# Patient Record
Sex: FEMALE | Race: WHITE | NOT HISPANIC OR LATINO | Employment: UNEMPLOYED | URBAN - NONMETROPOLITAN AREA
[De-identification: names, ages, dates, MRNs, and addresses within clinical notes are randomized per-mention and may not be internally consistent; named-entity substitution may affect disease eponyms.]

---

## 2017-06-05 ENCOUNTER — OFFICE VISIT (OUTPATIENT)
Dept: FAMILY MEDICINE | Facility: OTHER | Age: 42
End: 2017-06-05
Attending: FAMILY MEDICINE
Payer: COMMERCIAL

## 2017-06-05 VITALS
HEIGHT: 64 IN | DIASTOLIC BLOOD PRESSURE: 62 MMHG | WEIGHT: 164 LBS | BODY MASS INDEX: 28 KG/M2 | TEMPERATURE: 98.9 F | HEART RATE: 66 BPM | SYSTOLIC BLOOD PRESSURE: 98 MMHG

## 2017-06-05 DIAGNOSIS — R10.11 RUQ ABDOMINAL PAIN: Primary | ICD-10-CM

## 2017-06-05 DIAGNOSIS — F41.1 GAD (GENERALIZED ANXIETY DISORDER): ICD-10-CM

## 2017-06-05 LAB
ALBUMIN SERPL-MCNC: 4.1 G/DL (ref 3.4–5)
ALBUMIN UR-MCNC: NEGATIVE MG/DL
ALP SERPL-CCNC: 118 U/L (ref 40–150)
ALT SERPL W P-5'-P-CCNC: 68 U/L (ref 0–50)
ANION GAP SERPL CALCULATED.3IONS-SCNC: 6 MMOL/L (ref 3–14)
APPEARANCE UR: CLEAR
AST SERPL W P-5'-P-CCNC: 32 U/L (ref 0–45)
BACTERIA #/AREA URNS HPF: ABNORMAL /HPF
BASOPHILS # BLD AUTO: 0 10E9/L (ref 0–0.2)
BASOPHILS NFR BLD AUTO: 0.3 %
BILIRUB SERPL-MCNC: 0.7 MG/DL (ref 0.2–1.3)
BILIRUB UR QL STRIP: NEGATIVE
BUN SERPL-MCNC: 11 MG/DL (ref 7–30)
CALCIUM SERPL-MCNC: 8.7 MG/DL (ref 8.5–10.1)
CHLORIDE SERPL-SCNC: 105 MMOL/L (ref 94–109)
CO2 SERPL-SCNC: 27 MMOL/L (ref 20–32)
COLOR UR AUTO: ABNORMAL
CREAT SERPL-MCNC: 0.71 MG/DL (ref 0.52–1.04)
DIFFERENTIAL METHOD BLD: NORMAL
EOSINOPHIL # BLD AUTO: 0.1 10E9/L (ref 0–0.7)
EOSINOPHIL NFR BLD AUTO: 1.5 %
ERYTHROCYTE [DISTWIDTH] IN BLOOD BY AUTOMATED COUNT: 12.2 % (ref 10–15)
GFR SERPL CREATININE-BSD FRML MDRD: 89 ML/MIN/1.7M2
GLUCOSE SERPL-MCNC: 100 MG/DL (ref 70–99)
GLUCOSE UR STRIP-MCNC: NEGATIVE MG/DL
HCT VFR BLD AUTO: 41.8 % (ref 35–47)
HGB BLD-MCNC: 14.7 G/DL (ref 11.7–15.7)
HGB UR QL STRIP: NEGATIVE
IMM GRANULOCYTES # BLD: 0 10E9/L (ref 0–0.4)
IMM GRANULOCYTES NFR BLD: 0.3 %
KETONES UR STRIP-MCNC: NEGATIVE MG/DL
LEUKOCYTE ESTERASE UR QL STRIP: NEGATIVE
LIPASE SERPL-CCNC: 226 U/L (ref 73–393)
LYMPHOCYTES # BLD AUTO: 1.5 10E9/L (ref 0.8–5.3)
LYMPHOCYTES NFR BLD AUTO: 20.2 %
MCH RBC QN AUTO: 30 PG (ref 26.5–33)
MCHC RBC AUTO-ENTMCNC: 35.2 G/DL (ref 31.5–36.5)
MCV RBC AUTO: 85 FL (ref 78–100)
MONOCYTES # BLD AUTO: 0.5 10E9/L (ref 0–1.3)
MONOCYTES NFR BLD AUTO: 7.1 %
MUCOUS THREADS #/AREA URNS LPF: PRESENT /LPF
NEUTROPHILS # BLD AUTO: 5.2 10E9/L (ref 1.6–8.3)
NEUTROPHILS NFR BLD AUTO: 70.6 %
NITRATE UR QL: NEGATIVE
NRBC # BLD AUTO: 0 10*3/UL
NRBC BLD AUTO-RTO: 0 /100
PH UR STRIP: 6 PH (ref 4.7–8)
PLATELET # BLD AUTO: 209 10E9/L (ref 150–450)
POTASSIUM SERPL-SCNC: 3.8 MMOL/L (ref 3.4–5.3)
PROT SERPL-MCNC: 7.5 G/DL (ref 6.8–8.8)
RBC # BLD AUTO: 4.9 10E12/L (ref 3.8–5.2)
RBC #/AREA URNS AUTO: 1 /HPF (ref 0–2)
SODIUM SERPL-SCNC: 138 MMOL/L (ref 133–144)
SP GR UR STRIP: 1.01 (ref 1–1.03)
SQUAMOUS #/AREA URNS AUTO: 2 /HPF (ref 0–1)
URN SPEC COLLECT METH UR: ABNORMAL
UROBILINOGEN UR STRIP-MCNC: NORMAL MG/DL (ref 0–2)
WBC # BLD AUTO: 7.4 10E9/L (ref 4–11)
WBC #/AREA URNS AUTO: 1 /HPF (ref 0–2)

## 2017-06-05 PROCEDURE — 80053 COMPREHEN METABOLIC PANEL: CPT | Mod: ZL | Performed by: FAMILY MEDICINE

## 2017-06-05 PROCEDURE — 81001 URINALYSIS AUTO W/SCOPE: CPT | Mod: ZL | Performed by: FAMILY MEDICINE

## 2017-06-05 PROCEDURE — 99214 OFFICE O/P EST MOD 30 MIN: CPT | Performed by: FAMILY MEDICINE

## 2017-06-05 PROCEDURE — 83690 ASSAY OF LIPASE: CPT | Mod: ZL | Performed by: FAMILY MEDICINE

## 2017-06-05 PROCEDURE — 85025 COMPLETE CBC W/AUTO DIFF WBC: CPT | Mod: ZL | Performed by: FAMILY MEDICINE

## 2017-06-05 PROCEDURE — 99212 OFFICE O/P EST SF 10 MIN: CPT

## 2017-06-05 PROCEDURE — 36415 COLL VENOUS BLD VENIPUNCTURE: CPT | Mod: ZL | Performed by: FAMILY MEDICINE

## 2017-06-05 ASSESSMENT — ANXIETY QUESTIONNAIRES
6. BECOMING EASILY ANNOYED OR IRRITABLE: SEVERAL DAYS
3. WORRYING TOO MUCH ABOUT DIFFERENT THINGS: SEVERAL DAYS
5. BEING SO RESTLESS THAT IT IS HARD TO SIT STILL: SEVERAL DAYS
IF YOU CHECKED OFF ANY PROBLEMS ON THIS QUESTIONNAIRE, HOW DIFFICULT HAVE THESE PROBLEMS MADE IT FOR YOU TO DO YOUR WORK, TAKE CARE OF THINGS AT HOME, OR GET ALONG WITH OTHER PEOPLE: SOMEWHAT DIFFICULT
2. NOT BEING ABLE TO STOP OR CONTROL WORRYING: SEVERAL DAYS
GAD7 TOTAL SCORE: 8
1. FEELING NERVOUS, ANXIOUS, OR ON EDGE: MORE THAN HALF THE DAYS
7. FEELING AFRAID AS IF SOMETHING AWFUL MIGHT HAPPEN: SEVERAL DAYS

## 2017-06-05 ASSESSMENT — PATIENT HEALTH QUESTIONNAIRE - PHQ9: 5. POOR APPETITE OR OVEREATING: SEVERAL DAYS

## 2017-06-05 ASSESSMENT — PAIN SCALES - GENERAL: PAINLEVEL: SEVERE PAIN (7)

## 2017-06-05 NOTE — MR AVS SNAPSHOT
"              After Visit Summary   2017    Lenora Montelongo    MRN: [id number]           Patient Information     Date Of Birth          1975        Visit Information        Provider Department      2017 4:00 PM Duncan Moore MD [facility]        Today's Diagnoses     RUQ abdominal pain    -  1       Follow-ups after your visit        Who to contact     If you have questions or need follow up information about today's clinic visit or your schedule please contact [facility] directly at [phone].  Normal or non-critical lab and imaging results will be communicated to you by MyChart, letter or phone within 4 business days after the clinic has received the results. If you do not hear from us within 7 days, please contact the clinic through [facility] or phone. If you have a critical or abnormal lab result, we will notify you by phone as soon as possible.  Submit refill requests through [employer] or call your pharmacy and they will forward the refill request to us. Please allow 3 business days for your refill to be completed.          Additional Information About Your Visit        MyChart Information     [employer] lets you send messages to your doctor, view your test results, renew your prescriptions, schedule appointments and more. To sign up, go to [email or web address] . Click on \"Log in\" on the left side of the screen, which will take you to the Welcome page. Then click on \"Sign up Now\" on the right side of the page.     You will be asked to enter the access code listed below, as well as some personal information. Please follow the directions to create your username and password.     Your access code is: [email or web address]  Expires: 9/3/2017  4:22 PM     Your access code will  in 90 days. If you need help or a new code, please call your [facility] or [phone].        Care EveryWhere ID     This is your Care EveryWhere ID. This could be used by other organizations to " "access your [facility] medical records  [id number]        Your Vitals Were     Pulse Temperature Height BMI (Body Mass Index)          66 98.9  F (37.2  C) 5' 3.5\" (1.613 m) 28.6 kg/m2         Blood Pressure from Last 3 Encounters:   06/05/17 98/62   11/29/16 100/60   11/22/16 94/62    Weight from Last 3 Encounters:   06/05/17 164 lb (74.4 kg)   11/29/16 164 lb (74.4 kg)   11/22/16 164 lb (74.4 kg)              We Performed the Following     CBC with platelets differential     Comprehensive metabolic panel     Lipase     UA with Microscopic reflex to Culture     US Abdomen Limited        Primary Care Provider Office Phone # Fax #    Duncan Moore [phone]       [facility] [address] [facility] [address] [facility] [address] [facility]  [place] [place] [address]        Thank you!     Thank you for choosing [facility]  for your care. Our goal is always to provide you with excellent care. Hearing back from our patients is one way we can continue to improve our services. Please take a few minutes to complete the written survey that you may receive in the mail after your visit with us. Thank you!             Your Updated Medication List - Protect others around you: Learn how to safely use, store and throw away your medicines at www.disposemymeds.org.          This list is accurate as of: 6/5/17  4:22 PM.  Always use your most recent med list.                   Brand Name Dispense Instructions for use    LANsoprazole 30 MG CR capsule    PREVACID    90 capsule    Take 1 capsule (30 mg) by mouth daily Take 30-60 minutes before a meal.       sertraline 100 MG tablet    ZOLOFT    90 tablet    Take 1 tablet (100 mg) by mouth daily 1/2 tab daily for 1 week then go to 1 tab daily       ZANTAC PO      Take 150 mg by mouth 2 times daily         "

## 2017-06-05 NOTE — NURSING NOTE
"Chief Complaint   Patient presents with     Abdominal Pain       Initial BP 98/62  Pulse 66  Temp 98.9  F (37.2  C)  Ht 5' 3.5\" (1.613 m)  Wt 164 lb (74.4 kg)  BMI 28.6 kg/m2 Estimated body mass index is 28.6 kg/(m^2) as calculated from the following:    Height as of this encounter: 5' 3.5\" (1.613 m).    Weight as of this encounter: 164 lb (74.4 kg).  Medication Reconciliation: complete     Andrea Pulliam      "

## 2017-06-05 NOTE — PATIENT INSTRUCTIONS
*Abdominal Pain, Unknown Cause (Female)    The exact cause of your abdominal (stomach) pain is not certain. This does not mean that this is something to worry about, or the right tests were not done. Everyone likes to know the exact cause of the problem, but sometimes with abdominal pain, there is no clear-cut cause, and this could be a good thing. The good news is that your symptoms can be treated, and you will feel better.   Your condition does not seem serious now; however, sometimes the signs of a serious problem may take more time to appear. For this reason, it is important for you to watch for any new symptoms, problems, or worsening of your condition.  Over the next few days, the abdominal pain may come and go, or be continuous. Other common symptoms can include nausea and vomiting. Sometimes it can be difficult to tell if you feel nauseous, you may just feel bad and not associate that feeling with nausea. Constipation, diarrhea, and a fever may go along with the pain.  The pain may continue even if treated correctly over the following days. Depending on how things go, sometimes the cause can become clear and may require further or different treatment. Additional evaluations, medications, or tests may be needed.  Home care  Your health care provider may prescribe medications for pain, symptoms, or an infection.  Follow the health care provider's instructions for taking these medications.  General care    Rest until your next exam. No strenuous activities.    Try to find positions that ease discomfort. A small pillow placed on the abdomen may help relieve pain.    Something warm on your abdomen (such as a heating pad) may help, but be careful not to burn yourself.  Diet    Do not force yourself to eat, especially if having cramps, vomiting, or diarrhea.    Water is important so you do not get dehydrated. Soup may also be good. Sports drinks may also help, especially if they are not too acidic. Make sure you  don't drink sugary drinks as this can make things worse. Take liquids in small amounts. Do not guzzle them.    Caffeine sometimes makes the pain and cramping worse.    Avoid dairy products if you have vomiting or diarrhea.    Don't eat large amounts at a time. Wait a few minutes between bites.    Eat a diet low in fiber (called a low-residue diet). Foods allowed include refined breads, white rice, fruit and vegetable juices without pulp, tender meats. These foods will pass more easily through the intestine.    Avoid fried or fatty foods, dairy, alcohol and spicy foods until your symptoms go away.  Follow-up care  Follow up with your health care provider as instructed, or if your pain does not begin to improve in the next 24 hours.  When to seek medical care  Seek prompt medical care if any of the following occur:    Pain gets worse or moves to the right lower abdomen    New or worsening vomiting or diarrhea    Swelling of the abdomen    Unable to pass stool for more than three days    New fever over 101  F (38.3 C), or rising fever    Blood in vomit or bowel movements (dark red or black color)    Jaundice (yellow color of eyes and skin)    Weakness, dizziness    Chest, arm, back, neck or jaw pain    Unexpected vaginal bleeding or missed period  Call 911  Call emergency services if any of the following occur:    Trouble breathing    Confusion    Fainting or loss of consciousness    Rapid heart rate    Seizure    [id number] [facility], [address] [place] [address], [place] [address]. All rights reserved. This information is not intended as a substitute for professional medical care. Always follow your healthcare professional's instructions.

## 2017-06-05 NOTE — PROGRESS NOTES
"  SUBJECTIVE:                                                    Lenora Montelongo is a 42 year old female who presents to clinic today for the following health issues:      Abdominal Pain      Duration: 2 months    Description (location/character/radiation): upper right side into back       Associated flank pain: None    Intensity:  moderate, severe    Accompanying signs and symptoms:        Fever/Chills: no        Gas/Bloating: YES       Nausea/vomitting: YES- nausea after eating       Diarrhea: YES       Dysuria or Hematuria: no     History (previous similar pain/trauma/previous testing): none    Precipitating or alleviating factors:       Pain worse with eating/BM/urination: with eating       Pain relieved by BM: no     Therapies tried and outcome: None    LMP:  not applicable    Increase nausea with eating     Pain comes and goes- can last for days    No f/c/constipation    Some diarrhea    Still has GB     No GERD-  On PPI    2014 had US and HIDA-- feels like this different pain.        C/o increase anxiety - lots of issues with family  Taking zoloft- wondering about counseling  Depression ok       Problem list and histories reviewed & adjusted, as indicated.  Additional history: as documented    Labs reviewed in EPIC    Reviewed and updated as needed this visit by clinical staff  Tobacco  Allergies  Meds  Med Hx  Surg Hx  Fam Hx  Soc Hx      Reviewed and updated as needed this visit by Provider         ROS:  C: NEGATIVE for fever, chills, change in weight  R: NEGATIVE for significant cough or SOB  CV: NEGATIVE for chest pain, palpitations or peripheral edema    OBJECTIVE:                                                    BP 98/62  Pulse 66  Temp 98.9  F (37.2  C)  Ht 5' 3.5\" (1.613 m)  Wt 164 lb (74.4 kg)  BMI 28.6 kg/m2  Body mass index is 28.6 kg/(m^2).   GENERAL: healthy, alert, well nourished, well hydrated, no distress  NECK: no tenderness, no adenopathy, no asymmetry, no masses, no stiffness; " thyroid- normal to palpation  RESP: lungs clear to auscultation - no rales, no rhonchi, no wheezes  CV: regular rates and rhythm, normal S1 S2, no S3 or S4 and no murmur, no click or rub -  ABDOMEN: soft, RMG/RUQ/epigastric  tenderness, no  hepatosplenomegaly, no masses, normal bowel sounds  Psych - overall doing well in the changes she is making  to help self. Minimal teary eyed which is good for her.         Results for orders placed or performed in visit on 06/05/17 (from the past 24 hour(s))   CBC with platelets differential   Result Value Ref Range    WBC 7.4 4.0 - 11.0 10e9/L    RBC Count 4.90 3.8 - 5.2 10e12/L    Hemoglobin 14.7 11.7 - 15.7 g/dL    Hematocrit 41.8 35.0 - 47.0 %    MCV 85 78 - 100 fl    MCH 30.0 26.5 - 33.0 pg    MCHC 35.2 31.5 - 36.5 g/dL    RDW 12.2 10.0 - 15.0 %    Platelet Count 209 150 - 450 10e9/L    Diff Method Automated Method     % Neutrophils 70.6 %    % Lymphocytes 20.2 %    % Monocytes 7.1 %    % Eosinophils 1.5 %    % Basophils 0.3 %    % Immature Granulocytes 0.3 %    Nucleated RBCs 0 0 /100    Absolute Neutrophil 5.2 1.6 - 8.3 10e9/L    Absolute Lymphocytes 1.5 0.8 - 5.3 10e9/L    Absolute Monocytes 0.5 0.0 - 1.3 10e9/L    Absolute Eosinophils 0.1 0.0 - 0.7 10e9/L    Absolute Basophils 0.0 0.0 - 0.2 10e9/L    Abs Immature Granulocytes 0.0 0 - 0.4 10e9/L    Absolute Nucleated RBC 0.0    UA with Microscopic reflex to Culture   Result Value Ref Range    Color Urine Light Yellow     Appearance Urine Clear     Glucose Urine Negative NEG mg/dL    Bilirubin Urine Negative NEG    Ketones Urine Negative NEG mg/dL    Specific Gravity Urine 1.007 1.003 - 1.035    Blood Urine Negative NEG    pH Urine 6.0 4.7 - 8.0 pH    Protein Albumin Urine Negative NEG mg/dL    Urobilinogen mg/dL Normal 0.0 - 2.0 mg/dL    Nitrite Urine Negative NEG    Leukocyte Esterase Urine Negative NEG    Source Midstream Urine     WBC Urine 1 0 - 2 /HPF    RBC Urine 1 0 - 2 /HPF    Bacteria Urine Few (A) NEG /HPF     Squamous Epithelial /HPF Urine 2 (H) 0 - 1 /HPF    Mucous Urine Present (A) NEG /LPF       ASSESSMENT/PLAN:                                                    (R10.11) RUQ abdominal pain  (primary encounter diagnosis)  Comment: etiology unclear.  possible GB vs colitis vs gastritis but on PPI  Plan: CBC with platelets differential, Comprehensive         metabolic panel, Lipase, US Abdomen Limited, UA        with Microscopic reflex to Culture        Work up done above.  IF GB US negative -recommend HIDA scan. [place] diet for now. Symptomatic treatment was discussed along when patient should call and/or come back into the clinic or go to ER/Urgent care. All questions answered.       (F41.1) [place] (generalized anxiety disorder)  Comment: discussed at length. Lots of good things she is doing  Need to keep from getting taken advantage of by her kids.   Plan: No change in meds. Yes would recommend counseling. Needs to be done late in day due to work.  Phones numbers to call for appt for counseling given    Continue current medications and behavioral changes.       35-40 minutes was spent with patient and over 50%  of this time was spent on counseling patient regarding  illness, medication and / or treatment  options, coordinating further cares and follow ups that are needed along with resource material that will be helpful in the treatment of these issues.         See Patient Instructions    Duncan Moore MD  [facility]

## 2017-06-06 DIAGNOSIS — Z12.31 VISIT FOR SCREENING MAMMOGRAM: Primary | ICD-10-CM

## 2017-06-06 ASSESSMENT — ANXIETY QUESTIONNAIRES: GAD7 TOTAL SCORE: 8

## 2017-06-06 ASSESSMENT — PATIENT HEALTH QUESTIONNAIRE - PHQ9: SUM OF ALL RESPONSES TO PHQ QUESTIONS 1-9: 0

## 2017-06-08 ENCOUNTER — HOSPITAL ENCOUNTER (OUTPATIENT)
Dept: ULTRASOUND IMAGING | Facility: HOSPITAL | Age: 42
Discharge: HOME | End: 2017-06-08
Attending: FAMILY MEDICINE | Admitting: FAMILY MEDICINE
Payer: COMMERCIAL

## 2017-06-08 PROCEDURE — 76705 ECHO EXAM OF ABDOMEN: CPT | Mod: TC

## 2017-07-14 DIAGNOSIS — K21.9 GASTROESOPHAGEAL REFLUX DISEASE WITHOUT ESOPHAGITIS: ICD-10-CM

## 2017-07-18 RX ORDER — LANSOPRAZOLE 30 MG/1
CAPSULE, DELAYED RELEASE ORAL
Qty: 90 CAPSULE | Refills: 2 | Status: SHIPPED | OUTPATIENT
Start: 2017-07-18 | End: 2017-08-25

## 2017-08-25 ENCOUNTER — TELEPHONE (OUTPATIENT)
Dept: FAMILY MEDICINE | Facility: OTHER | Age: 42
End: 2017-08-25

## 2017-08-25 DIAGNOSIS — K21.9 GASTROESOPHAGEAL REFLUX DISEASE WITHOUT ESOPHAGITIS: ICD-10-CM

## 2017-08-25 RX ORDER — LANSOPRAZOLE 30 MG/1
CAPSULE, DELAYED RELEASE ORAL
Qty: 90 CAPSULE | Refills: 2 | Status: SHIPPED | OUTPATIENT
Start: 2017-08-25 | End: 2017-11-13

## 2017-08-25 NOTE — TELEPHONE ENCOUNTER
Contacted insurance company to initiate prior authorization. Will take up to 45 calender days to receive a denial or an approval. Mame Ritter LPN

## 2017-09-05 NOTE — TELEPHONE ENCOUNTER
DENIAL - on 9/3/2017, received DENIAL from [facility] for Lansoprazole.  1 capsule per day for a max of 120 days within 365 days.  This applies to ALL PPI's under this patient's plan.  Forms scanned to Epic.  Leydi Posey, HIS Specialist.

## 2017-11-08 ENCOUNTER — TELEPHONE (OUTPATIENT)
Dept: FAMILY MEDICINE | Facility: OTHER | Age: 42
End: 2017-11-08

## 2017-11-08 NOTE — TELEPHONE ENCOUNTER
11:55 AM    Reason for Call: Phone Call    Description: Pt would like nurse to call back. Personal reasons.    Was an appointment offered for this call? No  If yes : Appointment type              Date    Preferred method for responding to this message: Telephone Call  What is your phone number ? [phone]    If we cannot reach you directly, may we leave a detailed response at the number you provided? Yes    Can this message wait until your PCP/provider returns, if available today? No, would like call today    Alba Domingo

## 2017-11-08 NOTE — TELEPHONE ENCOUNTER
2:11 PM    Reason for Call: OVERBOOK    Patient is having the following symptoms: depression for 2 weeks.    The patient is requesting an appointment for ASAP with .    Was an appointment offered for this call? Yes  If yes : Appointment type              Date    Preferred method for responding to this message: Telephone Call  What is your phone number ?    If we cannot reach you directly, may we leave a detailed response at the number you provided? Yes    Can this message wait until your PCP/provider returns, if unavailable today? Not applicable,     Cyndie Lazar

## 2017-11-13 ENCOUNTER — OFFICE VISIT (OUTPATIENT)
Dept: FAMILY MEDICINE | Facility: OTHER | Age: 42
End: 2017-11-13
Attending: FAMILY MEDICINE
Payer: COMMERCIAL

## 2017-11-13 VITALS
OXYGEN SATURATION: 98 % | DIASTOLIC BLOOD PRESSURE: 60 MMHG | HEIGHT: 64 IN | TEMPERATURE: 97.5 F | SYSTOLIC BLOOD PRESSURE: 92 MMHG | WEIGHT: 164 LBS | HEART RATE: 78 BPM | BODY MASS INDEX: 28 KG/M2

## 2017-11-13 DIAGNOSIS — G44.201 ACUTE INTRACTABLE TENSION-TYPE HEADACHE: ICD-10-CM

## 2017-11-13 DIAGNOSIS — M77.11 LATERAL EPICONDYLITIS OF RIGHT ELBOW: Primary | ICD-10-CM

## 2017-11-13 DIAGNOSIS — F43.23 ADJUSTMENT DISORDER WITH MIXED ANXIETY AND DEPRESSED MOOD: ICD-10-CM

## 2017-11-13 DIAGNOSIS — Z63.79 STRESS DUE TO ILLNESS OF FAMILY MEMBER: ICD-10-CM

## 2017-11-13 PROCEDURE — 99212 OFFICE O/P EST SF 10 MIN: CPT

## 2017-11-13 PROCEDURE — 99214 OFFICE O/P EST MOD 30 MIN: CPT | Performed by: FAMILY MEDICINE

## 2017-11-13 RX ORDER — CITALOPRAM HYDROBROMIDE 40 MG/1
TABLET ORAL
Qty: 30 TABLET | Refills: 1 | Status: SHIPPED | OUTPATIENT
Start: 2017-11-13 | End: 2017-11-29

## 2017-11-13 ASSESSMENT — ANXIETY QUESTIONNAIRES
3. WORRYING TOO MUCH ABOUT DIFFERENT THINGS: NEARLY EVERY DAY
1. FEELING NERVOUS, ANXIOUS, OR ON EDGE: NEARLY EVERY DAY
7. FEELING AFRAID AS IF SOMETHING AWFUL MIGHT HAPPEN: NEARLY EVERY DAY
5. BEING SO RESTLESS THAT IT IS HARD TO SIT STILL: NEARLY EVERY DAY
4. TROUBLE RELAXING: NEARLY EVERY DAY
2. NOT BEING ABLE TO STOP OR CONTROL WORRYING: NEARLY EVERY DAY
6. BECOMING EASILY ANNOYED OR IRRITABLE: NEARLY EVERY DAY
GAD7 TOTAL SCORE: 21

## 2017-11-13 ASSESSMENT — PAIN SCALES - GENERAL: PAINLEVEL: MODERATE PAIN (5)

## 2017-11-13 ASSESSMENT — PATIENT HEALTH QUESTIONNAIRE - PHQ9: SUM OF ALL RESPONSES TO PHQ QUESTIONS 1-9: 24

## 2017-11-13 NOTE — NURSING NOTE
"Chief Complaint   Patient presents with     Depression       Initial BP 92/60 (BP Location: Right arm, Patient Position: Chair, Cuff Size: Adult Regular)  Pulse 78  Temp 97.5  F (36.4  C) (Tympanic)  Ht 5' 3.5\" (1.613 m)  Wt 164 lb (74.4 kg)  SpO2 98%  BMI 28.6 kg/m2 Estimated body mass index is 28.6 kg/(m^2) as calculated from the following:    Height as of this encounter: 5' 3.5\" (1.613 m).    Weight as of this encounter: 164 lb (74.4 kg).  Medication Reconciliation: complete     Ligia Boateng     "

## 2017-11-13 NOTE — MR AVS SNAPSHOT
After Visit Summary   11/13/2017    Lenora Montelongo    MRN: [id number]           Patient Information     Date Of Birth          1975        Visit Information        Provider Department      11/13/2017 7:45 AM Duncan Moore MD [facility]        Today's Diagnoses     Lateral epicondylitis of right elbow    -  1    Adjustment disorder with mixed anxiety and depressed mood        Acute intractable tension-type headache        Stress due to illness of family member           Follow-ups after your visit        Additional Services     PHYSICAL THERAPY REFERRAL       *This order will print in the [facility] Central Scheduling Office*    [facility] provides Physical Therapy evaluation and treatment and many specialty services across the [facility] system.  If requesting a specialty program, please choose from the list below.    Call [phone] to schedule [facility] at all locations, with the exception of [facility], please call [phone].     Treatment: Evaluation & Treatment  Special Instructions/Modalities: evaluate and treat  May use ionto  Special Programs: Good HEP    Please be aware that coverage of these services is subject to the terms and limitations of your health insurance plan.  Call member services at your health plan with any benefit or coverage questions.      **Note to Provider** To refer patients to therapy outside of the location list, change the order class to External Referral in the order composer.            PSYCHOLOGY REFERRAL       Your provider has referred you to:  [facility]   Please be aware that coverage of these services is subject to the terms and limitations of your health insurance plan.  Call member services at your health plan with any benefit or coverage questions.      Please bring the following to your appointment:    >>   Any x-rays, CTs or MRIs which have been performed.   "Contact the facility where they were done to arrange for  prior to your scheduled appointment.   >>   List of current medications   >>   This referral request   >>   Any documents/labs given to you for this referral                  Your next 10 appointments already scheduled     Dec 11, 2017  3:30 PM CST   (Arrive by 3:15 PM)   SHORT with Duncan Moore MD   [facility]    [place] [address]   [phone]              Who to contact     If you have questions or need follow up information about today's clinic visit or your schedule please contact [facility] directly at [phone].  Normal or non-critical lab and imaging results will be communicated to you by MyChart, letter or phone within 4 business days after the clinic has received the results. If you do not hear from us within 7 days, please contact the clinic through MyChart or phone. If you have a critical or abnormal lab result, we will notify you by phone as soon as possible.  Submit refill requests through [employer] or call your pharmacy and they will forward the refill request to us. Please allow 3 business days for your refill to be completed.          Additional Information About Your Visit        MyChart Information     [employer] lets you send messages to your doctor, view your test results, renew your prescriptions, schedule appointments and more. To sign up, go to [email or web address] . Click on \"Log in\" on the left side of the screen, which will take you to the Welcome page. Then click on \"Sign up Now\" on the right side of the page.     You will be asked to enter the access code listed below, as well as some personal information. Please follow the directions to create your username and password.     Your access code is: [email or web address]  Expires: 2018  8:28 AM     Your access code will  in 90 days. If you need help or a new code, please call your [place] " "clinic or [phone].        Care EveryWhere ID     This is your Care EveryWhere ID. This could be used by other organizations to access your [place] medical records  [id number]        Your Vitals Were     Pulse Temperature Height Pulse Oximetry BMI (Body Mass Index)       78 97.5  F (36.4  C) (Tympanic) 5' 3.5\" (1.613 m) 98% 28.6 kg/m2        Blood Pressure from Last 3 Encounters:   11/13/17 92/60   06/05/17 98/62   11/29/16 100/60    Weight from Last 3 Encounters:   11/13/17 164 lb (74.4 kg)   06/05/17 164 lb (74.4 kg)   11/29/16 164 lb (74.4 kg)              We Performed the Following     PHYSICAL THERAPY REFERRAL     PSYCHOLOGY REFERRAL          Today's Medication Changes          These changes are accurate as of: 11/13/17  8:36 AM.  If you have any questions, ask your nurse or doctor.               Start taking these medicines.        Dose/Directions    ACE TENNIS ELBOW STRAP Misc   Used for:  Lateral epicondylitis of right elbow   Started by:  Duncan Moore MD        Dose:  1 each   1 each daily   Quantity:  1 each   Refills:  0       citalopram 40 MG tablet   Commonly known as:  celeXA   Used for:  Adjustment disorder with mixed anxiety and depressed mood   Started by:  Duncan Moore MD        Take 1/2 tablet (20 mg) for 4-6 days, then increase to 1 tablet orally daily   Quantity:  30 tablet   Refills:  1            Where to get your medicines      These medications were sent to [facility] [address] [place] [address] [place] - [address] AT [facility] [address], [place] [address]     Phone:  [phone]     ACE TENNIS ELBOW STRAP Misc    citalopram 40 MG tablet                Primary Care Provider Office Phone # Fax #    Duncan Moore [phone]       [facility] [address] [facility] [address]  [place] [address]        Equal Access to Services     KYE COLE AH: Hadii aad ku hadasho Soomaali, waaxda luqadaha, qaybta kaalmada ademelani, mely de leon " jose c santizo ah. So [facility] [phone].    ATENCIÓN: Si habla devang, tiene a roper disposición servicios gratuitos de asistencia lingüística. Tyesha al [phone].    We comply with applicable federal civil rights laws and [place] laws. We do not discriminate on the basis of race, color, national origin, age, disability, sex, sexual orientation, or gender identity.            Thank you!     Thank you for choosing [facility]  for your [facility]. Our goal is always to provide you with excellent care. Hearing back from our patients is one way we can continue to improve our services. Please take a few minutes to complete the written survey that you may receive in the mail after your visit with us. Thank you!             Your Updated Medication List - Protect others around you: Learn how to safely use, store and throw away your medicines at www.disposemymeds.org.          This list is accurate as of: 11/13/17  8:36 AM.  Always use your most recent med list.                   Brand Name Dispense Instructions for use Diagnosis    ACE TENNIS ELBOW STRAP Misc     1 each    1 each daily    Lateral epicondylitis of right elbow       citalopram 40 MG tablet    celeXA    30 tablet    Take 1/2 tablet (20 mg) for 4-6 days, then increase to 1 tablet orally daily    Adjustment disorder with mixed anxiety and depressed mood       PRILOSEC PO

## 2017-11-13 NOTE — PROGRESS NOTES
SUBJECTIVE:   Lenora Montelongo is a 42 year old female who presents to clinic today for the following health issues:      Depression and Anxiety Follow-Up    Status since last visit: Worsened     Other associated symptoms:agitation, not sleeping well, loss of appetite     Complicating factors:     Significant life event: No     Current substance abuse: None    Lots of stress with kids     PHQ-9 Score and MyChart F/U Questions 11/22/2016 6/5/2017 11/13/2017   Total Score 13 0 24   Q9: Suicide Ideation Not at all Not at all Not at all     AZ-7 SCORE 11/22/2016 6/5/2017 11/13/2017   Total Score 13 8 21       PHQ-9  English  PHQ-9   Any Language  GAD7  Suicide Assessment Five-step Evaluation and Treatment (SAFE-T)      Amount of exercise or physical activity: None    Problems taking medications regularly: doesn't like to    Medication side effects: none    Diet: regular (no restrictions)        Arm pain      Duration: 3 months    Description (location/character/radiation): fell down the stairs, hurt right arm. Whole arm hurts    Intensity:  moderate    Accompanying signs and symptoms: none    History (similar episodes/previous evaluation): None    Precipitating or alleviating factors: None    Therapies tried and outcome: None    Mainly over lateral right elbow    Hurts to pick anything up      C/o sharp right side head pain over right frontal head region. Last for few minutes and goes away.  Sharp and quick- like ice cream headache - going on for 3 weeks.  2-3 times a week  No pattern    Problem list and histories reviewed & adjusted, as indicated.  Additional history:     Labs reviewed in EPIC    Reviewed and updated as needed this visit by clinical staffTobacco  Allergies  Meds  Med Hx  Surg Hx  Fam Hx  Soc Hx      Reviewed and updated as needed this visit by Provider         ROS:  C: NEGATIVE for fever, chills, change in weight  R: NEGATIVE for significant cough or SOB  CV: NEGATIVE for chest pain,  "palpitations or peripheral edema    OBJECTIVE:                                                    BP 92/60 (BP Location: Right arm, Patient Position: Chair, Cuff Size: Adult Regular)  Pulse 78  Temp 97.5  F (36.4  C) (Tympanic)  Ht 5' 3.5\" (1.613 m)  Wt 164 lb (74.4 kg)  SpO2 98%  BMI 28.6 kg/m2  Body mass index is 28.6 kg/(m^2).   GENERAL: healthy, alert, well nourished, well hydrated, no distress  Heat - minimal  tender over right scalp   RESP: lungs clear to auscultation - no rales, no rhonchi, no wheezes  CV: regular rates and rhythm, normal S1 S2, no S3 or S4 and no murmur, no click or rub -  ABDOMEN: soft, no tenderness, no  hepatosplenomegaly, no masses, normal bowel sounds  MS: extremities- right elbow - tender lateral epicondyl region -- no gross deformities noted, no edema  PSYCH: Alert and oriented times 3; speech- coherent , normal rate and volume; able to articulate logical thoughts, able to abstract reason, no tangential thoughts, no hallucinations or delusions, affect- depressed and anxiety          ASSESSMENT/PLAN:                                                      (M77.11) Lateral epicondylitis of right elbow  (primary encounter diagnosis)  Comment: discussed   Plan: Elastic Bandages & Supports (ACE TENNIS ELBOW         STRAP) MISC, PHYSICAL THERAPY REFERRAL        Discussed in length conservative measures of OTC medications for pain, Ice/Heat, elevation and the concept of R.I.C.E.. Continue behavioral changes and proper body mechanics to allow for healing. Follow up as directed.   Symptomatic treatment was discussed along when patient should call and/or come back into the clinic or go to ER/Urgent care. All questions answered.     (F43.23) Adjustment disorder with mixed anxiety and depressed mood  Comment: long discussion. Will try Celexa   Plan: citalopram (CELEXA) 40 MG tablet        restart counseling.     (G44.201) Acute intractable tension-type headache  Comment: discussed   Plan: Will " watch - work on  Stress reduction.   Symptomatic treatment was discussed along when patient should call and/or come back into the clinic or go to ER/Urgent care. All questions answered.     F/u in 3-4 weeks.    See Patient Instructions    Duncan Moore MD  [facility]

## 2017-11-14 ASSESSMENT — ANXIETY QUESTIONNAIRES: GAD7 TOTAL SCORE: 21

## 2017-11-20 ENCOUNTER — TELEPHONE (OUTPATIENT)
Dept: PHYSICAL THERAPY | Facility: HOSPITAL | Age: 42
End: 2017-11-20

## 2017-11-20 ENCOUNTER — HOSPITAL ENCOUNTER (OUTPATIENT)
Dept: PHYSICAL THERAPY | Facility: HOSPITAL | Age: 42
Setting detail: THERAPIES SERIES
End: 2017-11-20
Attending: FAMILY MEDICINE
Payer: COMMERCIAL

## 2017-11-20 DIAGNOSIS — G89.29 ELBOW PAIN, CHRONIC, RIGHT: Primary | ICD-10-CM

## 2017-11-20 DIAGNOSIS — M25.521 ELBOW PAIN, CHRONIC, RIGHT: Primary | ICD-10-CM

## 2017-11-20 PROCEDURE — 97161 PT EVAL LOW COMPLEX 20 MIN: CPT | Mod: GP | Performed by: PHYSICAL THERAPIST

## 2017-11-20 NOTE — TELEPHONE ENCOUNTER
Hi Dr Moore,   I saw Lenora in clinic today for her R elbow and was wondering if you'd consider ordering a xray to rule out any bony injury.  Due to her mechanism of injury (falling down the stairs) and that she continues to have persistent swelling and pain with multiple motions at her elbow and wrist, I would feel most comfortable ruling out any bony pathology prior to carrying out some of the modalities and interventions I would typically use for tendinopathy and inflammation, as some of these interventions are contraindicated in the presence of a fracture or bony injury.  Please let me know your thoughts.    Thank you,   Sharon Ly DPT, OCS

## 2017-11-20 NOTE — PROGRESS NOTES
11/20/17 1100   General Information   Type of Visit Initial OP Ortho PT Evaluation   Start of Care Date 11/20/17   Referring Physician Dr Moore   Patient/Family Goals Statement less pain, normal function   Orders Evaluate and Treat   Date of Order 11/13/17   Insurance Type [employer]   Insurance Comments/Visits Authorized up to 40 visits   Medical Diagnosis R elbow pain   Surgical/Medical history reviewed Yes   Precautions/Limitations no known precautions/limitations   Special Instructions Contacted physician about xray due to mechanism of injury   Body Part(s)   Body Part(s) Elbow/Wrist   Presentation and Etiology   Pertinent history of current problem (include personal factors and/or comorbidities that impact the POC) Patient reports she fell carrying a chair on the stairs in September. She hit her elbow and experienced immediate pain and swelling and bruising. She has noted continued swellin and pain over the region the past few months. At her physician follow-up for depression, she was given an order for PT and a forearm strap to address her ongoing pain. She did not have imaging performed prior to coming to PT. 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 Pain;B. Decreased WB tolerance;C. Swelling;J. Burning;L. Tingling   Functional Limitations perform required work activities;perform activities of daily living   Symptom Location R elbow - throughout but most prominent R posterior medial aspect   How/Where did it occur With a fall   Onset date of current episode/exacerbation 11/13/17  (physician order date)   Chronicity New   Pain rating (0-10 point scale) Best (/10);Worst (/10)   Best (/10) 5   Worst (/10) 9   Pain quality A. Sharp;B. Dull;C. Aching;D. Burning   Frequency of pain/symptoms A. Constant   Pain/symptoms are: Worse during the day   Pain/symptoms exacerbated by C. Lifting;D.  Carrying;J. ADL;K. Home tasks;L. Work tasks   Pain/symptoms eased by J. Braces/supports   Progression of symptoms since onset: Unchanged   Current / Previous Interventions   Diagnostic Tests: (None)   Current Level of Function   Patient role/employment history A. Employed   Employment Comments  provider   Living environment [facility]   Fall Risk Screen   Fall screen completed by PT   Have you fallen 2 or more times in the past year? No   Have you fallen and had an injury in the past year? No   Is patient a fall risk? No   Elbow/Wrist Objective Findings   Side (if bilateral, select both right and left) Right   Observation no acute distress   Integumentary  Mild-mod swelling appreciated around extensor bundle   Posture WNL   Cervical Screen ROM WNL   Cervical Screen ULTT #1 neg   Varus Stress Test neg   Valgus Stress Test neg   Palpation TTP throughout extensor bundle, ECRB, medial elbow - grossly tender throughout upper arm and into forearm. Special tests unable to get reliable answer as many things were painful   Accessory Motion/Joint Mobility Pain with mobility testing of radial head and with radio-ulnar joint   Right Elbow Flexion/Extension AROM Full FLEX, limited EXT by 4 degrees   Right Wrist Flexion AROM min duran + pain   Right Wrist Extension AROM min duran + pain   Right Wrist Radial Deviation AROM min duran + pain   Right Wrist Ulnar Deviation AROM min duran + pain   Right Wrist Supination AROM WNL + pull   Right Wrist Pronation AROM WNL + pull   Right Biceps Brachii Strength 4/5 + pain   Right Brachialis Strength 4+/5 + pain   Right Brachioradialis Strength 4+/5 + pain   Right Triceps Brachii Strength 5-/5 pain   Right Flexor Carpi Radialis Strength 4/5 pain   Right Flexor Carpi Ulnaris Strength 5/5 pain   Right Extensor Carpi Radialis Brevis Strength 5-/5 pain   Right  Strength (lbs) 20 lbs R, 42 lbs L (R hand dominant)   Planned Therapy Interventions   Planned Therapy Interventions joint  mobilization;manual therapy;ROM;strengthening;stretching   Planned Modality Interventions   Planned Modality Interventions Cryotherapy;Electrical stimulation;Iontophoresis;TENS;Ultrasound   Planned Modality Interventions Comments if bony pathology is ruled out   Clinical Impression   Criteria for Skilled Therapeutic Interventions Met yes, treatment indicated   PT Diagnosis R elbow pain, possible tendinopathy but need to rule out bony pathology   Influenced by the following impairments pain and swelling   Functional limitations due to impairments difficulty performing home and work tasks   Clinical Presentation Stable/Uncomplicated   Clinical Presentation Rationale due to lack of additional comorbidities   Clinical Decision Making (Complexity) Low complexity   Therapy Frequency 2 times/Week   Predicted Duration of Therapy Intervention (days/wks) up to 6 weeks   Risk & Benefits of therapy have been explained Yes   Patient, Family & other staff in agreement with plan of care Yes   Clinical Impression Comments Presentation is consistent with R elbow pain associated with trauma with a fall. She would benefit from xray imaging to rule out bony pathology to ensure she will benefit from skilled PT intervention   Education Assessment   Preferred Learning Style Demonstration   Barriers to Learning No barriers   ORTHO GOALS   PT Ortho Eval Goals 1;2;3   Ortho Goal 1   Goal Identifier STG 1   Goal Description Patient will report decreased worst PL in R elbow to 6/10 or less in order to perform home tasks   Target Date 12/11/17   Ortho Goal 2   Goal Identifier LTG 1   Goal Description Patient will demonstrate increased  strength by 10 lbs in order to improve function at home of her R elbow/arm   Target Date 01/01/18   Ortho Goal 3   Goal Identifier LTG 2   Goal Description Patient will report overall 80% or better improvement in her R elbow symptoms in order to return to all home and worst duties   Target Date 01/01/18    Total Evaluation Time   Total Evaluation Time 30

## 2017-11-24 ENCOUNTER — APPOINTMENT (OUTPATIENT)
Dept: GENERAL RADIOLOGY | Facility: OTHER | Age: 42
End: 2017-11-24
Attending: FAMILY MEDICINE
Payer: COMMERCIAL

## 2017-11-24 DIAGNOSIS — G89.29 ELBOW PAIN, CHRONIC, RIGHT: ICD-10-CM

## 2017-11-24 DIAGNOSIS — M25.521 ELBOW PAIN, CHRONIC, RIGHT: ICD-10-CM

## 2017-11-24 PROCEDURE — 73070 X-RAY EXAM OF ELBOW: CPT | Mod: TC,LT

## 2017-11-26 ENCOUNTER — HEALTH MAINTENANCE LETTER (OUTPATIENT)
Age: 42
End: 2017-11-26

## 2017-11-29 ENCOUNTER — HOSPITAL ENCOUNTER (EMERGENCY)
Facility: HOSPITAL | Age: 42
Discharge: HOME | End: 2017-11-29
Attending: NURSE PRACTITIONER | Admitting: NURSE PRACTITIONER
Payer: COMMERCIAL

## 2017-11-29 VITALS
RESPIRATION RATE: 16 BRPM | DIASTOLIC BLOOD PRESSURE: 74 MMHG | SYSTOLIC BLOOD PRESSURE: 112 MMHG | OXYGEN SATURATION: 100 % | HEART RATE: 94 BPM | TEMPERATURE: 98.1 F

## 2017-11-29 DIAGNOSIS — R30.0 DYSURIA: ICD-10-CM

## 2017-11-29 LAB
ALBUMIN UR-MCNC: 30 MG/DL
APPEARANCE UR: ABNORMAL
BACTERIA #/AREA URNS HPF: ABNORMAL /HPF
BILIRUB UR QL STRIP: NEGATIVE
COLOR UR AUTO: YELLOW
GLUCOSE UR STRIP-MCNC: NEGATIVE MG/DL
HGB UR QL STRIP: ABNORMAL
KETONES UR STRIP-MCNC: NEGATIVE MG/DL
LEUKOCYTE ESTERASE UR QL STRIP: ABNORMAL
MUCOUS THREADS #/AREA URNS LPF: PRESENT /LPF
NITRATE UR QL: NEGATIVE
PH UR STRIP: 7.5 PH (ref 4.7–8)
RBC #/AREA URNS AUTO: 27 /HPF (ref 0–2)
SOURCE: ABNORMAL
SP GR UR STRIP: 1.02 (ref 1–1.03)
SQUAMOUS #/AREA URNS AUTO: 15 /HPF (ref 0–1)
UROBILINOGEN UR STRIP-MCNC: NORMAL MG/DL (ref 0–2)
WBC #/AREA URNS AUTO: 174 /HPF (ref 0–2)

## 2017-11-29 PROCEDURE — 81001 URINALYSIS AUTO W/SCOPE: CPT | Performed by: NURSE PRACTITIONER

## 2017-11-29 PROCEDURE — 99213 OFFICE O/P EST LOW 20 MIN: CPT | Performed by: NURSE PRACTITIONER

## 2017-11-29 PROCEDURE — 99213 OFFICE O/P EST LOW 20 MIN: CPT

## 2017-11-29 PROCEDURE — 87086 URINE CULTURE/COLONY COUNT: CPT | Performed by: NURSE PRACTITIONER

## 2017-11-29 RX ORDER — NITROFURANTOIN 25; 75 MG/1; MG/1
100 CAPSULE ORAL 2 TIMES DAILY
Qty: 14 CAPSULE | Refills: 0 | Status: SHIPPED | OUTPATIENT
Start: 2017-11-29 | End: 2017-12-06

## 2017-11-29 RX ORDER — PHENAZOPYRIDINE HYDROCHLORIDE 200 MG/1
200 TABLET, FILM COATED ORAL 3 TIMES DAILY
Qty: 9 TABLET | Refills: 0 | Status: SHIPPED | OUTPATIENT
Start: 2017-11-29 | End: 2017-12-02

## 2017-11-29 ASSESSMENT — ENCOUNTER SYMPTOMS
FATIGUE: 0
COUGH: 0
ACTIVITY CHANGE: 0
DIARRHEA: 0
CHILLS: 0
ABDOMINAL PAIN: 0
HEMATURIA: 0
NAUSEA: 0
VOMITING: 0
FLANK PAIN: 0
APPETITE CHANGE: 0
PSYCHIATRIC NEGATIVE: 1
FREQUENCY: 1
FEVER: 0
DYSURIA: 1
TROUBLE SWALLOWING: 0

## 2017-11-29 NOTE — ED PROVIDER NOTES
History     Chief Complaint   Patient presents with     UTI     The history is provided by the patient. No  was used.     Lenora Montelongo is a 42 year old female who presents with burning, frequency, and urgency with urination that started 2 days ago. No interventions for symptoms. Denies fever, chills, or night sweats. Eating and drinking well. Bowel is working well. No antibiotic use in the past 30 days.       Problem List:    Patient Active Problem List    Diagnosis Date Noted     Acute intractable tension-type headache 11/13/2017     Priority: Medium     AZ (generalized anxiety disorder) 06/05/2017     Priority: Medium     Prolonged grief reaction 11/01/2016     Priority: Medium     Major depressive disorder, recurrent episode, moderate (H) 11/01/2016     Priority: Medium     ACP (advance care planning) 08/24/2016     Priority: Medium     Advance Care Planning 8/24/2016: ACP Review of Chart / Resources Provided:  Reviewed chart for advance care plan.  Lenora Montelongo has no plan or code status on file. Discussed available resources and provided with information.   Added by Andrea Pulliam             Gastroesophageal reflux disease without esophagitis 04/08/2016     Priority: Medium     Helicobacter positive gastritis      Priority: Medium     Midline low back pain without sciatica 01/13/2016     Priority: Medium     Stress due to illness of family member 12/10/2014     Priority: Medium     OCD (obsessive compulsive disorder)      Priority: Medium        Past Medical History:    Past Medical History:   Diagnosis Date     Adjustment disorder with anxiety      Constipation 07/17/2012     Dysmenorrhea 06/20/2002     Dyspareunia 06/13/2007     Dysplasia of cervix (uteri)  07/25/2000     Endometriosis of uterus 12/29/2003     Esophageal reflux 02/07/2000     Helicobacter positive gastritis      IBS (Irritable colon syndrome) 07/06/2011     OCD (obsessive compulsive disorder)        Past Surgical  History:    Past Surgical History:   Procedure Laterality Date     bladder reconstruction and mesh removal  2012     BLADDER REPAIR  2010     colposcopy with biopsy-mutliple  10/2014     ROEL/LSO       TUBAL LIGATION         Family History:    Family History   Problem Relation Age of Onset     Other - See Comments Paternal Grandmother      Antitrysin Deficiency     CANCER Mother      Cervical     Cancer - colorectal Maternal Grandfather        Social History:  Marital Status:   [4]  Social History   Substance Use Topics     Smoking status: Never Smoker     Smokeless tobacco: Never Used     Alcohol use 0.0 oz/week      Comment: RARELY        Medications:      nitroFURantoin, macrocrystal-monohydrate, (MACROBID) 100 MG capsule   phenazopyridine (PYRIDIUM) 200 MG tablet   Omeprazole (PRILOSEC PO)   Elastic Bandages & Supports (ACE TENNIS ELBOW STRAP) MISC         Review of Systems   Constitutional: Negative for activity change, appetite change, chills, fatigue and fever.   HENT: Negative for trouble swallowing.    Respiratory: Negative for cough.    Gastrointestinal: Negative for abdominal pain, diarrhea, nausea and vomiting.   Genitourinary: Positive for dysuria, frequency and urgency. Negative for decreased urine volume, flank pain, hematuria, pelvic pain, vaginal bleeding, vaginal discharge and vaginal pain.        Bladder fullness and pressure.    Skin: Negative for rash.   Psychiatric/Behavioral: Negative.        Physical Exam   BP: 112/74  Pulse: 94  Temp: 98.1  F (36.7  C)  Resp: 16  SpO2: 100 %      Physical Exam   Constitutional: She is oriented to person, place, and time. She appears well-developed and well-nourished. No distress.   HENT:   Head: Normocephalic.   Mouth/Throat: Oropharynx is clear and moist.   Neck: Normal range of motion. Neck supple.   Cardiovascular: Normal rate, regular rhythm and normal heart sounds.    No murmur heard.  Pulmonary/Chest: Effort normal. No respiratory distress.    Abdominal: Soft. She exhibits no distension. There is no tenderness. There is no rebound and no guarding.   Genitourinary:   Genitourinary Comments: Negative bilateral CVA tenderness.    Musculoskeletal: Normal range of motion.   Lymphadenopathy:     She has no cervical adenopathy.   Neurological: She is alert and oriented to person, place, and time.   Skin: Skin is warm and dry. No rash noted. She is not diaphoretic. No erythema.   Psychiatric: She has a normal mood and affect. Her behavior is normal.   Nursing note and vitals reviewed.      ED Course     ED Course     Procedures    Results for orders placed or performed during the hospital encounter of 11/29/17   UA reflex to Microscopic and Culture   Result Value Ref Range    Color Urine Yellow     Appearance Urine Slightly Cloudy     Glucose Urine Negative NEG^Negative mg/dL    Bilirubin Urine Negative NEG^Negative    Ketones Urine Negative NEG^Negative mg/dL    Specific Gravity Urine 1.017 1.003 - 1.035    Blood Urine Small (A) NEG^Negative    pH Urine 7.5 4.7 - 8.0 pH    Protein Albumin Urine 30 (A) NEG^Negative mg/dL    Urobilinogen mg/dL Normal 0.0 - 2.0 mg/dL    Nitrite Urine Negative NEG^Negative    Leukocyte Esterase Urine Large (A) NEG^Negative    Source Midstream Urine     RBC Urine 27 (H) 0 - 2 /HPF    WBC Urine 174 (H) 0 - 2 /HPF    Bacteria Urine None (A) NEG^Negative /HPF    Squamous Epithelial /HPF Urine 15 (H) 0 - 1 /HPF    Mucous Urine Present (A) NEG^Negative /LPF   Urine Culture Aerobic Bacterial   Result Value Ref Range    Specimen Description Midstream Urine     Culture Micro Culture in progress        Assessments & Plan (with Medical Decision Making)     Discussed plan of care. She verbalized understanding. All questions answered.     I have reviewed the nursing notes.    I have reviewed the findings, diagnosis, plan and need for follow up with the patient.  Discharged in stable condition.     Discharge Medication List as of 11/29/2017   6:24 PM      START taking these medications    Details   nitroFURantoin, macrocrystal-monohydrate, (MACROBID) 100 MG capsule Take 1 capsule (100 mg) by mouth 2 times daily for 7 days, Disp-14 capsule, R-0, E-Prescribe      phenazopyridine (PYRIDIUM) 200 MG tablet Take 1 tablet (200 mg) by mouth 3 times daily for 3 days, Disp-9 tablet, R-0, E-Prescribe             Final diagnoses:   Dysuria     Take antibiotics as ordered.   Eat a yogurt daily while taking antibiotics.   Take pyridium as needed as directed for burning with urination.   Increase water intake.   Urinate after sexual intercourse.   Follow up with PCP with any increase in symptoms or concerns.   Return to urgent care or emergency department with any increase in symptoms or concerns.     AZAR Benavides  11/29/2017  5:47 PM  [facility]       Ciara Choi NP  11/30/17 100

## 2017-11-29 NOTE — ED AVS SNAPSHOT
[facility]    [address] [address]    Phone:  [phone]                                       Lenora Montelongo   MRN: [id number]    Department:  [facility]   Date of Visit:  11/29/2017           After Visit Summary Signature Page     I have received my discharge instructions, and my questions have been answered. I have discussed any challenges I see with this plan with the nurse or doctor.    ..........................................................................................................................................  Patient/Patient Representative Signature      ..........................................................................................................................................  Patient Representative Print Name and Relationship to Patient    ..................................................               ................................................  Date                                            Time    ..........................................................................................................................................  Reviewed by Signature/Title    ...................................................              ..............................................  Date                                                            Time

## 2017-11-29 NOTE — ED AVS SNAPSHOT
[facility]    [address]    [place] [address]    Phone:  [phone]                                       Lenora Montelongo   MRN: [id number]    Department:  [facility]   Date of Visit:  11/29/2017           Patient Information     Date Of Birth          1975        Your diagnoses for this visit were:     Dysuria        You were seen by Ciara Choi NP.      Follow-up Information     Follow up with Duncan Moore MD.    Specialty:  Family Practice    Why:  As needed, If symptoms worsen    Contact information:     [facility]  [address]  [place] [address] [phone]          Follow up with [facility].    Specialty:  EMERGENCY MEDICINE    Why:  As needed, If symptoms worsen    Contact information:    [address]  [place] [address] [phone]    Additional information:    From [place]: Take [address]. Turn left at [address]. Turn left at the first stoplight on [address]. At the first stop sign, take a right onto [address]. Take a left into the parking lot and continue through until you reach the North enterance of the building.       From [place]: Take [address]. Take the [address] ramp towards [place]. Turn left onto [address]. Take a slight right onto [address]. Turn left at the first stoplight on [address]. At the first stop sign, take a right onto [address]. Take a left into the parking lot and continue through until you reach the North enterance of the building.       From [place]: Take [address]. Take a right at [address]. At the first stop sign, take a right onto [address]. Take a left into the parking lot and continue through until you reach the North enterance of the building.         Discharge Instructions       Take antibiotics as ordered.   Eat a yogurt daily while taking antibiotics.   Take  pyridium as needed as directed for burning with urination.   Increase water intake.   Urinate after sexual intercourse.   Follow up with PCP with any increase in symptoms or concerns.   Return to urgent care or emergency department with any increase in symptoms or concerns.     Discharge References/Attachments     DYSURIA (ENGLISH)      Future Appointments        Provider Department Dept Phone Center    12/11/2017 3:30 PM Duncan Moore MD [facility] [phone] [facility]         Review of your medicines      START taking        Dose / Directions Last dose taken    nitroFURantoin (macrocrystal-monohydrate) 100 MG capsule   Commonly known as:  MACROBID   Dose:  100 mg   Quantity:  14 capsule        Take 1 capsule (100 mg) by mouth 2 times daily for 7 days   Refills:  0        phenazopyridine 200 MG tablet   Commonly known as:  PYRIDIUM   Dose:  200 mg   Quantity:  9 tablet        Take 1 tablet (200 mg) by mouth 3 times daily for 3 days   Refills:  0          Our records show that you are taking the medicines listed below. If these are incorrect, please call your family doctor or clinic.        Dose / Directions Last dose taken    ACE TENNIS ELBOW STRAP Misc   Dose:  1 each   Quantity:  1 each        1 each daily   Refills:  0        PRILOSEC PO        Refills:  0                Prescriptions were sent or printed at these locations (2 Prescriptions)                   [facility] [address] [facility], [place] - [address] AT [facility] [address]   [address], [place] [address]    Telephone:  [phone]   Fax:  [phone]   Hours:                  E-Prescribed (2 of 2)         nitroFURantoin, macrocrystal-monohydrate, (MACROBID) 100 MG capsule               phenazopyridine (PYRIDIUM) 200 MG tablet                Procedures and tests performed during your visit     UA reflex to Microscopic and Culture      Orders Needing Specimen Collection     None      Pending Results     No orders  "found from 2017 to 2017.            Pending Culture Results     No orders found from 2017 to 2017.            Thank you for choosing [facility]       Thank you for choosing [facility] for your care. Our goal is always to provide you with excellent care. Hearing back from our patients is one way we can continue to improve our services. Please take a few minutes to complete the written survey that you may receive in the mail after you visit with us. Thank you!        [employer] Information     [facility] lets you send messages to your doctor, view your test results, renew your prescriptions, schedule appointments and more. To sign up, go to [email or web address] . Click on \"Log in\" on the left side of the screen, which will take you to the Welcome page. Then click on \"Sign up Now\" on the right side of the page.     You will be asked to enter the access code listed below, as well as some personal information. Please follow the directions to create your username and password.     Your access code is: [email or web address]  Expires: 2018  8:28 AM     Your access code will  in 90 days. If you need help or a new code, please call your [facility] clinic or [phone].        Care EveryWhere ID     This is your Care EveryWhere ID. This could be used by other organizations to access your [facility] medical records  [id number]        Equal Access to Services     KYE COLE : Hadjulai Mena, christina sanches, qamely eugene . So [facility] [phone].    ATENCIÓN: Si habla español, tiene a roper disposición servicios gratuitos de asistencia lingüística. Tyesha al [phone].    We comply with applicable federal civil rights laws and [place] laws. We do not discriminate on the basis of race, color, national origin, age, disability, sex, sexual orientation, or gender identity.            After Visit Summary       This is your record. Keep this with " you and show to your community pharmacist(s) and doctor(s) at your next visit.

## 2017-11-30 NOTE — DISCHARGE INSTRUCTIONS
Take antibiotics as ordered.   Eat a yogurt daily while taking antibiotics.   Take pyridium as needed as directed for burning with urination.   Increase water intake.   Urinate after sexual intercourse.   Follow up with PCP with any increase in symptoms or concerns.   Return to urgent care or emergency department with any increase in symptoms or concerns.

## 2017-12-01 ENCOUNTER — TELEPHONE (OUTPATIENT)
Dept: FAMILY MEDICINE | Facility: OTHER | Age: 42
End: 2017-12-01

## 2017-12-01 LAB
BACTERIA SPEC CULT: ABNORMAL
SPECIMEN SOURCE: ABNORMAL

## 2017-12-01 NOTE — TELEPHONE ENCOUNTER
8:27 AM    Reason for Call: Phone Call    Description: Pt called and states that she would like a call back regarding her xray results     Was an appointment offered for this call? No  If yes : Appointment type              Date    Preferred method for responding to this message: Telephone Call  What is your phone number ?    If we cannot reach you directly, may we leave a detailed response at the number you provided? Yes    Can this message wait until your PCP/provider returns, if available today? Not applicable, PCP is in     Cyndie Hanover

## 2018-01-12 ENCOUNTER — OFFICE VISIT (OUTPATIENT)
Dept: FAMILY MEDICINE | Facility: OTHER | Age: 43
End: 2018-01-12
Attending: FAMILY MEDICINE
Payer: COMMERCIAL

## 2018-01-12 VITALS
HEIGHT: 64 IN | BODY MASS INDEX: 29.02 KG/M2 | TEMPERATURE: 96.8 F | DIASTOLIC BLOOD PRESSURE: 70 MMHG | HEART RATE: 76 BPM | SYSTOLIC BLOOD PRESSURE: 108 MMHG | OXYGEN SATURATION: 95 % | WEIGHT: 170 LBS

## 2018-01-12 DIAGNOSIS — M77.11 LATERAL EPICONDYLITIS OF RIGHT ELBOW: Primary | ICD-10-CM

## 2018-01-12 PROCEDURE — G0463 HOSPITAL OUTPT CLINIC VISIT: HCPCS

## 2018-01-12 PROCEDURE — 99213 OFFICE O/P EST LOW 20 MIN: CPT | Performed by: FAMILY MEDICINE

## 2018-01-12 RX ORDER — PREDNISONE 20 MG/1
TABLET ORAL
Qty: 12 TABLET | Refills: 0 | Status: SHIPPED | OUTPATIENT
Start: 2018-01-12 | End: 2018-07-06

## 2018-01-12 ASSESSMENT — ANXIETY QUESTIONNAIRES
1. FEELING NERVOUS, ANXIOUS, OR ON EDGE: NEARLY EVERY DAY
6. BECOMING EASILY ANNOYED OR IRRITABLE: MORE THAN HALF THE DAYS
GAD7 TOTAL SCORE: 18
7. FEELING AFRAID AS IF SOMETHING AWFUL MIGHT HAPPEN: MORE THAN HALF THE DAYS
2. NOT BEING ABLE TO STOP OR CONTROL WORRYING: NEARLY EVERY DAY
4. TROUBLE RELAXING: NEARLY EVERY DAY
5. BEING SO RESTLESS THAT IT IS HARD TO SIT STILL: MORE THAN HALF THE DAYS
3. WORRYING TOO MUCH ABOUT DIFFERENT THINGS: NEARLY EVERY DAY

## 2018-01-12 ASSESSMENT — PAIN SCALES - GENERAL: PAINLEVEL: SEVERE PAIN (7)

## 2018-01-12 ASSESSMENT — PATIENT HEALTH QUESTIONNAIRE - PHQ9: SUM OF ALL RESPONSES TO PHQ QUESTIONS 1-9: 21

## 2018-01-12 NOTE — NURSING NOTE
"Chief Complaint   Patient presents with     Musculoskeletal Problem       Initial /70  Pulse 76  Temp 96.8  F (36  C)  Ht 5' 3.5\" (1.613 m)  Wt 170 lb (77.1 kg)  SpO2 95%  BMI 29.64 kg/m2 Estimated body mass index is 29.64 kg/(m^2) as calculated from the following:    Height as of this encounter: 5' 3.5\" (1.613 m).    Weight as of this encounter: 170 lb (77.1 kg).  Medication Reconciliation: complete     Andrea Pulliam      "

## 2018-01-12 NOTE — MR AVS SNAPSHOT
After Visit Summary   1/12/2018    Lenora Montelongo    MRN: [id number]           Patient Information     Date Of Birth          1975        Visit Information        Provider Department      1/12/2018 9:45 AM Duncan Moore MD [facility] [place]        Today's Diagnoses     Lateral epicondylitis of right elbow    -  1       Follow-ups after your visit        Additional Services     PHYSICAL THERAPY REFERRAL       *This order will print in the [facility] Central Scheduling Office*    [facility] provides Physical Therapy evaluation and treatment and many specialty services across the [facility] system.  If requesting a specialty program, please choose from the list below.    Call [phone] to schedule [facility] at all locations, with the exception of [facility], please call [phone].     Treatment: Evaluation & Treatment  Special Instructions/Modalities: evaluate and treat - please use US and iontophoresis   Special Programs: good HEP    Please be aware that coverage of these services is subject to the terms and limitations of your health insurance plan.  Call member services at your health plan with any benefit or coverage questions.      **Note to Provider** To refer patients to therapy outside of the location list, change the order class to External Referral in the order composer.                  Who to contact     If you have questions or need follow up information about today's clinic visit or your schedule please contact [facility] [place] directly at [phone].  Normal or non-critical lab and imaging results will be communicated to you by MyChart, letter or phone within 4 business days after the clinic has received the results. If you do not hear from us within 7 days, please contact the clinic through MyChart or phone. If you have a critical or abnormal lab result, we will notify you by phone as  "soon as possible.  Submit refill requests through [facility] or call your pharmacy and they will forward the refill request to us. Please allow 3 business days for your refill to be completed.          Additional Information About Your Visit        [facility] [employer]IMA Information     [facility]insula lets you send messages to your doctor, view your test results, renew your prescriptions, schedule appointments and more. To sign up, go to [email or web address] . Click on \"Log in\" on the left side of the screen, which will take you to the Welcome page. Then click on \"Sign up Now\" on the right side of the page.     You will be asked to enter the access code listed below, as well as some personal information. Please follow the directions to create your username and password.     Your access code is: [email or web address]  Expires: 2018  8:28 AM     Your access code will  in 90 days. If you need help or a new code, please call your [facility] clinic or [phone].        Care EveryWhere ID     This is your Care EveryWhere ID. This could be used by other organizations to access your [facility] medical records  [id number]        Your Vitals Were     Pulse Temperature Height Pulse Oximetry BMI (Body Mass Index)       76 96.8  F (36  C) 5' 3.5\" (1.613 m) 95% 29.64 kg/m2        Blood Pressure from Last 3 Encounters:   18 108/70   17 112/74   17 92/60    Weight from Last 3 Encounters:   18 170 lb (77.1 kg)   17 164 lb (74.4 kg)   17 164 lb (74.4 kg)              We Performed the Following     PHYSICAL THERAPY REFERRAL          Today's Medication Changes          These changes are accurate as of: 18 10:42 AM.  If you have any questions, ask your nurse or doctor.               Start taking these medicines.        Dose/Directions    predniSONE 20 MG tablet   Commonly known as:  DELTASONE   Used for:  Lateral epicondylitis of right elbow   Started by:  Duncan Moore MD        1 tab orally twice daily for 4 " days then 1 tab daily for 4 days.   Quantity:  12 tablet   Refills:  0            Where to get your medicines      These medications were sent to [id number] - [place], [place] - [address] AT [facility] [address], [place] [address]     Phone:  [phone]     predniSONE 20 MG tablet                Primary Care Provider Office Phone # Fax #    Duncan Moore [phone]       [facility] [address]  [facility] [address]        Equal Access to Services     [facility]: Hadii aad ku hadasho Soomaali, waaxda luqadaha, qaybta kaalmada adeegyada, waxtati sheetsin hayanneln haley santizo . So [facility] [phone].    ATENCIÓN: Si habla español, tiene a roper disposición servicios gratuitos de asistencia lingüística. [facility] [phone].    We comply with applicable federal civil rights laws and [place] laws. We do not discriminate on the basis of race, color, national origin, age, disability, sex, sexual orientation, or gender identity.            Thank you!     Thank you for choosing [facility]  for your care. Our goal is always to provide you with excellent care. Hearing back from our patients is one way we can continue to improve our services. Please take a few minutes to complete the written survey that you may receive in the mail after your visit with us. Thank you!             Your Updated Medication List - Protect others around you: Learn how to safely use, store and throw away your medicines at www.disposemymeds.org.          This list is accurate as of: 1/12/18 10:42 AM.  Always use your most recent med list.                   Brand Name Dispense Instructions for use Diagnosis    ACE TENNIS ELBOW STRAP Misc     1 each    1 each daily    Lateral epicondylitis of right elbow       predniSONE 20 MG tablet    DELTASONE    12 tablet    1 tab orally twice daily for 4 days then 1 tab daily for 4 days.    Lateral epicondylitis of right  elbow       PRILOSEC PO

## 2018-01-12 NOTE — PROGRESS NOTES
"  SUBJECTIVE:   Lenora Montelongo is a 42 year old female who presents to clinic today for the following health issues:      Musculoskeletal problem/pain      Duration: 6 months    Description  Location: right elbow, right forearm and right shoulder    Intensity:  moderate    Accompanying signs and symptoms: radiation of pain to shoulder and wrist, numbness, tingling, weakness of arm, warmth and swelling    History  Previous similar problem: YES  Previous evaluation:  x-ray and physical therapy    Precipitating or alleviating factors:  Trauma or overuse: YES- fell downstairs  Aggravating factors include: lifting, exercise and overuse    Therapies tried and outcome: support wrap, acetaminophen and Ibuprofen    Fell at onsite and smashed elbow into the wall      Pain is centered at lateral epicondyl region     I seen in November             Problem list and histories reviewed & adjusted, as indicated.  Additional history: as documented    Labs reviewed in EPIC    Reviewed and updated as needed this visit by clinical staff     Reviewed and updated as needed this visit by Provider         ROS:  C: NEGATIVE for fever, chills, change in weight  E/M: NEGATIVE for ear, mouth and throat problems  R: NEGATIVE for significant cough or SOB  CV: NEGATIVE for chest pain, palpitations or peripheral edema    OBJECTIVE:                                                    /70  Pulse 76  Temp 96.8  F (36  C)  Ht 5' 3.5\" (1.613 m)  Wt 170 lb (77.1 kg)  SpO2 95%  BMI 29.64 kg/m2  Body mass index is 29.64 kg/(m^2).   GENERAL: healthy, alert, well nourished, well hydrated, no distress  MS: extremities- right elbow-- very tender over lateral epicondyl region no gross deformities noted, no edema         ASSESSMENT/PLAN:                                                    (M77.11) Lateral epicondylitis of right elbow  (primary encounter diagnosis)  Comment: Needs to go back to PT   Plan: predniSONE (DELTASONE) 20 MG tablet, PHYSICAL "         THERAPY REFERRAL        Risk and benefits of steroids  was discussed and verbal consent to proceed was given.   Discussed in length conservative measures of OTC medications for pain, Ice/Heat, elevation and the concept of R.I.C.E.. Continue behavioral changes and proper body mechanics to allow for healing. Follow up as directed.   Symptomatic treatment was discussed along when patient should call and/or come back into the clinic or go to ER/Urgent care. All questions answered.   Get back to try splint again             Duncan Moore MD  [facility]

## 2018-01-13 ASSESSMENT — ANXIETY QUESTIONNAIRES: GAD7 TOTAL SCORE: 18

## 2018-01-21 ENCOUNTER — HEALTH MAINTENANCE LETTER (OUTPATIENT)
Age: 43
End: 2018-01-21

## 2018-02-27 NOTE — PROGRESS NOTES
Discharge Summary  DC patient did not return following xray. Current status is unknown.  Sharon Ly DPSABA, OCS       11/20/17 1100   Signing Clinician's Name / Credentials   Signing clinician's name / credentials Sharon Aliyah DPT, OCS   Session Number   Session Number 0 eval only   Adult Goals   PT Ortho Eval Goals 1;2;3   Ortho Goal 1   Goal Identifier STG 1   Goal Description Patient will report decreased worst PL in R elbow to 6/10 or less in order to perform home tasks   Target Date 12/11/17   Date Met (DC patient did not return following xray)   Ortho Goal 2   Goal Identifier LTG 1   Goal Description Patient will demonstrate increased  strength by 10 lbs in order to improve function at home of her R elbow/arm   Target Date 01/01/18   Date Met (DC patient did not return following xray)   Ortho Goal 3   Goal Identifier LTG 2   Goal Description Patient will report overall 80% or better improvement in her R elbow symptoms in order to return to all home and worst duties   Target Date 01/01/18   Date Met (DC patient did not return following xray)   Assessments Completed   Assessments Completed PT evaluation completed and patient was referred to physician for order for xray to rule out bony pathology   Education   Learner Patient   Readiness Acceptance   Method Explanation   Response Verbalizes Understanding   Communication with other professionals   Communication with other professionals Contacted physician   Plan   Home program Ice and rest   Plan for next session If no bony pathology is appreciated, may initiate ionto and inflammation calming interventions and modalities   Comments   Comments DC patient did not return following xray   Total Session Time   Timed Code Treatment Minutes 0   Total Treatment Time (sum of timed and untimed services) 30 evaluation

## 2018-04-17 ENCOUNTER — TELEPHONE (OUTPATIENT)
Dept: FAMILY MEDICINE | Facility: OTHER | Age: 43
End: 2018-04-17

## 2018-04-17 NOTE — TELEPHONE ENCOUNTER
8:51 AM    Reason for Call: OVERBOOK    Patient is having the following symptoms: talk about anxiety medication for 1 weeks.    The patient is requesting an appointment for 04/17/18 with .    Was an appointment offered for this call? No  If yes : Appointment type              Date    Preferred method for responding to this message: Telephone Call  What is your phone number ?[phone]    If we cannot reach you directly, may we leave a detailed response at the number you provided? Yes    Can this message wait until your PCP/provider returns, if unavailable today? Not applicable, PCP is in     [facility]

## 2018-07-06 ENCOUNTER — OFFICE VISIT (OUTPATIENT)
Dept: FAMILY MEDICINE | Facility: OTHER | Age: 43
End: 2018-07-06
Attending: FAMILY MEDICINE
Payer: COMMERCIAL

## 2018-07-06 VITALS
BODY MASS INDEX: 29.59 KG/M2 | TEMPERATURE: 98.9 F | SYSTOLIC BLOOD PRESSURE: 104 MMHG | HEART RATE: 99 BPM | OXYGEN SATURATION: 98 % | HEIGHT: 63 IN | DIASTOLIC BLOOD PRESSURE: 70 MMHG | WEIGHT: 167 LBS

## 2018-07-06 DIAGNOSIS — M25.50 PAIN IN JOINT, MULTIPLE SITES: Primary | ICD-10-CM

## 2018-07-06 DIAGNOSIS — M35.3 POLYMYALGIA (H): ICD-10-CM

## 2018-07-06 LAB
ALBUMIN SERPL-MCNC: 3.9 G/DL (ref 3.4–5)
ALP SERPL-CCNC: 112 U/L (ref 40–150)
ALT SERPL W P-5'-P-CCNC: 77 U/L (ref 0–50)
ANION GAP SERPL CALCULATED.3IONS-SCNC: 8 MMOL/L (ref 3–14)
AST SERPL W P-5'-P-CCNC: 54 U/L (ref 0–45)
BASOPHILS # BLD AUTO: 0 10E9/L (ref 0–0.2)
BASOPHILS NFR BLD AUTO: 0.3 %
BILIRUB SERPL-MCNC: 0.8 MG/DL (ref 0.2–1.3)
BUN SERPL-MCNC: 7 MG/DL (ref 7–30)
CALCIUM SERPL-MCNC: 8.7 MG/DL (ref 8.5–10.1)
CHLORIDE SERPL-SCNC: 104 MMOL/L (ref 94–109)
CK SERPL-CCNC: 65 U/L (ref 30–225)
CO2 SERPL-SCNC: 25 MMOL/L (ref 20–32)
CREAT SERPL-MCNC: 0.87 MG/DL (ref 0.52–1.04)
CRP SERPL-MCNC: 6.4 MG/L (ref 0–8)
DIFFERENTIAL METHOD BLD: NORMAL
EOSINOPHIL # BLD AUTO: 0.1 10E9/L (ref 0–0.7)
EOSINOPHIL NFR BLD AUTO: 1 %
ERYTHROCYTE [DISTWIDTH] IN BLOOD BY AUTOMATED COUNT: 12.5 % (ref 10–15)
ERYTHROCYTE [SEDIMENTATION RATE] IN BLOOD BY WESTERGREN METHOD: 8 MM/H (ref 0–20)
GFR SERPL CREATININE-BSD FRML MDRD: 70 ML/MIN/1.7M2
GLUCOSE SERPL-MCNC: 97 MG/DL (ref 70–99)
HCT VFR BLD AUTO: 41.8 % (ref 35–47)
HGB BLD-MCNC: 14.7 G/DL (ref 11.7–15.7)
IMM GRANULOCYTES # BLD: 0 10E9/L (ref 0–0.4)
IMM GRANULOCYTES NFR BLD: 0.2 %
LYMPHOCYTES # BLD AUTO: 0.9 10E9/L (ref 0.8–5.3)
LYMPHOCYTES NFR BLD AUTO: 14.7 %
MCH RBC QN AUTO: 30.4 PG (ref 26.5–33)
MCHC RBC AUTO-ENTMCNC: 35.2 G/DL (ref 31.5–36.5)
MCV RBC AUTO: 86 FL (ref 78–100)
MISCELLANEOUS TEST: NORMAL
MONOCYTES # BLD AUTO: 0.6 10E9/L (ref 0–1.3)
MONOCYTES NFR BLD AUTO: 9 %
NEUTROPHILS # BLD AUTO: 4.6 10E9/L (ref 1.6–8.3)
NEUTROPHILS NFR BLD AUTO: 74.8 %
NRBC # BLD AUTO: 0 10*3/UL
NRBC BLD AUTO-RTO: 0 /100
PLATELET # BLD AUTO: 192 10E9/L (ref 150–450)
POTASSIUM SERPL-SCNC: 3.8 MMOL/L (ref 3.4–5.3)
PROT SERPL-MCNC: 7.3 G/DL (ref 6.8–8.8)
RBC # BLD AUTO: 4.84 10E12/L (ref 3.8–5.2)
SODIUM SERPL-SCNC: 137 MMOL/L (ref 133–144)
WBC # BLD AUTO: 6.1 10E9/L (ref 4–11)

## 2018-07-06 PROCEDURE — 82550 ASSAY OF CK (CPK): CPT | Mod: ZL | Performed by: FAMILY MEDICINE

## 2018-07-06 PROCEDURE — 86039 ANTINUCLEAR ANTIBODIES (ANA): CPT | Mod: ZL | Performed by: FAMILY MEDICINE

## 2018-07-06 PROCEDURE — 36415 COLL VENOUS BLD VENIPUNCTURE: CPT | Mod: ZL | Performed by: FAMILY MEDICINE

## 2018-07-06 PROCEDURE — 86200 CCP ANTIBODY: CPT | Mod: ZL | Performed by: FAMILY MEDICINE

## 2018-07-06 PROCEDURE — 99000 SPECIMEN HANDLING OFFICE-LAB: CPT | Performed by: FAMILY MEDICINE

## 2018-07-06 PROCEDURE — 99214 OFFICE O/P EST MOD 30 MIN: CPT | Performed by: FAMILY MEDICINE

## 2018-07-06 PROCEDURE — 86431 RHEUMATOID FACTOR QUANT: CPT | Mod: ZL | Performed by: FAMILY MEDICINE

## 2018-07-06 PROCEDURE — 85652 RBC SED RATE AUTOMATED: CPT | Mod: ZL | Performed by: FAMILY MEDICINE

## 2018-07-06 PROCEDURE — 87207 SMEAR SPECIAL STAIN: CPT | Mod: ZL | Performed by: FAMILY MEDICINE

## 2018-07-06 PROCEDURE — 86666 EHRLICHIA ANTIBODY: CPT | Mod: ZL | Performed by: FAMILY MEDICINE

## 2018-07-06 PROCEDURE — 80053 COMPREHEN METABOLIC PANEL: CPT | Mod: ZL | Performed by: FAMILY MEDICINE

## 2018-07-06 PROCEDURE — 86753 PROTOZOA ANTIBODY NOS: CPT | Mod: ZL,59 | Performed by: FAMILY MEDICINE

## 2018-07-06 PROCEDURE — 86618 LYME DISEASE ANTIBODY: CPT | Mod: ZL | Performed by: FAMILY MEDICINE

## 2018-07-06 PROCEDURE — G0463 HOSPITAL OUTPT CLINIC VISIT: HCPCS

## 2018-07-06 PROCEDURE — 84999 UNLISTED CHEMISTRY PROCEDURE: CPT | Mod: ZL | Performed by: FAMILY MEDICINE

## 2018-07-06 PROCEDURE — 87015 SPECIMEN INFECT AGNT CONCNTJ: CPT | Mod: ZL | Performed by: FAMILY MEDICINE

## 2018-07-06 PROCEDURE — 86753 PROTOZOA ANTIBODY NOS: CPT | Mod: ZL | Performed by: FAMILY MEDICINE

## 2018-07-06 PROCEDURE — 86140 C-REACTIVE PROTEIN: CPT | Mod: ZL | Performed by: FAMILY MEDICINE

## 2018-07-06 PROCEDURE — 85025 COMPLETE CBC W/AUTO DIFF WBC: CPT | Mod: ZL | Performed by: FAMILY MEDICINE

## 2018-07-06 RX ORDER — DICLOFENAC SODIUM 75 MG/1
75 TABLET, DELAYED RELEASE ORAL 2 TIMES DAILY
Qty: 30 TABLET | Refills: 1 | Status: SHIPPED | OUTPATIENT
Start: 2018-07-06 | End: 2019-03-22

## 2018-07-06 RX ORDER — TRAMADOL HYDROCHLORIDE 50 MG/1
TABLET ORAL
Qty: 25 TABLET | Refills: 0 | Status: SHIPPED | OUTPATIENT
Start: 2018-07-06 | End: 2018-08-14

## 2018-07-06 ASSESSMENT — PAIN SCALES - GENERAL: PAINLEVEL: WORST PAIN (10)

## 2018-07-06 NOTE — MR AVS SNAPSHOT
"              After Visit Summary   2018    Lenora Montelongo    MRN: [id number]           Patient Information     Date Of Birth          1975        Visit Information        Provider Department      2018 9:00 AM [facility] MD [facility]        Today's Diagnoses     Pain in joint, multiple sites    -  1    Polymyalgia (H)           Follow-ups after your visit        Who to contact     If you have questions or need follow up information about today's clinic visit or your schedule please contact [facility] directly at [phone].  Normal or non-critical lab and imaging results will be communicated to you by [employer], letter or phone within 4 business days after the clinic has received the results. If you do not hear from us within 7 days, please contact the clinic through [employer] or phone. If you have a critical or abnormal lab result, we will notify you by phone as soon as possible.  Submit refill requests through [employer] or call your pharmacy and they will forward the refill request to us. Please allow 3 business days for your refill to be completed.          Additional Information About Your Visit        MyChart Information     [employer] lets you send messages to your doctor, view your test results, renew your prescriptions, schedule appointments and more. To sign up, go to [email or web address] . Click on \"Log in\" on the left side of the screen, which will take you to the Welcome page. Then click on \"Sign up Now\" on the right side of the page.     You will be asked to enter the access code listed below, as well as some personal information. Please follow the directions to create your username and password.     Your access code is: [email or web address]  Expires: 10/4/2018 10:56 AM     Your access code will  in 90 days. If you need help or a new code, please call your [facility] or [phone].        Care EveryWhere ID     This is your Care EveryWhere ID. This could " "be used by other organizations to access your [place] medical records  [id number]        Your Vitals Were     Pulse Temperature Height Pulse Oximetry BMI (Body Mass Index)       99 98.9  F (37.2  C) 5' 3\" (1.6 m) 98% 29.58 kg/m2        Blood Pressure from Last 3 Encounters:   07/06/18 104/70   01/12/18 108/70   11/29/17 112/74    Weight from Last 3 Encounters:   07/06/18 167 lb (75.8 kg)   01/12/18 170 lb (77.1 kg)   11/13/17 164 lb (74.4 kg)              We Performed the Following     Anaplasma  phagocytoph antibody IgM     ARUP Miscellaneous Test     Babesia antibody IgG IgM     CBC with platelets differential     CK total     Comprehensive metabolic panel     CRP inflammation     Cyclic Citrullinated Peptide Antibody IgG     Erythrocyte sedimentation rate auto     MISAEL TITER: Laboratory Miscellaneous Order     Lyme Disease Alethea with reflex to WB Serum     Parasite stain     Rheumatoid factor          Today's Medication Changes          These changes are accurate as of 7/6/18 10:56 AM.  If you have any questions, ask your nurse or doctor.               Start taking these medicines.        Dose/Directions    diclofenac 75 MG EC tablet   Commonly known as:  VOLTAREN   Used for:  Pain in joint, multiple sites, Polymyalgia (H)   Started by:  Duncan Moore MD        Dose:  75 mg   Take 1 tablet (75 mg) by mouth 2 times daily   Quantity:  30 tablet   Refills:  1       traMADol 50 MG tablet   Commonly known as:  ULTRAM   Used for:  Pain in joint, multiple sites, Polymyalgia (H)   Started by:  Duncan Moore MD        1-2 TABS ORALLY EVERY 4-6 HRS AS NEEDED FOR SEVERE PAIN   Quantity:  25 tablet   Refills:  0         Stop taking these medicines if you haven't already. Please contact your care team if you have questions.     ACE TENNIS ELBOW STRAP Misc   Stopped by:  Duncan Moore MD           predniSONE 20 MG tablet   Commonly known as:  DELTASONE   Stopped by:  Duncan Moore MD                Where to get " your medicines      [id number] - [place], [place] - [address] AT [facility] [address], [place] [address]     Phone:  [phone]     diclofenac 75 MG EC tablet         Some of these will need a paper prescription and others can be bought over the counter.  Ask your nurse if you have questions.     Bring a paper prescription for each of these medications     traMADol 50 MG tablet               Information about OPIOIDS     PRESCRIPTION OPIOIDS: WHAT YOU NEED TO KNOW   We gave you an opioid (narcotic) pain medicine. It is important to manage your pain, but opioids are not always the best choice. You should first try all the other options your care team gave you. Take this medicine for as short a time (and as few doses) as possible.     These medicines have risks:    DO NOT drive when on new or higher doses of pain medicine. These medicines can affect your alertness and reaction times, and you could be arrested for driving under the influence (DUI). If you need to use opioids long-term, talk to your care team about driving.    DO NOT operate heave machinery    DO NOT do any other dangerous activities while taking these medicines.     DO NOT drink any alcohol while taking these medicines.      If the opioid prescribed includes acetaminophen, DO NOT take with any other medicines that contain acetaminophen. Read all labels carefully. Look for the word  acetaminophen  or  Tylenol.  Ask your pharmacist if you have questions or are unsure.    You can get addicted to pain medicines, especially if you have a history of addiction (chemical, alcohol or substance dependence). Talk to your care team about ways to reduce this risk.    Store your pills in a secure place, locked if possible. We will not replace any lost or stolen medicine. If you don t finish your medicine, please throw away (dispose) as directed by your pharmacist. The [place] [employer]  [employer]ency has more information about safe disposal: [email or web address]/living-green/managing-unwanted-medications.     All opioids tend to cause constipation. Drink plenty of water and eat foods that have a lot of fiber, such as fruits, vegetables, prune juice, apple juice and high-fiber cereal. Take a laxative (Miralax, milk of magnesia, Colace, Senna) if you don t move your bowels at least every other day.          Primary Care Provider Office Phone # Fax #    Duncan Moore [phone] [address] [facility] [address]        Equal Access to Services     [facility] : Hadii lien Mena, waolivia sanches, tiffanie kaalmamike adams, mely santizo . So [facility] [phone].    ATENCIÓN: Si habla español, tiene a roper disposición servicios gratuitos de asistencia lingüística. [facility] [phone].    We comply with applicable federal civil rights laws and [place] laws. We do not discriminate on the basis of race, color, national origin, age, disability, sex, sexual orientation, or gender identity.            Thank you!     Thank you for choosing [facility]  for your care. Our goal is always to provide you with excellent care. Hearing back from our patients is one way we can continue to improve our services. Please take a few minutes to complete the written survey that you may receive in the mail after your visit with us. Thank you!             Your Updated Medication List - Protect others around you: Learn how to safely use, store and throw away your medicines at www.disposemymeds.org.          This list is accurate as of 7/6/18 10:56 AM.  Always use your most recent med list.                   Brand Name Dispense Instructions for use Diagnosis    diclofenac 75 MG EC tablet    VOLTAREN    30 tablet    Take 1 tablet (75 mg) by mouth 2 times daily    Pain in joint, multiple sites, Polymyalgia (H)       PRILOSEC PO           traMADol 50  MG tablet    ULTRAM    25 tablet    1-2 TABS ORALLY EVERY 4-6 HRS AS NEEDED FOR SEVERE PAIN    Pain in joint, multiple sites, Polymyalgia (H)

## 2018-07-06 NOTE — PROGRESS NOTES
"  SUBJECTIVE:   Lenora Montelongo is a 43 year old female who presents to clinic today for the following health issues:      Musculoskeletal problem/pain      Duration: 1 month    Description  Location: joint, muscles and head    Intensity:  moderate, severe    Accompanying signs and symptoms: none    History  Previous similar problem: no   Previous evaluation:  none    Precipitating or alleviating factors:  Trauma or overuse: no   Aggravating factors include: none    Therapies tried and outcome: Ibuprofen does not help symptoms    C/o muscle and joint pain for about month  Thought was job related - quit job and not help  Started on hips then went to all other joints and muscles  C/o HA just last night  No F/C /Night sweats  No rash  ?? If tick bite-- has not found a tick-- low risk for tick bites  No joint swelling but everything painful  Motrin or tylenol not helping much  No one else with sx.  HA- tight squeezing feeling.   No neck pain   No focal neuro sx.   No new meds or change in meds.  No travel     Problem list and histories reviewed & adjusted, as indicated.  Additional history: as documented    Labs reviewed in EPIC    Reviewed and updated as needed this visit by clinical staff       Reviewed and updated as needed this visit by Provider         ROS:  CONSTITUTIONAL: NEGATIVE for fever, chills, change in weight  RESP: NEGATIVE for significant cough or SOB  CV: NEGATIVE for chest pain, palpitations or peripheral edema    OBJECTIVE:                                                    /70  Pulse 99  Temp 98.9  F (37.2  C)  Ht 5' 3\" (1.6 m)  Wt 167 lb (75.8 kg)  SpO2 98%  BMI 29.58 kg/m2  Body mass index is 29.58 kg/(m^2).   GENERAL: healthy, alert, well nourished, well hydrated, mild  distress  HENT: ear canals- normal; TMs- normal; Nose- normal; Mouth- no ulcers, no lesions  NECK: no tenderness, no adenopathy, no asymmetry, no masses, no stiffness; thyroid- normal to palpation  RESP: lungs clear to " auscultation - no rales, no rhonchi, no wheezes  CV: regular rates and rhythm, normal S1 S2, no S3 or S4 and no murmur, no click or rub -  ABDOMEN: soft, no tenderness, no  hepatosplenomegaly, no masses, normal bowel sounds  MS: extremities- no gross deformities noted, no edema, full ROM of joints - no swelling or warmth. Tender muscles.          Results for orders placed or performed in visit on 07/06/18 (from the past 24 hour(s))   CBC with platelets differential   Result Value Ref Range    WBC 6.1 4.0 - 11.0 10e9/L    RBC Count 4.84 3.8 - 5.2 10e12/L    Hemoglobin 14.7 11.7 - 15.7 g/dL    Hematocrit 41.8 35.0 - 47.0 %    MCV 86 78 - 100 fl    MCH 30.4 26.5 - 33.0 pg    MCHC 35.2 31.5 - 36.5 g/dL    RDW 12.5 10.0 - 15.0 %    Platelet Count 192 150 - 450 10e9/L    Diff Method Automated Method     % Neutrophils 74.8 %    % Lymphocytes 14.7 %    % Monocytes 9.0 %    % Eosinophils 1.0 %    % Basophils 0.3 %    % Immature Granulocytes 0.2 %    Nucleated RBCs 0 0 /100    Absolute Neutrophil 4.6 1.6 - 8.3 10e9/L    Absolute Lymphocytes 0.9 0.8 - 5.3 10e9/L    Absolute Monocytes 0.6 0.0 - 1.3 10e9/L    Absolute Eosinophils 0.1 0.0 - 0.7 10e9/L    Absolute Basophils 0.0 0.0 - 0.2 10e9/L    Abs Immature Granulocytes 0.0 0 - 0.4 10e9/L    Absolute Nucleated RBC 0.0    Comprehensive metabolic panel   Result Value Ref Range    Sodium 137 133 - 144 mmol/L    Potassium 3.8 3.4 - 5.3 mmol/L    Chloride 104 94 - 109 mmol/L    Carbon Dioxide 25 20 - 32 mmol/L    Anion Gap 8 3 - 14 mmol/L    Glucose 97 70 - 99 mg/dL    Urea Nitrogen 7 7 - 30 mg/dL    Creatinine 0.87 0.52 - 1.04 mg/dL    GFR Estimate 70 >60 mL/min/1.7m2    GFR Estimate If Black 85 >60 mL/min/1.7m2    Calcium 8.7 8.5 - 10.1 mg/dL    Bilirubin Total 0.8 0.2 - 1.3 mg/dL    Albumin 3.9 3.4 - 5.0 g/dL    Protein Total 7.3 6.8 - 8.8 g/dL    Alkaline Phosphatase 112 40 - 150 U/L    ALT 77 (H) 0 - 50 U/L    AST 54 (H) 0 - 45 U/L   CRP inflammation   Result Value Ref Range     CRP Inflammation 6.4 0.0 - 8.0 mg/L   Erythrocyte sedimentation rate auto   Result Value Ref Range    Sed Rate 8 0 - 20 mm/h   CK total   Result Value Ref Range    CK Total 65 30 - 225 U/L       ASSESSMENT/PLAN:                                                        ICD-10-CM    1. Pain in joint, multiple sites M25.50 CBC with platelets differential     Comprehensive metabolic panel     CRP inflammation     Erythrocyte sedimentation rate auto     Lyme Disease Alethea with reflex to WB Serum     Rheumatoid factor     Babesia antibody IgG IgM     MISAEL TITER: Laboratory Miscellaneous Order     Parasite stain     Anaplasma  phagocytoph antibody IgM     Cyclic Citrullinated Peptide Antibody IgG     CK total     ARUP Miscellaneous Test     diclofenac (VOLTAREN) 75 MG EC tablet     traMADol (ULTRAM) 50 MG tablet   2. Polymyalgia (H) M35.3 CBC with platelets differential     Comprehensive metabolic panel     CRP inflammation     Erythrocyte sedimentation rate auto     Lyme Disease Alethea with reflex to WB Serum     Rheumatoid factor     Babesia antibody IgG IgM     MISAEL TITER: Laboratory Miscellaneous Order     Parasite stain     Anaplasma  phagocytoph antibody IgM     Cyclic Citrullinated Peptide Antibody IgG     CK total     ARUP Miscellaneous Test     diclofenac (VOLTAREN) 75 MG EC tablet     traMADol (ULTRAM) 50 MG tablet     ETIOLOGY UNCLEAR-  Will wait for rest of labs.  Reassurance and symptomatic treatment discussed.  Discussed in length conservative measures of OTC medications for pain, Ice/Heat, elevation and the concept of R.I.C.E.. Continue behavioral changes and proper body mechanics to allow for healing. Follow up as directed.   Symptomatic treatment was discussed along when patient should call and/or come back into the clinic or go to ER/Urgent care. All questions answered.   Looks to have felt better with the negative tests so far. Risk and benefits of tramadol* was discussed and verbal consent to proceed was  given.     See Patient Instructions    Duncan Moore MD  [facility]

## 2018-07-06 NOTE — NURSING NOTE
"Chief Complaint   Patient presents with     Arthritis       Initial /70  Pulse 99  Temp 98.9  F (37.2  C)  Ht 5' 3\" (1.6 m)  Wt 167 lb (75.8 kg)  SpO2 98%  BMI 29.58 kg/m2 Estimated body mass index is 29.58 kg/(m^2) as calculated from the following:    Height as of this encounter: 5' 3\" (1.6 m).    Weight as of this encounter: 167 lb (75.8 kg).  Medication Reconciliation: complete    Andrea Pulliam LPN  "

## 2018-07-08 ENCOUNTER — HOSPITAL ENCOUNTER (EMERGENCY)
Facility: HOSPITAL | Age: 43
Discharge: HOME | End: 2018-07-08
Attending: NURSE PRACTITIONER | Admitting: NURSE PRACTITIONER
Payer: COMMERCIAL

## 2018-07-08 VITALS
TEMPERATURE: 99.8 F | OXYGEN SATURATION: 97 % | SYSTOLIC BLOOD PRESSURE: 116 MMHG | HEIGHT: 62 IN | DIASTOLIC BLOOD PRESSURE: 71 MMHG | RESPIRATION RATE: 16 BRPM
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
## 2018-07-08 DIAGNOSIS — S61.258A CAT BITE OF INDEX FINGER, INITIAL ENCOUNTER: ICD-10-CM

## 2018-07-08 LAB
PARASITE SPEC INSPECT: NORMAL
SPECIMEN SOURCE: NORMAL

## 2018-07-08 PROCEDURE — G0463 HOSPITAL OUTPT CLINIC VISIT: HCPCS

## 2018-07-08 PROCEDURE — 99213 OFFICE O/P EST LOW 20 MIN: CPT | Performed by: NURSE PRACTITIONER

## 2018-07-08 RX ORDER — DOXYCYCLINE 100 MG/1
100 CAPSULE ORAL 2 TIMES DAILY
Qty: 20 CAPSULE | Refills: 0 | Status: SHIPPED | OUTPATIENT
Start: 2018-07-08 | End: 2019-02-11

## 2018-07-08 RX ORDER — METRONIDAZOLE 500 MG/1
500 TABLET ORAL 2 TIMES DAILY
Qty: 20 TABLET | Refills: 0 | Status: SHIPPED | OUTPATIENT
Start: 2018-07-08 | End: 2019-02-11

## 2018-07-08 NOTE — ED TRIAGE NOTES
Pt presents today with c/o pt was bit by her own cat on Friday. Increased pain and swelling now to her second finger. Cat is up to date on shots.

## 2018-07-08 NOTE — ED PROVIDER NOTES
History     Chief Complaint   Patient presents with     Cat Bite     2 days ago, 2nd finger left hand. Reddness and swelling started yesterday and increased today. Pt's own cat. States UTD on shots.     HPI  Lenora Montelongo is a 43 year old right hand dominate female who presents today with a CC of right index finger pain and swelling x 2 days.  She was bit by her own cat 2 days ago.  The cat is an indoor cat, is up to date with rabies vaccinations.  It will be quarantined.  She noted swelling and pain that evening.  She has had low grade fevers at home.  She has been taking tramadol for pain with improvement.  She has also been washing with peroxide    Problem List:    Patient Active Problem List    Diagnosis Date Noted     Lateral epicondylitis of right elbow 01/12/2018     Priority: Medium     Acute intractable tension-type headache 11/13/2017     Priority: Medium     AZ (generalized anxiety disorder) 06/05/2017     Priority: Medium     Prolonged grief reaction 11/01/2016     Priority: Medium     Major depressive disorder, recurrent episode, moderate (H) 11/01/2016     Priority: Medium     ACP (advance care planning) 08/24/2016     Priority: Medium     Advance Care Planning 8/24/2016: ACP Review of Chart / Resources Provided:  Reviewed chart for advance care plan.  Lenora Montelongo has no plan or code status on file. Discussed available resources and provided with information.   Added by Andrea Pulliam             Gastroesophageal reflux disease without esophagitis 04/08/2016     Priority: Medium     Helicobacter positive gastritis      Priority: Medium     Midline low back pain without sciatica 01/13/2016     Priority: Medium     Stress due to illness of family member 12/10/2014     Priority: Medium     OCD (obsessive compulsive disorder)      Priority: Medium        Past Medical History:    Past Medical History:   Diagnosis Date     Adjustment disorder with anxiety      Constipation 07/17/2012     Dysmenorrhea  "06/20/2002     Dyspareunia 06/13/2007     Dysplasia of cervix (uteri)  07/25/2000     Endometriosis of uterus 12/29/2003     Esophageal reflux 02/07/2000     Helicobacter positive gastritis      IBS (Irritable colon syndrome) 07/06/2011     OCD (obsessive compulsive disorder)        Past Surgical History:    Past Surgical History:   Procedure Laterality Date     bladder reconstruction and mesh removal  2012     BLADDER REPAIR  2010     colposcopy with biopsy-mutliple  10/2014     ROEL/LSO       TUBAL LIGATION         Family History:    Family History   Problem Relation Age of Onset     Other - See Comments Paternal Grandmother      Antitrysin Deficiency     Cancer Mother      Cervical     Cancer - colorectal Maternal Grandfather        Social History:  Marital Status:   [4]  Social History   Substance Use Topics     Smoking status: Never Smoker     Smokeless tobacco: Never Used     Alcohol use 0.0 oz/week      Comment: RARELY        Medications:      diclofenac (VOLTAREN) 75 MG EC tablet   doxycycline (VIBRAMYCIN) 100 MG capsule   metroNIDAZOLE (FLAGYL) 500 MG tablet   Omeprazole (PRILOSEC PO)   traMADol (ULTRAM) 50 MG tablet         Review of Systems   Constitutional: Positive for fatigue and fever (low grade). Negative for appetite change.   Musculoskeletal: Positive for joint swelling.   Skin: Positive for wound.       Physical Exam   BP: 116/71  Heart Rate: 89  Temp: 99.8  F (37.7  C)  Resp: 16  Height: 157.5 cm (5' 2\")  SpO2: 97 %      Physical Exam   Constitutional: She is oriented to person, place, and time. She appears well-developed. She is cooperative. She does not appear ill.   HENT:   Head: Normocephalic and atraumatic.   Cardiovascular: Normal rate.    Pulmonary/Chest: Effort normal.   Musculoskeletal: Normal range of motion.        Right hand: She exhibits tenderness (Proximal and middle phalanx), laceration (index finger - diffuse puncture bites over MCP joint ~ 5 bites, no drainage or " "fluctuation) and swelling (index finger - mild to moderate). She exhibits normal range of motion (index finger - full ROM with mild to moderate pain), no bony tenderness, normal capillary refill and no deformity. Normal sensation noted. Normal strength noted.   Neurological: She is alert and oriented to person, place, and time.   Skin: Skin is warm and dry.   Psychiatric: She has a normal mood and affect. Her behavior is normal.   Nursing note and vitals reviewed.      ED Course     ED Course     Procedures    Medications - No data to display    Assessments & Plan (with Medical Decision Making)     I have reviewed the nursing notes.    I have reviewed the findings, diagnosis, plan and need for follow up with the patient.  ASSESSMENT / PLAN:  (S61.258A,  W55.01XA) Cat bite of index finger, initial encounter  Comment: right index finger  Plan: Doxycycline as prescribed   Flagyl as prescribed   Wash wounds 1-2 times daily with soap and water and cover with antiseptic ointment   Watch for signs and symptoms of infection including redness, swelling, increasing pain, drainage, odor, warmth, fever/chills   Return to PCP, ED or UC if this develops   Take all medications as prescribed   Pets fully vaccinated against rabies are at very low risk of rabies infection but    because human rabies is almost always fatal, any biting pet should be confined for 10 days.     If the animal is healthy at the end of the 10 days, there is no danger of rabies.  If the animal becomes ill or dies in the 10 day period,    contact animal control so the animal can be tested for rabies.     Return to ED/UC if symptoms increase or concerns develop such as those discussed and listed on the \"When to go the Emergency Room\" portion of your discharge instructions.     Patient verbally educated and given appropriate education sheets for their diagnoses and all questions answered to the best of my ability.    Discharge Medication List as of 7/8/2018  " 1:50 PM      START taking these medications    Details   doxycycline (VIBRAMYCIN) 100 MG capsule Take 1 capsule (100 mg) by mouth 2 times daily for 10 days, Disp-20 capsule, R-0, E-Prescribe      metroNIDAZOLE (FLAGYL) 500 MG tablet Take 1 tablet (500 mg) by mouth 2 times daily for 10 days, Disp-20 tablet, R-0, E-PrescribeEat yogurt or cottage cheese daily to prevent diarrhea that can be caused by taking this medication.             Final diagnoses:   Cat bite of index finger, initial encounter - right index finger       7/8/2018   [facility]     Nancy Betts NP  07/09/18 1952

## 2018-07-08 NOTE — ED AVS SNAPSHOT
[facility]    [address] [address]    Phone:  [phone]                                       Lenora Montelongo   MRN: [id number]    Department:  [facility]   Date of Visit:  7/8/2018           Patient Information     Date Of Birth          1975        Your diagnoses for this visit were:     Cat bite of index finger, initial encounter right index finger       You were seen by Nancy Betts NP.      Follow-up Information     Follow up with [facility].    Specialty:  EMERGENCY MEDICINE    Why:  As needed, If symptoms worsen, or concerns develop    Contact information:    [address] [address] [phone]    Additional information:    From [facility]: Take [address]. Turn left at [address]. Turn left at the first stoplight on [address]. At the first stop sign, take a right onto [facility]. Take a left into the parking lot and continue through until you reach the North enterance of the building.       From [place]: Take [address]. Take the [address] ramp towards [place]. Turn left onto [address]. Take a slight right onto [address]. Turn left at the first stoplight on [address]. At the first stop sign, take a right onto [address]. Take a left into the parking lot and continue through until you reach the North enterance of the building.       From [place]: Take [address]. Take a right at [address]. At the first stop sign, take a right onto [address]. Take a left into the parking lot and continue through until you reach the North enterance of the building.         Follow up with Duncan Moore MD. Call on 7/9/2018.    Specialty:  Family Practice    Why:  to schedule a follow up appointment for Monday or Tuesday for wound re-check    Contact information:    [address] [facility] [address]  [phone]           Discharge Instructions         Cat Bite    A cat bite can cause a wound deep enough to break the skin. In such cases, the wound is cleaned and then sometimes closed. If the wound is closed it is usually not closed completely. This is so that fluid can drain if the wound becomes infected. Often the wound is left open to heal. In addition to wound care, a tetanus shot may be given, if needed.  Home care    Wash your hands well with soap and warm water before and after caring for the wound. This helps lower the risk of infection.    Care for the wound as directed. If a dressing was applied to the wound, be sure to change it as directed.    If the wound bleeds, place a clean, soft cloth on the wound. Then firmly apply pressure until the bleeding stops. This may take up to 5 minutes. Do not release the pressure and look at the wound during this time.    Always get medical attention for cat bites on the hand. They are highly likely to become infected.    Most wounds heal within 10 days. But an infection can occur even with proper treatment. So be sure to check the wound daily for signs of infection (see below).    Antibiotics may be prescribed. These help prevent or treat infection. If you re given antibiotics, take them as directed. Also be sure to complete the medicines.  Rabies prevention  Rabies is a virus that can be carried in certain animals. These can include domestic animals such as cats and dogs. Pets fully vaccinated against rabies (2 shots) are at very low risk of infection. But because human rabies is almost always fatal, any biting pet should be confined for 10 days as an extra precaution. In general, if there is a risk for rabies, the following steps may need to be taken:    If someone s pet cat has bitten you, it should be kept in a secure area for the next 10 days to watch for signs of illness. (If the pet owner won t allow this, contact your local animal control center.) If the cat becomes ill or  dies during that time, contact your local animal control center at once so the animal may be tested for rabies. If the cat stays healthy for the next 10 days, there is no danger of rabies in the animal or you.    If a stray cat bit you, contact your local animal control center. They can give information on capture, quarantine, and animal rabies testing.    If you can t find the animal that bit you in the next 2 days, and if rabies exists in your area, you may need to receive the rabies vaccine series. Call your healthcare provider right away. Or return to the emergency department promptly.    All animal bites should be reported to the local animal control center. If you were not given a form to fill out, you can report this yourself.  Follow-up care  Follow up with your healthcare provider, or as directed.  When to seek medical advice  Call your healthcare provider right away if any of these occur:    Signs of infection:  ? Spreading redness or warmth from the wound  ? Increased pain or swelling  ? Fever of 100.4 F (38 C) or higher, or as directed by your healthcare provider  ? Colored fluid or pus draining from the wound  ? Enlarged lymph nodes above the area that was bitten, such as lymph nodes in the armpit if you were bitten on the hand or arm. This may be a sign of cat-scratch disease (cat-scratch fever).    Signs of rabies infection:  ? Headache  ? Confusion  ? Strange behavior  ? Increased salivating or drooling  ? Seizure    Decreased ability to move any body part near the bite area    Bleeding that can't be stopped after 5 minutes of firm pressure  Date Last Reviewed: 3/23/2015    9609-3636 [employer]. [address] [place] [address], [place] [address]. All rights reserved. This information is not intended as a substitute for professional medical care. Always follow your healthcare professional's instructions.             Review of your medicines      START taking        Dose / Directions Last dose  taken    doxycycline 100 MG capsule   Commonly known as:  VIBRAMYCIN   Dose:  100 mg   Quantity:  20 capsule        Take 1 capsule (100 mg) by mouth 2 times daily for 10 days   Refills:  0        metroNIDAZOLE 500 MG tablet   Commonly known as:  FLAGYL   Dose:  500 mg   Quantity:  20 tablet        Take 1 tablet (500 mg) by mouth 2 times daily for 10 days   Refills:  0          Our records show that you are taking the medicines listed below. If these are incorrect, please call your family doctor or clinic.        Dose / Directions Last dose taken    diclofenac 75 MG EC tablet   Commonly known as:  VOLTAREN   Dose:  75 mg   Quantity:  30 tablet        Take 1 tablet (75 mg) by mouth 2 times daily   Refills:  1        PRILOSEC PO        Refills:  0        traMADol 50 MG tablet   Commonly known as:  ULTRAM   Quantity:  25 tablet        1-2 TABS ORALLY EVERY 4-6 HRS AS NEEDED FOR SEVERE PAIN   Refills:  0                Prescriptions were sent or printed at these locations (2 Prescriptions)                   [facility] [address] [place] [address], [place] - [address] AT [facility] [address]   [address], [facility] [address]    Telephone:  [phone]   Fax:  [phone]   Hours:                  E-Prescribed (2 of 2)         doxycycline (VIBRAMYCIN) 100 MG capsule               metroNIDAZOLE (FLAGYL) 500 MG tablet                Orders Needing Specimen Collection     None      Pending Results     Date and Time Order Name Status Description    7/6/2018 0940 ARUP MISCELLANEOUS TEST In process     7/6/2018 0929 CYCLIC CITRULLINATED PEPTIDE ANTIBODY IGG In process     7/6/2018 0929 ANAPLASMA  PHAGOCYTOPH ANTIBODY IGM In process     7/6/2018 0929 BABESIA ANTIBODY IGG IGM In process     7/6/2018 0929 RHEUMATOID FACTOR In process     7/6/2018 0929 LYME DISEASE ANGEL WITH REFLEX TO WB SERUM In process             Pending Culture Results     No orders found from 7/6/2018 to 7/9/2018.            Thank you for choosing [facility]  "      Thank you for choosing [facility] for your care. Our goal is always to provide you with excellent care. Hearing back from our patients is one way we can continue to improve our services. Please take a few minutes to complete the written survey that you may receive in the mail after you visit with us. Thank you!        [employer] Information     [employer] lets you send messages to your doctor, view your test results, renew your prescriptions, schedule appointments and more. To sign up, go to [email or web address] . Click on \"Log in\" on the left side of the screen, which will take you to the Welcome page. Then click on \"Sign up Now\" on the right side of the page.     You will be asked to enter the access code listed below, as well as some personal information. Please follow the directions to create your username and password.     Your access code is: [email or web address]  Expires: 10/4/2018 10:56 AM     Your access code will  in 90 days. If you need help or a new code, please call your [facility] clinic or [phone].        Care EveryWhere ID     This is your Care EveryWhere ID. This could be used by other organizations to access your [facility] medical records  [id number]        Equal Access to Services     KYE COLE : Hannah Mena, waolivia sanches, qaybta kaalmada angie, mely coates. So [facility] [phone].    ATENCIÓN: Si habla español, tiene a roper disposición servicios gratuitos de asistencia lingüística. Llame al [phone].    We comply with applicable federal civil rights laws and [place] laws. We do not discriminate on the basis of race, color, national origin, age, disability, sex, sexual orientation, or gender identity.            After Visit Summary       This is your record. Keep this with you and show to your community pharmacist(s) and doctor(s) at your next visit.                  "

## 2018-07-08 NOTE — ED AVS SNAPSHOT
[facility]    [address] [address]    Phone:  [phone]                                       Lenora Montelongo   MRN: [id number]    Department[facility]  [facility]   Date of Visit:  7/8/2018           After Visit Summary Signature Page     I have received my discharge instructions, and my questions have been answered. I have discussed any challenges I see with this plan with the nurse or doctor.    ..........................................................................................................................................  Patient/Patient Representative Signature      ..........................................................................................................................................  Patient Representative Print Name and Relationship to Patient    ..................................................               ................................................  Date                                            Time    ..........................................................................................................................................  Reviewed by Signature/Title    ...................................................              ..............................................  Date                                                            Time

## 2018-07-08 NOTE — DISCHARGE INSTRUCTIONS
Cat Bite    A cat bite can cause a wound deep enough to break the skin. In such cases, the wound is cleaned and then sometimes closed. If the wound is closed it is usually not closed completely. This is so that fluid can drain if the wound becomes infected. Often the wound is left open to heal. In addition to wound care, a tetanus shot may be given, if needed.  Home care    Wash your hands well with soap and warm water before and after caring for the wound. This helps lower the risk of infection.    Care for the wound as directed. If a dressing was applied to the wound, be sure to change it as directed.    If the wound bleeds, place a clean, soft cloth on the wound. Then firmly apply pressure until the bleeding stops. This may take up to 5 minutes. Do not release the pressure and look at the wound during this time.    Always get medical attention for cat bites on the hand. They are highly likely to become infected.    Most wounds heal within 10 days. But an infection can occur even with proper treatment. So be sure to check the wound daily for signs of infection (see below).    Antibiotics may be prescribed. These help prevent or treat infection. If you re given antibiotics, take them as directed. Also be sure to complete the medicines.  Rabies prevention  Rabies is a virus that can be carried in certain animals. These can include domestic animals such as cats and dogs. Pets fully vaccinated against rabies (2 shots) are at very low risk of infection. But because human rabies is almost always fatal, any biting pet should be confined for 10 days as an extra precaution. In general, if there is a risk for rabies, the following steps may need to be taken:    If someone s pet cat has bitten you, it should be kept in a secure area for the next 10 days to watch for signs of illness. (If the pet owner won t allow this, contact your local animal control center.) If the cat becomes ill or dies during that time, contact your  local animal control center at once so the animal may be tested for rabies. If the cat stays healthy for the next 10 days, there is no danger of rabies in the animal or you.    If a stray cat bit you, contact your local animal control center. They can give information on capture, quarantine, and animal rabies testing.    If you can t find the animal that bit you in the next 2 days, and if rabies exists in your area, you may need to receive the rabies vaccine series. Call your healthcare provider right away. Or return to the emergency department promptly.    All animal bites should be reported to the local animal control center. If you were not given a form to fill out, you can report this yourself.  Follow-up care  Follow up with your healthcare provider, or as directed.  When to seek medical advice  Call your healthcare provider right away if any of these occur:    Signs of infection:  ? Spreading redness or warmth from the wound  ? Increased pain or swelling  ? Fever of 100.4 F (38 C) or higher, or as directed by your healthcare provider  ? Colored fluid or pus draining from the wound  ? Enlarged lymph nodes above the area that was bitten, such as lymph nodes in the armpit if you were bitten on the hand or arm. This may be a sign of cat-scratch disease (cat-scratch fever).    Signs of rabies infection:  ? Headache  ? Confusion  ? Strange behavior  ? Increased salivating or drooling  ? Seizure    Decreased ability to move any body part near the bite area    Bleeding that can't be stopped after 5 minutes of firm pressure  Date Last Reviewed: 3/23/2015    3168-1025 [employer]. [address] [place] [address], [place] [id number]. All rights reserved. This information is not intended as a substitute for professional medical care. Always follow your healthcare professional's instructions.

## 2018-07-09 LAB
B BURGDOR IGG+IGM SER QL: 0.28 (ref 0–0.89)
CCP AB SER IA-ACNC: 1 U/ML
RESULT: ABNORMAL
RHEUMATOID FACT SER NEPH-ACNC: <20 IU/ML (ref 0–20)
SEND OUTS MISC TEST CODE: ABNORMAL
SEND OUTS MISC TEST SPECIMEN: ABNORMAL
TEST NAME: ABNORMAL

## 2018-07-09 ASSESSMENT — ENCOUNTER SYMPTOMS
FATIGUE: 1
WOUND: 1
FEVER: 1
APPETITE CHANGE: 0
JOINT SWELLING: 1

## 2018-07-11 LAB — A PHAGOCYTOPH IGM TITR SER IF: NORMAL {TITER}

## 2018-07-12 LAB
B MICROTI IGG TITR SER: NORMAL {TITER}
B MICROTI IGM TITR SER: NORMAL {TITER}

## 2018-08-01 ENCOUNTER — TELEPHONE (OUTPATIENT)
Dept: FAMILY MEDICINE | Facility: OTHER | Age: 43
End: 2018-08-01

## 2018-08-01 NOTE — TELEPHONE ENCOUNTER
To: Dr. Moore nurse  Patient would like to speak with you regarding her last appointment.  Please call her @ [phone].  Thank you

## 2018-08-14 ENCOUNTER — OFFICE VISIT (OUTPATIENT)
Dept: FAMILY MEDICINE | Facility: OTHER | Age: 43
End: 2018-08-14
Attending: FAMILY MEDICINE
Payer: COMMERCIAL

## 2018-08-14 VITALS
SYSTOLIC BLOOD PRESSURE: 92 MMHG | TEMPERATURE: 97.5 F | BODY MASS INDEX: 30.18 KG/M2 | OXYGEN SATURATION: 98 % | HEART RATE: 77 BPM | WEIGHT: 165 LBS | DIASTOLIC BLOOD PRESSURE: 56 MMHG

## 2018-08-14 DIAGNOSIS — F33.1 MAJOR DEPRESSIVE DISORDER, RECURRENT EPISODE, MODERATE (H): ICD-10-CM

## 2018-08-14 DIAGNOSIS — Z12.31 ENCOUNTER FOR SCREENING MAMMOGRAM FOR BREAST CANCER: ICD-10-CM

## 2018-08-14 DIAGNOSIS — Z00.00 ROUTINE GENERAL MEDICAL EXAMINATION AT A HEALTH CARE FACILITY: Primary | ICD-10-CM

## 2018-08-14 DIAGNOSIS — F41.1 GAD (GENERALIZED ANXIETY DISORDER): ICD-10-CM

## 2018-08-14 PROCEDURE — 99396 PREV VISIT EST AGE 40-64: CPT | Performed by: FAMILY MEDICINE

## 2018-08-14 ASSESSMENT — ANXIETY QUESTIONNAIRES
2. NOT BEING ABLE TO STOP OR CONTROL WORRYING: SEVERAL DAYS
3. WORRYING TOO MUCH ABOUT DIFFERENT THINGS: SEVERAL DAYS
GAD7 TOTAL SCORE: 6
5. BEING SO RESTLESS THAT IT IS HARD TO SIT STILL: SEVERAL DAYS
4. TROUBLE RELAXING: SEVERAL DAYS
7. FEELING AFRAID AS IF SOMETHING AWFUL MIGHT HAPPEN: NOT AT ALL
6. BECOMING EASILY ANNOYED OR IRRITABLE: SEVERAL DAYS
1. FEELING NERVOUS, ANXIOUS, OR ON EDGE: SEVERAL DAYS

## 2018-08-14 ASSESSMENT — PAIN SCALES - GENERAL: PAINLEVEL: MODERATE PAIN (4)

## 2018-08-14 NOTE — NURSING NOTE
"Chief Complaint   Patient presents with     Physical       Initial BP 92/56  Pulse 77  Temp 97.5  F (36.4  C) (Tympanic)  Wt 165 lb (74.8 kg)  SpO2 98%  BMI 30.18 kg/m2 Estimated body mass index is 30.18 kg/(m^2) as calculated from the following:    Height as of 7/8/18: 5' 2\" (1.575 m).    Weight as of this encounter: 165 lb (74.8 kg).  Medication Reconciliation: complete    Socorro Borjas MA    "

## 2018-08-14 NOTE — PROGRESS NOTES
SUBJECTIVE:   CC: Lenora Montelongo is an 43 year old woman who presents for preventive health visit.     Healthy Habits:    Do you get at least three servings of calcium containing foods daily (dairy, green leafy vegetables, etc.)? yes    Amount of exercise or daily activities, outside of work: stays active every day     Problems taking medications regularly No    Medication side effects: No    Have you had an eye exam in the past two years? yes    Do you see a dentist twice per year? yes    Do you have sleep apnea, excessive snoring or daytime drowsiness?no      Depression and Anxiety Follow-Up    Status since last visit: Improved doing well     Other associated symptoms:somatic c/o - some sleep issues     Complicating factors:     Significant life event: Yes-  Adult Children issues      Current substance abuse: None    PHQ-9 11/13/2017 1/12/2018 8/14/2018   Total Score 24 21 4   Q9: Suicide Ideation Not at all Not at all Not at all     AZ-7 SCORE 11/13/2017 1/12/2018 8/14/2018   Total Score 21 18 6       PHQ-9  English  PHQ-9   Any Language  AZ-7  Suicide Assessment Five-step Evaluation and Treatment (SAFE-T)    PHQ-9 SCORE 11/13/2017 1/12/2018 8/14/2018   Total Score - - -   Total Score 24 21 4     AZ-7 SCORE 11/13/2017 1/12/2018 8/14/2018   Total Score 21 18 6           Abuse: Current or Past(Physical, Sexual or Emotional)- No  Do you feel safe in your environment - Yes    Social History   Substance Use Topics     Smoking status: Never Smoker     Smokeless tobacco: Never Used     Alcohol use 0.0 oz/week      Comment: RARELY     If you drink alcohol do you typically have >3 drinks per day or >7 drinks per week? No                     Reviewed orders with patient.  Reviewed health maintenance and updated orders accordingly - Yes  Labs reviewed in EPIC    Patient under age 50, mutual decision reflected in health maintenance.      Pertinent mammograms are reviewed under the imaging tab.  History of abnormal Pap  smear: Status post benign hysterectomy. Health Maintenance and Surgical History updated.     Reviewed and updated as needed this visit by clinical staff  Tobacco  Allergies  Meds  Med Hx  Surg Hx  Fam Hx  Soc Hx        Reviewed and updated as needed this visit by Provider        Past Medical History:   Diagnosis Date     Adjustment disorder with anxiety      Constipation 07/17/2012     Dysmenorrhea 06/20/2002     Dyspareunia 06/13/2007     Dysplasia of cervix (uteri)  07/25/2000     Endometriosis of uterus 12/29/2003     Esophageal reflux 02/07/2000     Helicobacter positive gastritis      IBS (Irritable colon syndrome) 07/06/2011     OCD (obsessive compulsive disorder)       Past Surgical History:   Procedure Laterality Date     bladder reconstruction and mesh removal  2012     BLADDER REPAIR  2010     colposcopy with biopsy-mutliple  10/2014     ROEL/LSO       TUBAL LIGATION         ROS:  CONSTITUTIONAL: NEGATIVE for fever, chills, change in weight  INTEGUMENTARU/SKIN: NEGATIVE for worrisome rashes, moles or lesions  EYES: NEGATIVE for vision changes or irritation  ENT: NEGATIVE for ear, mouth and throat problems  RESP: NEGATIVE for significant cough or SOB  BREAST: NEGATIVE for masses, tenderness or discharge  CV: NEGATIVE for chest pain, palpitations or peripheral edema  GI: NEGATIVE for nausea, abdominal pain, heartburn, or change in bowel habits  : NEGATIVE for unusual urinary or vaginal symptoms. Periods are regular.  MUSCULOSKELETAL: NEGATIVE for significant arthralgias or myalgia  NEURO: NEGATIVE for weakness, dizziness or paresthesias  PSYCHIATRIC: NEGATIVE for changes in mood or affect    OBJECTIVE:   BP 92/56  Pulse 77  Temp 97.5  F (36.4  C) (Tympanic)  Wt 165 lb (74.8 kg)  SpO2 98%  BMI 30.18 kg/m2  EXAM:  GENERAL: healthy, alert and no distress  EYES: Eyes grossly normal to inspection, PERRL and conjunctivae and sclerae normal  HENT: ear canals and TM's normal, nose and mouth without  "ulcers or lesions  NECK: no adenopathy, no asymmetry, masses, or scars and thyroid normal to palpation  RESP: lungs clear to auscultation - no rales, rhonchi or wheezes  BREAST: normal without masses, tenderness or nipple discharge and no palpable axillary masses or adenopathy  CV: regular rate and rhythm, normal S1 S2, no S3 or S4, no murmur, click or rub, no peripheral edema and peripheral pulses strong  ABDOMEN: soft, nontender, no hepatosplenomegaly, no masses and bowel sounds normal   (female): normal female external genitalia, normal urethral meatus, vaginal mucosa pink, moist, well rugated, and  without masses or discharge  MS: no gross musculoskeletal defects noted, no edema  SKIN: no suspicious lesions or rashes  NEURO: Normal strength and tone, mentation intact and speech normal  PSYCH: mentation appears normal, affect normal/bright        ASSESSMENT/PLAN:   1. Routine general medical examination at a health care facility  Doing well. Form filled out for insurance of      2. AZ (generalized anxiety disorder)  Stable - actually good control recently . Symptomatic treatment was discussed along when patient should call and/or come back into the clinic or go to ER/Urgent care. All questions answered.     3. Major depressive disorder, recurrent episode, moderate (H)  Stable - actually good control . Symptomatic treatment was discussed along when patient should call and/or come back into the clinic or go to ER/Urgent care. All questions answered.     4. Encounter for screening mammogram for breast cancer  Will order.   - MA Screen Bilateral w/Oskar; Future    COUNSELING:   Reviewed preventive health counseling, as reflected in patient instructions       Regular exercise       Healthy diet/nutrition    BP Readings from Last 1 Encounters:   08/14/18 92/56     Estimated body mass index is 30.18 kg/(m^2) as calculated from the following:    Height as of 7/8/18: 5' 2\" (1.575 m).    Weight as of this " encounter: 165 lb (74.8 kg).      Weight management plan: Discussed healthy diet and exercise guidelines and patient will follow up in 12 months in clinic to re-evaluate.     reports that she has never smoked. She has never used smokeless tobacco.      Counseling Resources:  ATP IV Guidelines  Pooled Cohorts Equation Calculator  Breast Cancer Risk Calculator  FRAX Risk Assessment  ICSI Preventive Guidelines  Dietary Guidelines for Americans, 2010  USDA's MyPlate  ASA Prophylaxis  Lung CA Screening    Duncan Moore MD  [facility]

## 2018-08-14 NOTE — LETTER
My Depression Action Plan  Name: Lenora Montelongo   Date of Birth 1975  Date: 8/14/2018    My doctor: Duncan Moore   My clinic: [facility] [place]  [place] [address]  [phone]          GREEN    ZONE   Good Control    What it looks like:     Things are going generally well. You have normal up s and down s. You may even feel depressed from time to time, but bad moods usually last less than a day.   What you need to do:  1. Continue to care for yourself (see self care plan)  2. Check your depression survival kit and update it as needed  3. Follow your physician s recommendations including any medication.  4. Do not stop taking medication unless you consult with your physician first.           YELLOW         ZONE Getting Worse    What it looks like:     Depression is starting to interfere with your life.     It may be hard to get out of bed; you may be starting to isolate yourself from others.    Symptoms of depression are starting to last most all day and this has happened for several days.     You may have suicidal thoughts but they are not constant.   What you need to do:     1. Call your care team, your response to treatment will improve if you keep your care team informed of your progress. Yellow periods are signs an adjustment may need to be made.     2. Continue your self-care, even if you have to fake it!    3. Talk to someone in your support network    4. Open up your depression survival kit           RED    ZONE Medical Alert - Get Help    What it looks like:     Depression is seriously interfering with your life.     You may experience these or other symptoms: You can t get out of bed most days, can t work or engage in other necessary activities, you have trouble taking care of basic hygiene, or basic responsibilities, thoughts of suicide or death that will not go away, self-injurious behavior.     What you need to do:  1. Call your care team and request a  same-day appointment. If they are not available (weekends or after hours) call your local crisis line, emergency room or 911.            Depression Self Care Plan / Survival Kit    Self-Care for Depression  Here s the deal. Your body and mind are really not as separate as most people think.  What you do and think affects how you feel and how you feel influences what you do and think. This means if you do things that people who feel good do, it will help you feel better.  Sometimes this is all it takes.  There is also a place for medication and therapy depending on how severe your depression is, so be sure to consult with your medical provider and/ or Behavioral Health Consultant if your symptoms are worsening or not improving.     In order to better manage my stress, I will:    Exercise  Get some form of exercise, every day. This will help reduce pain and release endorphins, the  feel good  chemicals in your brain. This is almost as good as taking antidepressants!  This is not the same as joining a gym and then never going! (they count on that by the way ) It can be as simple as just going for a walk or doing some gardening, anything that will get you moving.      Hygiene   Maintain good hygiene (Get out of bed in the morning, Make your bed, Brush your teeth, Take a shower, and Get dressed like you were going to work, even if you are unemployed).  If your clothes don't fit try to get ones that do.    Diet  I will strive to eat foods that are good for me, drink plenty of water, and avoid excessive sugar, caffeine, alcohol, and other mood-altering substances.  Some foods that are helpful in depression are: complex carbohydrates, B vitamins, flaxseed, fish or fish oil, fresh fruits and vegetables.    Psychotherapy  I agree to participate in Individual Therapy (if recommended).    Medication  If prescribed medications, I agree to take them.  Missing doses can result in serious side effects.  I understand that drinking  alcohol, or other illicit drug use, may cause potential side effects.  I will not stop my medication abruptly without first discussing it with my provider.    Staying Connected With Others  I will stay in touch with my friends, family members, and my primary care provider/team.    Use your imagination  Be creative.  We all have a creative side; it doesn t matter if it s oil painting, sand castles, or mud pies! This will also kick up the endorphins.    Witness Beauty  (AKA stop and smell the roses) Take a look outside, even in mid-winter. Notice colors, textures. Watch the squirrels and birds.     Service to others  Be of service to others.  There is always someone else in need.  By helping others we can  get out of ourselves  and remember the really important things.  This also provides opportunities for practicing all the other parts of the program.    Humor  Laugh and be silly!  Adjust your TV habits for less news and crime-drama and more comedy.    Control your stress  Try breathing deep, massage therapy, biofeedback, and meditation. Find time to relax each day.     My support system    Clinic Contact:  Phone number:    Contact 1:  Phone number:    Contact 2:  Phone number:    [employer]/:  Phone number:    Therapist:  Phone number:    Local crisis center:    Phone number:    Other community support:  Phone number:

## 2018-08-14 NOTE — MR AVS SNAPSHOT
After Visit Summary   8/14/2018    Lenora Montelongo    MRN: [id number]           Patient Information     Date Of Birth          1975        Visit Information        Provider Department      8/14/2018 11:15 AM Duncan Moore MD [facility]        Today's Diagnoses     Routine general medical examination at a health care facility    -  1    AZ (generalized anxiety disorder)        Major depressive disorder, recurrent episode, moderate (H)        Encounter for screening mammogram for breast cancer          Care Instructions      Preventive Health Recommendations  Female Ages 40 to 49    Yearly exam:     See your health care provider every year in order to  1. Review health changes.   2. Discuss preventive care.    3. Review your medicines if your doctor prescribed any.      Get a Pap test every three years (unless you have an abnormal result and your provider advises testing more often).      If you get Pap tests with HPV test, you only need to test every 5 years, unless you have an abnormal result. You do not need a Pap test if your uterus was removed (hysterectomy) and you have not had cancer.      You should be tested each year for STDs (sexually transmitted diseases), if you're at risk.     Ask your doctor if you should have a mammogram.      Have a colonoscopy (test for colon cancer) if someone in your family has had colon cancer or polyps before age 50.       Have a cholesterol test every 5 years.       Have a diabetes test (fasting glucose) after age 45. If you are at risk for diabetes, you should have this test every 3 years.    Shots: Get a flu shot each year. Get a tetanus shot every 10 years.     Nutrition:     Eat at least 5 servings of fruits and vegetables each day.    Eat whole-grain bread, whole-wheat pasta and brown rice instead of white grains and rice.    Get adequate Calcium and Vitamin D.      Lifestyle    Exercise at least 150 minutes a week (an average of 30  minutes a day, 5 days a week). This will help you control your weight and prevent disease.    Limit alcohol to one drink per day.    No smoking.     Wear sunscreen to prevent skin cancer.    See your dentist every six months for an exam and cleaning.          Follow-ups after your visit        Future tests that were ordered for you today     Open Future Orders        Priority Expected Expires Ordered    MA Screen Bilateral w/Oskar Routine  8/14/2019 8/14/2018            Who to contact     If you have questions or need follow up information about today's clinic visit or your schedule please contact [facility] directly at [phone].  Normal or non-critical lab and imaging results will be communicated to you by MyChart, letter or phone within 4 business days after the clinic has received the results. If you do not hear from us within 7 days, please contact the clinic through MyChart or phone. If you have a critical or abnormal lab result, we will notify you by phone as soon as possible.  Submit refill requests through [employer] or call your pharmacy and they will forward the refill request to us. Please allow 3 business days for your refill to be completed.          Additional Information About Your Visit        Care EveryWhere ID     This is your Care EveryWhere ID. This could be used by other organizations to access your [facility] medical records  [id number]        Your Vitals Were     Pulse Temperature Pulse Oximetry BMI (Body Mass Index)          77 97.5  F (36.4  C) (Tympanic) 98% 30.18 kg/m2         Blood Pressure from Last 3 Encounters:   08/14/18 92/56   07/08/18 116/71   07/06/18 104/70    Weight from Last 3 Encounters:   08/14/18 165 lb (74.8 kg)   07/06/18 167 lb (75.8 kg)   01/12/18 170 lb (77.1 kg)               Primary Care Provider Office Phone # Fax #    Duncan Moore [phone]       [facility] [address]        Equal Access to Services     KYE COLE  AH: Hannah torres Trina, wascotda luqadaha, qaybta karick marymelani, waxtati inez marilynvidal guzmanterecordell coates. So [facility] [phone].    ATENCIÓN: Si habla español, tiene a roper disposición servicios gratuitos de asistencia lingüística. Llame al [phone].    We comply with applicable federal civil rights laws and [place] laws. We do not discriminate on the basis of race, color, national origin, age, disability, sex, sexual orientation, or gender identity.            Thank you!     [facility] you [facility]  [facility] your [facility]. Our goal is always to provide you with excellent care. Hearing back from our patients is one way we can continue to improve our services. Please take a few minutes to complete the written survey that you may receive in the mail after your visit with us. Thank you!             Your Updated Medication List - Protect others around you: Learn how to safely use, store and throw away your medicines at www.disposemymeds.org.          This list is accurate as of 8/14/18 12:38 PM.  Always use your most recent med list.                   Brand Name Dispense Instructions for use Diagnosis    diclofenac 75 MG EC tablet    VOLTAREN    30 tablet    Take 1 tablet (75 mg) by mouth 2 times daily    Pain in joint, multiple sites, Polymyalgia (H)       PRILOSEC PO

## 2018-08-15 ASSESSMENT — PATIENT HEALTH QUESTIONNAIRE - PHQ9: SUM OF ALL RESPONSES TO PHQ QUESTIONS 1-9: 4

## 2018-08-15 ASSESSMENT — ANXIETY QUESTIONNAIRES: GAD7 TOTAL SCORE: 6

## 2018-11-09 ENCOUNTER — TELEPHONE (OUTPATIENT)
Dept: FAMILY MEDICINE | Facility: OTHER | Age: 43
End: 2018-11-09

## 2018-11-09 NOTE — TELEPHONE ENCOUNTER
Spoke to patient - looking to get all of son's medical records.   Patient advised to call release of information and was given the phone number to reach.     Jasmyn Tapia LPN

## 2018-11-09 NOTE — TELEPHONE ENCOUNTER
Pt would like Dr Moore nurse to call her back please at [phone] regarding records.  She did not want to be transferred to release of information.

## 2018-11-12 ENCOUNTER — OFFICE VISIT (OUTPATIENT)
Dept: FAMILY MEDICINE | Facility: OTHER | Age: 43
End: 2018-11-12
Attending: FAMILY MEDICINE
Payer: COMMERCIAL

## 2018-11-12 VITALS
OXYGEN SATURATION: 97 % | HEIGHT: 62 IN | SYSTOLIC BLOOD PRESSURE: 118 MMHG | WEIGHT: 159 LBS | RESPIRATION RATE: 16 BRPM | BODY MASS INDEX: 29.26 KG/M2 | HEART RATE: 72 BPM | DIASTOLIC BLOOD PRESSURE: 64 MMHG | TEMPERATURE: 97.5 F

## 2018-11-12 DIAGNOSIS — R10.11 RUQ ABDOMINAL PAIN: ICD-10-CM

## 2018-11-12 DIAGNOSIS — L65.9 HAIR LOSS: Primary | ICD-10-CM

## 2018-11-12 DIAGNOSIS — B00.9 HERPES SIMPLEX VIRUS INFECTION: ICD-10-CM

## 2018-11-12 DIAGNOSIS — F41.1 GAD (GENERALIZED ANXIETY DISORDER): ICD-10-CM

## 2018-11-12 LAB — TSH SERPL DL<=0.005 MIU/L-ACNC: 1.74 MU/L (ref 0.4–4)

## 2018-11-12 PROCEDURE — 99214 OFFICE O/P EST MOD 30 MIN: CPT | Performed by: FAMILY MEDICINE

## 2018-11-12 PROCEDURE — 84443 ASSAY THYROID STIM HORMONE: CPT | Performed by: FAMILY MEDICINE

## 2018-11-12 PROCEDURE — 36415 COLL VENOUS BLD VENIPUNCTURE: CPT | Performed by: FAMILY MEDICINE

## 2018-11-12 RX ORDER — ACYCLOVIR 400 MG/1
400 TABLET ORAL 3 TIMES DAILY
Qty: 90 TABLET | Refills: 1 | Status: SHIPPED | OUTPATIENT
Start: 2018-11-12 | End: 2020-11-11

## 2018-11-12 RX ORDER — OMEPRAZOLE 40 MG/1
40 CAPSULE, DELAYED RELEASE ORAL DAILY
Qty: 90 CAPSULE | Refills: 1 | Status: SHIPPED | OUTPATIENT
Start: 2018-11-12 | End: 2019-08-20

## 2018-11-12 ASSESSMENT — ANXIETY QUESTIONNAIRES
IF YOU CHECKED OFF ANY PROBLEMS ON THIS QUESTIONNAIRE, HOW DIFFICULT HAVE THESE PROBLEMS MADE IT FOR YOU TO DO YOUR WORK, TAKE CARE OF THINGS AT HOME, OR GET ALONG WITH OTHER PEOPLE: SOMEWHAT DIFFICULT
3. WORRYING TOO MUCH ABOUT DIFFERENT THINGS: MORE THAN HALF THE DAYS
6. BECOMING EASILY ANNOYED OR IRRITABLE: MORE THAN HALF THE DAYS
1. FEELING NERVOUS, ANXIOUS, OR ON EDGE: MORE THAN HALF THE DAYS
5. BEING SO RESTLESS THAT IT IS HARD TO SIT STILL: SEVERAL DAYS
2. NOT BEING ABLE TO STOP OR CONTROL WORRYING: MORE THAN HALF THE DAYS
GAD7 TOTAL SCORE: 13
7. FEELING AFRAID AS IF SOMETHING AWFUL MIGHT HAPPEN: MORE THAN HALF THE DAYS
4. TROUBLE RELAXING: MORE THAN HALF THE DAYS

## 2018-11-12 ASSESSMENT — PAIN SCALES - GENERAL: PAINLEVEL: NO PAIN (0)

## 2018-11-12 ASSESSMENT — PATIENT HEALTH QUESTIONNAIRE - PHQ9: SUM OF ALL RESPONSES TO PHQ QUESTIONS 1-9: 17

## 2018-11-12 NOTE — MR AVS SNAPSHOT
After Visit Summary   11/12/2018    Lenora Montelongo    MRN: [id number]           Patient Information     Date Of Birth          1975        Visit Information        Provider Department      11/12/2018 11:15 AM Duncan Moore MD [facility]        Today's Diagnoses     Hair loss    -  1    Herpes simplex virus infection        AZ (generalized anxiety disorder)           Follow-ups after your visit        Your next 10 appointments already scheduled     Nov 19, 2018  3:30 PM CST   (Arrive by 3:15 PM)   Office Visit with TRAVIS Khan CNP   [facility] ([facility] )    [address]  [facility] [address]   [phone]           Bring a current list of meds and any records pertaining to this visit. For Physicals, please bring immunization records and any forms needing to be filled out. Please arrive 10 minutes early to complete paperwork.              Who to contact     If you have questions or need follow up information about today's clinic visit or your schedule please contact [facility] directly at [phone].  Normal or non-critical lab and imaging results will be communicated to you by Healthrage[employer], letter or phone within 4 business days after the clinic has received the results. If you do not hear from us within 7 days, please contact the clinic through [employer] or phone. If you have a critical or abnormal lab result, we will notify you by phone as soon as possible.  Submit refill requests through [employer] or call your pharmacy and they will forward the refill request to us. Please allow 3 business days for your refill to be completed.          Additional Information About Your Visit        MyChart Information     [employer] lets you send messages to your doctor, view your test results, renew your prescriptions, schedule appointments and more. To sign up, go to [email or web address] . Click on  "\"Log in\" on the left side of the screen, which will take you to the Welcome page. Then click on \"Sign up Now\" on the right side of the page.     You will be asked to enter the access code listed below, as well as some personal information. Please follow the directions to create your username and password.     Your access code is: [email or web address]  Expires: 2/10/2019  1:04 PM     Your access code will  in 90 days. If you need help or a new code, please call your [facility] or [phone].        Care EveryWhere ID     This is your Care EveryWhere ID. This could be used by other organizations to access your [facility] medical records  [id number]        Your Vitals Were     Pulse Temperature Respirations Height Pulse Oximetry BMI (Body Mass Index)    72 97.5  F (36.4  C) (Tympanic) 16 5' 2\" (1.575 m) 97% 29.08 kg/m2       Blood Pressure from Last 3 Encounters:   18 118/64   18 92/56   18 116/71    Weight from Last 3 Encounters:   18 159 lb (72.1 kg)   18 165 lb (74.8 kg)   18 167 lb (75.8 kg)              We Performed the Following     TSH          Today's Medication Changes          These changes are accurate as of 18  1:04 PM.  If you have any questions, ask your nurse or doctor.               Start taking these medicines.        Dose/Directions    acyclovir 400 MG tablet   Commonly known as:  ZOVIRAX   Used for:  Herpes simplex virus infection   Started by:  Duncan Moore MD        Dose:  400 mg   Take 1 tablet (400 mg) by mouth 3 times daily For 10 days as needed for HSV breakout.   Quantity:  90 tablet   Refills:  1            Where to get your medicines      These medications were sent to [facility] - [address]  [address] [place] [address] [place] [address]     Phone:  [phone]     acyclovir 400 MG tablet                Primary Care Provider Office Phone # Fax #    Duncan Moore [phone] [address] [facility] [address]        Equal Access to Services     [facility] DOUGLAS : Hadii aad ku hadkadealfredito Sozhaneali, wascotda luqadaha, qaybta kaalmamely nixon. So [facility] [phone].    ATENCIÓN: Si habla español, tiene a roper disposición servicios gratuitos de asistencia lingüística. Saritaame al [phone].    We comply with applicable federal civil rights laws and [place] laws. We do not discriminate on the basis of race, color, national origin, age, disability, sex, sexual orientation, or gender identity.            Thank you!     Thank you for choosing [facility] [place] [facility]  for your care. Our goal is always to provide you with excellent care. Hearing back from our patients is one way we can continue to improve our services. Please take a few minutes to complete the written survey that you may receive in the mail after your visit with us. Thank you!             Your Updated Medication List - Protect others around you: Learn how to safely use, store and throw away your medicines at www.disposemymeds.org.          This list is accurate as of 11/12/18  1:04 PM.  Always use your most recent med list.                   Brand Name Dispense Instructions for use Diagnosis    acyclovir 400 MG tablet    ZOVIRAX    90 tablet    Take 1 tablet (400 mg) by mouth 3 times daily For 10 days as needed for HSV breakout.    Herpes simplex virus infection       diclofenac 75 MG EC tablet    VOLTAREN    30 tablet    Take 1 tablet (75 mg) by mouth 2 times daily    Pain in joint, multiple sites, Polymyalgia (H)       PRILOSEC PO

## 2018-11-12 NOTE — NURSING NOTE
"Chief Complaint   Patient presents with     Derm Problem     Hair Loss       Initial /64  Pulse 72  Temp 97.5  F (36.4  C) (Tympanic)  Resp 16  Ht 5' 2\" (1.575 m)  Wt 165 lb (74.8 kg)  SpO2 97%  BMI 30.18 kg/m2 Estimated body mass index is 30.18 kg/(m^2) as calculated from the following:    Height as of this encounter: 5' 2\" (1.575 m).    Weight as of this encounter: 165 lb (74.8 kg).  Medication Reconciliation: complete    Ligia Boateng LPN  "

## 2018-11-12 NOTE — PROGRESS NOTES
"  SUBJECTIVE:   Lenora Montelongo is a 43 year old female who presents to clinic today for the following health issues:      Hair loss      Duration: couple months    Description (location/character/radiation): losing hair on head, eyebrows, and eye lashes    Intensity:  mild    Accompanying signs and symptoms: none    History (similar episodes/previous evaluation): None    Precipitating or alleviating factors: None    Therapies tried and outcome: vitamins make her sick       Acne      Duration: on going    Description (location/character/radiation): facial acne    Intensity:  mild    Accompanying signs and symptoms: none    History (similar episodes/previous evaluation): None    Precipitating or alleviating factors: None    Therapies tried and outcome: acyclovir - helps. Unable to get refills because prescribing dermatologist is now retired.        Problem list and histories reviewed & adjusted, as indicated.  Additional history:     Labs reviewed in EPIC    Reviewed and updated as needed this visit by clinical staff       Reviewed and updated as needed this visit by Provider         ROS:  CONSTITUTIONAL: NEGATIVE for fever, chills, change in weight  RESP: NEGATIVE for significant cough or SOB  CV: NEGATIVE for chest pain, palpitations or peripheral edema    OBJECTIVE:                                                    /64  Pulse 72  Temp 97.5  F (36.4  C) (Tympanic)  Resp 16  Ht 5' 2\" (1.575 m)  Wt 159 lb (72.1 kg)  SpO2 97%  BMI 29.08 kg/m2  Body mass index is 29.08 kg/(m^2).   GENERAL: healthy, alert, well nourished, well hydrated, no distress  HENT: eyelashes and eyebrows - showed damage - fractured hairs and stubble , scalp -   SKIN: bakari-nasal old HSV infection   Psych - anxious and teary eyed - lots of stress Again        ASSESSMENT/PLAN:                                                      (L65.9) Hair loss  (primary encounter diagnosis)  Comment: of scalp - no pathology  Seen. probable " physiological  Plan: TSH        Will watch - decrease hair damage.   Regarding eyelashes/eyebrows -- ??trichimaina - she denies.  Looks like due to trauma- long discussion on this. Hands off her eyelashes and eyebrows. Should grow back - damaged and broken     (B00.9) Herpes simplex virus infection  Comment: will treat and rf meds   Plan: acyclovir (ZOVIRAX) 400 MG tablet            (F41.1) AZ (generalized anxiety disorder)  Comment: long discussion on meds vs counseling. Have down this road before- pt does not follow thru with staying on meds.   Plan: Pt is going to hold on meds and look to see where she can see a counselor that insurance will pay for it. Discussed at length. Symptomatic treatment was discussed along when patient should call and/or come back into the clinic or go to ER/Urgent care. All questions answered.   F/u prn.           Duncan Moore MD  [facility]

## 2018-11-13 ASSESSMENT — ANXIETY QUESTIONNAIRES: GAD7 TOTAL SCORE: 13

## 2019-02-11 ENCOUNTER — HOSPITAL ENCOUNTER (EMERGENCY)
Facility: HOSPITAL | Age: 44
Discharge: HOME | End: 2019-02-11
Attending: NURSE PRACTITIONER | Admitting: NURSE PRACTITIONER
Payer: COMMERCIAL

## 2019-02-11 VITALS
BODY MASS INDEX: 29.26 KG/M2 | WEIGHT: 160 LBS | TEMPERATURE: 99.4 F | DIASTOLIC BLOOD PRESSURE: 76 MMHG | OXYGEN SATURATION: 98 % | HEART RATE: 99 BPM | SYSTOLIC BLOOD PRESSURE: 105 MMHG | RESPIRATION RATE: 18 BRPM

## 2019-02-11 DIAGNOSIS — J01.00 ACUTE MAXILLARY SINUSITIS, RECURRENCE NOT SPECIFIED: ICD-10-CM

## 2019-02-11 DIAGNOSIS — R30.0 DYSURIA: ICD-10-CM

## 2019-02-11 LAB
ALBUMIN UR-MCNC: NEGATIVE MG/DL
APPEARANCE UR: ABNORMAL
BACTERIA #/AREA URNS HPF: ABNORMAL /HPF
BILIRUB UR QL STRIP: NEGATIVE
COLOR UR AUTO: YELLOW
GLUCOSE UR STRIP-MCNC: NEGATIVE MG/DL
HGB UR QL STRIP: NEGATIVE
HYALINE CASTS #/AREA URNS LPF: 1 /LPF
KETONES UR STRIP-MCNC: NEGATIVE MG/DL
LEUKOCYTE ESTERASE UR QL STRIP: ABNORMAL
MUCOUS THREADS #/AREA URNS LPF: PRESENT /LPF
NITRATE UR QL: NEGATIVE
PH UR STRIP: 6.5 PH (ref 4.7–8)
RBC #/AREA URNS AUTO: 1 /HPF (ref 0–2)
SOURCE: ABNORMAL
SP GR UR STRIP: 1.01 (ref 1–1.03)
SQUAMOUS #/AREA URNS AUTO: 12 /HPF (ref 0–1)
UROBILINOGEN UR STRIP-MCNC: NORMAL MG/DL (ref 0–2)
WBC #/AREA URNS AUTO: 1 /HPF (ref 0–5)

## 2019-02-11 PROCEDURE — G0463 HOSPITAL OUTPT CLINIC VISIT: HCPCS

## 2019-02-11 PROCEDURE — 99214 OFFICE O/P EST MOD 30 MIN: CPT | Mod: Z6 | Performed by: NURSE PRACTITIONER

## 2019-02-11 PROCEDURE — 81001 URINALYSIS AUTO W/SCOPE: CPT | Performed by: FAMILY MEDICINE

## 2019-02-11 RX ORDER — DOXYCYCLINE 100 MG/1
100 CAPSULE ORAL 2 TIMES DAILY
Qty: 14 CAPSULE | Refills: 0 | Status: SHIPPED | OUTPATIENT
Start: 2019-02-11 | End: 2019-03-22

## 2019-02-11 ASSESSMENT — ENCOUNTER SYMPTOMS
FATIGUE: 1
HEADACHES: 1
COUGH: 1
SHORTNESS OF BREATH: 0
FREQUENCY: 1
DIARRHEA: 0
DYSURIA: 1
CONSTIPATION: 0
SINUS PAIN: 1
SINUS PRESSURE: 1
VOMITING: 0
SORE THROAT: 0
FEVER: 0
ABDOMINAL PAIN: 0
BACK PAIN: 1
APPETITE CHANGE: 1

## 2019-02-11 NOTE — ED AVS SNAPSHOT
[facility]  [address] [address]  Phone:  [phone]                                    Lenora Gage   MRN: [id number]    Department:  [facility]   Date of Visit:  2/11/2019           After Visit Summary Signature Page    I have received my discharge instructions, and my questions have been answered. I have discussed any challenges I see with this plan with the nurse or doctor.    ..........................................................................................................................................  Patient/Patient Representative Signature      ..........................................................................................................................................  Patient Representative Print Name and Relationship to Patient    ..................................................               ................................................  Date                                   Time    ..........................................................................................................................................  Reviewed by Signature/Title    ...................................................              ..............................................  Date                                               Time          22EPIC Rev 08/18

## 2019-02-11 NOTE — ED TRIAGE NOTES
"Arrived for \"thinking I am having a upper respiratory infection and a urinary tract infection.  I have been having respiratory symptoms for 7 days and my right side of my face hurts.  It hurts to urinate for 5 days.\"   "

## 2019-02-21 ENCOUNTER — APPOINTMENT (OUTPATIENT)
Dept: GENERAL RADIOLOGY | Facility: HOSPITAL | Age: 44
End: 2019-02-21
Attending: NURSE PRACTITIONER
Payer: COMMERCIAL

## 2019-02-21 ENCOUNTER — HOSPITAL ENCOUNTER (EMERGENCY)
Facility: HOSPITAL | Age: 44
Discharge: HOME | End: 2019-02-21
Attending: NURSE PRACTITIONER | Admitting: NURSE PRACTITIONER
Payer: COMMERCIAL

## 2019-02-21 VITALS
DIASTOLIC BLOOD PRESSURE: 66 MMHG | TEMPERATURE: 98 F | SYSTOLIC BLOOD PRESSURE: 110 MMHG | OXYGEN SATURATION: 96 % | RESPIRATION RATE: 14 BRPM

## 2019-02-21 DIAGNOSIS — M25.562 LEFT KNEE PAIN, UNSPECIFIED CHRONICITY: ICD-10-CM

## 2019-02-21 PROCEDURE — 73562 X-RAY EXAM OF KNEE 3: CPT | Mod: TC,LT

## 2019-02-21 PROCEDURE — G0463 HOSPITAL OUTPT CLINIC VISIT: HCPCS

## 2019-02-21 PROCEDURE — 99213 OFFICE O/P EST LOW 20 MIN: CPT | Mod: Z6 | Performed by: NURSE PRACTITIONER

## 2019-02-21 ASSESSMENT — ENCOUNTER SYMPTOMS
CONSTITUTIONAL NEGATIVE: 1
ALLERGIC/IMMUNOLOGIC NEGATIVE: 1
NEUROLOGICAL NEGATIVE: 1
HEMATOLOGIC/LYMPHATIC NEGATIVE: 1
GASTROINTESTINAL NEGATIVE: 1
RESPIRATORY NEGATIVE: 1
PSYCHIATRIC NEGATIVE: 1

## 2019-02-21 NOTE — ED AVS SNAPSHOT
[facility]  [address] [address]  Phone:  [phone]                                    Lenora Gage   MRN: [id number]    Department:  [facility]   Date of Visit:  2/21/2019           After Visit Summary Signature Page    I have received my discharge instructions, and my questions have been answered. I have discussed any challenges I see with this plan with the nurse or doctor.    ..........................................................................................................................................  Patient/Patient Representative Signature      ..........................................................................................................................................  Patient Representative Print Name and Relationship to Patient    ..................................................               ................................................  Date                                   Time    ..........................................................................................................................................  Reviewed by Signature/Title    ...................................................              ..............................................  Date                                               Time          22EPIC Rev 08/18

## 2019-02-22 NOTE — ED NOTES
Patient presents with Lt knee pain X 1 month.  Patient states she fell on it a month ago.  Patient hasn't seen pcp.  Ibu taken at home.

## 2019-02-22 NOTE — ED PROVIDER NOTES
History     Chief Complaint   Patient presents with     Knee Pain     lt knee pain x 1 month, notes injury     The history is provided by the patient.     Lenora Gage is a 43 year old female who presents to the  for left knee pain. She feel about a month ago and landed on knees. The left knee continues to giver her some problems. She has tried tylenol and ibuprofen with minimal relief. Pain worsens at the end of the day when she is on her feet all day.     Allergies:  Allergies   Allergen Reactions     Blood Transfusion Related (Informational Only) Other (See Comments)     Patient has a history of a clinically significant antibody against RBC antigens.  A delay in compatible RBCs may occur.     Penicillins Anaphylaxis and Hives       Problem List:    Patient Active Problem List    Diagnosis Date Noted     Lateral epicondylitis of right elbow 01/12/2018     Priority: Medium     Acute intractable tension-type headache 11/13/2017     Priority: Medium     AZ (generalized anxiety disorder) 06/05/2017     Priority: Medium     Prolonged grief reaction 11/01/2016     Priority: Medium     Major depressive disorder, recurrent episode, moderate (H) 11/01/2016     Priority: Medium     ACP (advance care planning) 08/24/2016     Priority: Medium     Advance Care Planning 8/24/2016: ACP Review of Chart / Resources Provided:  Reviewed chart for advance care plan.  Lenora Montelongo has no plan or code status on file. Discussed available resources and provided with information.   Added by Andrea Pulliam             Gastroesophageal reflux disease without esophagitis 04/08/2016     Priority: Medium     Helicobacter positive gastritis      Priority: Medium     Midline low back pain without sciatica 01/13/2016     Priority: Medium     Stress due to illness of family member 12/10/2014     Priority: Medium     OCD (obsessive compulsive disorder)      Priority: Medium        Past Medical History:    Past Medical History:   Diagnosis  Date     Adjustment disorder with anxiety      Constipation 07/17/2012     Dysmenorrhea 06/20/2002     Dyspareunia 06/13/2007     Dysplasia of cervix (uteri)  07/25/2000     Endometriosis of uterus 12/29/2003     Esophageal reflux 02/07/2000     Helicobacter positive gastritis      IBS (Irritable colon syndrome) 07/06/2011     OCD (obsessive compulsive disorder)        Past Surgical History:    Past Surgical History:   Procedure Laterality Date     bladder reconstruction and mesh removal  2012     BLADDER REPAIR  2010     colposcopy with biopsy-mutliple  10/2014     HYSTERECTOMY TOTAL ABDOMINAL, BILATERAL SALPINGO-OOPHORECTOMY, COMBINED Left     Right ovary remains.      TUBAL LIGATION         Family History:    Family History   Problem Relation Age of Onset     Other - See Comments Paternal Grandmother         Antitrysin Deficiency     Cancer Mother         Cervical     Cancer - colorectal Maternal Grandfather        Social History:  Marital Status:   [2]  Social History     Tobacco Use     Smoking status: Never Smoker     Smokeless tobacco: Never Used   Substance Use Topics     Alcohol use: Yes     Alcohol/week: 0.0 oz     Comment: RARELY     Drug use: No        Medications:      acyclovir (ZOVIRAX) 400 MG tablet   diclofenac (VOLTAREN) 75 MG EC tablet   Omeprazole (PRILOSEC PO)   omeprazole (PRILOSEC) 40 MG capsule         Review of Systems   Constitutional: Negative.    HENT: Negative.    Respiratory: Negative.    Gastrointestinal: Negative.    Genitourinary: Negative.    Musculoskeletal:        Left knee pain   Skin:        Initially it was bruised,but that has cleared.    Allergic/Immunologic: Negative.    Neurological: Negative.    Hematological: Negative.    Psychiatric/Behavioral: Negative.        Physical Exam   BP: 110/66  Heart Rate: 86  Temp: 98  F (36.7  C)  Resp: 14  SpO2: 96 %      Physical Exam   Constitutional: She is oriented to person, place, and time.   Cardiovascular: Normal rate.    Pulmonary/Chest: Effort normal.   Musculoskeletal:        Left knee: She exhibits normal range of motion.        Legs:  Neurological: She is alert and oriented to person, place, and time.   Skin: Skin is warm and dry. Capillary refill takes less than 2 seconds.   Psychiatric: She has a normal mood and affect.   Nursing note and vitals reviewed.      ED Course        Procedures              Critical Care time:  none             Results for orders placed or performed during the hospital encounter of 02/21/19 (from the past 24 hour(s))   XR Knee Left 3 Views    Narrative    PROCEDURE:  XR KNEE LT 3 VW    HISTORY: pain    COMPARISON:  None.    TECHNIQUE:  3 views of the left knee were obtained.    FINDINGS:  No fracture or dislocation is identified. The joint spaces  are preserved.        Impression    IMPRESSION: Normal left knee      JULIANNE COSME MD       Medications - No data to display    Assessments & Plan (with Medical Decision Making)     I have reviewed the nursing notes.    I have reviewed the findings, diagnosis, plan and need for follow up with the patient.  Elevate injured area above heart as often as possible and when resting. Take OTC motrin 800 mg every 8 hours as needed for pain/swelling.   Apply ice at least three times a day x 20 minutes.   Ace wrap applied in the UC and suggested to try a knee sleeve   Patient verbally educated and given appropriate education sheets for the diagnoses and has no questions.  Take medications as directed.   Follow up with your Primary Care provider if symptoms increase or if further concerns develop, return to the ER         Medication List      ASK your doctor about these medications    doxycycline hyclate 100 MG capsule  Commonly known as:  VIBRAMYCIN  100 mg, Oral, 2 TIMES DAILY  Ask about: Should I take this medication?            Final diagnoses:   Left knee pain, unspecified chronicity       2/21/2019   [facility]     Leyda Domingo  TRAVIS CNP  02/21/19 2021

## 2019-03-21 ENCOUNTER — TELEPHONE (OUTPATIENT)
Dept: FAMILY MEDICINE | Facility: OTHER | Age: 44
End: 2019-03-21

## 2019-03-21 NOTE — TELEPHONE ENCOUNTER
11:42 AM    Reason for Call: OVERBOOK    Patient is having the following symptoms: UTI AND LOW BACK PAIN for X 3 WEEKS.    The patient is requesting an appointment for FRI 02/22/19 OR MON 02/25/19 with DR VOGEL.    Was an appointment offered for this call? Yes  If yes : Appointment type              Date 03/26/19 [facility]    Preferred method for responding to this message: TELEPHONE   What is your phone number ?[phone] please let her know either way if can or can't work in    If we cannot reach you directly, may we leave a detailed response at the number you provided? Yes    Can this message wait until your PCP/provider returns, if unavailable today? Yes      Tashia Kidd

## 2019-03-22 ENCOUNTER — OFFICE VISIT (OUTPATIENT)
Dept: FAMILY MEDICINE | Facility: OTHER | Age: 44
End: 2019-03-22
Attending: FAMILY MEDICINE
Payer: COMMERCIAL

## 2019-03-22 VITALS
OXYGEN SATURATION: 98 % | HEIGHT: 62 IN | DIASTOLIC BLOOD PRESSURE: 66 MMHG | TEMPERATURE: 98.1 F | HEART RATE: 72 BPM | WEIGHT: 160 LBS | SYSTOLIC BLOOD PRESSURE: 112 MMHG | BODY MASS INDEX: 29.44 KG/M2

## 2019-03-22 DIAGNOSIS — R30.0 DYSURIA: Primary | ICD-10-CM

## 2019-03-22 DIAGNOSIS — R30.0 DYSURIA: ICD-10-CM

## 2019-03-22 DIAGNOSIS — M70.61 GREATER TROCHANTERIC BURSITIS OF RIGHT HIP: ICD-10-CM

## 2019-03-22 DIAGNOSIS — M53.3 SACROILIAC JOINT DYSFUNCTION: ICD-10-CM

## 2019-03-22 LAB
ALBUMIN UR-MCNC: NEGATIVE MG/DL
APPEARANCE UR: ABNORMAL
BACTERIA #/AREA URNS HPF: ABNORMAL /HPF
BILIRUB UR QL STRIP: NEGATIVE
COLOR UR AUTO: ABNORMAL
GLUCOSE UR STRIP-MCNC: NEGATIVE MG/DL
HGB UR QL STRIP: NEGATIVE
HYALINE CASTS #/AREA URNS LPF: 1 /LPF
KETONES UR STRIP-MCNC: NEGATIVE MG/DL
LEUKOCYTE ESTERASE UR QL STRIP: NEGATIVE
MUCOUS THREADS #/AREA URNS LPF: PRESENT /LPF
NITRATE UR QL: NEGATIVE
PH UR STRIP: 6.5 PH (ref 4.7–8)
RBC #/AREA URNS AUTO: 1 /HPF (ref 0–2)
SOURCE: ABNORMAL
SP GR UR STRIP: 1.02 (ref 1–1.03)
SQUAMOUS #/AREA URNS AUTO: 31 /HPF (ref 0–1)
UROBILINOGEN UR STRIP-MCNC: NORMAL MG/DL (ref 0–2)
WBC #/AREA URNS AUTO: 1 /HPF (ref 0–5)

## 2019-03-22 PROCEDURE — 81001 URINALYSIS AUTO W/SCOPE: CPT | Performed by: FAMILY MEDICINE

## 2019-03-22 PROCEDURE — 99213 OFFICE O/P EST LOW 20 MIN: CPT | Performed by: FAMILY MEDICINE

## 2019-03-22 ASSESSMENT — MIFFLIN-ST. JEOR: SCORE: 1324.01

## 2019-03-22 ASSESSMENT — PAIN SCALES - GENERAL: PAINLEVEL: SEVERE PAIN (6)

## 2019-03-22 NOTE — NURSING NOTE
"Chief Complaint   Patient presents with     Musculoskeletal Problem       Initial /66 (BP Location: Right arm, Patient Position: Sitting, Cuff Size: Adult Regular)   Pulse 72   Temp 98.1  F (36.7  C)   Ht 1.575 m (5' 2\")   Wt 72.6 kg (160 lb)   SpO2 98%   BMI 29.26 kg/m   Estimated body mass index is 29.26 kg/m  as calculated from the following:    Height as of this encounter: 1.575 m (5' 2\").    Weight as of this encounter: 72.6 kg (160 lb).  Medication Reconciliation: complete    Socorro Bray LPN  "

## 2019-03-22 NOTE — PROGRESS NOTES
"  SUBJECTIVE:   Lenora Gage is a 44 year old female who presents to clinic today for the following health issues:        Musculoskeletal problem/pain      Duration: 3 months    Description  Location: low back    Intensity:  moderate    Accompanying signs and symptoms: radiation of pain to hips    History  Previous similar problem: no   Previous evaluation:  none    Precipitating or alleviating factors:  Trauma or overuse: YES  Aggravating factors include: none    Therapies tried and outcome: nothing    Points to over right greater than left SI joint area    Some mild radiation to right hip - lateral greater trochanter area    Heat helps - took Alleve on regular bases.     No pain down leg    No injury           Pt c/o some dysuria in past and seen in ER - wanted UA checked. No sx except back issues      Problem list and histories reviewed & adjusted, as indicated.  Additional history: as documented    Labs reviewed in EPIC    Reviewed and updated as needed this visit by clinical staff       Reviewed and updated as needed this visit by Provider         ROS:  CONSTITUTIONAL: NEGATIVE for fever, chills, change in weight  CV: NEGATIVE for chest pain, palpitations or peripheral edema    OBJECTIVE:                                                    /66 (BP Location: Right arm, Patient Position: Sitting, Cuff Size: Adult Regular)   Pulse 72   Temp 98.1  F (36.7  C)   Ht 1.575 m (5' 2\")   Wt 72.6 kg (160 lb)   SpO2 98%   BMI 29.26 kg/m    Body mass index is 29.26 kg/m .   GENERAL: healthy, alert, well nourished, well hydrated, no distress  ABDOMEN: soft, no tenderness, no  hepatosplenomegaly, no masses, normal bowel sounds  MS: extremities- no gross deformities noted, no edema tender over Right greater trochanter.   BACK: no CVA tenderness, mild  paralumbar tenderness/ right  SI joint tenderness and JIMBO test positive on right side     Results for orders placed or performed in visit on 03/22/19   UA with " Microscopic reflex to Culture - [place]   Result Value Ref Range    Color Urine Light Yellow     Appearance Urine Slightly Cloudy     Glucose Urine Negative NEG^Negative mg/dL    Bilirubin Urine Negative NEG^Negative    Ketones Urine Negative NEG^Negative mg/dL    Specific Gravity Urine 1.020 1.003 - 1.035    Blood Urine Negative NEG^Negative    pH Urine 6.5 4.7 - 8.0 pH    Protein Albumin Urine Negative NEG^Negative mg/dL    Urobilinogen mg/dL Normal 0.0 - 2.0 mg/dL    Nitrite Urine Negative NEG^Negative    Leukocyte Esterase Urine Negative NEG^Negative    Source Midstream Urine     WBC Urine 1 0 - 5 /HPF    RBC Urine 1 0 - 2 /HPF    Bacteria Urine Few (A) NEG^Negative /HPF    Squamous Epithelial /HPF Urine 31 (H) 0 - 1 /HPF    Mucous Urine Present (A) NEG^Negative /LPF    Hyaline Casts 1 (A) OTO2^O - 2 /LPF          ASSESSMENT/PLAN:                                                    (R30.0) Dysuria  (primary encounter diagnosis)  Comment: resolved - UA ok   Plan: UA with Microscopic reflex to Culture - [place]            (M53.3) Sacroiliac joint dysfunction  Comment: discussed. Discussed in length conservative measures of OTC medications for pain, Ice/Heat, elevation and the concept of R.I.C.E.. Continue behavioral changes and proper body mechanics to allow for healing. Follow up as directed.   Plan: PHYSICAL THERAPY REFERRAL, nabumetone (RELAFEN)        750 MG tablet        Add PT     (M70.61) Greater trochanteric bursitis of right hip  Comment: discussed. Discussed in length conservative measures of OTC medications for pain, Ice/Heat, elevation and the concept of R.I.C.E.. Continue behavioral changes and proper body mechanics to allow for healing. Follow up as directed.   Plan: PHYSICAL THERAPY REFERRAL, nabumetone (RELAFEN)        750 MG tablet        Symptomatic treatment was discussed along when patient should call and/or come back into the clinic or go to ER/Urgent care. All questions answered.      .        Duncan Moore MD  [facility]

## 2019-03-22 NOTE — PATIENT INSTRUCTIONS
Patient Education     Understanding Trochanteric Bursitis    A bursa is a thin, slippery, sac-like film. It contains a small amount of fluid. This structure is found between bones and soft tissues in and around joints. A bursa cushions and protects a joint. It keeps parts of a joint from rubbing against each other. If a bursa becomes inflamed and irritated, it is known as bursitis.  The trochanteric bursa is found on the hip joint. It lies on top of the bump at the top of the thighbone called the greater trochanter. Inflammation of this bursa is called trochanteric bursitis.     How to say it  [id number]   Causes of trochanteric bursitis  Causes may include:    Overuse of the hip during running or other sports, dance, or work    Falling on or irritation to the side of the hip  This condition may occur along with other problems, such as osteoarthritis of the hip or knee, or low back problems. In rare cases, it may occur after hip surgery.  Symptoms of trochanteric bursitis    Pain or aching on the side of the hip. The pain may travel down the leg.    Swelling, tenderness, or warmth on the side of the hip at the bony bump at the top of the thigh  Treatment for trochanteric bursitis  These may include:    Resting the hip. This allows the bursa to heal.    Prescription or over-the-counter pain medicines. These help reduce inflammation, swelling, and pain.    Cold packs and heat packs. These help reduce pain and swelling.    Stretching and strengthening exercises. These improve flexibility and strength around the hip.    Physical therapy. This includes exercises or other treatments.    Injections of medicine into the bursa. This may help reduce inflammation and relieve symptoms.  Possible complications  If you don t give your hip time to heal, the problem may not go away, may return, or may get worse. Rest and treat your hip as directed.     When to call your healthcare provider  Call your healthcare provider  right away if you have any of these:    Fever of 100.4 F (38 C) or higher, or as directed    Redness, swelling, or warmth that gets worse    Symptoms that don t get better with prescribed medicines, or get worse    New symptoms   Date Last Reviewed: 3/29/2016    [id number] [employer]. [address] [place] [address], [place] [id number]. All rights reserved. This information is not intended as a substitute for professional medical care. Always follow your healthcare professional's instructions.

## 2019-04-15 ENCOUNTER — HOSPITAL ENCOUNTER (EMERGENCY)
Facility: HOSPITAL | Age: 44
Discharge: HOME | End: 2019-04-15
Attending: FAMILY MEDICINE | Admitting: FAMILY MEDICINE
Payer: COMMERCIAL

## 2019-04-15 VITALS
RESPIRATION RATE: 12 BRPM | WEIGHT: 160 LBS | TEMPERATURE: 98.1 F | OXYGEN SATURATION: 98 % | DIASTOLIC BLOOD PRESSURE: 69 MMHG | SYSTOLIC BLOOD PRESSURE: 101 MMHG | BODY MASS INDEX: 29.26 KG/M2

## 2019-04-15 DIAGNOSIS — G89.29 CHRONIC MUSCULOSKELETAL PAIN: ICD-10-CM

## 2019-04-15 DIAGNOSIS — F41.1 GAD (GENERALIZED ANXIETY DISORDER): ICD-10-CM

## 2019-04-15 DIAGNOSIS — F41.1 ANXIETY REACTION: Primary | ICD-10-CM

## 2019-04-15 DIAGNOSIS — M79.18 CHRONIC MUSCULOSKELETAL PAIN: ICD-10-CM

## 2019-04-15 PROCEDURE — 93005 ELECTROCARDIOGRAM TRACING: CPT

## 2019-04-15 PROCEDURE — 99284 EMERGENCY DEPT VISIT MOD MDM: CPT | Mod: Z6 | Performed by: FAMILY MEDICINE

## 2019-04-15 PROCEDURE — 99283 EMERGENCY DEPT VISIT LOW MDM: CPT

## 2019-04-15 RX ORDER — DULOXETIN HYDROCHLORIDE 60 MG/1
60 CAPSULE, DELAYED RELEASE ORAL AT BEDTIME
Qty: 30 CAPSULE | Refills: 0 | Status: SHIPPED | OUTPATIENT
Start: 2019-04-15 | End: 2019-05-15

## 2019-04-15 ASSESSMENT — ENCOUNTER SYMPTOMS
COLOR CHANGE: 0
EYE REDNESS: 0
FEVER: 0
HEADACHES: 0
DIFFICULTY URINATING: 0
CONFUSION: 0
SHORTNESS OF BREATH: 0
NECK STIFFNESS: 0
ABDOMINAL PAIN: 0
ARTHRALGIAS: 0

## 2019-04-15 NOTE — ED NOTES
"Pt states she was going to PT for right hip and back pain along waistline \"it is weird because it moves\". Pt states she was sent to PT but has not had any xrays yet. \"I don't know why the pain moves\".  "

## 2019-04-15 NOTE — ED PROVIDER NOTES
History     Chief Complaint   Patient presents with     Chest Pain     Anxiety     HPI  Lenora Gage is a 44 year old woman with history of AZ who presents reporting increased stress and anxiety from a variety of sources. She reports that her son has been struggling with mental health issues and was recently diagnosed with borderline personality disorder. The patient's close girlfriend has been diagnosed with cancer. The patient's mother-in-law  this past fall. 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 She feels intermittent episodes of left-sided chest pain, most recently beginning last evening. It comes and goes without associated dizziness, diaphoresis, N/V, shortness of breath.    She also struggles with chronic musculoskeletal pain, particularly in her upper back. She reports that she was actually beginning physical therapy this morning when she also reported her chest pain and was directed to the emergency department for further evaluation. The patient reports no recent illness/injury.    Allergies:  Allergies   Allergen Reactions     Blood Transfusion Related (Informational Only) Other (See Comments)     Patient has a history of a clinically significant antibody against RBC antigens.  A delay in compatible RBCs may occur.     Penicillins Anaphylaxis and Hives       Problem List:    Patient Active Problem List    Diagnosis Date Noted     Lateral epicondylitis of right elbow 2018     Priority: Medium     Acute intractable tension-type headache 2017     Priority: Medium     AZ (generalized anxiety disorder) 2017     Priority: Medium     Prolonged grief reaction 2016     Priority: Medium     Major depressive disorder, recurrent episode, moderate (H) 2016     Priority: Medium     ACP (advance care planning) 2016     Priority: Medium     Advance Care Planning 2016: ACP Review of Chart / Resources Provided:  Reviewed chart  for advance care plan.  Lenora Montelongo has no plan or code status on file. Discussed available resources and provided with information.   Added by Andrea Pulliam             Gastroesophageal reflux disease without esophagitis 04/08/2016     Priority: Medium     Helicobacter positive gastritis      Priority: Medium     Midline low back pain without sciatica 01/13/2016     Priority: Medium     Stress due to illness of family member 12/10/2014     Priority: Medium     OCD (obsessive compulsive disorder)      Priority: Medium        Past Medical History:    Past Medical History:   Diagnosis Date     Adjustment disorder with anxiety      Constipation 07/17/2012     Dysmenorrhea 06/20/2002     Dyspareunia 06/13/2007     Dysplasia of cervix (uteri)  07/25/2000     Endometriosis of uterus 12/29/2003     Esophageal reflux 02/07/2000     Helicobacter positive gastritis      IBS (Irritable colon syndrome) 07/06/2011     OCD (obsessive compulsive disorder)        Past Surgical History:    Past Surgical History:   Procedure Laterality Date     bladder reconstruction and mesh removal  2012     BLADDER REPAIR  2010     colposcopy with biopsy-mutliple  10/2014     HYSTERECTOMY TOTAL ABDOMINAL, BILATERAL SALPINGO-OOPHORECTOMY, COMBINED Left     Right ovary remains.      TUBAL LIGATION         Family History:    Family History   Problem Relation Age of Onset     Other - See Comments Paternal Grandmother         Antitrysin Deficiency     Cancer Mother         Cervical     Cancer - colorectal Maternal Grandfather        Social History:  Marital Status:   [2]  Social History     Tobacco Use     Smoking status: Never Smoker     Smokeless tobacco: Never Used   Substance Use Topics     Alcohol use: Yes     Alcohol/week: 0.0 oz     Comment: RARELY     Drug use: No        Medications:      DULoxetine (CYMBALTA) 60 MG capsule   nabumetone (RELAFEN) 750 MG tablet   omeprazole (PRILOSEC) 40 MG capsule   acyclovir (ZOVIRAX) 400 MG tablet          Review of Systems   Constitutional: Negative for fever.   HENT: Negative for congestion.    Eyes: Negative for redness.   Respiratory: Negative for shortness of breath.    Cardiovascular: Negative for chest pain.   Gastrointestinal: Negative for abdominal pain.   Genitourinary: Negative for difficulty urinating.   Musculoskeletal: Negative for arthralgias and neck stiffness.   Skin: Negative for color change.   Neurological: Negative for headaches.   Psychiatric/Behavioral: Negative for confusion.       Physical Exam   BP: 111/75  Heart Rate: 101  Temp: 98.1  F (36.7  C)  Resp: 18  Weight: 72.6 kg (160 lb)  SpO2: 100 %      Physical Exam  General Appearance: well-developed, well-nourished, alert & oriented, no apparent distress.    HEENT: atraumatic. Pupils equal, round & reactive to light, extraocular movements intact. Bilateral ear canals clear. Nose without rhinorrhea or epistaxis. Clear oropharynx. Moist mucous membranes.    Neck: normal inspection, non-tender, full & painless ROM, supple, no lymphadenopathy or nuchal rigidity. No jugular venous distension.    Cardiovascular: regular rate, rhythm, normal S1 & S2, no murmurs, rubs or gallops.    Respiratory: clear lung sounds with good air entry, no wheezes rales or rhonchi, no acute respiratory distress.    Gastrointestinal: normal inspection, normal bowel sounds, non-tender, no masses or organomegaly.    Back: no pain with palpation of midline cervical, thoracic or lumbar vertebrae. No tenderness of paraspinous musculature.    Extremities: 2+/4+ pulses bilaterally, normal & painless ROM, non-tender, joints normal, normal inspection. No obvious paired points of tenderness.    Neurologic: CN II - XII intact, no motor/sensory deficit.    Psychiatric: Anxious mood and affect. Tearful at times when describing stressful circumstances. Logical through with fluent speech. No SI/HI or intent to harm self/others.    Skin: normal color, no skin rash.    ED Course         Procedures                 No results found for this or any previous visit (from the past 24 hour(s)).    Medications - No data to display    Assessments & Plan (with Medical Decision Making)   The patient is a 44 year old woman with AZ who presents with an anxiety reaction and chronic musculoskeletal pain.    1) Anxiety reaction and chronic musculoskeletal pain - patient will be treated with rest, follow-up with her therapist and duloxetine which is expected to assist the patient with both her AZ and chronic musculoskeletal pain. She may also use heat/cold application and alternating APAP/ibuprofen for pain management with plan for PCP follow-up in 5 - 7 days regarding this ER visit. The patient verbalizes agreement with this plan as well as to immediately return to the ER with any new/worsening S/Sx.      I have reviewed the nursing notes.    I have reviewed the findings, diagnosis, plan and need for follow up with the patient.       Medication List      Started    DULoxetine 60 MG capsule  Commonly known as:  CYMBALTA  60 mg, Oral, AT BEDTIME            Final diagnoses:   Anxiety reaction   AZ (generalized anxiety disorder)   Chronic musculoskeletal pain       4/15/2019   [facility]     Isreal Quintanilla MD  04/15/19 0242

## 2019-04-15 NOTE — PROGRESS NOTES
Emergency Department Assessment  Reason for Referral: Anxiety  PCP: Duncan Moore-Is going to establish with Doreen Riveraradha CNP  Specialists or MH providers: Has seen a counselor in [place] one time.   Pharmacy: [facility]  Medication routine/concerns none  Cognitive Behavioral Status: awake, alert and oriented    Affect and Mood Status: depressed and teary    Problems sleeping: yes    Mental Health History: anxiety, grief reaction, depression  Recent Stressors: many stressors    Case Manger/: no    Support System: spouse  Transportation: drives    Current Living Situation:    Home Setting: Two-story   Living Situation:Spouse   Function Status/Assistive Device: no assistive devices    Employment/Financial/Insurance Concerns:takes care of her brother and father in law     No Insurance issues identified     Patient's insurance coverage: [unfilled]     [facility]  Health Care Directive: no  Previous Services at Home or in the Community:no  Falls at home?: no    ADL's:ind  Equipment at home?: no  O2: no  Smoker: no  ETOH: no  THC/Drugs: no    Any Violence in the home?: no  Do you feel safe at home?: yes    Smoke alarm/Carbon monoxide detectors in home: na :     Plan: home    Met with patient at length.  Pt is very teary and chronically depressed since the death of her adopted daughter several years ago.  Following that she  and her one of 2 son's had lots of chemical dependency issues.  Right now she takes care of her father and brother in law.  Elderly man and the brother in law who is a paraplegic.  Pt had been to counseling briefly (one session) and did not return.  She is also would like to establish new PCP with a female.  I set an appt up with Doreen Galo for this Wednesday at 11:30.  I also provided her with a list of local psychologists.  Encouraged patient to follow through with counseling.  I also provided her with my contact information if she has any  further concerns.    Elizabeth Rodriguez, [facility]   Care Transitions

## 2019-04-15 NOTE — ED AVS SNAPSHOT
[facility]  [address] [address]  Phone:  [phone]                                    Lenora Gage   MRN: [id number]    Department:  [facility]   Date of Visit:  4/15/2019           After Visit Summary Signature Page    I have received my discharge instructions, and my questions have been answered. I have discussed any challenges I see with this plan with the nurse or doctor.    ..........................................................................................................................................  Patient/Patient Representative Signature      ..........................................................................................................................................  Patient Representative Print Name and Relationship to Patient    ..................................................               ................................................  Date                                   Time    ..........................................................................................................................................  Reviewed by Signature/Title    ...................................................              ..............................................  Date                                               Time          22EPIC Rev 08/18

## 2019-04-15 NOTE — ED NOTES
Pt states last night at approx 2030 while grocery shopping she started having left chest pain that radiates into left shoulder. C/O increased pain with deep breath causing pain that radiates to her mid back between shoulder blades. Pt states she has been under stress lately with son's mental health and friend recently diagnosed with cancer. Pt works as a PCA taking care of a paraplegic. Denies any recent illness. Pt states for 2 weeks she has felt tired. 7/10 left chest pain.

## 2019-04-16 NOTE — PROGRESS NOTES
SUBJECTIVE:   Lenora Gage is a 44 year old female who presents to clinic today for the following   health issues:      New Patient/Transfer of Care:  At this time, past medical history, current medications, allergies and drug sensitivities, immunizations, habits and life style, family history, and social history are reviewed and updated.    Anxiety and Depression:  Patient has a history of anxiety and depression and notes that it has recently worsened over the past several months. She states that she has a lot going on and is having trouble coping. Her son was having a lot of mental health issues and was using IV drugs. He was suicidal, but was recently diagnosed with borderline personality and has been clean for several weeks. Her best friend was also recently diagnosed with cancer and her mother in law recently . 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 Her daughter also  of a drug overdose 10 years ago and she does not feel she has gotten over this. She did donate her daughter's organs and notes that the boy that got her daughter's heart, graduates this year. They want her to be at the graduation, but she is unsure how she feels about this. She does not feel suicidal. She has tried counseling in the past and is unsure if it helped. She only went to one session. She has also taken sertraline in the past and did not feel this helped. She was recently in the ER with an anxiety attack and was started on Cymbalta. She has taken this for 3 days, but is unsure if it is helping. She denies chest pain or shortness of breath, but notes that her neck and shoulders hurt. She does note that she carries a lot of her stress in her upper back. No fevers. Slight nausea since starting the Cymbalta, but no vomiting. She also has OCD and notes that she likes everything very clean and organized.     Additional history: as documented    Reviewed  and updated as needed this visit by clinical  staff         Reviewed and updated as needed this visit by Provider         Patient Active Problem List   Diagnosis     OCD (obsessive compulsive disorder)     Stress due to illness of family member     Midline low back pain without sciatica     Helicobacter positive gastritis     Gastroesophageal reflux disease without esophagitis     ACP (advance care planning)     Prolonged grief reaction     Major depressive disorder, recurrent episode, moderate (H)     AZ (generalized anxiety disorder)     Acute intractable tension-type headache     Lateral epicondylitis of right elbow     Past Surgical History:   Procedure Laterality Date     bladder reconstruction and mesh removal  2012     BLADDER REPAIR  2010     colposcopy with biopsy-mutliple  10/2014     HYSTERECTOMY TOTAL ABDOMINAL, BILATERAL SALPINGO-OOPHORECTOMY, COMBINED Left     Right ovary remains.      TUBAL LIGATION         Social History     Tobacco Use     Smoking status: Never Smoker     Smokeless tobacco: Never Used   Substance Use Topics     Alcohol use: Yes     Alcohol/week: 0.0 oz     Comment: RARELY     Family History   Problem Relation Age of Onset     Other - See Comments Paternal Grandmother         Antitrysin Deficiency     Cancer Mother         Cervical     Cancer - colorectal Maternal Grandfather          Current Outpatient Medications   Medication Sig Dispense Refill     acyclovir (ZOVIRAX) 400 MG tablet Take 1 tablet (400 mg) by mouth 3 times daily For 10 days as needed for HSV breakout. 90 tablet 1     DULoxetine (CYMBALTA) 60 MG capsule Take 1 capsule (60 mg) by mouth At Bedtime 30 capsule 0     omeprazole (PRILOSEC) 40 MG capsule Take 1 capsule (40 mg) by mouth daily Take 30-60 minutes before a meal. 90 capsule 1     Allergies   Allergen Reactions     Blood Transfusion Related (Informational Only) Other (See Comments)     Patient has a history of a clinically significant antibody against RBC antigens.  A delay in compatible RBCs may occur.      "Penicillins Anaphylaxis and Hives       ROS:  As noted in the HPI.     OBJECTIVE:     /68 (BP Location: Left arm, Patient Position: Sitting, Cuff Size: Adult Regular)   Pulse 90   Temp 98.8  F (37.1  C) (Tympanic)   Ht 1.575 m (5' 2\")   Wt 75.8 kg (167 lb)   SpO2 96%   BMI 30.54 kg/m    Body mass index is 30.54 kg/m .  GENERAL: healthy, alert and no distress  EYES: Eyes grossly normal to inspection, PERRL and conjunctivae and sclerae normal  HENT: ear canals and TM's normal, nose and mouth without ulcers or lesions  NECK: no adenopathy, no asymmetry, masses, or scars and thyroid normal to palpation  RESP: lungs clear to auscultation - no rales, rhonchi or wheezes  CV: regular rate and rhythm, normal S1 S2, no S3 or S4, no murmur, click or rub, no peripheral edema and peripheral pulses strong  PSYCH: mentation appears normal, tearful when talking about her daughter    Diagnostic Test Results:  TSH and vit D pending.     ASSESSMENT/PLAN:   (F41.1) AZ (generalized anxiety disorder)  (primary encounter diagnosis)  (F33.1) Major depressive disorder, recurrent episode, moderate (H)  (F42.9) Obsessive-compulsive disorder, unspecified type  (Z63.79) Stress due to illness of family member  (F43.29) Prolonged grief reaction  Comment: Anxiety and depression poorly controlled.   Plan: Will have patient continue her Cymbalta and begin counseling again. Will then see her back in 3 weeks for reassessment. If still having problems, will increase her Cymbalta. Will also check TSH and vit D. Will notify patient of the results when available and intervene accordingly. Certainly, if she feels worse in the meantime, she will f/u sooner. If she feels suicidal, she will stop her Cymbalta and go to the ER.        Doreen Galo NP  [facility] [place]      "

## 2019-04-17 ENCOUNTER — OFFICE VISIT (OUTPATIENT)
Dept: FAMILY MEDICINE | Facility: OTHER | Age: 44
End: 2019-04-17
Attending: NURSE PRACTITIONER
Payer: COMMERCIAL

## 2019-04-17 VITALS
OXYGEN SATURATION: 96 % | TEMPERATURE: 98.8 F | WEIGHT: 167 LBS | BODY MASS INDEX: 30.73 KG/M2 | DIASTOLIC BLOOD PRESSURE: 68 MMHG | HEART RATE: 90 BPM | SYSTOLIC BLOOD PRESSURE: 106 MMHG | HEIGHT: 62 IN

## 2019-04-17 DIAGNOSIS — F41.1 GAD (GENERALIZED ANXIETY DISORDER): Primary | ICD-10-CM

## 2019-04-17 DIAGNOSIS — F42.9 OBSESSIVE-COMPULSIVE DISORDER, UNSPECIFIED TYPE: ICD-10-CM

## 2019-04-17 DIAGNOSIS — F33.1 MAJOR DEPRESSIVE DISORDER, RECURRENT EPISODE, MODERATE (H): ICD-10-CM

## 2019-04-17 DIAGNOSIS — Z63.79 STRESS DUE TO ILLNESS OF FAMILY MEMBER: ICD-10-CM

## 2019-04-17 DIAGNOSIS — F43.29 PROLONGED GRIEF REACTION: ICD-10-CM

## 2019-04-17 LAB — TSH SERPL DL<=0.005 MIU/L-ACNC: 2.09 MU/L (ref 0.4–4)

## 2019-04-17 PROCEDURE — 82306 VITAMIN D 25 HYDROXY: CPT | Mod: 90 | Performed by: NURSE PRACTITIONER

## 2019-04-17 PROCEDURE — 36415 COLL VENOUS BLD VENIPUNCTURE: CPT | Performed by: NURSE PRACTITIONER

## 2019-04-17 PROCEDURE — 84443 ASSAY THYROID STIM HORMONE: CPT | Performed by: NURSE PRACTITIONER

## 2019-04-17 PROCEDURE — 99000 SPECIMEN HANDLING OFFICE-LAB: CPT | Performed by: NURSE PRACTITIONER

## 2019-04-17 PROCEDURE — 99214 OFFICE O/P EST MOD 30 MIN: CPT | Performed by: NURSE PRACTITIONER

## 2019-04-17 ASSESSMENT — PATIENT HEALTH QUESTIONNAIRE - PHQ9
SUM OF ALL RESPONSES TO PHQ QUESTIONS 1-9: 19
5. POOR APPETITE OR OVEREATING: NEARLY EVERY DAY

## 2019-04-17 ASSESSMENT — PAIN SCALES - GENERAL: PAINLEVEL: SEVERE PAIN (7)

## 2019-04-17 ASSESSMENT — ANXIETY QUESTIONNAIRES
5. BEING SO RESTLESS THAT IT IS HARD TO SIT STILL: MORE THAN HALF THE DAYS
6. BECOMING EASILY ANNOYED OR IRRITABLE: MORE THAN HALF THE DAYS
1. FEELING NERVOUS, ANXIOUS, OR ON EDGE: NEARLY EVERY DAY
3. WORRYING TOO MUCH ABOUT DIFFERENT THINGS: NEARLY EVERY DAY
GAD7 TOTAL SCORE: 18
7. FEELING AFRAID AS IF SOMETHING AWFUL MIGHT HAPPEN: MORE THAN HALF THE DAYS
2. NOT BEING ABLE TO STOP OR CONTROL WORRYING: NEARLY EVERY DAY

## 2019-04-17 ASSESSMENT — MIFFLIN-ST. JEOR: SCORE: 1355.76

## 2019-04-17 NOTE — LETTER
My Depression Action Plan  Name: Lenora Gage   Date of Birth 1975  Date: 4/17/2019    My doctor: Duncan Moore   My clinic: [facility] [place]  [place] [address]  [phone]          GREEN    ZONE   Good Control    What it looks like:     Things are going generally well. You have normal up s and down s. You may even feel depressed from time to time, but bad moods usually last less than a day.   What you need to do:  1. Continue to care for yourself (see self care plan)  2. Check your depression survival kit and update it as needed  3. Follow your physician s recommendations including any medication.  4. Do not stop taking medication unless you consult with your physician first.           YELLOW         ZONE Getting Worse    What it looks like:     Depression is starting to interfere with your life.     It may be hard to get out of bed; you may be starting to isolate yourself from others.    Symptoms of depression are starting to last most all day and this has happened for several days.     You may have suicidal thoughts but they are not constant.   What you need to do:     1. Call your care team, your response to treatment will improve if you keep your care team informed of your progress. Yellow periods are signs an adjustment may need to be made.     2. Continue your self-care, even if you have to fake it!    3. Talk to someone in your support network    4. Open up your depression survival kit           RED    ZONE Medical Alert - Get Help    What it looks like:     Depression is seriously interfering with your life.     You may experience these or other symptoms: You can t get out of bed most days, can t work or engage in other necessary activities, you have trouble taking care of basic hygiene, or basic responsibilities, thoughts of suicide or death that will not go away, self-injurious behavior.     What you need to do:  1. Call your care team and request  a same-day appointment. If they are not available (weekends or after hours) call your local crisis line, emergency room or 911.            Depression Self Care Plan / Survival Kit    Self-Care for Depression  Here s the deal. Your body and mind are really not as separate as most people think.  What you do and think affects how you feel and how you feel influences what you do and think. This means if you do things that people who feel good do, it will help you feel better.  Sometimes this is all it takes.  There is also a place for medication and therapy depending on how severe your depression is, so be sure to consult with your medical provider and/ or Behavioral Health Consultant if your symptoms are worsening or not improving.     In order to better manage my stress, I will:    Exercise  Get some form of exercise, every day. This will help reduce pain and release endorphins, the  feel good  chemicals in your brain. This is almost as good as taking antidepressants!  This is not the same as joining a gym and then never going! (they count on that by the way ) It can be as simple as just going for a walk or doing some gardening, anything that will get you moving.      Hygiene   Maintain good hygiene (Get out of bed in the morning, Make your bed, Brush your teeth, Take a shower, and Get dressed like you were going to work, even if you are unemployed).  If your clothes don't fit try to get ones that do.    Diet  I will strive to eat foods that are good for me, drink plenty of water, and avoid excessive sugar, caffeine, alcohol, and other mood-altering substances.  Some foods that are helpful in depression are: complex carbohydrates, B vitamins, flaxseed, fish or fish oil, fresh fruits and vegetables.    Psychotherapy  I agree to participate in Individual Therapy (if recommended).    Medication  If prescribed medications, I agree to take them.  Missing doses can result in serious side effects.  I understand that drinking  alcohol, or other illicit drug use, may cause potential side effects.  I will not stop my medication abruptly without first discussing it with my provider.    Staying Connected With Others  I will stay in touch with my friends, family members, and my primary care provider/team.    Use your imagination  Be creative.  We all have a creative side; it doesn t matter if it s oil painting, sand castles, or mud pies! This will also kick up the endorphins.    Witness Beauty  (AKA stop and smell the roses) Take a look outside, even in mid-winter. Notice colors, textures. Watch the squirrels and birds.     Service to others  Be of service to others.  There is always someone else in need.  By helping others we can  get out of ourselves  and remember the really important things.  This also provides opportunities for practicing all the other parts of the program.    Humor  Laugh and be silly!  Adjust your TV habits for less news and crime-drama and more comedy.    Control your stress  Try breathing deep, massage therapy, biofeedback, and meditation. Find time to relax each day.     My support system    Clinic Contact:  Phone number:    Contact 1:  Phone number:    Contact 2:  Phone number:    Congregational/:  Phone number:    Therapist:  Phone number:    Local crisis center:    Phone number:    Other community support:  Phone number:

## 2019-04-17 NOTE — NURSING NOTE
"Chief Complaint   Patient presents with     Establish Care       Initial /68 (BP Location: Left arm, Patient Position: Sitting, Cuff Size: Adult Regular)   Pulse 90   Temp 98.8  F (37.1  C) (Tympanic)   Ht 1.575 m (5' 2\")   Wt 75.8 kg (167 lb)   SpO2 96%   BMI 30.54 kg/m   Estimated body mass index is 30.54 kg/m  as calculated from the following:    Height as of this encounter: 1.575 m (5' 2\").    Weight as of this encounter: 75.8 kg (167 lb).  Medication Reconciliation: complete    Socorro Bray LPN  "

## 2019-04-18 ASSESSMENT — ANXIETY QUESTIONNAIRES: GAD7 TOTAL SCORE: 18

## 2019-04-19 LAB — DEPRECATED CALCIDIOL+CALCIFEROL SERPL-MC: 39 UG/L (ref 20–75)

## 2019-05-09 NOTE — PROGRESS NOTES
SUBJECTIVE:   Lenora Gage is a 44 year old female who presents to clinic today for the following   health issues:      Depression and Anxiety Follow-Up  Do note, patient was started on duloxetine about 4 weeks ago.       Status since last visit: Improved but still having difficulties, her best friend currently has cancer and she is going through chemo treatments, Lenora notes that this has been very hard on her, her son also stopped taking his medications and his mental health was not in control, she does feel the duloxetine has helped    Other associated symptoms:None    Complicating factors:     Significant life event: No     Current substance abuse: None    PHQ 11/12/2018 4/17/2019 5/15/2019   PHQ-9 Total Score 17 19 10   Q9: Thoughts of better off dead/self-harm past 2 weeks Several days Not at all Not at all   F/U: Thoughts of suicide or self-harm No - -   F/U: Safety concerns No - -     AZ-7 SCORE 11/12/2018 4/17/2019 5/15/2019   Total Score 13 18 7       PHQ-9  English  PHQ-9   Any Language  AZ-7  Suicide Assessment Five-step Evaluation and Treatment (SAFE-T)    Amount of exercise or physical activity: 2-3 days/week for an average of 30-45 minutes    Problems taking medications regularly: No    Medication side effects: none    Diet: regular (no restrictions)    No thoughts of suicide.     She also notes that she has a small mass on her right mid shin that is getting bigger. She notes that it has been present times 2 months. No fevers. No erythema or pain over the area. No known injury.     Additional history: as documented    Reviewed  and updated as needed this visit by clinical staff  Tobacco  Allergies  Meds  Med Hx  Surg Hx  Fam Hx  Soc Hx        Reviewed and updated as needed this visit by Provider         Patient Active Problem List   Diagnosis     OCD (obsessive compulsive disorder)     Stress due to illness of family member     Midline low back pain without sciatica     Helicobacter  positive gastritis     Gastroesophageal reflux disease without esophagitis     ACP (advance care planning)     Prolonged grief reaction     Major depressive disorder, recurrent episode, moderate (H)     AZ (generalized anxiety disorder)     Acute intractable tension-type headache     Lateral epicondylitis of right elbow     Past Surgical History:   Procedure Laterality Date     bladder reconstruction and mesh removal  2012     BLADDER REPAIR  2010     colposcopy with biopsy-mutliple  10/2014     HYSTERECTOMY TOTAL ABDOMINAL, BILATERAL SALPINGO-OOPHORECTOMY, COMBINED Left     Right ovary remains.      TUBAL LIGATION         Social History     Tobacco Use     Smoking status: Never Smoker     Smokeless tobacco: Never Used   Substance Use Topics     Alcohol use: Yes     Alcohol/week: 0.0 oz     Comment: RARELY     Family History   Problem Relation Age of Onset     Other - See Comments Paternal Grandmother         Antitrysin Deficiency     Cancer Mother         Cervical     Cancer - colorectal Maternal Grandfather          Current Outpatient Medications   Medication Sig Dispense Refill     acyclovir (ZOVIRAX) 400 MG tablet Take 1 tablet (400 mg) by mouth 3 times daily For 10 days as needed for HSV breakout. 90 tablet 1     DULoxetine (CYMBALTA) 30 MG capsule Take 1 capsule (30 mg) by mouth daily 30 capsule 1     DULoxetine (CYMBALTA) 60 MG capsule Take 1 capsule (60 mg) by mouth At Bedtime 30 capsule 1     omeprazole (PRILOSEC) 40 MG capsule Take 1 capsule (40 mg) by mouth daily Take 30-60 minutes before a meal. 90 capsule 1     Allergies   Allergen Reactions     Blood Transfusion Related (Informational Only) Other (See Comments)     Patient has a history of a clinically significant antibody against RBC antigens.  A delay in compatible RBCs may occur.     Penicillins Anaphylaxis and Hives       ROS:  As noted in the HPI.     OBJECTIVE:     /64 (BP Location: Left arm, Patient Position: Chair, Cuff Size: Adult  "Regular)   Pulse 92   Ht 1.575 m (5' 2\")   Wt 76 kg (167 lb 9.6 oz)   BMI 30.65 kg/m    Body mass index is 30.65 kg/m .  GENERAL: healthy, alert and no distress  SKIN: patient has nickel sized mass on right mid shin, mobile and no pain with palpation  PSYCH: mentation appears normal, affect normal/bright    Diagnostic Test Results:  none     ASSESSMENT/PLAN:     (F41.1) AZ (generalized anxiety disorder)  (primary encounter diagnosis)  (Z63.79) Stress due to illness of family member  (F43.29) Prolonged grief reaction  Comment: Gad7 score went from 18 to 7 and PHQ9 score went from 19 to 10  Plan: DULoxetine (CYMBALTA) 30 MG capsule, DULoxetine        (CYMBALTA) 60 MG capsule        Mental health improved, but not where she would like it. Will increase duloxetine to 90 mg daily from 60 mg daily. F/u 4 weeks for reassessment. Encouraged discussion with trusted relative or friend to monitor for negative mood changes and change in behaviour during medication initiation and titration. Recommended immediate help if increased thoughts of suicide and call if significant side effects occur. Encouraged patient that this type of medication is not effective immediately, and to be consistant with taking the medication.    (R22.41) Mass of soft tissue of right lower extremity  Comment: most likely lipoma or cyst  Plan: US Extremity Non Vascular Bilateral,   Will notify patient of the results when available and intervene accordingly.       Doreen Galo, BEULAH  [facility] [place]      "

## 2019-05-15 ENCOUNTER — OFFICE VISIT (OUTPATIENT)
Dept: FAMILY MEDICINE | Facility: OTHER | Age: 44
End: 2019-05-15
Attending: NURSE PRACTITIONER
Payer: COMMERCIAL

## 2019-05-15 VITALS
DIASTOLIC BLOOD PRESSURE: 64 MMHG | WEIGHT: 167.6 LBS | BODY MASS INDEX: 30.84 KG/M2 | HEART RATE: 92 BPM | HEIGHT: 62 IN | SYSTOLIC BLOOD PRESSURE: 102 MMHG

## 2019-05-15 DIAGNOSIS — F43.29 PROLONGED GRIEF REACTION: ICD-10-CM

## 2019-05-15 DIAGNOSIS — M79.89 MASS OF SOFT TISSUE OF RIGHT LOWER EXTREMITY: ICD-10-CM

## 2019-05-15 DIAGNOSIS — Z63.79 STRESS DUE TO ILLNESS OF FAMILY MEMBER: ICD-10-CM

## 2019-05-15 DIAGNOSIS — F41.1 GAD (GENERALIZED ANXIETY DISORDER): Primary | ICD-10-CM

## 2019-05-15 PROCEDURE — 99213 OFFICE O/P EST LOW 20 MIN: CPT | Performed by: NURSE PRACTITIONER

## 2019-05-15 RX ORDER — DULOXETIN HYDROCHLORIDE 30 MG/1
30 CAPSULE, DELAYED RELEASE ORAL DAILY
Qty: 30 CAPSULE | Refills: 1 | Status: SHIPPED | OUTPATIENT
Start: 2019-05-15 | End: 2019-06-12

## 2019-05-15 RX ORDER — DULOXETIN HYDROCHLORIDE 60 MG/1
60 CAPSULE, DELAYED RELEASE ORAL AT BEDTIME
Qty: 30 CAPSULE | Refills: 1 | Status: SHIPPED | OUTPATIENT
Start: 2019-05-15 | End: 2019-06-12

## 2019-05-15 ASSESSMENT — PATIENT HEALTH QUESTIONNAIRE - PHQ9
SUM OF ALL RESPONSES TO PHQ QUESTIONS 1-9: 10
5. POOR APPETITE OR OVEREATING: SEVERAL DAYS

## 2019-05-15 ASSESSMENT — ANXIETY QUESTIONNAIRES
3. WORRYING TOO MUCH ABOUT DIFFERENT THINGS: SEVERAL DAYS
IF YOU CHECKED OFF ANY PROBLEMS ON THIS QUESTIONNAIRE, HOW DIFFICULT HAVE THESE PROBLEMS MADE IT FOR YOU TO DO YOUR WORK, TAKE CARE OF THINGS AT HOME, OR GET ALONG WITH OTHER PEOPLE: SOMEWHAT DIFFICULT
7. FEELING AFRAID AS IF SOMETHING AWFUL MIGHT HAPPEN: SEVERAL DAYS
5. BEING SO RESTLESS THAT IT IS HARD TO SIT STILL: SEVERAL DAYS
6. BECOMING EASILY ANNOYED OR IRRITABLE: SEVERAL DAYS
2. NOT BEING ABLE TO STOP OR CONTROL WORRYING: SEVERAL DAYS
1. FEELING NERVOUS, ANXIOUS, OR ON EDGE: SEVERAL DAYS
GAD7 TOTAL SCORE: 7

## 2019-05-15 ASSESSMENT — PAIN SCALES - GENERAL: PAINLEVEL: NO PAIN (0)

## 2019-05-15 ASSESSMENT — MIFFLIN-ST. JEOR: SCORE: 1358.48

## 2019-05-15 NOTE — LETTER
My Depression Action Plan  Name: Lenora Gage   Date of Birth 1975  Date: 5/15/2019    My doctor: Doreen Galo   My clinic: [facility] [place]  [place] [address]  [phone]          GREEN    ZONE   Good Control    What it looks like:     Things are going generally well. You have normal up s and down s. You may even feel depressed from time to time, but bad moods usually last less than a day.   What you need to do:  1. Continue to care for yourself (see self care plan)  2. Check your depression survival kit and update it as needed  3. Follow your physician s recommendations including any medication.  4. Do not stop taking medication unless you consult with your physician first.           YELLOW         ZONE Getting Worse    What it looks like:     Depression is starting to interfere with your life.     It may be hard to get out of bed; you may be starting to isolate yourself from others.    Symptoms of depression are starting to last most all day and this has happened for several days.     You may have suicidal thoughts but they are not constant.   What you need to do:     1. Call your care team, your response to treatment will improve if you keep your care team informed of your progress. Yellow periods are signs an adjustment may need to be made.     2. Continue your self-care, even if you have to fake it!    3. Talk to someone in your support network    4. Open up your depression survival kit           RED    ZONE Medical Alert - Get Help    What it looks like:     Depression is seriously interfering with your life.     You may experience these or other symptoms: You can t get out of bed most days, can t work or engage in other necessary activities, you have trouble taking care of basic hygiene, or basic responsibilities, thoughts of suicide or death that will not go away, self-injurious behavior.     What you need to do:  1. Call your care team and request  a same-day appointment. If they are not available (weekends or after hours) call your local crisis line, emergency room or 911.            Depression Self Care Plan / Survival Kit    Self-Care for Depression  Here s the deal. Your body and mind are really not as separate as most people think.  What you do and think affects how you feel and how you feel influences what you do and think. This means if you do things that people who feel good do, it will help you feel better.  Sometimes this is all it takes.  There is also a place for medication and therapy depending on how severe your depression is, so be sure to consult with your medical provider and/ or Behavioral Health Consultant if your symptoms are worsening or not improving.     In order to better manage my stress, I will:    Exercise  Get some form of exercise, every day. This will help reduce pain and release endorphins, the  feel good  chemicals in your brain. This is almost as good as taking antidepressants!  This is not the same as joining a gym and then never going! (they count on that by the way ) It can be as simple as just going for a walk or doing some gardening, anything that will get you moving.      Hygiene   Maintain good hygiene (Get out of bed in the morning, Make your bed, Brush your teeth, Take a shower, and Get dressed like you were going to work, even if you are unemployed).  If your clothes don't fit try to get ones that do.    Diet  I will strive to eat foods that are good for me, drink plenty of water, and avoid excessive sugar, caffeine, alcohol, and other mood-altering substances.  Some foods that are helpful in depression are: complex carbohydrates, B vitamins, flaxseed, fish or fish oil, fresh fruits and vegetables.    Psychotherapy  I agree to participate in Individual Therapy (if recommended).    Medication  If prescribed medications, I agree to take them.  Missing doses can result in serious side effects.  I understand that drinking  alcohol, or other illicit drug use, may cause potential side effects.  I will not stop my medication abruptly without first discussing it with my provider.    Staying Connected With Others  I will stay in touch with my friends, family members, and my primary care provider/team.    Use your imagination  Be creative.  We all have a creative side; it doesn t matter if it s oil painting, sand castles, or mud pies! This will also kick up the endorphins.    Witness Beauty  (AKA stop and smell the roses) Take a look outside, even in mid-winter. Notice colors, textures. Watch the squirrels and birds.     Service to others  Be of service to others.  There is always someone else in need.  By helping others we can  get out of ourselves  and remember the really important things.  This also provides opportunities for practicing all the other parts of the program.    Humor  Laugh and be silly!  Adjust your TV habits for less news and crime-drama and more comedy.    Control your stress  Try breathing deep, massage therapy, biofeedback, and meditation. Find time to relax each day.     My support system    Clinic Contact:  Phone number:    Contact 1:  Phone number:    Contact 2:  Phone number:    [employer]/:  Phone number:    Therapist:  Phone number:    Local crisis center:    Phone number:    Other community support:  Phone number:

## 2019-05-15 NOTE — NURSING NOTE
"Chief Complaint   Patient presents with     Depression     Anxiety       Initial /64 (BP Location: Left arm, Patient Position: Chair, Cuff Size: Adult Regular)   Pulse 92   Ht 1.575 m (5' 2\")   Wt 76 kg (167 lb 9.6 oz)   BMI 30.65 kg/m   Estimated body mass index is 30.65 kg/m  as calculated from the following:    Height as of this encounter: 1.575 m (5' 2\").    Weight as of this encounter: 76 kg (167 lb 9.6 oz).  Medication Reconciliation: complete    Bindu Castle LPN  "

## 2019-05-16 ASSESSMENT — ANXIETY QUESTIONNAIRES: GAD7 TOTAL SCORE: 7

## 2019-05-17 ENCOUNTER — HOSPITAL ENCOUNTER (OUTPATIENT)
Dept: ULTRASOUND IMAGING | Facility: HOSPITAL | Age: 44
Discharge: HOME | End: 2019-05-17
Attending: NURSE PRACTITIONER | Admitting: NURSE PRACTITIONER
Payer: COMMERCIAL

## 2019-05-17 DIAGNOSIS — M79.89 MASS OF SOFT TISSUE OF RIGHT LOWER EXTREMITY: ICD-10-CM

## 2019-05-17 PROCEDURE — 76882 US LMTD JT/FCL EVL NVASC XTR: CPT | Mod: TC,RT

## 2019-06-07 NOTE — PROGRESS NOTES
Subjective     Lenora Gage is a 44 year old female who presents to clinic today for the following health issues:    HPI   Depression and Anxiety Follow-Up  Do note, she was seen 4 weeks ago and her duloxetine was increased slightly. She had been taking 60 mg and it was increased to 90 mg daily.      How are you doing with your depression since your last visit? Improved     How are you doing with your anxiety since your last visit?  Improved     Are you having other symptoms that might be associated with depression or anxiety? No    Have you had a significant life event? Yes, recently attended the graduation of the boy who received her daughter's heart when she ; her son also does meth and recently was admitted into a 28 day treatment program    Do you have any concerns with your use of alcohol or other drugs? No     Patient notes that overall, she feels slightly better. Still occasionally has panic attacks and notes that she has trouble sleeping as her mind often races. Also has trouble focusing. 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 At the end of the day, she notes that she has many projects started, but nothing is finished. She drinks about 6-8 bottles of mountain dew daily and notes that this helps with her inability to focus. She states that her inability to focus frustrates her the most. Denies thoughts of suicide. No chest pain or shortness of breath. No fevers. No palpitations.     Social History     Tobacco Use     Smoking status: Never Smoker     Smokeless tobacco: Never Used   Substance Use Topics     Alcohol use: Yes     Alcohol/week: 0.0 oz     Comment: RARELY     Drug use: No     PHQ 2019 5/15/2019 2019   PHQ-9 Total Score 19 10 12   Q9: Thoughts of better off dead/self-harm past 2 weeks Not at all Not at all Not at all   F/U: Thoughts of suicide or self-harm - - -   F/U: Safety concerns - - -     AZ-7 SCORE 2019 5/15/2019 2019    Total Score 18 7 14       Amount of exercise or physical activity: None    Problems taking medications regularly: No    Medication side effects: none    Diet: regular (no restrictions)    Secondly, she notes that [place], she recently hurty her left great toe. Nail is coming off, but is not completely off. Does not hurt. No erythema. No edema.     Patient Active Problem List   Diagnosis     OCD (obsessive compulsive disorder)     Stress due to illness of family member     Midline low back pain without sciatica     Helicobacter positive gastritis     Gastroesophageal reflux disease without esophagitis     ACP (advance care planning)     Prolonged grief reaction     Major depressive disorder, recurrent episode, moderate (H)     AZ (generalized anxiety disorder)     Acute intractable tension-type headache     Lateral epicondylitis of right elbow     Past Surgical History:   Procedure Laterality Date     bladder reconstruction and mesh removal  2012     BLADDER REPAIR  2010     colposcopy with biopsy-mutliple  10/2014     HYSTERECTOMY TOTAL ABDOMINAL, BILATERAL SALPINGO-OOPHORECTOMY, COMBINED Left     Right ovary remains.      TUBAL LIGATION         Social History     Tobacco Use     Smoking status: Never Smoker     Smokeless tobacco: Never Used   Substance Use Topics     Alcohol use: Yes     Alcohol/week: 0.0 oz     Comment: RARELY     Family History   Problem Relation Age of Onset     Other - See Comments Paternal Grandmother         Antitrysin Deficiency     Cancer Mother         Cervical     Cancer - colorectal Maternal Grandfather          Current Outpatient Medications   Medication Sig Dispense Refill     acyclovir (ZOVIRAX) 400 MG tablet Take 1 tablet (400 mg) by mouth 3 times daily For 10 days as needed for HSV breakout. 90 tablet 1     DULoxetine (CYMBALTA) 30 MG capsule Take 1 capsule (30 mg) by mouth daily 90 capsule 1     DULoxetine (CYMBALTA) 60 MG capsule Take 1  capsule (60 mg) by mouth At Bedtime 90 capsule 1     hydrOXYzine (ATARAX) 25 MG tablet Take 1 tablet (25 mg) by mouth 3 times daily as needed for itching 30 tablet 1     omeprazole (PRILOSEC) 40 MG capsule Take 1 capsule (40 mg) by mouth daily Take 30-60 minutes before a meal. 90 capsule 1     Allergies   Allergen Reactions     Blood Transfusion Related (Informational Only) Other (See Comments)     Patient has a history of a clinically significant antibody against RBC antigens.  A delay in compatible RBCs may occur.     Penicillins Anaphylaxis and Hives       Reviewed and updated as needed this visit by Provider         Review of Systems   As noted in the HPI.       Objective    BP 98/70 (BP Location: Left arm, Patient Position: Chair, Cuff Size: Adult Regular)   Pulse 90   LMP  (Exact Date)   SpO2 98%   There is no height or weight on file to calculate BMI.  Physical Exam   GENERAL: healthy, alert and no distress  PSYCH: mentation appears normal, affect normal/bright  Left Great Toe: Skin intact. No edema or erythema or ecchymosis. She has no pain over the area, but her nail is partially torn off.     Diagnostic Test Results:  none         Assessment & Plan   (F41.1) AZ (generalized anxiety disorder)  (primary encounter diagnosis)  (Z63.79) Stress due to illness of family member  (F43.29) Prolonged grief reaction  (F33.1) Major depressive disorder, recurrent episode, moderate (H)  (R41.840) Inattention  Comment: anxiety and depression better controlled, but having a lot of trouble staying on task and focusing.    Plan: Will continue the duloxetine, but add 25 mg hydroxyzine to take as needed.  She was encouraged to take this also before bed to help with her racing mind. She was made aware of the side effects. Will also refer to [facility] for ADHD testing. She will then f/u with me after she is seen there. If she is positive, she may benefit from some Adderall.     (S90.212A) Contusion of left  "great toe with damage to nail, initial encounter  Comment: no pain, no signs of infection, no ingrown nail  Plan: Declines to see podiatry for complete removal. She will wrap the toenail until it grows out. If she changes her mind, will place referral to podiatry.          BMI:   Estimated body mass index is 30.65 kg/m  as calculated from the following:    Height as of 5/15/19: 1.575 m (5' 2\").    Weight as of 5/15/19: 76 kg (167 lb 9.6 oz).   Weight management plan: Discussed healthy diet and exercise guidelines      No follow-ups on file.    Doreen Galo NP  [facility] [place]      "

## 2019-06-12 ENCOUNTER — OFFICE VISIT (OUTPATIENT)
Dept: FAMILY MEDICINE | Facility: OTHER | Age: 44
End: 2019-06-12
Attending: NURSE PRACTITIONER
Payer: COMMERCIAL

## 2019-06-12 VITALS — DIASTOLIC BLOOD PRESSURE: 70 MMHG | HEART RATE: 90 BPM | SYSTOLIC BLOOD PRESSURE: 98 MMHG | OXYGEN SATURATION: 98 %

## 2019-06-12 DIAGNOSIS — S90.212A CONTUSION OF LEFT GREAT TOE WITH DAMAGE TO NAIL, INITIAL ENCOUNTER: ICD-10-CM

## 2019-06-12 DIAGNOSIS — R41.840 INATTENTION: ICD-10-CM

## 2019-06-12 DIAGNOSIS — F41.1 GAD (GENERALIZED ANXIETY DISORDER): Primary | ICD-10-CM

## 2019-06-12 DIAGNOSIS — Z63.79 STRESS DUE TO ILLNESS OF FAMILY MEMBER: ICD-10-CM

## 2019-06-12 DIAGNOSIS — F33.1 MAJOR DEPRESSIVE DISORDER, RECURRENT EPISODE, MODERATE (H): ICD-10-CM

## 2019-06-12 DIAGNOSIS — F43.29 PROLONGED GRIEF REACTION: ICD-10-CM

## 2019-06-12 PROCEDURE — 99213 OFFICE O/P EST LOW 20 MIN: CPT | Performed by: NURSE PRACTITIONER

## 2019-06-12 RX ORDER — HYDROXYZINE HYDROCHLORIDE 25 MG/1
25 TABLET, FILM COATED ORAL 3 TIMES DAILY PRN
Qty: 30 TABLET | Refills: 1 | Status: SHIPPED | OUTPATIENT
Start: 2019-06-12 | End: 2019-08-20

## 2019-06-12 RX ORDER — DULOXETIN HYDROCHLORIDE 60 MG/1
60 CAPSULE, DELAYED RELEASE ORAL AT BEDTIME
Qty: 90 CAPSULE | Refills: 1 | Status: SHIPPED | OUTPATIENT
Start: 2019-06-12 | End: 2019-08-20

## 2019-06-12 RX ORDER — DULOXETIN HYDROCHLORIDE 30 MG/1
30 CAPSULE, DELAYED RELEASE ORAL DAILY
Qty: 90 CAPSULE | Refills: 1 | Status: SHIPPED | OUTPATIENT
Start: 2019-06-12 | End: 2019-08-20 | Stop reason: DRUGHIGH

## 2019-06-12 ASSESSMENT — PATIENT HEALTH QUESTIONNAIRE - PHQ9
5. POOR APPETITE OR OVEREATING: NEARLY EVERY DAY
SUM OF ALL RESPONSES TO PHQ QUESTIONS 1-9: 12

## 2019-06-12 ASSESSMENT — ANXIETY QUESTIONNAIRES
6. BECOMING EASILY ANNOYED OR IRRITABLE: MORE THAN HALF THE DAYS
5. BEING SO RESTLESS THAT IT IS HARD TO SIT STILL: MORE THAN HALF THE DAYS
GAD7 TOTAL SCORE: 14
3. WORRYING TOO MUCH ABOUT DIFFERENT THINGS: MORE THAN HALF THE DAYS
1. FEELING NERVOUS, ANXIOUS, OR ON EDGE: NEARLY EVERY DAY
2. NOT BEING ABLE TO STOP OR CONTROL WORRYING: MORE THAN HALF THE DAYS
7. FEELING AFRAID AS IF SOMETHING AWFUL MIGHT HAPPEN: NOT AT ALL
IF YOU CHECKED OFF ANY PROBLEMS ON THIS QUESTIONNAIRE, HOW DIFFICULT HAVE THESE PROBLEMS MADE IT FOR YOU TO DO YOUR WORK, TAKE CARE OF THINGS AT HOME, OR GET ALONG WITH OTHER PEOPLE: SOMEWHAT DIFFICULT

## 2019-06-12 ASSESSMENT — PAIN SCALES - GENERAL: PAINLEVEL: NO PAIN (0)

## 2019-06-12 NOTE — NURSING NOTE
"Chief Complaint   Patient presents with     Anxiety       Initial BP 98/70 (BP Location: Left arm, Patient Position: Chair, Cuff Size: Adult Regular)   Pulse 90   LMP  (Exact Date)   SpO2 98%  Estimated body mass index is 30.65 kg/m  as calculated from the following:    Height as of 5/15/19: 1.575 m (5' 2\").    Weight as of 5/15/19: 76 kg (167 lb 9.6 oz).  Medication Reconciliation: complete    Bindu aCstle LPN  "

## 2019-06-13 ASSESSMENT — ANXIETY QUESTIONNAIRES: GAD7 TOTAL SCORE: 14

## 2019-07-11 ENCOUNTER — TRANSFERRED RECORDS (OUTPATIENT)
Dept: HEALTH INFORMATION MANAGEMENT | Facility: CLINIC | Age: 44
End: 2019-07-11

## 2019-08-02 ENCOUNTER — OFFICE VISIT (OUTPATIENT)
Dept: FAMILY MEDICINE | Facility: OTHER | Age: 44
End: 2019-08-02
Attending: NURSE PRACTITIONER
Payer: COMMERCIAL

## 2019-08-02 VITALS
BODY MASS INDEX: 30.21 KG/M2 | SYSTOLIC BLOOD PRESSURE: 108 MMHG | HEART RATE: 103 BPM | HEIGHT: 61 IN | DIASTOLIC BLOOD PRESSURE: 64 MMHG | WEIGHT: 160 LBS | TEMPERATURE: 98.6 F | OXYGEN SATURATION: 98 %

## 2019-08-02 DIAGNOSIS — Z12.72 SCREENING FOR VAGINAL CANCER: ICD-10-CM

## 2019-08-02 DIAGNOSIS — Z12.31 ENCOUNTER FOR SCREENING MAMMOGRAM FOR BREAST CANCER: ICD-10-CM

## 2019-08-02 DIAGNOSIS — D22.9 ATYPICAL NEVI: ICD-10-CM

## 2019-08-02 DIAGNOSIS — Z00.00 ROUTINE GENERAL MEDICAL EXAMINATION AT A HEALTH CARE FACILITY: Primary | ICD-10-CM

## 2019-08-02 DIAGNOSIS — F43.29 PROLONGED GRIEF REACTION: ICD-10-CM

## 2019-08-02 DIAGNOSIS — R41.840 INATTENTION: ICD-10-CM

## 2019-08-02 DIAGNOSIS — F33.1 MAJOR DEPRESSIVE DISORDER, RECURRENT EPISODE, MODERATE (H): ICD-10-CM

## 2019-08-02 DIAGNOSIS — Z63.79 STRESS DUE TO ILLNESS OF FAMILY MEMBER: ICD-10-CM

## 2019-08-02 DIAGNOSIS — F41.1 GAD (GENERALIZED ANXIETY DISORDER): ICD-10-CM

## 2019-08-02 DIAGNOSIS — Z13.1 SCREENING FOR DIABETES MELLITUS: ICD-10-CM

## 2019-08-02 DIAGNOSIS — Z13.220 LIPID SCREENING: ICD-10-CM

## 2019-08-02 PROCEDURE — 87624 HPV HI-RISK TYP POOLED RSLT: CPT | Mod: 90 | Performed by: NURSE PRACTITIONER

## 2019-08-02 PROCEDURE — 99000 SPECIMEN HANDLING OFFICE-LAB: CPT | Performed by: NURSE PRACTITIONER

## 2019-08-02 PROCEDURE — 99396 PREV VISIT EST AGE 40-64: CPT | Performed by: NURSE PRACTITIONER

## 2019-08-02 PROCEDURE — G0123 SCREEN CERV/VAG THIN LAYER: HCPCS | Performed by: NURSE PRACTITIONER

## 2019-08-02 RX ORDER — GABAPENTIN 300 MG/1
100 CAPSULE ORAL DAILY
COMMUNITY
End: 2019-08-20

## 2019-08-02 ASSESSMENT — PATIENT HEALTH QUESTIONNAIRE - PHQ9
5. POOR APPETITE OR OVEREATING: MORE THAN HALF THE DAYS
SUM OF ALL RESPONSES TO PHQ QUESTIONS 1-9: 11

## 2019-08-02 ASSESSMENT — ANXIETY QUESTIONNAIRES
3. WORRYING TOO MUCH ABOUT DIFFERENT THINGS: NEARLY EVERY DAY
GAD7 TOTAL SCORE: 19
5. BEING SO RESTLESS THAT IT IS HARD TO SIT STILL: MORE THAN HALF THE DAYS
IF YOU CHECKED OFF ANY PROBLEMS ON THIS QUESTIONNAIRE, HOW DIFFICULT HAVE THESE PROBLEMS MADE IT FOR YOU TO DO YOUR WORK, TAKE CARE OF THINGS AT HOME, OR GET ALONG WITH OTHER PEOPLE: VERY DIFFICULT
6. BECOMING EASILY ANNOYED OR IRRITABLE: NEARLY EVERY DAY
7. FEELING AFRAID AS IF SOMETHING AWFUL MIGHT HAPPEN: NEARLY EVERY DAY
1. FEELING NERVOUS, ANXIOUS, OR ON EDGE: NEARLY EVERY DAY
2. NOT BEING ABLE TO STOP OR CONTROL WORRYING: NEARLY EVERY DAY

## 2019-08-02 ASSESSMENT — MIFFLIN-ST. JEOR: SCORE: 1314.49

## 2019-08-02 ASSESSMENT — PAIN SCALES - GENERAL: PAINLEVEL: NO PAIN (0)

## 2019-08-02 NOTE — NURSING NOTE
"Chief Complaint   Patient presents with     Physical       Initial /64 (BP Location: Left arm, Patient Position: Chair, Cuff Size: Adult Large)   Pulse 103   Temp 98.6  F (37  C) (Tympanic)   Ht 1.56 m (5' 1.4\")   Wt 72.6 kg (160 lb)   SpO2 98%   BMI 29.84 kg/m   Estimated body mass index is 29.84 kg/m  as calculated from the following:    Height as of this encounter: 1.56 m (5' 1.4\").    Weight as of this encounter: 72.6 kg (160 lb).  Medication Reconciliation: complete  "

## 2019-08-02 NOTE — LETTER
8/12/2019     Lenora Gage  [address]  [place] [address]      Dear Lenora:    Thank you for allowing me to participate in your care. Your recent test results were reviewed and listed below.      Your results are provided below for your review    Your results are normal.       Results for orders placed or performed in visit on 08/02/19   A pap thin layer screen with  HPV - recommended age 30 - 65 years (select HPV order below)   Result Value Ref Range    PAP NIL     Copath Report         Patient Name: LENORA GAGE  MR#: [id number]  Specimen #: [id number]  Collected: 8/2/2019  Received: 8/5/2019  Reported: 8/7/2019 10:25  Ordering Phy(s): PARDEEP MEHTA    For improved result formatting, select 'View Enhanced Report Format' under   Linked Documents section.    SPECIMEN/STAIN PROCESS:  Pap thin layer prep screening ([facility])       Pap-Cyto x 1, HPV ordered x 1    SOURCE: Vaginal  ----------------------------------------------------------------   Pap thin layer prep screening ([facility])  SPECIMEN ADEQUACY:  Satisfactory for evaluation.    CYTOLOGIC INTERPRETATION:    Negative for intraepithelial lesion or malignancy    Electronically signed out by:  TIM Montes ( ASCP)    CLINICAL HISTORY:    Complete Hysterectomy: due to cervical dysplasia,    Papanicolaou Test Limitations:  Cervical cytology is a screening test with   limited sensitivity; regular  screening is critical for cancer prevention; Pap tests are primarily   effective for the diagnosis/prevention of   squamous cell carcinoma, not adenocarcinomas or other cancers.    COLLECTION SITE:  Client:  [facility]  Location: [facility] (B)    The technical component of this testing was completed at [facility], with the professional  component performed at [facility], [address] [place] [address], [place] [address] ([phone])       HPV High Risk Types DNA Cervical   Result Value  Ref Range    HPV Source [facility]     HPV 16 DNA Negative NEG^Negative    HPV 18 DNA Negative NEG^Negative    Other HR HPV Negative NEG^Negative    Final Diagnosis This patient's sample is negative for HPV DNA.     Specimen Description Cervical Cells                 As a result, please continue with the treatment plan discussed in the office. Return as discussed or sooner if symptoms worsens or fail to improve. If you have any further questions or concerns, please do not hesitate to contact us.      Sincerely,    Bindu Castle LPN   [facility]  [address]  [place] [address]  Phone: [phone]

## 2019-08-03 ASSESSMENT — ANXIETY QUESTIONNAIRES: GAD7 TOTAL SCORE: 19

## 2019-08-07 LAB
COPATH REPORT: NORMAL
PAP: NORMAL

## 2019-08-08 LAB
FINAL DIAGNOSIS: NORMAL
HPV HR 12 DNA CVX QL NAA+PROBE: NEGATIVE
HPV16 DNA SPEC QL NAA+PROBE: NEGATIVE
HPV18 DNA SPEC QL NAA+PROBE: NEGATIVE
SPECIMEN DESCRIPTION: NORMAL
SPECIMEN SOURCE CVX/VAG CYTO: NORMAL

## 2019-08-12 ENCOUNTER — ANCILLARY PROCEDURE (OUTPATIENT)
Dept: MAMMOGRAPHY | Facility: OTHER | Age: 44
End: 2019-08-12
Attending: NURSE PRACTITIONER
Payer: COMMERCIAL

## 2019-08-12 ENCOUNTER — OFFICE VISIT (OUTPATIENT)
Dept: SURGERY | Facility: OTHER | Age: 44
End: 2019-08-12
Attending: NURSE PRACTITIONER
Payer: COMMERCIAL

## 2019-08-12 VITALS
OXYGEN SATURATION: 97 % | DIASTOLIC BLOOD PRESSURE: 66 MMHG | SYSTOLIC BLOOD PRESSURE: 100 MMHG | WEIGHT: 162 LBS | TEMPERATURE: 98.1 F | BODY MASS INDEX: 29.81 KG/M2 | HEIGHT: 62 IN | HEART RATE: 88 BPM

## 2019-08-12 DIAGNOSIS — D22.9 ATYPICAL NEVI: ICD-10-CM

## 2019-08-12 DIAGNOSIS — Z12.31 ENCOUNTER FOR SCREENING MAMMOGRAM FOR BREAST CANCER: ICD-10-CM

## 2019-08-12 PROCEDURE — 99214 OFFICE O/P EST MOD 30 MIN: CPT | Mod: 25 | Performed by: SURGERY

## 2019-08-12 PROCEDURE — 77067 SCR MAMMO BI INCL CAD: CPT | Mod: TC

## 2019-08-12 PROCEDURE — 88305 TISSUE EXAM BY PATHOLOGIST: CPT | Mod: TC | Performed by: SURGERY

## 2019-08-12 PROCEDURE — 11104 PUNCH BX SKIN SINGLE LESION: CPT | Performed by: SURGERY

## 2019-08-12 ASSESSMENT — PAIN SCALES - GENERAL: PAINLEVEL: NO PAIN (0)

## 2019-08-12 ASSESSMENT — MIFFLIN-ST. JEOR: SCORE: 1333.08

## 2019-08-12 NOTE — NURSING NOTE
"Chief Complaint   Patient presents with     Consult     atypical nevi       Initial /66 (BP Location: Right arm, Patient Position: Chair, Cuff Size: Adult Large)   Pulse 88   Temp 98.1  F (36.7  C) (Tympanic)   Ht 1.575 m (5' 2\")   Wt 73.5 kg (162 lb)   SpO2 97%   BMI 29.63 kg/m   Estimated body mass index is 29.63 kg/m  as calculated from the following:    Height as of this encounter: 1.575 m (5' 2\").    Weight as of this encounter: 73.5 kg (162 lb).  Medication Reconciliation: complete       Emily Caal LPN  '    "

## 2019-08-12 NOTE — LETTER
8/14/2019     Lenora Mack  [address] [address]      Dear Lenora:    Thank you for allowing me to participate in your care. Your recent test results were reviewed and listed below.      Your results are provided below for your review  Your  Results are normal.   Results for orders placed or performed in visit on 08/12/19   Surgical pathology exam   Result Value Ref Range    Copath Report       Patient Name: LENORA MACK  MR#: [id number]  Specimen #: [id number]  Collected: 8/12/2019  Received: 8/13/2019  Reported: 8/14/2019 15:04  Ordering Phy(s): ANGEL JERNIGAN  Additional Phy(s): PARDEEP MEHTA    For improved result formatting, select 'View Enhanced Report Format' under   Linked Documents section.    SPECIMEN(S):  Skin, left wrist    FINAL DIAGNOSIS:  Skin, left wrist, biopsy  - Blue nevus, excised    I have personally reviewed all specimens and/or slides, including the   listed special stains, and used them  with my medical judgement to determine or confirm the final diagnosis.    Electronically signed out by:    Louis Maria M.D.    CLINICAL HISTORY:  Left wrist skin lesion; atypical nevi.    GROSS:  There is a disc of tan skin which is 5 mm in diameter and 3 mm thick with   a central flat blue area.  After the  margin is inked it is bisected. (2 TE in 1 block). (Dictated by: Louis Maria MD 8/13/2019 03:48 PM)    MICROSCOPIC:  There is skin with spindle cell s within the dermis.  Some of them have   coarse granular brown pigment.  The  nuclei are uniform and small.  The lesion is excised.  The overlying   epidermis has mild hyperkeratosis.    CPT Codes  A: 67191-AN1    COLLECTION SITE:  Client: [facility]  Location: [facility] (B)    The technical component of this testing was completed at the [facility], with the  professional component performed at the [facility], [address] [place] [address], [place]  [address]  ([phone])                    As a result, please continue with the treatment plan discussed in the office. Return as discussed or sooner if symptoms worsens or fail to improve. If you have any further questions or concerns, please do not hesitate to contact us.      Sincerely,    Bindu Castle LPN   [facility]  [address]  [place] [address]  Phone: [phone]

## 2019-08-12 NOTE — PATIENT INSTRUCTIONS
"Thank you for allowing Dr. Parkinson and the surgical team to participate in your care today. Please call with any scheduling questions to our Unit Health Coordinator at [phone] or any other questions to the nurse at  [phone].     POST PROCEDURE INSTRUCTIONS    Apply ice to the surgical area to reduce swelling if desired. (no longer than 20 minutes at a time Remove your dressing in 24-48 hours.  Wash incision gently with soap and water twice daily or let soapy shower water run over the wound. Do not scrub the wound.  Keep incision clean and dry.   Do not submerge wound in water such as a tub bath or swimming.   Do not do dishes or \"dirty work\" if wound is on hands.     Do not put make-up, deodorant, powders, hairspray, lotions, etc on the incision.  You may apply antibiotic ointment twice daily.  Cover with a clean dressing daily or when wet/soiled  If you have steri-strips, these will fall off on their own in 7 days. If they are still adhered after 7 days, you may remove them by pulling gently.   You can use acetaminophen(Tylenol) and Ibuprofen for pain.     If you have any bleeding, cover the wound with clean gauze and hold pressure for 10-15 Minutes. If the bleeding does not stop or is heavy and profuse, call the clinic or go to the Urgent Care/Emergency Department.    SIGNS OF INFECTION:    Watch for redness, swelling, red streaks, pus, drainage, warmth, fever, increased pain, foul smell.   Contact our office or your primary health care provider if you notice any of the warning signs.     FOLLOW - UP    Follow-up in clinic with  in 7-10 days for suture removal.   Pathology results will available in about 7-10 days and be communicated via phone or letter or may be discussed at a follow-up appointment.  Call the office with any questions.     "

## 2019-08-12 NOTE — PROGRESS NOTES
Surgery Consult Clinic Note      RE: Lenora Gage  : 1975    Chief Complaint:  Atypical nevus    History of Present Illness:  Mrs. Gage is a very pleasant 44 year old female who I am seeing at the request of Doreen Galo NP for evaluation of skin lesion and for consideration for excision.  Has had for at least 5 years.  Not growing in size, but has changed.  She bumped it the other week and it bled and since that time has a red ring around it.  States it used to be more raised, but now is smaller in size.  No family history of skin cancer.  Does spend a lot of time in the sun without wearing sunscreen.  States she's never really had issues with sunburns in the past because of her dark complexion.  No previous skin removals.  Doesn't smoke or use alcohol on a regular basis.     Medical history:  Past Medical History:   Diagnosis Date     Adjustment disorder with anxiety      Constipation 2012     Dysmenorrhea 2002     Dyspareunia 2007     Dysplasia of cervix (uteri)  2000     Endometriosis of uterus 2003     Esophageal reflux 2000     Helicobacter positive gastritis      IBS (Irritable colon syndrome) 2011     OCD (obsessive compulsive disorder)        Surgical history:  Past Surgical History:   Procedure Laterality Date     bladder reconstruction and mesh removal       BLADDER REPAIR       colposcopy with biopsy-mutliple  10/2014     HYSTERECTOMY TOTAL ABDOMINAL, BILATERAL SALPINGO-OOPHORECTOMY, COMBINED Left     Right ovary remains.      TUBAL LIGATION         Family history:  Family History   Problem Relation Age of Onset     Other - See Comments Paternal Grandmother         Antitrysin Deficiency     Cancer Mother         Cervical     Cancer - colorectal Maternal Grandfather        Medications:  Prior to Admission medications    Medication Sig Start Date End Date Taking? Authorizing Provider   acyclovir (ZOVIRAX) 400 MG tablet Take 1 tablet (400 mg)  by mouth 3 times daily For 10 days as needed for HSV breakout. 11/12/18  Yes Duncan Moore MD   DULoxetine (CYMBALTA) 30 MG capsule Take 1 capsule (30 mg) by mouth daily 6/12/19  Yes Doreen Galo NP   DULoxetine (CYMBALTA) 60 MG capsule Take 1 capsule (60 mg) by mouth At Bedtime 6/12/19  Yes Doreen Galo NP   gabapentin (NEURONTIN) 300 MG capsule Take 100 mg by mouth daily    Yes Reported, Patient   hydrOXYzine (ATARAX) 25 MG tablet Take 1 tablet (25 mg) by mouth 3 times daily as needed for itching  Patient taking differently: Take 25 mg by mouth At Bedtime  6/12/19  Yes Doreen Galo NP   omeprazole (PRILOSEC) 40 MG capsule Take 1 capsule (40 mg) by mouth daily Take 30-60 minutes before a meal. 11/12/18  Yes Duncan Moore MD       Allergies:  The patientis allergic to blood transfusion related (informational only) and penicillins.  .  Social history:  Social History     Tobacco Use     Smoking status: Never Smoker     Smokeless tobacco: Never Used   Substance Use Topics     Alcohol use: Yes     Alcohol/week: 0.0 oz     Comment: RARELY     Marital status: .    Review of Systems:    Constitutional: Negative for fever, chills and weight loss.   HENT: Negative for ear pain, nosebleeds, congestion, sore throat, tinnitus and ear discharge.    Eyes: Negative for blurred vision, double vision, photophobia and pain.   Respiratory: Negative for cough, hemoptysis, shortness of breath, wheezing and stridor.    Cardiovascular: Negative for chest pain, palpitations and orthopnea.   Gastrointestinal: Negative for heartburn, nausea, vomiting, abdominal pain and blood in stool.   Genitourinary: Negative for urgency, frequency and hematuria.   Musculoskeletal: Negative for myalgias, back pain and joint pain.   Neurological: Negative for tingling, speech change and headaches.   Endo/Heme/Allergies: Does not bruise/bleed easily.   Psychiatric/Behavioral: Negative for depression, suicidal ideas and  "hallucinations. The patient is not nervous/anxious.    Physical Examination:  /66 (BP Location: Right arm, Patient Position: Chair, Cuff Size: Adult Large)   Pulse 88   Temp 98.1  F (36.7  C) (Tympanic)   Ht 1.575 m (5' 2\")   Wt 73.5 kg (162 lb)   SpO2 97%   BMI 29.63 kg/m    General: AAOx4, NAD, WN/WD, ambulating without assistance  HEENT:NCAT, EOMI, PERRL Sclerae anicteric; Trachea mideline, no JVD  Extremities: Cursory exam unremarkable.  Skin: Warm, dry, < 2 sec cap refill.  On the dorsum of the left wrist near the carpal metacarpal joint of the 2nd digit is a 1mm x 3 mm symmetric hyperpigmented slightly raised papule with a very slight surrounding erythema  Neuro: CN 2-12 grossly intact, no focal deficit, GCS 15  Psych: happy, calm, asks appropriate questions      Assessment/Plan:  #1 atypical nevus left hand     I don't  think its unreasonable to remove this mass and confirm diagnosis.  The risks, including but not limited to, bleeding, infection, recurrence, need for further resection, wound healing, scar formation and chronic pain have all been discussed with the patient. She voiced understanding of all these risks and there were no barriers to patient education.    After a procedural pause was conducted, the area was prepped and draped in the usual sterile fashion.  Local anesthetic was infiltrated around the left hand skin lesion.  After the anesthetic had taken affect,a 5 mm skin punch was used to completely excise the skin lesion.  Hemostasis was ensured using direct pressure and the wound was closed with interrupted 4-0 prolene sutures.  Patient tolerated the procedure well and a sterile dressing was applied.           Dr. Nino DO, [facility]  [address], [address]  [place]    [address]    Referring Provider:  [place] [address]     Primary Care Provider:  Doreen Galo  "

## 2019-08-14 ENCOUNTER — TELEPHONE (OUTPATIENT)
Dept: FAMILY MEDICINE | Facility: OTHER | Age: 44
End: 2019-08-14

## 2019-08-14 LAB — COPATH REPORT: NORMAL

## 2019-08-14 NOTE — TELEPHONE ENCOUNTER
Left message for patient informing her that an appointment  has been made for Friday Aug. 16th at 1:00 with Dolores at [facility] .

## 2019-08-20 ENCOUNTER — ALLIED HEALTH/NURSE VISIT (OUTPATIENT)
Dept: SURGERY | Facility: OTHER | Age: 44
End: 2019-08-20
Payer: COMMERCIAL

## 2019-08-20 DIAGNOSIS — F33.1 MAJOR DEPRESSIVE DISORDER, RECURRENT EPISODE, MODERATE (H): ICD-10-CM

## 2019-08-20 DIAGNOSIS — Z63.79 STRESS DUE TO ILLNESS OF FAMILY MEMBER: ICD-10-CM

## 2019-08-20 DIAGNOSIS — R10.11 RUQ ABDOMINAL PAIN: ICD-10-CM

## 2019-08-20 DIAGNOSIS — Z48.02 VISIT FOR SUTURE REMOVAL: Primary | ICD-10-CM

## 2019-08-20 DIAGNOSIS — F43.29 PROLONGED GRIEF REACTION: ICD-10-CM

## 2019-08-20 DIAGNOSIS — F41.1 GAD (GENERALIZED ANXIETY DISORDER): ICD-10-CM

## 2019-08-20 RX ORDER — DULOXETIN HYDROCHLORIDE 60 MG/1
120 CAPSULE, DELAYED RELEASE ORAL AT BEDTIME
Qty: 180 CAPSULE | Refills: 3 | Status: SHIPPED | OUTPATIENT
Start: 2019-08-20 | End: 2019-11-18

## 2019-08-20 RX ORDER — DULOXETIN HYDROCHLORIDE 60 MG/1
120 CAPSULE, DELAYED RELEASE ORAL AT BEDTIME
Qty: 14 CAPSULE | Refills: 0 | Status: SHIPPED | OUTPATIENT
Start: 2019-08-20 | End: 2019-11-05

## 2019-08-20 RX ORDER — HYDROXYZINE HYDROCHLORIDE 25 MG/1
25 TABLET, FILM COATED ORAL AT BEDTIME
Qty: 90 TABLET | Refills: 3 | Status: SHIPPED | OUTPATIENT
Start: 2019-08-20 | End: 2019-11-18

## 2019-08-20 RX ORDER — OMEPRAZOLE 40 MG/1
40 CAPSULE, DELAYED RELEASE ORAL DAILY
Qty: 90 CAPSULE | Refills: 3 | Status: SHIPPED | OUTPATIENT
Start: 2019-08-20 | End: 2019-11-18

## 2019-08-20 RX ORDER — HYDROXYZINE HYDROCHLORIDE 25 MG/1
25 TABLET, FILM COATED ORAL AT BEDTIME
Qty: 7 TABLET | Refills: 0 | Status: SHIPPED | OUTPATIENT
Start: 2019-08-20 | End: 2019-11-05

## 2019-08-20 RX ORDER — OMEPRAZOLE 40 MG/1
40 CAPSULE, DELAYED RELEASE ORAL DAILY
Qty: 7 CAPSULE | Refills: 0 | Status: SHIPPED | OUTPATIENT
Start: 2019-08-20 | End: 2019-11-05

## 2019-08-20 NOTE — NURSING NOTE
Removed sutures placed steri strips and patient advise to let steri strips fall off on own and to continue not to emerge in water until fully heal. No s/s of infection noted.   Merly Domingo LPN

## 2019-10-31 DIAGNOSIS — R30.0 DYSURIA: Primary | ICD-10-CM

## 2019-11-01 NOTE — PROGRESS NOTES
"Tonya Gage is a 44 year old female who presents to clinic today for the following health issues:    HPI   URINARY TRACT SYMPTOMS      Duration: x 2 months     Description  frequency and odor, no dysuria, no hematuria     Intensity:  mild    Accompanying signs and symptoms:  Fever/chills: no   Flank pain: no  Nausea and vomiting: no   Vaginal symptoms: no discharge or odor  Abdominal/Pelvic Pain: no     History  History of frequent UTI's: YES in the past   History of kidney stones: no   Sexually Active: YES, no concern about STDs  Possibility of pregnancy: No    Precipitating or alleviating factors: None    Therapies tried and outcome: increase fluid intake     Bilateral Hand Swelling     Duration: 2 months     Description  Location: bilat hand swelling-right hand worse than the left, states it will turn a \"bluish color\" when cold and feels bruised     Intensity:  mild, moderate    Accompanying signs and symptoms: swelling and discoloration of pointer fingers    History  Previous similar problem: no   Previous evaluation:  none    Precipitating or alleviating factors:  Trauma or overuse: no   Aggravating factors include: overuse, 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 No shortness of breath.     She feels her mother might have rheumatoid arthritis.    No known injury.        Patient Active Problem List   Diagnosis     OCD (obsessive compulsive disorder)     Stress due to illness of family member     Midline low back pain without sciatica     Helicobacter positive gastritis     Gastroesophageal reflux disease without esophagitis     ACP (advance care planning)     Prolonged grief reaction     Major depressive disorder, recurrent episode, moderate (H)     AZ (generalized anxiety disorder)     Acute intractable tension-type headache     Lateral epicondylitis of right elbow     Past " "Surgical History:   Procedure Laterality Date     bladder reconstruction and mesh removal  2012     BLADDER REPAIR  2010     colposcopy with biopsy-mutliple  10/2014     HYSTERECTOMY TOTAL ABDOMINAL, BILATERAL SALPINGO-OOPHORECTOMY, COMBINED Left     Right ovary remains.      TUBAL LIGATION         Social History     Tobacco Use     Smoking status: Never Smoker     Smokeless tobacco: Never Used   Substance Use Topics     Alcohol use: Yes     Alcohol/week: 0.0 standard drinks     Comment: RARELY     Family History   Problem Relation Age of Onset     Other - See Comments Paternal Grandmother         Antitrysin Deficiency     Cancer Mother         Cervical     Cancer - colorectal Maternal Grandfather          Current Outpatient Medications   Medication Sig Dispense Refill     acyclovir (ZOVIRAX) 400 MG tablet Take 1 tablet (400 mg) by mouth 3 times daily For 10 days as needed for HSV breakout. 90 tablet 1     DULoxetine (CYMBALTA) 60 MG capsule Take 2 capsules (120 mg) by mouth At Bedtime 180 capsule 3     hydrOXYzine (ATARAX) 25 MG tablet Take 1 tablet (25 mg) by mouth At Bedtime 90 tablet 3     omeprazole (PRILOSEC) 40 MG DR capsule Take 1 capsule (40 mg) by mouth daily Take 30-60 minutes before a meal. 90 capsule 3     sulfamethoxazole-trimethoprim (BACTRIM DS/SEPTRA DS) 800-160 MG tablet Take 1 tablet by mouth 2 times daily for 3 days 6 tablet 0     Allergies   Allergen Reactions     Blood Transfusion Related (Informational Only) Other (See Comments)     Patient has a history of a clinically significant antibody against RBC antigens.  A delay in compatible RBCs may occur.     Penicillins Anaphylaxis and Hives       Reviewed and updated as needed this visit by Provider         Review of Systems   As noted in the HPI.       Objective    /70 (BP Location: Left arm, Patient Position: Chair, Cuff Size: Adult Regular)   Pulse 105   Ht 1.575 m (5' 2\")   Wt 74.4 kg (164 lb)   SpO2 99%   BMI 30.00 kg/m    Body " mass index is 30 kg/m .  Physical Exam   GENERAL: healthy, alert and no distress  RESP: lungs clear to auscultation - no rales, rhonchi or wheezes  CV: regular rate and rhythm, normal S1 S2, no S3 or S4, no murmur, click or rub, no peripheral edema and peripheral pulses strong  ABDOMEN: soft, nontender, no hepatosplenomegaly, no masses and bowel sounds normal  NO CVA tenderness  PSYCH: mentation appears normal, affect normal/bright  HANDS: Skin intact. No edema, erythema, or ecchymosis. Normal coloring and sensation. Radial pulses 2+ bilaterally. Strength 5/5. No pain with palpation.     Diagnostic Test Results:  Labs reviewed in Epic  Results for orders placed or performed in visit on 11/05/19 (from the past 24 hour(s))   UA reflex to Microscopic and Culture - [place]   Result Value Ref Range    Color Urine Yellow     Appearance Urine Slightly Cloudy     Glucose Urine Negative NEG^Negative mg/dL    Bilirubin Urine Negative NEG^Negative    Ketones Urine Negative NEG^Negative mg/dL    Specific Gravity Urine 1.015 1.003 - 1.035    Blood Urine Negative NEG^Negative    pH Urine 6.5 4.7 - 8.0 pH    Protein Albumin Urine Negative NEG^Negative mg/dL    Urobilinogen mg/dL Normal 0.0 - 2.0 mg/dL    Nitrite Urine Positive (A) NEG^Negative    Leukocyte Esterase Urine Large (A) NEG^Negative    Source Midstream Urine     RBC Urine 10 (H) 0 - 2 /HPF    WBC Urine >182 (H) 0 - 5 /HPF    WBC Clumps Present (A) NEG^Negative /HPF    Bacteria Urine Many (A) NEG^Negative /HPF    Squamous Epithelial /HPF Urine 4 (H) 0 - 1 /HPF    Mucous Urine Present (A) NEG^Negative /LPF       PROCEDURE:  XR HAND LT G/E 3 VW     HISTORY: pain and swelling at base of pointer finger on both hands,  arthritis?; Bilateral hand swelling     COMPARISON:  None.     TECHNIQUE:  3 views of the left hand were obtained.     FINDINGS:  No fracture or dislocation is identified. The joint spaces  are preserved.                                                           "              IMPRESSION: No acute fracture    PROCEDURE:  XR HAND RT G/E 3 VW     HISTORY: pain and swelling at base of pointer finger on both hands,  arthritis?, no erythema; Bilateral hand swelling     COMPARISON:  None.     TECHNIQUE:  3 views of the right hand were obtained.     FINDINGS:  No fracture or dislocation is identified. The joint spaces  are preserved.                                                                        IMPRESSION: Unremarkable right hand x-rays    Assessment & Plan   (M79.89) Bilateral hand swelling  Comment: no current swelling, normal coloring, imaging appears unremarkable  Plan: Will continue to monitor. Most likely from overuse. Rest, ice, and NSAIDs recommended. Will also draw RA labs. Will notify patient of the results when available and intervene accordingly.     (N39.0) Urinary tract infection without hematuria, site unspecified  Plan: sulfamethoxazole-trimethoprim (BACTRIM DS/SEPTRA DS) 800-160 MG tablet times 3 days        She was also encouraged to push fluids. F/u if symptoms do not improve.    (Z23) Need for prophylactic vaccination and inoculation against influenza  Plan: INFLUENZA VACCINE IM > 6 MONTHS VALENT IIV4        BMI:   Estimated body mass index is 29.63 kg/m  as calculated from the following:    Height as of 8/12/19: 1.575 m (5' 2\").    Weight as of 8/12/19: 73.5 kg (162 lb).         Doreen Galo NP  [facility] [place]      "

## 2019-11-05 ENCOUNTER — ANCILLARY PROCEDURE (OUTPATIENT)
Dept: GENERAL RADIOLOGY | Facility: OTHER | Age: 44
End: 2019-11-05
Attending: NURSE PRACTITIONER
Payer: COMMERCIAL

## 2019-11-05 ENCOUNTER — OFFICE VISIT (OUTPATIENT)
Dept: FAMILY MEDICINE | Facility: OTHER | Age: 44
End: 2019-11-05
Attending: NURSE PRACTITIONER
Payer: COMMERCIAL

## 2019-11-05 VITALS
DIASTOLIC BLOOD PRESSURE: 70 MMHG | SYSTOLIC BLOOD PRESSURE: 100 MMHG | HEIGHT: 62 IN | OXYGEN SATURATION: 99 % | BODY MASS INDEX: 30.18 KG/M2 | WEIGHT: 164 LBS | HEART RATE: 105 BPM

## 2019-11-05 DIAGNOSIS — Z23 NEED FOR PROPHYLACTIC VACCINATION AND INOCULATION AGAINST INFLUENZA: ICD-10-CM

## 2019-11-05 DIAGNOSIS — M79.89 BILATERAL HAND SWELLING: ICD-10-CM

## 2019-11-05 DIAGNOSIS — N39.0 URINARY TRACT INFECTION WITHOUT HEMATURIA, SITE UNSPECIFIED: ICD-10-CM

## 2019-11-05 DIAGNOSIS — R82.90 FOUL SMELLING URINE: Primary | ICD-10-CM

## 2019-11-05 DIAGNOSIS — R82.90 ABNORMAL URINE FINDINGS: ICD-10-CM

## 2019-11-05 LAB
ALBUMIN UR-MCNC: NEGATIVE MG/DL
APPEARANCE UR: ABNORMAL
BACTERIA #/AREA URNS HPF: ABNORMAL /HPF
BILIRUB UR QL STRIP: NEGATIVE
COLOR UR AUTO: YELLOW
GLUCOSE UR STRIP-MCNC: NEGATIVE MG/DL
HGB UR QL STRIP: NEGATIVE
KETONES UR STRIP-MCNC: NEGATIVE MG/DL
LEUKOCYTE ESTERASE UR QL STRIP: ABNORMAL
MUCOUS THREADS #/AREA URNS LPF: PRESENT /LPF
NITRATE UR QL: POSITIVE
PH UR STRIP: 6.5 PH (ref 4.7–8)
RBC #/AREA URNS AUTO: 10 /HPF (ref 0–2)
SOURCE: ABNORMAL
SP GR UR STRIP: 1.01 (ref 1–1.03)
SQUAMOUS #/AREA URNS AUTO: 4 /HPF (ref 0–1)
UROBILINOGEN UR STRIP-MCNC: NORMAL MG/DL (ref 0–2)
WBC #/AREA URNS AUTO: >182 /HPF (ref 0–5)
WBC CLUMPS #/AREA URNS HPF: PRESENT /HPF

## 2019-11-05 PROCEDURE — 73130 X-RAY EXAM OF HAND: CPT | Mod: TC

## 2019-11-05 PROCEDURE — 87086 URINE CULTURE/COLONY COUNT: CPT | Performed by: NURSE PRACTITIONER

## 2019-11-05 PROCEDURE — 36415 COLL VENOUS BLD VENIPUNCTURE: CPT | Performed by: NURSE PRACTITIONER

## 2019-11-05 PROCEDURE — 86200 CCP ANTIBODY: CPT | Performed by: NURSE PRACTITIONER

## 2019-11-05 PROCEDURE — 87088 URINE BACTERIA CULTURE: CPT | Performed by: NURSE PRACTITIONER

## 2019-11-05 PROCEDURE — 90686 IIV4 VACC NO PRSV 0.5 ML IM: CPT | Performed by: NURSE PRACTITIONER

## 2019-11-05 PROCEDURE — 90471 IMMUNIZATION ADMIN: CPT | Performed by: NURSE PRACTITIONER

## 2019-11-05 PROCEDURE — 86431 RHEUMATOID FACTOR QUANT: CPT | Performed by: NURSE PRACTITIONER

## 2019-11-05 PROCEDURE — 81001 URINALYSIS AUTO W/SCOPE: CPT | Performed by: NURSE PRACTITIONER

## 2019-11-05 PROCEDURE — 99213 OFFICE O/P EST LOW 20 MIN: CPT | Mod: 25 | Performed by: NURSE PRACTITIONER

## 2019-11-05 PROCEDURE — 87186 SC STD MICRODIL/AGAR DIL: CPT | Performed by: NURSE PRACTITIONER

## 2019-11-05 RX ORDER — SULFAMETHOXAZOLE/TRIMETHOPRIM 800-160 MG
1 TABLET ORAL 2 TIMES DAILY
Qty: 6 TABLET | Refills: 0 | Status: SHIPPED | OUTPATIENT
Start: 2019-11-05 | End: 2019-11-18

## 2019-11-05 ASSESSMENT — PATIENT HEALTH QUESTIONNAIRE - PHQ9
SUM OF ALL RESPONSES TO PHQ QUESTIONS 1-9: 19
5. POOR APPETITE OR OVEREATING: NEARLY EVERY DAY

## 2019-11-05 ASSESSMENT — ANXIETY QUESTIONNAIRES
2. NOT BEING ABLE TO STOP OR CONTROL WORRYING: NEARLY EVERY DAY
GAD7 TOTAL SCORE: 21
7. FEELING AFRAID AS IF SOMETHING AWFUL MIGHT HAPPEN: NEARLY EVERY DAY
5. BEING SO RESTLESS THAT IT IS HARD TO SIT STILL: NEARLY EVERY DAY
3. WORRYING TOO MUCH ABOUT DIFFERENT THINGS: NEARLY EVERY DAY
1. FEELING NERVOUS, ANXIOUS, OR ON EDGE: NEARLY EVERY DAY
IF YOU CHECKED OFF ANY PROBLEMS ON THIS QUESTIONNAIRE, HOW DIFFICULT HAVE THESE PROBLEMS MADE IT FOR YOU TO DO YOUR WORK, TAKE CARE OF THINGS AT HOME, OR GET ALONG WITH OTHER PEOPLE: VERY DIFFICULT
6. BECOMING EASILY ANNOYED OR IRRITABLE: NEARLY EVERY DAY

## 2019-11-05 ASSESSMENT — MIFFLIN-ST. JEOR: SCORE: 1342.15

## 2019-11-05 ASSESSMENT — PAIN SCALES - GENERAL: PAINLEVEL: MILD PAIN (2)

## 2019-11-05 NOTE — NURSING NOTE
"Chief Complaint   Patient presents with     Swelling     hand swelling left is worse states hands will turn a \"bluish color\"       Initial /70 (BP Location: Left arm, Patient Position: Chair, Cuff Size: Adult Regular)   Pulse 105   Ht 1.575 m (5' 2\")   Wt 74.4 kg (164 lb)   SpO2 99%   BMI 30.00 kg/m   Estimated body mass index is 30 kg/m  as calculated from the following:    Height as of this encounter: 1.575 m (5' 2\").    Weight as of this encounter: 74.4 kg (164 lb).  Medication Reconciliation: complete  Bindu Castle LPN  "

## 2019-11-06 ASSESSMENT — ANXIETY QUESTIONNAIRES: GAD7 TOTAL SCORE: 21

## 2019-11-07 LAB
BACTERIA SPEC CULT: ABNORMAL
RHEUMATOID FACT SER NEPH-ACNC: 26 IU/ML (ref 0–20)
SPECIMEN SOURCE: ABNORMAL

## 2019-11-08 LAB — CCP AB SER IA-ACNC: 2 U/ML

## 2019-11-12 DIAGNOSIS — M79.89 BILATERAL HAND SWELLING: Primary | ICD-10-CM

## 2019-11-12 DIAGNOSIS — M25.541 ARTHRALGIA OF BOTH HANDS: ICD-10-CM

## 2019-11-12 DIAGNOSIS — M25.542 ARTHRALGIA OF BOTH HANDS: ICD-10-CM

## 2019-11-18 ENCOUNTER — TELEPHONE (OUTPATIENT)
Dept: FAMILY MEDICINE | Facility: OTHER | Age: 44
End: 2019-11-18

## 2019-11-18 ENCOUNTER — OFFICE VISIT (OUTPATIENT)
Dept: FAMILY MEDICINE | Facility: OTHER | Age: 44
End: 2019-11-18
Attending: NURSE PRACTITIONER
Payer: COMMERCIAL

## 2019-11-18 VITALS
OXYGEN SATURATION: 97 % | TEMPERATURE: 99.2 F | HEART RATE: 107 BPM | SYSTOLIC BLOOD PRESSURE: 94 MMHG | BODY MASS INDEX: 31.28 KG/M2 | HEIGHT: 62 IN | WEIGHT: 170 LBS | DIASTOLIC BLOOD PRESSURE: 68 MMHG

## 2019-11-18 DIAGNOSIS — Z63.79 STRESS DUE TO ILLNESS OF FAMILY MEMBER: ICD-10-CM

## 2019-11-18 DIAGNOSIS — F33.1 MAJOR DEPRESSIVE DISORDER, RECURRENT EPISODE, MODERATE (H): ICD-10-CM

## 2019-11-18 DIAGNOSIS — F41.1 GAD (GENERALIZED ANXIETY DISORDER): ICD-10-CM

## 2019-11-18 DIAGNOSIS — H53.8 BLURRED VISION: ICD-10-CM

## 2019-11-18 DIAGNOSIS — M25.50 ARTHRALGIA, UNSPECIFIED JOINT: ICD-10-CM

## 2019-11-18 DIAGNOSIS — R53.83 FATIGUE, UNSPECIFIED TYPE: Primary | ICD-10-CM

## 2019-11-18 DIAGNOSIS — F43.29 PROLONGED GRIEF REACTION: ICD-10-CM

## 2019-11-18 DIAGNOSIS — R74.01 ELEVATED ALT MEASUREMENT: ICD-10-CM

## 2019-11-18 DIAGNOSIS — R74.01 ELEVATED ALT MEASUREMENT: Primary | ICD-10-CM

## 2019-11-18 DIAGNOSIS — R06.83 SNORES: ICD-10-CM

## 2019-11-18 LAB
ALBUMIN SERPL-MCNC: 4.1 G/DL (ref 3.4–5)
ALP SERPL-CCNC: 134 U/L (ref 40–150)
ALT SERPL W P-5'-P-CCNC: 82 U/L (ref 0–50)
ANION GAP SERPL CALCULATED.3IONS-SCNC: 3 MMOL/L (ref 3–14)
AST SERPL W P-5'-P-CCNC: 35 U/L (ref 0–45)
BASOPHILS # BLD AUTO: 0 10E9/L (ref 0–0.2)
BASOPHILS NFR BLD AUTO: 0.3 %
BILIRUB SERPL-MCNC: 0.6 MG/DL (ref 0.2–1.3)
BUN SERPL-MCNC: 9 MG/DL (ref 7–30)
CALCIUM SERPL-MCNC: 8.6 MG/DL (ref 8.5–10.1)
CHLORIDE SERPL-SCNC: 105 MMOL/L (ref 94–109)
CO2 SERPL-SCNC: 30 MMOL/L (ref 20–32)
CREAT SERPL-MCNC: 0.74 MG/DL (ref 0.52–1.04)
CRP SERPL-MCNC: <2.9 MG/L (ref 0–8)
DIFFERENTIAL METHOD BLD: NORMAL
EOSINOPHIL # BLD AUTO: 0.1 10E9/L (ref 0–0.7)
EOSINOPHIL NFR BLD AUTO: 1.4 %
ERYTHROCYTE [DISTWIDTH] IN BLOOD BY AUTOMATED COUNT: 11.9 % (ref 10–15)
ERYTHROCYTE [SEDIMENTATION RATE] IN BLOOD BY WESTERGREN METHOD: 7 MM/H (ref 0–20)
GFR SERPL CREATININE-BSD FRML MDRD: >90 ML/MIN/{1.73_M2}
GLUCOSE SERPL-MCNC: 102 MG/DL (ref 70–99)
HCT VFR BLD AUTO: 41.1 % (ref 35–47)
HGB BLD-MCNC: 14.3 G/DL (ref 11.7–15.7)
IMM GRANULOCYTES # BLD: 0 10E9/L (ref 0–0.4)
IMM GRANULOCYTES NFR BLD: 0.3 %
LYMPHOCYTES # BLD AUTO: 1.8 10E9/L (ref 0.8–5.3)
LYMPHOCYTES NFR BLD AUTO: 27.5 %
MCH RBC QN AUTO: 29.4 PG (ref 26.5–33)
MCHC RBC AUTO-ENTMCNC: 34.8 G/DL (ref 31.5–36.5)
MCV RBC AUTO: 84 FL (ref 78–100)
MONOCYTES # BLD AUTO: 0.5 10E9/L (ref 0–1.3)
MONOCYTES NFR BLD AUTO: 7.9 %
NEUTROPHILS # BLD AUTO: 4.1 10E9/L (ref 1.6–8.3)
NEUTROPHILS NFR BLD AUTO: 62.6 %
NRBC # BLD AUTO: 0 10*3/UL
NRBC BLD AUTO-RTO: 0 /100
PLATELET # BLD AUTO: 252 10E9/L (ref 150–450)
POTASSIUM SERPL-SCNC: 3.6 MMOL/L (ref 3.4–5.3)
PROT SERPL-MCNC: 7.6 G/DL (ref 6.8–8.8)
RBC # BLD AUTO: 4.87 10E12/L (ref 3.8–5.2)
SODIUM SERPL-SCNC: 138 MMOL/L (ref 133–144)
TSH SERPL DL<=0.005 MIU/L-ACNC: 3.64 MU/L (ref 0.4–4)
WBC # BLD AUTO: 6.5 10E9/L (ref 4–11)

## 2019-11-18 PROCEDURE — 86038 ANTINUCLEAR ANTIBODIES: CPT | Performed by: NURSE PRACTITIONER

## 2019-11-18 PROCEDURE — 82977 ASSAY OF GGT: CPT | Performed by: NURSE PRACTITIONER

## 2019-11-18 PROCEDURE — 80050 GENERAL HEALTH PANEL: CPT | Performed by: NURSE PRACTITIONER

## 2019-11-18 PROCEDURE — 86140 C-REACTIVE PROTEIN: CPT | Performed by: NURSE PRACTITIONER

## 2019-11-18 PROCEDURE — 82306 VITAMIN D 25 HYDROXY: CPT | Performed by: NURSE PRACTITIONER

## 2019-11-18 PROCEDURE — 36415 COLL VENOUS BLD VENIPUNCTURE: CPT | Performed by: NURSE PRACTITIONER

## 2019-11-18 PROCEDURE — 99214 OFFICE O/P EST MOD 30 MIN: CPT | Performed by: NURSE PRACTITIONER

## 2019-11-18 PROCEDURE — 85652 RBC SED RATE AUTOMATED: CPT | Performed by: NURSE PRACTITIONER

## 2019-11-18 RX ORDER — OMEPRAZOLE 40 MG/1
40 CAPSULE, DELAYED RELEASE ORAL DAILY
Qty: 90 CAPSULE | Refills: 3 | Status: SHIPPED | OUTPATIENT
Start: 2019-11-18 | End: 2020-11-11

## 2019-11-18 RX ORDER — NAPROXEN 500 MG/1
500 TABLET ORAL 2 TIMES DAILY WITH MEALS
Qty: 60 TABLET | Refills: 1 | Status: SHIPPED | OUTPATIENT
Start: 2019-11-18 | End: 2020-01-21

## 2019-11-18 RX ORDER — DULOXETIN HYDROCHLORIDE 60 MG/1
120 CAPSULE, DELAYED RELEASE ORAL AT BEDTIME
Qty: 180 CAPSULE | Refills: 3 | Status: SHIPPED | OUTPATIENT
Start: 2019-11-18 | End: 2020-03-23

## 2019-11-18 RX ORDER — HYDROXYZINE HYDROCHLORIDE 25 MG/1
50 TABLET, FILM COATED ORAL AT BEDTIME
Qty: 180 TABLET | Refills: 3 | Status: SHIPPED | OUTPATIENT
Start: 2019-11-18 | End: 2020-03-23

## 2019-11-18 ASSESSMENT — PATIENT HEALTH QUESTIONNAIRE - PHQ9
SUM OF ALL RESPONSES TO PHQ QUESTIONS 1-9: 18
5. POOR APPETITE OR OVEREATING: MORE THAN HALF THE DAYS

## 2019-11-18 ASSESSMENT — ANXIETY QUESTIONNAIRES
7. FEELING AFRAID AS IF SOMETHING AWFUL MIGHT HAPPEN: MORE THAN HALF THE DAYS
2. NOT BEING ABLE TO STOP OR CONTROL WORRYING: NEARLY EVERY DAY
IF YOU CHECKED OFF ANY PROBLEMS ON THIS QUESTIONNAIRE, HOW DIFFICULT HAVE THESE PROBLEMS MADE IT FOR YOU TO DO YOUR WORK, TAKE CARE OF THINGS AT HOME, OR GET ALONG WITH OTHER PEOPLE: VERY DIFFICULT
1. FEELING NERVOUS, ANXIOUS, OR ON EDGE: NEARLY EVERY DAY
6. BECOMING EASILY ANNOYED OR IRRITABLE: MORE THAN HALF THE DAYS
3. WORRYING TOO MUCH ABOUT DIFFERENT THINGS: MORE THAN HALF THE DAYS
GAD7 TOTAL SCORE: 16
5. BEING SO RESTLESS THAT IT IS HARD TO SIT STILL: MORE THAN HALF THE DAYS

## 2019-11-18 ASSESSMENT — PAIN SCALES - GENERAL: PAINLEVEL: MODERATE PAIN (5)

## 2019-11-18 ASSESSMENT — MIFFLIN-ST. JEOR: SCORE: 1369.36

## 2019-11-18 NOTE — PROGRESS NOTES
Subjective     Lenora Gage is a 44 year old female who presents to clinic today for the following health issues:    HPI   Joint Aches and Fatigue      Duration: x 2 years, but she feels her symptoms are worsening over the past 2 months     Description (location/character/radiation): Joint pain-worse in right hip, feet, and hands; No joint erythema, edema, or ecchymosis; Blurry vision on and off-she notes that she recently saw the eye doctor and she did need a stronger prescription, but she has not picked this up yet; fatigue-often tired throughout the day, she notes that she was recently driving and almost fell asleep at the wheel, she does snore at night and frequently wakes up, she takes hydroxyzine before bed, but is unsure if it helps;     Intensity:  mild    Accompanying signs and symptoms: blurry vision, forgetfulness     History (similar episodes/previous evaluation): none     Precipitating or alleviating factors: None    Therapies tried and outcome:  Increased Atarax 50 mg at bedtime, cymbalta     She recently was seen for hand pain. Lab testing was done. Her rheumatoid factor was elevated at 26. CCP was normal. TSH normal in 4/17/19. At this time, she was referred to rheumatology. She still has not heard from them.     She denies fevers, chest pain, shortness of breath, abdominal pain, nausea, or vomiting. No rashes. Eating and drinking well.     She is unsure if RA runs in her family as she does not know her father's side.    She is under a lot of stress and suffers from anxiety and depression. Following with a counselor and notes that it helps. Also taking Cymbalta with some relief. She has never had a diagnostic assessment and this is scheduled in December to r/o bipolar and ADHD. She does admit that her stress is getting better. Son in treatment for drugs and doing well. Daughter also doing better. 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 She and her  plan to move in with them to  better help them. No thoughts of suicide.        Patient Active Problem List   Diagnosis     OCD (obsessive compulsive disorder)     Stress due to illness of family member     Midline low back pain without sciatica     Helicobacter positive gastritis     Gastroesophageal reflux disease without esophagitis     ACP (advance care planning)     Prolonged grief reaction     Major depressive disorder, recurrent episode, moderate (H)     AZ (generalized anxiety disorder)     Acute intractable tension-type headache     Lateral epicondylitis of right elbow     Past Surgical History:   Procedure Laterality Date     bladder reconstruction and mesh removal  2012     BLADDER REPAIR  2010     colposcopy with biopsy-mutliple  10/2014     HYSTERECTOMY TOTAL ABDOMINAL, BILATERAL SALPINGO-OOPHORECTOMY, COMBINED Left     Right ovary remains.      TUBAL LIGATION         Social History     Tobacco Use     Smoking status: Never Smoker     Smokeless tobacco: Never Used   Substance Use Topics     Alcohol use: Yes     Alcohol/week: 0.0 standard drinks     Comment: RARELY     Family History   Problem Relation Age of Onset     Other - See Comments Paternal Grandmother         Antitrysin Deficiency     Cancer Mother         Cervical     Cancer - colorectal Maternal Grandfather          Current Outpatient Medications   Medication Sig Dispense Refill     acyclovir (ZOVIRAX) 400 MG tablet Take 1 tablet (400 mg) by mouth 3 times daily For 10 days as needed for HSV breakout. 90 tablet 1     DULoxetine (CYMBALTA) 60 MG capsule Take 2 capsules (120 mg) by mouth At Bedtime 180 capsule 3     hydrOXYzine (ATARAX) 25 MG tablet Take 2 tablets (50 mg) by mouth At Bedtime 180 tablet 3     naproxen (NAPROSYN) 500 MG tablet Take 1 tablet (500 mg) by mouth 2 times daily (with meals) 60 tablet 1     omeprazole (PRILOSEC) 40 MG DR capsule Take 1 capsule (40 mg) by mouth daily Take 30-60 minutes before a meal. 90 capsule 3     Allergies   Allergen Reactions      "Blood Transfusion Related (Informational Only) Other (See Comments)     Patient has a history of a clinically significant antibody against RBC antigens.  A delay in compatible RBCs may occur.     Penicillins Anaphylaxis and Hives       Reviewed and updated as needed this visit by Provider         Review of Systems   As noted in the HPI.       Objective    BP 94/68 (BP Location: Left arm, Patient Position: Chair, Cuff Size: Adult Regular)   Pulse 107   Temp 99.2  F (37.3  C) (Tympanic)   Ht 1.575 m (5' 2\")   Wt 77.1 kg (170 lb)   SpO2 97%   BMI 31.09 kg/m    Body mass index is 31.09 kg/m .  Physical Exam   GENERAL: healthy, alert and no distress  EYES: Eyes grossly normal to inspection, PERRL and conjunctivae and sclerae normal  HENT: ear canals and TM's normal, nose and mouth without ulcers or lesions  NECK: no adenopathy, no asymmetry, masses, or scars and thyroid normal to palpation  RESP: lungs clear to auscultation - no rales, rhonchi or wheezes  CV: regular rate and rhythm, normal S1 S2, no S3 or S4, no murmur, click or rub, no peripheral edema and peripheral pulses strong  ABDOMEN: soft, nontender, no hepatosplenomegaly, no masses and bowel sounds normal  MS: no gross musculoskeletal defects noted, no edema, no joint erythema or bogginess   NEURO: Normal strength and tone, mentation intact and speech normal  PSYCH: mentation appears normal, affect normal/bright    Diagnostic Test Results:  Labs reviewed in Epic  Results for orders placed or performed in visit on 11/18/19 (from the past 24 hour(s))   CBC with platelets and differential   Result Value Ref Range    WBC 6.5 4.0 - 11.0 10e9/L    RBC Count 4.87 3.8 - 5.2 10e12/L    Hemoglobin 14.3 11.7 - 15.7 g/dL    Hematocrit 41.1 35.0 - 47.0 %    MCV 84 78 - 100 fl    MCH 29.4 26.5 - 33.0 pg    MCHC 34.8 31.5 - 36.5 g/dL    RDW 11.9 10.0 - 15.0 %    Platelet Count 252 150 - 450 10e9/L    Diff Method Automated Method     % Neutrophils 62.6 %    % Lymphocytes " 27.5 %    % Monocytes 7.9 %    % Eosinophils 1.4 %    % Basophils 0.3 %    % Immature Granulocytes 0.3 %    Nucleated RBCs 0 0 /100    Absolute Neutrophil 4.1 1.6 - 8.3 10e9/L    Absolute Lymphocytes 1.8 0.8 - 5.3 10e9/L    Absolute Monocytes 0.5 0.0 - 1.3 10e9/L    Absolute Eosinophils 0.1 0.0 - 0.7 10e9/L    Absolute Basophils 0.0 0.0 - 0.2 10e9/L    Abs Immature Granulocytes 0.0 0 - 0.4 10e9/L    Absolute Nucleated RBC 0.0    TSH with free T4 reflex   Result Value Ref Range    TSH 3.64 0.40 - 4.00 mU/L   Comprehensive metabolic panel   Result Value Ref Range    Sodium 138 133 - 144 mmol/L    Potassium 3.6 3.4 - 5.3 mmol/L    Chloride 105 94 - 109 mmol/L    Carbon Dioxide 30 20 - 32 mmol/L    Anion Gap 3 3 - 14 mmol/L    Glucose 102 (H) 70 - 99 mg/dL    Urea Nitrogen 9 7 - 30 mg/dL    Creatinine 0.74 0.52 - 1.04 mg/dL    GFR Estimate >90 >60 mL/min/[1.73_m2]    GFR Estimate If Black >90 >60 mL/min/[1.73_m2]    Calcium 8.6 8.5 - 10.1 mg/dL    Bilirubin Total 0.6 0.2 - 1.3 mg/dL    Albumin 4.1 3.4 - 5.0 g/dL    Protein Total 7.6 6.8 - 8.8 g/dL    Alkaline Phosphatase 134 40 - 150 U/L    ALT 82 (H) 0 - 50 U/L    AST 35 0 - 45 U/L   ESR: Erythrocyte sedimentation rate   Result Value Ref Range    Sed Rate 7 0 - 20 mm/h   CRP, inflammation   Result Value Ref Range    CRP Inflammation <2.9 0.0 - 8.0 mg/L           Assessment & Plan   (R53.83) Fatigue, unspecified type  (primary encounter diagnosis)  (R06.83) Snores  Plan: Lab work unremarkable thus far for fatigue. Will send for sleep study to r/o sleep apnea.     (M25.50) Arthralgia, unspecified joint  Comment: positive RF recently  Plan: ESR: Erythrocyte sedimentation rate, CRP,         inflammation, Anti Nuclear Alethea IgG by IFA with         Reflex, naproxen (NAPROSYN) 500 MG tablet        Will refer to rheumatology. Will order Naproxen for the pain. She will not taking ibuprofen when taking the Naproxen. F/u with me in 4 weeks for reassessment.     (F41.1) AZ  "(generalized anxiety disorder)  (F33.1) Major depressive disorder, recurrent episode, moderate (H)  (Z63.79) Stress due to illness of family member  (F43.29) Prolonged grief reaction  Comment: stable  Plan: DULoxetine (CYMBALTA) 60 MG capsule        Continue to see therapist and take the Cymbalta. F/u with me after diagnotic testing-approx 4 weeks.     (H53.8) Blurred vision  Comment: recently saw eye MD and needs new prescription, but she has not picked it up  Plan:  new eye glasses script. F/u with eye MD if it does not help.     ((R74.0) Elevated ALT measurement  Comment: unsure cause  Plan: Will order US of liver and add on GGT. Will notify patient of the results when available and intervene accordingly. Will also encouraged to avoid alcohol and tylenol. Recheck 4 weeks.          BMI:   Estimated body mass index is 30 kg/m  as calculated from the following:    Height as of 11/5/19: 1.575 m (5' 2\").    Weight as of 11/5/19: 74.4 kg (164 lb).   Weight management plan: Discussed healthy diet and exercise guidelines      Doreen Galo NP  [facility] - [place]    "

## 2019-11-18 NOTE — NURSING NOTE
"Chief Complaint   Patient presents with     Eye Problem     blurry vision on and off      Memory Loss     has been placing this in odd places . example placing keys inside the fridge        Initial BP 94/68 (BP Location: Left arm, Patient Position: Chair, Cuff Size: Adult Regular)   Pulse 107   Temp 99.2  F (37.3  C) (Tympanic)   Ht 1.575 m (5' 2\")   Wt 77.1 kg (170 lb)   SpO2 97%   BMI 31.09 kg/m   Estimated body mass index is 31.09 kg/m  as calculated from the following:    Height as of this encounter: 1.575 m (5' 2\").    Weight as of this encounter: 77.1 kg (170 lb).  Medication Reconciliation: complete  Bindu Castle LPN  "

## 2019-11-19 LAB — GGT SERPL-CCNC: 200 U/L (ref 0–40)

## 2019-11-19 ASSESSMENT — ANXIETY QUESTIONNAIRES: GAD7 TOTAL SCORE: 16

## 2019-11-20 ENCOUNTER — TELEPHONE (OUTPATIENT)
Dept: FAMILY MEDICINE | Facility: OTHER | Age: 44
End: 2019-11-20

## 2019-11-20 DIAGNOSIS — R30.0 DYSURIA: Primary | ICD-10-CM

## 2019-11-20 DIAGNOSIS — R30.0 DYSURIA: ICD-10-CM

## 2019-11-20 DIAGNOSIS — R82.90 ABNORMAL URINE FINDINGS: Primary | ICD-10-CM

## 2019-11-20 DIAGNOSIS — N39.0 URINARY TRACT INFECTION WITHOUT HEMATURIA, SITE UNSPECIFIED: Primary | ICD-10-CM

## 2019-11-20 LAB
ALBUMIN UR-MCNC: 30 MG/DL
ANA SER QL IF: NEGATIVE
APPEARANCE UR: ABNORMAL
BACTERIA #/AREA URNS HPF: ABNORMAL /HPF
BILIRUB UR QL STRIP: NEGATIVE
COLOR UR AUTO: YELLOW
DEPRECATED CALCIDIOL+CALCIFEROL SERPL-MC: 33 UG/L (ref 20–75)
GLUCOSE UR STRIP-MCNC: NEGATIVE MG/DL
HGB UR QL STRIP: ABNORMAL
KETONES UR STRIP-MCNC: NEGATIVE MG/DL
LEUKOCYTE ESTERASE UR QL STRIP: ABNORMAL
MUCOUS THREADS #/AREA URNS LPF: PRESENT /LPF
NITRATE UR QL: POSITIVE
PH UR STRIP: 5.5 PH (ref 4.7–8)
RBC #/AREA URNS AUTO: 8 /HPF (ref 0–2)
SOURCE: ABNORMAL
SP GR UR STRIP: 1.02 (ref 1–1.03)
SQUAMOUS #/AREA URNS AUTO: 7 /HPF (ref 0–1)
UROBILINOGEN UR STRIP-MCNC: NORMAL MG/DL (ref 0–2)
WBC #/AREA URNS AUTO: >182 /HPF (ref 0–5)
WBC CLUMPS #/AREA URNS HPF: PRESENT /HPF

## 2019-11-20 PROCEDURE — 81001 URINALYSIS AUTO W/SCOPE: CPT | Performed by: NURSE PRACTITIONER

## 2019-11-20 PROCEDURE — 87086 URINE CULTURE/COLONY COUNT: CPT | Performed by: NURSE PRACTITIONER

## 2019-11-20 PROCEDURE — 87186 SC STD MICRODIL/AGAR DIL: CPT | Performed by: NURSE PRACTITIONER

## 2019-11-20 PROCEDURE — 87088 URINE BACTERIA CULTURE: CPT | Performed by: NURSE PRACTITIONER

## 2019-11-20 RX ORDER — NITROFURANTOIN 25; 75 MG/1; MG/1
100 CAPSULE ORAL 2 TIMES DAILY
Qty: 10 CAPSULE | Refills: 0 | Status: SHIPPED | OUTPATIENT
Start: 2019-11-20 | End: 2020-01-29

## 2019-11-20 NOTE — TELEPHONE ENCOUNTER
Called pt and let her know that her GGT was elevated. ALT most likely up due to alcohol intake. She does note that she does not drink often. Will also proceed with abdominal US to check liver. She denies abdominal pain. Does have joint pain and recent RF was elevated. She has been referred to rheumatology.     She also mentions that she has some dysuria. Wondering if she can come in for outpt urine. Ordered. Will notify patient of the results when available and intervene accordingly.

## 2019-11-22 ENCOUNTER — HOSPITAL ENCOUNTER (OUTPATIENT)
Dept: ULTRASOUND IMAGING | Facility: HOSPITAL | Age: 44
Discharge: HOME | End: 2019-11-22
Attending: NURSE PRACTITIONER | Admitting: NURSE PRACTITIONER
Payer: COMMERCIAL

## 2019-11-22 DIAGNOSIS — R74.01 ELEVATED ALT MEASUREMENT: ICD-10-CM

## 2019-11-22 LAB
BACTERIA SPEC CULT: ABNORMAL
SPECIMEN SOURCE: ABNORMAL

## 2019-11-22 PROCEDURE — 76705 ECHO EXAM OF ABDOMEN: CPT | Mod: TC

## 2019-12-06 ENCOUNTER — TELEPHONE (OUTPATIENT)
Dept: FAMILY MEDICINE | Facility: OTHER | Age: 44
End: 2019-12-06

## 2019-12-06 DIAGNOSIS — R35.0 URINARY FREQUENCY: ICD-10-CM

## 2019-12-06 DIAGNOSIS — R35.0 URINARY FREQUENCY: Primary | ICD-10-CM

## 2019-12-06 LAB
ALBUMIN UR-MCNC: NEGATIVE MG/DL
APPEARANCE UR: CLEAR
BACTERIA #/AREA URNS HPF: ABNORMAL /HPF
BILIRUB UR QL STRIP: NEGATIVE
COLOR UR AUTO: ABNORMAL
GLUCOSE UR STRIP-MCNC: NEGATIVE MG/DL
HGB UR QL STRIP: NEGATIVE
KETONES UR STRIP-MCNC: NEGATIVE MG/DL
LEUKOCYTE ESTERASE UR QL STRIP: ABNORMAL
MUCOUS THREADS #/AREA URNS LPF: PRESENT /LPF
NITRATE UR QL: NEGATIVE
PH UR STRIP: 7 PH (ref 4.7–8)
RBC #/AREA URNS AUTO: 0 /HPF (ref 0–2)
SOURCE: ABNORMAL
SP GR UR STRIP: 1.01 (ref 1–1.03)
SQUAMOUS #/AREA URNS AUTO: 7 /HPF (ref 0–1)
UROBILINOGEN UR STRIP-MCNC: NORMAL MG/DL (ref 0–2)
WBC #/AREA URNS AUTO: 5 /HPF (ref 0–5)

## 2019-12-06 PROCEDURE — 87088 URINE BACTERIA CULTURE: CPT | Performed by: NURSE PRACTITIONER

## 2019-12-06 PROCEDURE — 87186 SC STD MICRODIL/AGAR DIL: CPT | Performed by: NURSE PRACTITIONER

## 2019-12-06 PROCEDURE — 87086 URINE CULTURE/COLONY COUNT: CPT | Performed by: NURSE PRACTITIONER

## 2019-12-06 PROCEDURE — 81001 URINALYSIS AUTO W/SCOPE: CPT | Performed by: NURSE PRACTITIONER

## 2019-12-06 NOTE — TELEPHONE ENCOUNTER
Spoke with patient who stated that that her urine is still foul smelling , dark and cloudy. She is concerned that her UTI may not be resolved and is requesting to come in for an outpatient UA.     Ok to enter outpatient UA order ? Was given Macrobid 100 mg bid x 5 days  on 11-20-19

## 2019-12-08 LAB
BACTERIA SPEC CULT: ABNORMAL
SPECIMEN SOURCE: ABNORMAL

## 2019-12-09 DIAGNOSIS — N30.00 ACUTE CYSTITIS WITHOUT HEMATURIA: Primary | ICD-10-CM

## 2019-12-09 RX ORDER — SULFAMETHOXAZOLE/TRIMETHOPRIM 800-160 MG
1 TABLET ORAL 2 TIMES DAILY
Qty: 10 TABLET | Refills: 0 | Status: SHIPPED | OUTPATIENT
Start: 2019-12-09 | End: 2020-01-29

## 2020-01-19 DIAGNOSIS — M25.50 ARTHRALGIA, UNSPECIFIED JOINT: ICD-10-CM

## 2020-01-21 RX ORDER — NAPROXEN 500 MG/1
TABLET ORAL
Qty: 60 TABLET | Refills: 0 | Status: SHIPPED | OUTPATIENT
Start: 2020-01-21 | End: 2020-02-14

## 2020-01-24 NOTE — PROGRESS NOTES
"Subjective     Lenora Gage is a 44 year old female who presents to clinic today for the following health issues:    HPI   Follow up from ADHD testing       Duration: had ADHD testing completed with Issa at [facility],     Description (location/character/radiation): received results, The diagnosises included: major recurrent depressive disorder, AZ, PTSD, and dependent personality disorder.     Intensity:  N/a     Accompanying signs and symptoms:     History (similar episodes/previous evaluation): n/a     Precipitating or alleviating factors: None    Therapies tried and outcome: currently on Cymbalta 60 mg      Do note, she is under a lot of stress and suffers from anxiety and depression. Following with a counselor and notes that it helps. Also taking Cymbalta with some relief. She most recently noted that she had trouble concentrating and focusing on one task. She often had trouble completing projects as she found herself \"bouncing around.\" She was referred for ADHD testing and follows up today.     Did review testing and she was not diagnosed with ADHD. Therapist thought a lot of her symptoms were related to her anxiety. She notes that she is under a lot of stress. One son is in treatment for drugs. Other son has not paid his mortgage and is being evicted. Grandson recently diagnosed with autism. Children not appreciative of everything she does for them. No thoughts of suicide.     Patient Active Problem List   Diagnosis     OCD (obsessive compulsive disorder)     Stress due to illness of family member     Midline low back pain without sciatica     Helicobacter positive gastritis     Gastroesophageal reflux disease without esophagitis     ACP (advance care planning)     Prolonged grief reaction     Major depressive disorder, recurrent episode, moderate (H)     AZ (generalized anxiety disorder)     Acute intractable tension-type headache     Lateral epicondylitis of right elbow     Past Surgical " "History:   Procedure Laterality Date     bladder reconstruction and mesh removal  2012     BLADDER REPAIR  2010     colposcopy with biopsy-mutliple  10/2014     HYSTERECTOMY TOTAL ABDOMINAL, BILATERAL SALPINGO-OOPHORECTOMY, COMBINED Left     Right ovary remains.      TUBAL LIGATION         Social History     Tobacco Use     Smoking status: Never Smoker     Smokeless tobacco: Never Used   Substance Use Topics     Alcohol use: Yes     Alcohol/week: 0.0 standard drinks     Comment: RARELY     Family History   Problem Relation Age of Onset     Other - See Comments Paternal Grandmother         Antitrysin Deficiency     Cancer Mother         Cervical     Cancer - colorectal Maternal Grandfather          Current Outpatient Medications   Medication Sig Dispense Refill     acyclovir (ZOVIRAX) 400 MG tablet Take 1 tablet (400 mg) by mouth 3 times daily For 10 days as needed for HSV breakout. 90 tablet 1     DULoxetine (CYMBALTA) 30 MG capsule Take 1 capsule (30 mg) by mouth daily 90 capsule 1     DULoxetine (CYMBALTA) 60 MG capsule Take 2 capsules (120 mg) by mouth At Bedtime 180 capsule 3     hydrOXYzine (ATARAX) 25 MG tablet Take 2 tablets (50 mg) by mouth At Bedtime 180 tablet 3     naproxen (NAPROSYN) 500 MG tablet TAKE 1 TABLET BY MOUTH TWICE A DAY WITH MEALS 60 tablet 0     omeprazole (PRILOSEC) 40 MG DR capsule Take 1 capsule (40 mg) by mouth daily Take 30-60 minutes before a meal. 90 capsule 3     Allergies   Allergen Reactions     Blood Transfusion Related (Informational Only) Other (See Comments)     Patient has a history of a clinically significant antibody against RBC antigens.  A delay in compatible RBCs may occur.     Penicillins Anaphylaxis and Hives       Reviewed and updated as needed this visit by Provider  Tobacco         Review of Systems   As noted in the HPI.       Objective    /70 (BP Location: Left arm, Patient Position: Chair, Cuff Size: Adult Large)   Pulse 80   Ht 1.575 m (5' 2\")   Wt 77.1 " kg (170 lb)   SpO2 96%   BMI 31.09 kg/m    Body mass index is 31.09 kg/m .  Physical Exam   GENERAL: healthy, alert and no distress  PSYCH: mentation appears normal and tearful    Diagnostic Test Results:  none         Assessment & Plan   (F41.1) AZ (generalized anxiety disorder)  (primary encounter diagnosis)  (F33.1) Major depressive disorder, recurrent episode, moderate (H)  (F43.29) Prolonged grief reaction  (Z63.79) Stress due to illness of family member  Comment: testing was negative for ADHD  Plan: Currently taking 60 mg of Cymbalta daily. Will increase to 90 mg daily. F/u 3 weeks for reassessment, sooner with new or worsening symptoms. Encouraged discussion with trusted relative or friend to monitor for negative mood changes and change in behaviour during medication initiation and titration. Recommended immediate help if increased thoughts of suicide and call if significant side effects occur. Encouraged patient that this type of medication is not effective immediately, and to be consistant with taking the medication.           Doreen Galo NP  [facility] - [facility]

## 2020-01-29 ENCOUNTER — TELEPHONE (OUTPATIENT)
Dept: FAMILY MEDICINE | Facility: OTHER | Age: 45
End: 2020-01-29

## 2020-01-29 ENCOUNTER — OFFICE VISIT (OUTPATIENT)
Dept: FAMILY MEDICINE | Facility: OTHER | Age: 45
End: 2020-01-29
Attending: NURSE PRACTITIONER
Payer: COMMERCIAL

## 2020-01-29 VITALS
WEIGHT: 170 LBS | DIASTOLIC BLOOD PRESSURE: 70 MMHG | HEART RATE: 80 BPM | OXYGEN SATURATION: 96 % | HEIGHT: 62 IN | BODY MASS INDEX: 31.28 KG/M2 | SYSTOLIC BLOOD PRESSURE: 100 MMHG

## 2020-01-29 DIAGNOSIS — F43.29 PROLONGED GRIEF REACTION: ICD-10-CM

## 2020-01-29 DIAGNOSIS — F33.1 MAJOR DEPRESSIVE DISORDER, RECURRENT EPISODE, MODERATE (H): ICD-10-CM

## 2020-01-29 DIAGNOSIS — Z63.79 STRESS DUE TO ILLNESS OF FAMILY MEMBER: ICD-10-CM

## 2020-01-29 DIAGNOSIS — F41.1 GAD (GENERALIZED ANXIETY DISORDER): Primary | ICD-10-CM

## 2020-01-29 PROCEDURE — 99213 OFFICE O/P EST LOW 20 MIN: CPT | Performed by: NURSE PRACTITIONER

## 2020-01-29 RX ORDER — DULOXETIN HYDROCHLORIDE 30 MG/1
30 CAPSULE, DELAYED RELEASE ORAL DAILY
Qty: 90 CAPSULE | Refills: 1 | Status: SHIPPED | OUTPATIENT
Start: 2020-01-29 | End: 2020-02-21 | Stop reason: DRUGHIGH

## 2020-01-29 ASSESSMENT — PATIENT HEALTH QUESTIONNAIRE - PHQ9
SUM OF ALL RESPONSES TO PHQ QUESTIONS 1-9: 19
5. POOR APPETITE OR OVEREATING: NEARLY EVERY DAY

## 2020-01-29 ASSESSMENT — ANXIETY QUESTIONNAIRES
1. FEELING NERVOUS, ANXIOUS, OR ON EDGE: NEARLY EVERY DAY
2. NOT BEING ABLE TO STOP OR CONTROL WORRYING: NEARLY EVERY DAY
7. FEELING AFRAID AS IF SOMETHING AWFUL MIGHT HAPPEN: NEARLY EVERY DAY
IF YOU CHECKED OFF ANY PROBLEMS ON THIS QUESTIONNAIRE, HOW DIFFICULT HAVE THESE PROBLEMS MADE IT FOR YOU TO DO YOUR WORK, TAKE CARE OF THINGS AT HOME, OR GET ALONG WITH OTHER PEOPLE: VERY DIFFICULT
3. WORRYING TOO MUCH ABOUT DIFFERENT THINGS: NEARLY EVERY DAY
6. BECOMING EASILY ANNOYED OR IRRITABLE: NEARLY EVERY DAY
5. BEING SO RESTLESS THAT IT IS HARD TO SIT STILL: MORE THAN HALF THE DAYS
GAD7 TOTAL SCORE: 20

## 2020-01-29 ASSESSMENT — MIFFLIN-ST. JEOR: SCORE: 1369.36

## 2020-01-29 ASSESSMENT — PAIN SCALES - GENERAL: PAINLEVEL: NO PAIN (0)

## 2020-01-29 NOTE — NURSING NOTE
"Chief Complaint   Patient presents with     Follow Up     ADHD        Initial /70 (BP Location: Left arm, Patient Position: Chair, Cuff Size: Adult Large)   Pulse 100   Ht 1.575 m (5' 2\")   Wt 77.1 kg (170 lb)   SpO2 96%   BMI 31.09 kg/m   Estimated body mass index is 31.09 kg/m  as calculated from the following:    Height as of this encounter: 1.575 m (5' 2\").    Weight as of this encounter: 77.1 kg (170 lb).  Medication Reconciliation: complete  Bindu Castle LPN  "

## 2020-01-29 NOTE — LETTER
[facility]  01/29/20    Patient: Lenora Gage  YOB: 1975  Medical Record Number: [id number]  CSN: [id number]                                                                              Non-opioid Controlled Substance Agreement    I understand that my care provider has prescribed a controlled substance to help manage my condition(s). I am taking this medicine to help me function or work. I know this is strong medicine, and that it can cause serious side effects. Controlled substances can be sedating, addicting and may cause a dependency on the drug. They can affect my ability to drive or think, and cause depression. They need to be taken exactly as prescribed. Combining controlled substances with certain medicines or chemicals (such as cocaine, sedatives and tranquilizers, sleeping pills, meth) can be dangerous or even fatal. Also, if I stop controlled substances suddenly, I may have severe withdrawal symptoms.  If not helpful, I may be asked to stop them.    The risks, benefits, and side effects of these medicine(s) were explained to me. I agree that:    1. I will take part in other treatments as advised by my care team. This may be psychiatry or counseling, physical therapy, behavioral therapy, group treatment or a referral to a pain clinic. I will reduce or stop my medicine when my care team tells me to do so.  2. I will take my medicines as prescribed. I will not change the dose or schedule unless my care team tells me to. There will be no refills if I  run out early.   I may be contactedwithout warning and asked to complete a urine drug test or pill count at any time.   3. I will keep all my appointments, and understand this is part of the monitoring of controlled substances. My care team may require an office visit for EVERY controlled substance refill. If I miss appointments or don t follow instructions, my care team may stop my medicine.  4. I will not ask other providers to  prescribe controlled substances, and I will not accept controlled substances from other people. If I need another prescribed controlled substance for a new reason, I will tell my care team within 1 business day.  5. I will use one pharmacy to fill all of my controlled substance prescriptions, and it is up to me to make sure that I do not run out of my medicines on weekends or holidays. If my care team is willing to refill my controlled substance prescription without a visit, I must request refills only during office hours, refills may take up to 3 days to process, and it may take up to 5 to 7 days for my medicine to be mailed and ready at my pharmacy. Prescriptions will not be mailed anywhere except my pharmacy.    6. I am responsible for my prescriptions. If the medicine/prescription is lost or stolen, it will not be replaced. I also agree not to share controlled substance medicines with anyone.              [facility]  01/29/20  Patient:  Lenora Gage  YOB: 1975  Medical Record Number: [id number]  [facility]: [id number]    7. I agree to not use ANY illegal or recreational drugs. This includes marijuana, cocaine, bath salts or other drugs. I agree not to use alcohol unless my care team says I may. I agree to give urine samples whenever asked. If I don t give a urine sample, the care team may stop my medicine.    8. If I enroll in the [place] Medical Marijuana program, I will tell my care team. I will also sign an agreement to share my medical records with my care team.    9. I will bring in my list of medicines (or my medicine bottles) each time I come to the clinic.   10. I will tell my care team right away if I become pregnant or have a new medical problem treated outside of my regular clinic.  11. I understand that this medicine can affect my thinking and judgment. It may be unsafe for me to drive, use machinery and do dangerous tasks. I will not do any of these things until I know how the  medicine affects me. If my dose changes, I will wait to see how it affects me. I will contact my care team if I have concerns about medicine side effects.    I understand that if I do not follow any of the conditions above, my prescriptions or treatment may be stopped.      I agree that my provider, clinic care team, and pharmacy may work with any city, state or federal law enforcement agency that investigates the misuse, sale, or other diversion of my controlled medicine. I will allow my provider to discuss my care with or share a copy of this agreement with any other treating provider, pharmacy or emergency room where I receive care. I agree to give up (waive) any right of privacy or confidentiality with respect to these consents.   I have read this agreement and have asked questions about anything I did not understand.    ____________________________________________________    ____________  ________  Patient signature                                                         Date      Time    ____________________________________________________     ____________  ________  Witness                                                          Date      Time    ____________________________________________________  Provider signature

## 2020-01-29 NOTE — TELEPHONE ENCOUNTER
Mckenzie Valencia from [facility] called with DX for patient. The diagnosis include: major recurrent depressive disorder, AZ, PTSD, and dependent personality disorder.    For questions you can call him back at [phone].    Thank you,    Colleen Rodríguez RN

## 2020-01-30 ASSESSMENT — ANXIETY QUESTIONNAIRES: GAD7 TOTAL SCORE: 20

## 2020-02-05 ENCOUNTER — TELEPHONE (OUTPATIENT)
Dept: FAMILY MEDICINE | Facility: OTHER | Age: 45
End: 2020-02-05

## 2020-02-05 NOTE — TELEPHONE ENCOUNTER
Received voicemail from Ashley Valencia at [facility] who did clarify that patient does indeed meet the criteria for ADHD predominately Inattentive disorder .

## 2020-02-06 DIAGNOSIS — F90.2 ADHD (ATTENTION DEFICIT HYPERACTIVITY DISORDER), COMBINED TYPE: Primary | ICD-10-CM

## 2020-02-06 NOTE — TELEPHONE ENCOUNTER
Spoke with patient informed her of medication and instructions . A follow up appointment has been made for Monday March 2nd.

## 2020-02-07 RX ORDER — DEXTROAMPHETAMINE SACCHARATE, AMPHETAMINE ASPARTATE, DEXTROAMPHETAMINE SULFATE AND AMPHETAMINE SULFATE 1.25; 1.25; 1.25; 1.25 MG/1; MG/1; MG/1; MG/1
5 TABLET ORAL 2 TIMES DAILY
Qty: 60 TABLET | Refills: 0 | Status: SHIPPED | OUTPATIENT
Start: 2020-02-07 | End: 2020-02-10

## 2020-02-10 DIAGNOSIS — F90.2 ADHD (ATTENTION DEFICIT HYPERACTIVITY DISORDER), COMBINED TYPE: ICD-10-CM

## 2020-02-10 RX ORDER — DEXTROAMPHETAMINE SACCHARATE, AMPHETAMINE ASPARTATE, DEXTROAMPHETAMINE SULFATE AND AMPHETAMINE SULFATE 1.25; 1.25; 1.25; 1.25 MG/1; MG/1; MG/1; MG/1
5 TABLET ORAL 2 TIMES DAILY
Qty: 60 TABLET | Refills: 0 | Status: SHIPPED | OUTPATIENT
Start: 2020-02-10 | End: 2020-03-02 | Stop reason: DRUGHIGH

## 2020-02-11 ENCOUNTER — TELEPHONE (OUTPATIENT)
Dept: FAMILY MEDICINE | Facility: OTHER | Age: 45
End: 2020-02-11

## 2020-02-11 NOTE — TELEPHONE ENCOUNTER
APPROVAL - PA request received from [facility] for Adderall 5MG Tablet. Submitted request through [facility] and received an immediate approval from [id number]. Approval dates 2/11/20 through 2/10/23. Pharmacy advised. Documentation scanned to Epic.

## 2020-02-13 ENCOUNTER — TRANSFERRED RECORDS (OUTPATIENT)
Dept: HEALTH INFORMATION MANAGEMENT | Facility: CLINIC | Age: 45
End: 2020-02-13

## 2020-02-20 ENCOUNTER — TELEPHONE (OUTPATIENT)
Dept: FAMILY MEDICINE | Facility: OTHER | Age: 45
End: 2020-02-20

## 2020-02-20 NOTE — TELEPHONE ENCOUNTER
Pharmacy calling wanting to verify the dose of the cymbalta. Patient has this medication listed 2 times with different doses and it is coming up on their end stating it is too high of a dose. Please return call to the pharmacy. Ashley A. Lechevalier, LPN on 2/20/2020 at 3:42 PM

## 2020-02-20 NOTE — TELEPHONE ENCOUNTER
Spoke with patient and due to her bottle stating to take 60 mg  #2 caps at bedtime in addition to her 30 mg . Patient was spose to be taking 90 mg daily but instead has been taking Cymbalta 150 mg daily .    Please advise

## 2020-02-21 PROBLEM — M79.10 MYALGIA: Status: ACTIVE | Noted: 2020-02-13

## 2020-02-21 PROBLEM — R76.8 RHEUMATOID FACTOR POSITIVE: Status: ACTIVE | Noted: 2020-02-13

## 2020-02-21 NOTE — TELEPHONE ENCOUNTER
Called and spoke with patient . She did clarify that she has been taking 150 mg . Informed patient to stop the 30 mg and continue with the # 2 60 mg tablets for 120 mg daily . An appointment has been made for nest week to do over results

## 2020-02-26 NOTE — PROGRESS NOTES
Subjective     Lenora Gage is a 44 year old female who presents to clinic today for the following health issues:    HPI   Elevated LFTs:    Patient recently saw rheumatology on 2/13/20 for a positive rheumatoid factor and muscle aches. Her ALT was high at 54. Her ALT was 82 in 11/2019 and her GGT was 200. She was encouraged to refrain from any alcohol use. When reviewing her chart, it appears her ALT has been slightly elevated for quite some time. It was 77 in 7/2018. Her AST was 54. ALT was 68 in 6/2017. ALT was elevated in 1/2016. An US of her liver was done in 11/2019. This was normal without fatty liver disease. Her kidney function is normal. Recent hepatitis testing was negative. Recent smooth muscle antibody testing was negative. Recent alpha 1 antitrypsin testing normal. Recent CRP normal.     She tells me that she rarely drinks alcohol and when she does, she does not have more than 2 drinks in one setting. No fevers. No nausea or vomiting. No yellowing or her eyes. No dark urine.  She does note that she often gets right upper quadrant pain that radiates to her back. Unsure when triggers this. Occurs at least once daily. Does not have current pain. This pain has occurred intermittently for years. No unintentional weight loss. Unsure if food makes her symptoms worse.     She was also seen for mental health on 1/29/20. She had recently had ADHD testing and was told that she had ADHD. I then called the facility and reviewed paperwork from them. They did tell me that the testing was negative for ADHD. Ashley Valencia then called back on 2/5/20 and noted that she did have ADHD-predominately inattentive type. She was then started on Adderall 5 mg twice daily. Today she notes that she does not notice much of a difference. Still has trouble focusing. Difficult staying on task. Drinking less caffeine. No chest pain or shortness of breath from the medication. She does take 120 mg of Cymbalta for anxiety. Unsure if it  is helping. Wanting to stop taking this. Feels her stress levels have improved. Son out of drug treatment.  Helping more with his children.     Next, she would like an FSH checked. She has had a hysterectomy, but notes that she recently started having hot flashes. She still has her ovaries. Wondering if she is going through menopause.     Lastly, she notes that she has had a cough for 2.5 weeks. She notes that she has been coughing up thick green mucus. No fevers. Some nasal congestion. No sore throat. No ear pain. Some wheezes. She has tried taking Dayquil and Nyquil with little relief. She does not smoke. No known asthma or COPD.        Patient Active Problem List   Diagnosis     OCD (obsessive compulsive disorder)     Stress due to illness of family member     Midline low back pain without sciatica     Helicobacter positive gastritis     Gastroesophageal reflux disease without esophagitis     ACP (advance care planning)     Prolonged grief reaction     Major depressive disorder, recurrent episode, moderate (H)     AZ (generalized anxiety disorder)     Acute intractable tension-type headache     Lateral epicondylitis of right elbow     Myalgia     Rheumatoid factor positive     Past Surgical History:   Procedure Laterality Date     bladder reconstruction and mesh removal  2012     BLADDER REPAIR  2010     colposcopy with biopsy-mutliple  10/2014     HYSTERECTOMY TOTAL ABDOMINAL, BILATERAL SALPINGO-OOPHORECTOMY, COMBINED Left     Right ovary remains.      TUBAL LIGATION         Social History     Tobacco Use     Smoking status: Never Smoker     Smokeless tobacco: Never Used   Substance Use Topics     Alcohol use: Yes     Alcohol/week: 0.0 standard drinks     Comment: RARELY     Family History   Problem Relation Age of Onset     Other - See Comments Paternal Grandmother         Antitrysin Deficiency     Cancer Mother         Cervical     Cancer - colorectal Maternal Grandfather          Current Outpatient  "Medications   Medication Sig Dispense Refill     acyclovir (ZOVIRAX) 400 MG tablet Take 1 tablet (400 mg) by mouth 3 times daily For 10 days as needed for HSV breakout. 90 tablet 1     amphetamine-dextroamphetamine (ADDERALL) 10 MG tablet Take 1 tablet (10 mg) by mouth 2 times daily 60 tablet 0     azithromycin (ZITHROMAX) 250 MG tablet Take 2 tablets (500 mg) by mouth daily for 1 day, THEN 1 tablet (250 mg) daily for 4 days. 6 tablet 0     DULoxetine (CYMBALTA) 60 MG capsule Take 2 capsules (120 mg) by mouth At Bedtime 180 capsule 3     hydrOXYzine (ATARAX) 25 MG tablet Take 2 tablets (50 mg) by mouth At Bedtime 180 tablet 3     naproxen (NAPROSYN) 500 MG tablet TAKE 1 TABLET BY MOUTH TWICE A DAY WITH MEALS 60 tablet 1     omeprazole (PRILOSEC) 40 MG DR capsule Take 1 capsule (40 mg) by mouth daily Take 30-60 minutes before a meal. 90 capsule 3     Allergies   Allergen Reactions     Blood Transfusion Related (Informational Only) Other (See Comments)     Patient has a history of a clinically significant antibody against RBC antigens.  A delay in compatible RBCs may occur.     Penicillins Anaphylaxis and Hives       Reviewed and updated as needed this visit by Provider  Tobacco         Review of Systems   As noted in the HPI.       Objective    /62 (BP Location: Left arm, Patient Position: Chair, Cuff Size: Adult Large)   Pulse 98   Temp 97.3  F (36.3  C) (Tympanic)   Ht 1.575 m (5' 2\")   Wt 77.1 kg (170 lb)   SpO2 98%   BMI 31.09 kg/m    Body mass index is 31.09 kg/m .  Physical Exam   GENERAL: alert, no distress and over weight  EYES: Eyes grossly normal to inspection, PERRL and conjunctivae and sclerae normal  HENT: ear canals and TM's normal, nose and mouth without ulcers or lesions  NECK: no adenopathy, no asymmetry, masses, or scars and thyroid normal to palpation  RESP: lungs clear to auscultation - no rales, rhonchi or wheezes  CV: regular rate and rhythm, normal S1 S2, no S3 or S4, no murmur, " click or rub, no peripheral edema and peripheral pulses strong  ABDOMEN: soft, nontender, no hepatosplenomegaly, no masses and bowel sounds normal  PSYCH: mentation appears normal, affect normal/bright    Diagnostic Test Results:  Labs reviewed in Epic  Results for orders placed or performed in visit on 03/02/20 (from the past 24 hour(s))   Basic metabolic panel   Result Value Ref Range    Sodium 138 133 - 144 mmol/L    Potassium 3.9 3.4 - 5.3 mmol/L    Chloride 106 94 - 109 mmol/L    Carbon Dioxide 27 20 - 32 mmol/L    Anion Gap 5 3 - 14 mmol/L    Glucose 111 (H) 70 - 99 mg/dL    Urea Nitrogen 14 7 - 30 mg/dL    Creatinine 0.71 0.52 - 1.04 mg/dL    GFR Estimate >90 >60 mL/min/[1.73_m2]    GFR Estimate If Black >90 >60 mL/min/[1.73_m2]    Calcium 8.7 8.5 - 10.1 mg/dL           Assessment & Plan   (R74.0) Elevated ALT measurement  (R10.11) Right upper quadrant pain  Plan: GASTROENTEROLOGY ADULT REF CONSULT ONLY        Unsure cause of elevated ALT. It is only mildly elevated. US of area normal-no fatty liver disease. Hepatitis testing negative. She tells me that she rarely drinks alcohol, although GGT was elevated. She does intermittently have right upper quadrant pain. She does have acyclovir ordered, but rarely takes this. She does have some intermittent right upper quadrant pain. Will do Hida-scan to be sure she does not have any gallstones. Will then refer to GI for further work up. Would like her to meet with Chauncey Call at [facility].     (F90.2) ADHD (attention deficit hyperactivity disorder), combined type  Comment: still poorly controlled  Plan: Will increase her from 5 mg twice daily to amphetamine-dextroamphetamine (ADDERALL) 10 MG tablet twice daily. She is aware of the side effects. She also takes Cymbalta, but we are currently cutting the dose from 120 mg daily to 60 mg daily. While I doubt that she would have a serotonin issue, she was educated about serotonin syndrome and what to monitor for. Reassess in  3 weeks.     (N95.1) Menopausal syndrome (hot flashes)  Comment: having more hot flashes, wants to know if she is post menopausal  Plan: Follicle stimulating hormone        FSH ordered. Will notify patient of the results when available and intervene accordingly.     (J06.9) Acute URI  Plan: azithromycin (ZITHROMAX) 250 MG tablet        Cough times 2.5 weeks. Lungs clear. Given length of time with her cough, will treat with azithromycin. She will hold the hydroxyzine while taking the azithromycin due to potential interaction.     (F41.1) AZ (generalized anxiety disorder)  (F33.1) Major depressive disorder, recurrent episode, moderate (H)  Plan: Better controlled. Would like to titrate off the Cymbalta. Will cut her dose from 120 gm daily to 60 mg daily. Follow up 3 weeks for reassessment, sooner with new or worsening symptoms.         Return in about 3 weeks (around 3/23/2020).    Doreen Galo NP  [facility] - [facility]

## 2020-02-27 ENCOUNTER — TRANSFERRED RECORDS (OUTPATIENT)
Dept: HEALTH INFORMATION MANAGEMENT | Facility: CLINIC | Age: 45
End: 2020-02-27

## 2020-03-02 ENCOUNTER — OFFICE VISIT (OUTPATIENT)
Dept: FAMILY MEDICINE | Facility: OTHER | Age: 45
End: 2020-03-02
Attending: NURSE PRACTITIONER
Payer: COMMERCIAL

## 2020-03-02 ENCOUNTER — TELEPHONE (OUTPATIENT)
Dept: FAMILY MEDICINE | Facility: OTHER | Age: 45
End: 2020-03-02

## 2020-03-02 VITALS
TEMPERATURE: 97.3 F | SYSTOLIC BLOOD PRESSURE: 100 MMHG | OXYGEN SATURATION: 98 % | HEART RATE: 98 BPM | WEIGHT: 170 LBS | DIASTOLIC BLOOD PRESSURE: 62 MMHG | BODY MASS INDEX: 31.28 KG/M2 | HEIGHT: 62 IN

## 2020-03-02 DIAGNOSIS — F41.1 GAD (GENERALIZED ANXIETY DISORDER): ICD-10-CM

## 2020-03-02 DIAGNOSIS — F90.2 ADHD (ATTENTION DEFICIT HYPERACTIVITY DISORDER), COMBINED TYPE: ICD-10-CM

## 2020-03-02 DIAGNOSIS — J06.9 ACUTE URI: ICD-10-CM

## 2020-03-02 DIAGNOSIS — R10.11 RIGHT UPPER QUADRANT PAIN: ICD-10-CM

## 2020-03-02 DIAGNOSIS — N95.1 MENOPAUSAL SYNDROME (HOT FLASHES): ICD-10-CM

## 2020-03-02 DIAGNOSIS — F33.1 MAJOR DEPRESSIVE DISORDER, RECURRENT EPISODE, MODERATE (H): ICD-10-CM

## 2020-03-02 DIAGNOSIS — R74.01 ELEVATED ALT MEASUREMENT: Primary | ICD-10-CM

## 2020-03-02 LAB
ANION GAP SERPL CALCULATED.3IONS-SCNC: 5 MMOL/L (ref 3–14)
BUN SERPL-MCNC: 14 MG/DL (ref 7–30)
CALCIUM SERPL-MCNC: 8.7 MG/DL (ref 8.5–10.1)
CHLORIDE SERPL-SCNC: 106 MMOL/L (ref 94–109)
CO2 SERPL-SCNC: 27 MMOL/L (ref 20–32)
CREAT SERPL-MCNC: 0.71 MG/DL (ref 0.52–1.04)
FSH SERPL-ACNC: 27.7 IU/L
GFR SERPL CREATININE-BSD FRML MDRD: >90 ML/MIN/{1.73_M2}
GLUCOSE SERPL-MCNC: 111 MG/DL (ref 70–99)
POTASSIUM SERPL-SCNC: 3.9 MMOL/L (ref 3.4–5.3)
SODIUM SERPL-SCNC: 138 MMOL/L (ref 133–144)

## 2020-03-02 PROCEDURE — 83001 ASSAY OF GONADOTROPIN (FSH): CPT | Performed by: NURSE PRACTITIONER

## 2020-03-02 PROCEDURE — 36415 COLL VENOUS BLD VENIPUNCTURE: CPT | Performed by: NURSE PRACTITIONER

## 2020-03-02 PROCEDURE — 99214 OFFICE O/P EST MOD 30 MIN: CPT | Performed by: NURSE PRACTITIONER

## 2020-03-02 PROCEDURE — 80048 BASIC METABOLIC PNL TOTAL CA: CPT | Performed by: NURSE PRACTITIONER

## 2020-03-02 RX ORDER — AZITHROMYCIN 250 MG/1
TABLET, FILM COATED ORAL
Qty: 6 TABLET | Refills: 0 | Status: SHIPPED | OUTPATIENT
Start: 2020-03-02 | End: 2020-03-23

## 2020-03-02 RX ORDER — DEXTROAMPHETAMINE SACCHARATE, AMPHETAMINE ASPARTATE, DEXTROAMPHETAMINE SULFATE AND AMPHETAMINE SULFATE 2.5; 2.5; 2.5; 2.5 MG/1; MG/1; MG/1; MG/1
10 TABLET ORAL 2 TIMES DAILY
Qty: 60 TABLET | Refills: 0 | Status: SHIPPED | OUTPATIENT
Start: 2020-03-02 | End: 2020-03-23

## 2020-03-02 ASSESSMENT — MIFFLIN-ST. JEOR: SCORE: 1369.36

## 2020-03-02 ASSESSMENT — ANXIETY QUESTIONNAIRES
3. WORRYING TOO MUCH ABOUT DIFFERENT THINGS: MORE THAN HALF THE DAYS
5. BEING SO RESTLESS THAT IT IS HARD TO SIT STILL: SEVERAL DAYS
6. BECOMING EASILY ANNOYED OR IRRITABLE: SEVERAL DAYS
1. FEELING NERVOUS, ANXIOUS, OR ON EDGE: SEVERAL DAYS
7. FEELING AFRAID AS IF SOMETHING AWFUL MIGHT HAPPEN: SEVERAL DAYS
IF YOU CHECKED OFF ANY PROBLEMS ON THIS QUESTIONNAIRE, HOW DIFFICULT HAVE THESE PROBLEMS MADE IT FOR YOU TO DO YOUR WORK, TAKE CARE OF THINGS AT HOME, OR GET ALONG WITH OTHER PEOPLE: SOMEWHAT DIFFICULT
2. NOT BEING ABLE TO STOP OR CONTROL WORRYING: MORE THAN HALF THE DAYS
GAD7 TOTAL SCORE: 10

## 2020-03-02 ASSESSMENT — PAIN SCALES - GENERAL: PAINLEVEL: NO PAIN (0)

## 2020-03-02 NOTE — NURSING NOTE
"Chief Complaint   Patient presents with     A.D.H.D       Initial /62 (BP Location: Left arm, Patient Position: Chair, Cuff Size: Adult Large)   Pulse 109   Temp 97.3  F (36.3  C) (Tympanic)   Ht 1.575 m (5' 2\")   Wt 77.1 kg (170 lb)   SpO2 98%   BMI 31.09 kg/m   Estimated body mass index is 31.09 kg/m  as calculated from the following:    Height as of this encounter: 1.575 m (5' 2\").    Weight as of this encounter: 77.1 kg (170 lb).  Medication Reconciliation: complete  Bindu Castle LPN  "

## 2020-03-02 NOTE — TELEPHONE ENCOUNTER
[facility] called stating when trying to fill medications there drug interactions that are alerting. Interactions are occurring between Hydoxyzine and Azithromycin. Also an interaction between Adderall and Duloxetine. Please advise.    [facility] pharmacy can be reached at the following number:  [phone]

## 2020-03-03 ASSESSMENT — ANXIETY QUESTIONNAIRES: GAD7 TOTAL SCORE: 10

## 2020-03-06 ENCOUNTER — TELEPHONE (OUTPATIENT)
Dept: FAMILY MEDICINE | Facility: OTHER | Age: 45
End: 2020-03-06

## 2020-03-06 NOTE — TELEPHONE ENCOUNTER
Tara paul from [facility] at [phone]. Needs clarification on Cymbalta dose. She has an order from 1.29.19 to add 30mg.  Please see note. The last filled Cymbalta was for 120mg on 11.18.19. Please clarify dose.      Thank you,    Colleen Rodríguez RN

## 2020-03-09 ENCOUNTER — TELEPHONE (OUTPATIENT)
Dept: NUCLEAR MEDICINE | Facility: HOSPITAL | Age: 45
End: 2020-03-09

## 2020-03-09 NOTE — TELEPHONE ENCOUNTER
A reminder call was performed on 3/9/20 for the patients nuclear medicine HIDA scan on 3/10/20. Patient given the time, date, location and prep for the exam. Patient understood.

## 2020-03-10 ENCOUNTER — HOSPITAL ENCOUNTER (OUTPATIENT)
Dept: NUCLEAR MEDICINE | Facility: HOSPITAL | Age: 45
End: 2020-03-10
Attending: NURSE PRACTITIONER
Payer: COMMERCIAL

## 2020-03-10 DIAGNOSIS — R10.11 RIGHT UPPER QUADRANT PAIN: ICD-10-CM

## 2020-03-10 PROCEDURE — 78227 HEPATOBIL SYST IMAGE W/DRUG: CPT | Mod: TC

## 2020-03-10 PROCEDURE — A9537 TC99M MEBROFENIN: HCPCS | Performed by: RADIOLOGY

## 2020-03-10 PROCEDURE — 34300033 ZZH RX 343: Performed by: RADIOLOGY

## 2020-03-10 RX ORDER — KIT FOR THE PREPARATION OF TECHNETIUM TC 99M MEBROFENIN 45 MG/10ML
5.07 INJECTION, POWDER, LYOPHILIZED, FOR SOLUTION INTRAVENOUS ONCE
Status: COMPLETED | OUTPATIENT
Start: 2020-03-10 | End: 2020-03-10

## 2020-03-10 RX ADMIN — MEBROFENIN 5.07 MILLICURIE: 45 INJECTION, POWDER, LYOPHILIZED, FOR SOLUTION INTRAVENOUS at 12:05

## 2020-03-18 NOTE — PROGRESS NOTES
"Lenora Gage is a 45 year old female who is being evaluated via a billable telephone visit.      The patient has been notified of following:     \"This telephone visit will be conducted via a call between you and your physician/provider. We have found that certain health care needs can be provided without the need for a physical exam.  This service lets us provide the care you need with a short phone conversation.  If a prescription is necessary we can send it directly to your pharmacy.  If lab work is needed we can place an order for that and you can then stop by our lab to have the test done at a later time.    If during the course of the call the physician/provider feels a telephone visit is not appropriate, you will not be charged for this service.\"       Lenora Gage complains of  No chief complaint on file.      I have reviewed and updated the patient's Past Medical History, Social History, Family History and Medication List.    ALLERGIES  Blood transfusion related (informational only) and Penicillins    Bindu Castle ([place] signature)    ADHD:  Patient was diagnosed with ADHD and started on Adderall 5 mg twice daily on 2/5/20. She then followed up on 3/2/20 and her symptoms were better, but she was still getting distracted. Her Adderall was then increased to 10 mg twice daily. Today she notes that she is doing better. Able to get more done. Less distracted. Able to stay on track and focus. Denies any side effects from the medication including chest pain, shortness of breath, dizziness, palpitations, or syncope. No insomnia. Slightly more anxiety. Worried about the Covid19. At our last visit, her stress had also improved and she wanted to wean off of her Cymbalta. She has currently weaned to 30 mg daily, but notes that her anxiety has increased. Depression controlled. No thoughts of suicide.     Assessment/Plan:  (F90.2) ADHD (attention deficit hyperactivity disorder), combined type  (primary encounter " diagnosis)  Plan: Much better controlled with Adderall 10 mg twice daily. Will continue. Recheck 4 weeks.     (F33.1) Major depressive disorder, recurrent episode, moderate (H)  (F41.1) AZ (generalized anxiety disorder)  (Z63.79) Stress due to illness of family member  (F43.29) Prolonged grief reaction  Plan: DULoxetine (CYMBALTA) 60 MG capsule        She has weaned to 30 mg daily. Anxiety slightly worse. Will increase to 60 mg daily and recheck in 4 weeks with telephone call. Encouraged discussion with trusted relative or friend to monitor for negative mood changes and change in behaviour during medication initiation and titration. Recommended immediate help if increased thoughts of suicide and call if significant side effects occur. Encouraged patient that this type of medication is not effective immediately, and to be consistant with taking the medication.      I have reviewed the note as documented above.  This accurately captures the substance of my conversation with the patient.    Phone call contact time  Call Started at 8:24  Call Ended at 8:32    Doreen Galo NP

## 2020-03-23 ENCOUNTER — VIRTUAL VISIT (OUTPATIENT)
Dept: FAMILY MEDICINE | Facility: OTHER | Age: 45
End: 2020-03-23
Attending: NURSE PRACTITIONER
Payer: COMMERCIAL

## 2020-03-23 DIAGNOSIS — F43.29 PROLONGED GRIEF REACTION: ICD-10-CM

## 2020-03-23 DIAGNOSIS — F33.1 MAJOR DEPRESSIVE DISORDER, RECURRENT EPISODE, MODERATE (H): ICD-10-CM

## 2020-03-23 DIAGNOSIS — Z63.79 STRESS DUE TO ILLNESS OF FAMILY MEMBER: ICD-10-CM

## 2020-03-23 DIAGNOSIS — F41.1 GAD (GENERALIZED ANXIETY DISORDER): ICD-10-CM

## 2020-03-23 DIAGNOSIS — F90.2 ADHD (ATTENTION DEFICIT HYPERACTIVITY DISORDER), COMBINED TYPE: Primary | ICD-10-CM

## 2020-03-23 PROCEDURE — 99213 OFFICE O/P EST LOW 20 MIN: CPT | Mod: 95 | Performed by: NURSE PRACTITIONER

## 2020-03-23 RX ORDER — DULOXETIN HYDROCHLORIDE 60 MG/1
60 CAPSULE, DELAYED RELEASE ORAL AT BEDTIME
Qty: 90 CAPSULE | Refills: 3 | Status: ON HOLD | OUTPATIENT
Start: 2020-03-23 | End: 2020-09-03

## 2020-03-23 RX ORDER — DEXTROAMPHETAMINE SACCHARATE, AMPHETAMINE ASPARTATE, DEXTROAMPHETAMINE SULFATE AND AMPHETAMINE SULFATE 2.5; 2.5; 2.5; 2.5 MG/1; MG/1; MG/1; MG/1
10 TABLET ORAL 2 TIMES DAILY
Qty: 60 TABLET | Refills: 0 | Status: SHIPPED | OUTPATIENT
Start: 2020-03-23 | End: 2020-04-27

## 2020-03-23 ASSESSMENT — PAIN SCALES - GENERAL: PAINLEVEL: MODERATE PAIN (4)

## 2020-03-23 NOTE — PATIENT INSTRUCTIONS

## 2020-04-11 ENCOUNTER — APPOINTMENT (OUTPATIENT)
Dept: CT IMAGING | Facility: HOSPITAL | Age: 45
End: 2020-04-11
Attending: PHYSICIAN ASSISTANT
Payer: COMMERCIAL

## 2020-04-11 ENCOUNTER — HOSPITAL ENCOUNTER (EMERGENCY)
Facility: HOSPITAL | Age: 45
Discharge: HOME | End: 2020-04-11
Attending: PHYSICIAN ASSISTANT | Admitting: PHYSICIAN ASSISTANT
Payer: COMMERCIAL

## 2020-04-11 VITALS
SYSTOLIC BLOOD PRESSURE: 100 MMHG | TEMPERATURE: 97.6 F | OXYGEN SATURATION: 98 % | DIASTOLIC BLOOD PRESSURE: 71 MMHG | HEART RATE: 90 BPM | RESPIRATION RATE: 16 BRPM

## 2020-04-11 DIAGNOSIS — R51.9 ACUTE NONINTRACTABLE HEADACHE, UNSPECIFIED HEADACHE TYPE: Primary | ICD-10-CM

## 2020-04-11 LAB
ALBUMIN SERPL-MCNC: 3.7 G/DL (ref 3.4–5)
ALP SERPL-CCNC: 101 U/L (ref 40–150)
ALT SERPL W P-5'-P-CCNC: 42 U/L (ref 0–50)
ANION GAP SERPL CALCULATED.3IONS-SCNC: 5 MMOL/L (ref 3–14)
AST SERPL W P-5'-P-CCNC: 35 U/L (ref 0–45)
BASOPHILS # BLD AUTO: 0 10E9/L (ref 0–0.2)
BASOPHILS NFR BLD AUTO: 0.3 %
BILIRUB SERPL-MCNC: 0.6 MG/DL (ref 0.2–1.3)
BUN SERPL-MCNC: 7 MG/DL (ref 7–30)
CALCIUM SERPL-MCNC: 8.4 MG/DL (ref 8.5–10.1)
CHLORIDE SERPL-SCNC: 106 MMOL/L (ref 94–109)
CO2 SERPL-SCNC: 27 MMOL/L (ref 20–32)
CREAT SERPL-MCNC: 0.77 MG/DL (ref 0.52–1.04)
D DIMER PPP FEU-MCNC: 0.5 UG/ML FEU (ref 0–0.5)
DIFFERENTIAL METHOD BLD: NORMAL
EOSINOPHIL # BLD AUTO: 0.1 10E9/L (ref 0–0.7)
EOSINOPHIL NFR BLD AUTO: 1.1 %
ERYTHROCYTE [DISTWIDTH] IN BLOOD BY AUTOMATED COUNT: 12.3 % (ref 10–15)
GFR SERPL CREATININE-BSD FRML MDRD: >90 ML/MIN/{1.73_M2}
GLUCOSE SERPL-MCNC: 95 MG/DL (ref 70–99)
HCT VFR BLD AUTO: 40 % (ref 35–47)
HGB BLD-MCNC: 13.8 G/DL (ref 11.7–15.7)
IMM GRANULOCYTES # BLD: 0 10E9/L (ref 0–0.4)
IMM GRANULOCYTES NFR BLD: 0.2 %
LYMPHOCYTES # BLD AUTO: 1 10E9/L (ref 0.8–5.3)
LYMPHOCYTES NFR BLD AUTO: 16.6 %
MCH RBC QN AUTO: 29.2 PG (ref 26.5–33)
MCHC RBC AUTO-ENTMCNC: 34.5 G/DL (ref 31.5–36.5)
MCV RBC AUTO: 85 FL (ref 78–100)
MONOCYTES # BLD AUTO: 0.4 10E9/L (ref 0–1.3)
MONOCYTES NFR BLD AUTO: 6.8 %
NEUTROPHILS # BLD AUTO: 4.6 10E9/L (ref 1.6–8.3)
NEUTROPHILS NFR BLD AUTO: 75 %
NRBC # BLD AUTO: 0 10*3/UL
NRBC BLD AUTO-RTO: 0 /100
PLATELET # BLD AUTO: 242 10E9/L (ref 150–450)
POTASSIUM SERPL-SCNC: 3.4 MMOL/L (ref 3.4–5.3)
PROT SERPL-MCNC: 7.2 G/DL (ref 6.8–8.8)
RBC # BLD AUTO: 4.73 10E12/L (ref 3.8–5.2)
SODIUM SERPL-SCNC: 138 MMOL/L (ref 133–144)
WBC # BLD AUTO: 6.2 10E9/L (ref 4–11)

## 2020-04-11 PROCEDURE — 96374 THER/PROPH/DIAG INJ IV PUSH: CPT

## 2020-04-11 PROCEDURE — 25000128 H RX IP 250 OP 636: Performed by: PHYSICIAN ASSISTANT

## 2020-04-11 PROCEDURE — 99285 EMERGENCY DEPT VISIT HI MDM: CPT | Mod: 25

## 2020-04-11 PROCEDURE — 25800030 ZZH RX IP 258 OP 636: Performed by: PHYSICIAN ASSISTANT

## 2020-04-11 PROCEDURE — 99285 EMERGENCY DEPT VISIT HI MDM: CPT | Mod: Z6 | Performed by: PHYSICIAN ASSISTANT

## 2020-04-11 PROCEDURE — 96375 TX/PRO/DX INJ NEW DRUG ADDON: CPT

## 2020-04-11 PROCEDURE — 85379 FIBRIN DEGRADATION QUANT: CPT | Performed by: PHYSICIAN ASSISTANT

## 2020-04-11 PROCEDURE — 96361 HYDRATE IV INFUSION ADD-ON: CPT

## 2020-04-11 PROCEDURE — 70450 CT HEAD/BRAIN W/O DYE: CPT | Mod: TC

## 2020-04-11 PROCEDURE — 36415 COLL VENOUS BLD VENIPUNCTURE: CPT | Performed by: PHYSICIAN ASSISTANT

## 2020-04-11 PROCEDURE — 80053 COMPREHEN METABOLIC PANEL: CPT | Performed by: PHYSICIAN ASSISTANT

## 2020-04-11 PROCEDURE — 85025 COMPLETE CBC W/AUTO DIFF WBC: CPT | Performed by: PHYSICIAN ASSISTANT

## 2020-04-11 RX ORDER — DIPHENHYDRAMINE HYDROCHLORIDE 50 MG/ML
25 INJECTION INTRAMUSCULAR; INTRAVENOUS ONCE
Status: COMPLETED | OUTPATIENT
Start: 2020-04-11 | End: 2020-04-11

## 2020-04-11 RX ORDER — KETOROLAC TROMETHAMINE 30 MG/ML
30 INJECTION, SOLUTION INTRAMUSCULAR; INTRAVENOUS ONCE
Status: COMPLETED | OUTPATIENT
Start: 2020-04-11 | End: 2020-04-11

## 2020-04-11 RX ADMIN — PROCHLORPERAZINE EDISYLATE 10 MG: 5 INJECTION INTRAMUSCULAR; INTRAVENOUS at 16:33

## 2020-04-11 RX ADMIN — KETOROLAC TROMETHAMINE 30 MG: 30 INJECTION, SOLUTION INTRAMUSCULAR at 16:36

## 2020-04-11 RX ADMIN — SODIUM CHLORIDE 1000 ML: 9 INJECTION, SOLUTION INTRAVENOUS at 16:35

## 2020-04-11 RX ADMIN — DIPHENHYDRAMINE HYDROCHLORIDE 25 MG: 50 INJECTION, SOLUTION INTRAMUSCULAR; INTRAVENOUS at 16:31

## 2020-04-11 ASSESSMENT — ENCOUNTER SYMPTOMS
BACK PAIN: 0
SEIZURES: 0
NUMBNESS: 0
HEADACHES: 1
NECK STIFFNESS: 0
ABDOMINAL PAIN: 0
FATIGUE: 0
SHORTNESS OF BREATH: 0
WEAKNESS: 0
NAUSEA: 0
PHOTOPHOBIA: 0
FEVER: 0
VOMITING: 0
APPETITE CHANGE: 1
ACTIVITY CHANGE: 0
BRUISES/BLEEDS EASILY: 0
DIZZINESS: 0
LIGHT-HEADEDNESS: 0
NECK PAIN: 0
CHEST TIGHTNESS: 0

## 2020-04-11 NOTE — ED NOTES
States feels much better and headache is gone. Denies any pain. Discharge instructions reviewed. Verbalized understanding. Ambulated out of ED independently to awaiting private vehicle driven by .

## 2020-04-11 NOTE — ED NOTES
"Pt wheeled to room 7. Here with c/o worse headache she has ever had. This is day 7 of headache. Denies having headaches or migraine HX. Has been using tylenol and Naproxen w/o relief.   Pain is pressure and only at left side of face and ear area. interferes with sleep.   Pt states she has been falling for 3 months also.states she does not trip, or get dizzy-\"I just end up on the floor\". Denies any injury to head or LOC with falls. Denies vomiting or dizziness but intermittently nauseous.   Sadaf LOTT updated.   "

## 2020-04-11 NOTE — ED PROVIDER NOTES
History     Chief Complaint   Patient presents with     Headache     nausea no vomiting. Present on the right side of her head. States she has been falling alot over the past 3 months.     The history is provided by the patient.     Lenora Gage is a 45 year old female who presented to the emergency department ambulatory with steady gait for evaluation of a one-week history of headache.  Denies any significant headache history.  Has been taking NSAIDs at home without relief.  She has a history of fibromyalgia.  Tells me she is been having abnormal falling episodes for the last 3 months.  Denies any significant weakness.  Denies any focal weakness.  Denies any difficulty speaking.  Denies any visual concerns.  Does have some left ear pain as well.    Allergies:  Allergies   Allergen Reactions     Blood Transfusion Related (Informational Only) Other (See Comments)     Patient has a history of a clinically significant antibody against RBC antigens.  A delay in compatible RBCs may occur.     Penicillins Anaphylaxis and Hives       Problem List:    Patient Active Problem List    Diagnosis Date Noted     Myalgia 02/13/2020     Priority: Medium     Rheumatoid factor positive 02/13/2020     Priority: Medium     Lateral epicondylitis of right elbow 01/12/2018     Priority: Medium     Acute intractable tension-type headache 11/13/2017     Priority: Medium     AZ (generalized anxiety disorder) 06/05/2017     Priority: Medium     Prolonged grief reaction 11/01/2016     Priority: Medium     Major depressive disorder, recurrent episode, moderate (H) 11/01/2016     Priority: Medium     ACP (advance care planning) 08/24/2016     Priority: Medium     Advance Care Planning 8/24/2016: ACP Review of Chart / Resources Provided:  Reviewed chart for advance care plan.  Lenora Montelongo has no plan or code status on file. Discussed available resources and provided with information.   Added by Andrea Pulliam             Gastroesophageal  reflux disease without esophagitis 04/08/2016     Priority: Medium     Helicobacter positive gastritis      Priority: Medium     Midline low back pain without sciatica 01/13/2016     Priority: Medium     Stress due to illness of family member 12/10/2014     Priority: Medium     OCD (obsessive compulsive disorder)      Priority: Medium        Past Medical History:    Past Medical History:   Diagnosis Date     Adjustment disorder with anxiety      Constipation 07/17/2012     Dysmenorrhea 06/20/2002     Dyspareunia 06/13/2007     Dysplasia of cervix (uteri)  07/25/2000     Endometriosis of uterus 12/29/2003     Esophageal reflux 02/07/2000     Helicobacter positive gastritis      IBS (Irritable colon syndrome) 07/06/2011     OCD (obsessive compulsive disorder)        Past Surgical History:    Past Surgical History:   Procedure Laterality Date     bladder reconstruction and mesh removal  2012     BLADDER REPAIR  2010     colposcopy with biopsy-mutliple  10/2014     HYSTERECTOMY TOTAL ABDOMINAL, BILATERAL SALPINGO-OOPHORECTOMY, COMBINED Left     Right ovary remains.      TUBAL LIGATION         Family History:    Family History   Problem Relation Age of Onset     Other - See Comments Paternal Grandmother         Antitrysin Deficiency     Cancer Mother         Cervical     Cancer - colorectal Maternal Grandfather        Social History:  Marital Status:   [2]  Social History     Tobacco Use     Smoking status: Never Smoker     Smokeless tobacco: Never Used   Substance Use Topics     Alcohol use: Yes     Alcohol/week: 0.0 standard drinks     Comment: RARELY     Drug use: No        Medications:    acyclovir (ZOVIRAX) 400 MG tablet  amphetamine-dextroamphetamine (ADDERALL) 10 MG tablet  DULoxetine (CYMBALTA) 60 MG capsule  naproxen (NAPROSYN) 500 MG tablet  omeprazole (PRILOSEC) 40 MG DR capsule          Review of Systems   Constitutional: Positive for appetite change. Negative for activity change, fatigue and fever.    HENT:        See HPI   Eyes: Negative for photophobia and visual disturbance.   Respiratory: Negative for chest tightness and shortness of breath.    Cardiovascular: Negative for chest pain.   Gastrointestinal: Negative for abdominal pain, nausea and vomiting.   Genitourinary: Negative.    Musculoskeletal: Negative for back pain, neck pain and neck stiffness.   Skin: Negative.    Neurological: Positive for headaches. Negative for dizziness, seizures, weakness, light-headedness and numbness.   Hematological: Does not bruise/bleed easily.       Physical Exam   BP: 118/82  Heart Rate: 117  Temp: 97.9  F (36.6  C)  Resp: 22  SpO2: 98 %      Physical Exam  Vitals signs and nursing note reviewed.   Constitutional:       General: She is not in acute distress.     Appearance: Normal appearance. She is normal weight. She is not ill-appearing, toxic-appearing or diaphoretic.   HENT:      Head: Normocephalic and atraumatic.      Right Ear: Tympanic membrane, ear canal and external ear normal.      Left Ear: Tympanic membrane, ear canal and external ear normal.      Ears:      Comments: No evidence of auditory canal concerns or mastoiditis.  Cardiovascular:      Comments: Mild tachycardia  Pulmonary:      Effort: Pulmonary effort is normal.      Breath sounds: Normal breath sounds.   Abdominal:      General: There is no distension.      Palpations: Abdomen is soft.      Tenderness: There is no abdominal tenderness.   Skin:     General: Skin is warm and dry.      Capillary Refill: Capillary refill takes less than 2 seconds.   Neurological:      General: No focal deficit present.      Mental Status: She is alert and oriented to person, place, and time.      Comments: Cranial nerve examination: revealed that for cranial nerve   II: the pupils were reactive and the visual field were full  III, IV, and VI, the extraocular movements were full.    V: facial sensation intact on right but decreased on left   VII: facial movements are  symmetric  VIII: hearing intact to voice  IX & X: the soft palate rises symmetrically   XI: shoulder movements are symmetric  XII: tongue is midline    Neurological examination:  That the patient was awake and alert, the attention, orientation, concentration, language, memory and fund of knowledge were all normal.  The patient had no neglect or apraxia.    Gait is steady.  She has no evidence of truncal or limb ataxia.  Normal speech.  Normal finger-to-nose.  Normal rapid finger movement.   Psychiatric:         Mood and Affect: Mood normal.         ED Course        Procedures               Critical Care time:  none               Results for orders placed or performed during the hospital encounter of 04/11/20 (from the past 24 hour(s))   CBC with platelets differential   Result Value Ref Range    WBC 6.2 4.0 - 11.0 10e9/L    RBC Count 4.73 3.8 - 5.2 10e12/L    Hemoglobin 13.8 11.7 - 15.7 g/dL    Hematocrit 40.0 35.0 - 47.0 %    MCV 85 78 - 100 fl    MCH 29.2 26.5 - 33.0 pg    MCHC 34.5 31.5 - 36.5 g/dL    RDW 12.3 10.0 - 15.0 %    Platelet Count 242 150 - 450 10e9/L    Diff Method Automated Method     % Neutrophils 75.0 %    % Lymphocytes 16.6 %    % Monocytes 6.8 %    % Eosinophils 1.1 %    % Basophils 0.3 %    % Immature Granulocytes 0.2 %    Nucleated RBCs 0 0 /100    Absolute Neutrophil 4.6 1.6 - 8.3 10e9/L    Absolute Lymphocytes 1.0 0.8 - 5.3 10e9/L    Absolute Monocytes 0.4 0.0 - 1.3 10e9/L    Absolute Eosinophils 0.1 0.0 - 0.7 10e9/L    Absolute Basophils 0.0 0.0 - 0.2 10e9/L    Abs Immature Granulocytes 0.0 0 - 0.4 10e9/L    Absolute Nucleated RBC 0.0    Comprehensive metabolic panel   Result Value Ref Range    Sodium 138 133 - 144 mmol/L    Potassium 3.4 3.4 - 5.3 mmol/L    Chloride 106 94 - 109 mmol/L    Carbon Dioxide 27 20 - 32 mmol/L    Anion Gap 5 3 - 14 mmol/L    Glucose 95 70 - 99 mg/dL    Urea Nitrogen 7 7 - 30 mg/dL    Creatinine 0.77 0.52 - 1.04 mg/dL    GFR Estimate >90 >60 mL/min/[1.73_m2]     GFR Estimate If Black >90 >60 mL/min/[1.73_m2]    Calcium 8.4 (L) 8.5 - 10.1 mg/dL    Bilirubin Total 0.6 0.2 - 1.3 mg/dL    Albumin 3.7 3.4 - 5.0 g/dL    Protein Total 7.2 6.8 - 8.8 g/dL    Alkaline Phosphatase 101 40 - 150 U/L    ALT 42 0 - 50 U/L    AST 35 0 - 45 U/L   D dimer quantitative   Result Value Ref Range    D Dimer 0.5 0.0 - 0.50 ug/ml FEU   CT Head w/o Contrast    Narrative    EXAM:CT HEAD W/O CONTRAST     CLINICAL HISTORY: Patient Age  45 years Additional clinical info: 7  days of worst headache. Left sided    COMPARISON: None      TECHNIQUE: Axial images acquired without contrast with coronal and  sagittal two-dimensional reformatted images    CONTRAST: None    FINDINGS: No intra-axial or extra-axial mass or hemorrhage. No midline  shift. Normal CSF spaces. Normal gray-white differentiation. Retention  cyst in the visualized left maxillary antrum. Membrane thickening in  the ethmoid and sphenoid sinuses. The mastoid air cells appear normal.  No fracture identified. No appreciable soft tissue swelling.           Impression    IMPRESSION:   1. Mild paranasal sinusitis.  2. No acute findings.    REA MARTINEZ MD       Medications   0.9% sodium chloride BOLUS (1,000 mLs Intravenous New Bag 4/11/20 1635)   ketorolac (TORADOL) injection 30 mg (30 mg Intravenous Given 4/11/20 1636)   prochlorperazine (COMPAZINE) injection 10 mg (10 mg Intravenous Given 4/11/20 1633)   diphenhydrAMINE (BENADRYL) injection 25 mg (25 mg Intravenous Given 4/11/20 1631)       Assessments & Plan (with Medical Decision Making)   Work-up as above.  CT scan is unremarkable for masses, hydrocephalus, or bleeding.  She did report subjective left-sided facial numbness without evidence of paralysis or other concerns.  Neurological examination is otherwise normal.  Laboratory evaluation as above.  Negative d-dimer would argue against cerebral venous thrombosis or similar presentation.  Headache for 7 days.  Reports recurrent falls over  the last 3 months.  I ordered an MRI as an outpatient.  Toradol and Compazine improved headache to a 2 or 3 on the 10 scale.  There is no reasonable or compelling medical indication for further work-up or intervention.  I believe this patient requires a lumbar puncture at this time to rule out subarachnoid hemorrhage of the like.  The pain was not abrupt in onset nor maximal intensity within the first 30 minutes.  She has no fevers or chills.  Not consistent with meningitis, encephalitis, subarachnoid hemorrhage, epidural hematoma, subdural hematoma, cerebral venous thrombosis, or other intracranial catastrophe.  However I do believe that she would fit any reasonable indication for MRI the brain given the subjective findings as well as the recurrent falls recently.  The patient voiced complete understanding of the plan and was happy and agreeable.    This document was prepared using a combination of typing and voice generated software.  While every attempt was made for accuracy, spelling and grammatical errors may exist.        I have reviewed the nursing notes.    I have reviewed the findings, diagnosis, plan and need for follow up with the patient.       New Prescriptions    No medications on file       Final diagnoses:   Acute nonintractable headache, unspecified headache type       4/11/2020   [facility]     Sadaf Wylie PA-C  04/11/20 1695

## 2020-04-11 NOTE — ED AVS SNAPSHOT
[facility]  [address] [address]  Phone:  [phone]                                    Lenora Gage   MRN: [id number]    Department:  [facility]   Date of Visit:  4/11/2020           After Visit Summary Signature Page    I have received my discharge instructions, and my questions have been answered. I have discussed any challenges I see with this plan with the nurse or doctor.    ..........................................................................................................................................  Patient/Patient Representative Signature      ..........................................................................................................................................  Patient Representative Print Name and Relationship to Patient    ..................................................               ................................................  Date                                   Time    ..........................................................................................................................................  Reviewed by Signature/Title    ...................................................              ..............................................  Date                                               Time          22EPIC Rev 08/18

## 2020-04-11 NOTE — DISCHARGE INSTRUCTIONS
Rest and stay hydrated.    Please follow-up in the clinic on Monday.    I will order an MRI of your brain as an outpatient and they will call and have it scheduled.    Please return here for any worsening symptoms, new symptoms, or other concerns.

## 2020-04-17 ENCOUNTER — HOSPITAL ENCOUNTER (OUTPATIENT)
Dept: MRI IMAGING | Facility: HOSPITAL | Age: 45
Discharge: HOME | End: 2020-04-17
Attending: PHYSICIAN ASSISTANT | Admitting: PHYSICIAN ASSISTANT
Payer: COMMERCIAL

## 2020-04-17 DIAGNOSIS — R51.9 ACUTE NONINTRACTABLE HEADACHE, UNSPECIFIED HEADACHE TYPE: ICD-10-CM

## 2020-04-17 PROCEDURE — 70553 MRI BRAIN STEM W/O & W/DYE: CPT | Mod: TC

## 2020-04-17 PROCEDURE — 25500064 ZZH RX 255 OP 636: Performed by: RADIOLOGY

## 2020-04-17 PROCEDURE — A9585 GADOBUTROL INJECTION: HCPCS | Performed by: RADIOLOGY

## 2020-04-17 RX ORDER — GADOBUTROL 604.72 MG/ML
7.5 INJECTION INTRAVENOUS ONCE
Status: COMPLETED | OUTPATIENT
Start: 2020-04-17 | End: 2020-04-17

## 2020-04-17 RX ADMIN — GADOBUTROL 7.5 ML: 604.72 INJECTION INTRAVENOUS at 14:24

## 2020-04-27 ENCOUNTER — TELEPHONE (OUTPATIENT)
Dept: FAMILY MEDICINE | Facility: OTHER | Age: 45
End: 2020-04-27

## 2020-04-27 DIAGNOSIS — F90.2 ADHD (ATTENTION DEFICIT HYPERACTIVITY DISORDER), COMBINED TYPE: ICD-10-CM

## 2020-04-27 RX ORDER — DEXTROAMPHETAMINE SACCHARATE, AMPHETAMINE ASPARTATE, DEXTROAMPHETAMINE SULFATE AND AMPHETAMINE SULFATE 2.5; 2.5; 2.5; 2.5 MG/1; MG/1; MG/1; MG/1
10 TABLET ORAL 2 TIMES DAILY
Qty: 60 TABLET | Refills: 0 | Status: SHIPPED | OUTPATIENT
Start: 2020-04-27 | End: 2020-06-05

## 2020-04-27 NOTE — TELEPHONE ENCOUNTER
amphetamine-dextroamphetamine (ADDERALL) 10 MG tablet      Last Written Prescription Date:  3/23/20  Last Fill Quantity: 60,   # refills: 0  Last Office Visit: 3/23/20  Future Office visit:       Routing refill request to provider for review/approval because:  Drug not on the [facility], [facility] or [facility] refill protocol or controlled substance

## 2020-04-27 NOTE — TELEPHONE ENCOUNTER
Patient would like a phone call with the brain scan results please.  Provider has not read them yet.

## 2020-04-28 NOTE — TELEPHONE ENCOUNTER
Spoke with patient this morning and discussed MRI. She continues to have headaches. Can we please have her scheduled for a virtual visit with Doreen Galo next week.  ??? Neurology referral     Leyda ROBLES FNP-BC  Family Nurse Practitioner

## 2020-04-30 ENCOUNTER — TELEPHONE (OUTPATIENT)
Dept: FAMILY MEDICINE | Facility: OTHER | Age: 45
End: 2020-04-30

## 2020-04-30 NOTE — TELEPHONE ENCOUNTER
Spoke with patient. A telephone visit has been set up for Monday with Doreen Galo to go over MRI results .

## 2020-05-03 NOTE — PROGRESS NOTES
"Lenora Gage is a 45 year old female who is being evaluated via a billable telephone visit.      The patient has been notified of following:     \"This telephone visit will be conducted via a call between you and your physician/provider. We have found that certain health care needs can be provided without the need for a physical exam.  This service lets us provide the care you need with a short phone conversation.  If a prescription is necessary we can send it directly to your pharmacy.  If lab work is needed we can place an order for that and you can then stop by our lab to have the test done at a later time.    Telephone visits are billed at different rates depending on your insurance coverage. During this emergency period, for some insurers they may be billed the same as an in-person visit.  Please reach out to your insurance provider with any questions.    If during the course of the call the physician/provider feels a telephone visit is not appropriate, you will not be charged for this service.\"    Patient has given verbal consent for Telephone visit?  Yes    What phone number would you like to be contacted at? On file    How would you like to obtain your AVS? Mail a copy    Subjective     Lenora Gage is a 45 year old female who presents to clinic today for the following health issues:    HPI  ED/UC Followup:    Facility:  [facility]  Date of visit: 4/11/20  Reason for visit: Headaches      Patient presented to the Urgent Care on 4/11/20 with a headache. A head CT was done and showed a mild paranasal sinusitis, otherwise no acute findings. She also noted that she was falling more so an outpatient MRI was ordered. This showed a normal brain, but mucosal thickening in the left sphenoid sinus.   Current Status: Today she notes that she continues to have headaches. Typically has 2-3 headaches per week and they last anywhere from 8-12 hours. Headache are always located in frontal and temporal region on her " "left side. She notes that she does occasionally have vision changes prior to the headache occuring. No loss of vision, however. Some nausea, no vomiting. No fevers. No weakness. No numbness or tingling. She feels sleep helps. She notes that she has also tried taking Advil, Tylenol, and Excedrin without relief. She does not feel she is sensitive to light, smells, or loud noise when she does have a headache. No family h/o migraines. She was drinking 7-8 cans of soda daily, but has cut back to 2 cans daily. Does not feel this has helped with her headaches. She denies nasal congestion. No cough or cold like symptoms. No dental pain. She notes that she has fallen, but believes this is r/t right ankle and hip pain. She notes that she has had pain in both these joints for several months. She feels she falls as her right ankle \"gives out.\" She denies dizziness, lightheadedness, or any episodes of syncope. She does not smoke. She is under a lot of stress with her family.        Patient Active Problem List   Diagnosis     OCD (obsessive compulsive disorder)     Stress due to illness of family member     Midline low back pain without sciatica     Helicobacter positive gastritis     Gastroesophageal reflux disease without esophagitis     ACP (advance care planning)     Prolonged grief reaction     Major depressive disorder, recurrent episode, moderate (H)     AZ (generalized anxiety disorder)     Acute intractable tension-type headache     Lateral epicondylitis of right elbow     Myalgia     Rheumatoid factor positive     Past Surgical History:   Procedure Laterality Date     bladder reconstruction and mesh removal  2012     BLADDER REPAIR  2010     colposcopy with biopsy-mutliple  10/2014     HYSTERECTOMY TOTAL ABDOMINAL, BILATERAL SALPINGO-OOPHORECTOMY, COMBINED Left     Right ovary remains.      TUBAL LIGATION         Social History     Tobacco Use     Smoking status: Never Smoker     Smokeless tobacco: Never Used "   Substance Use Topics     Alcohol use: Yes     Alcohol/week: 0.0 standard drinks     Comment: RARELY     Family History   Problem Relation Age of Onset     Other - See Comments Paternal Grandmother         Antitrysin Deficiency     Cancer Mother         Cervical     Cancer - colorectal Maternal Grandfather          Current Outpatient Medications   Medication Sig Dispense Refill     acyclovir (ZOVIRAX) 400 MG tablet Take 1 tablet (400 mg) by mouth 3 times daily For 10 days as needed for HSV breakout. 90 tablet 1     amphetamine-dextroamphetamine (ADDERALL) 10 MG tablet Take 1 tablet (10 mg) by mouth 2 times daily 60 tablet 0     DULoxetine (CYMBALTA) 60 MG capsule Take 1 capsule (60 mg) by mouth At Bedtime 90 capsule 3     naproxen (NAPROSYN) 500 MG tablet TAKE 1 TABLET BY MOUTH TWICE A DAY WITH MEALS 60 tablet 1     omeprazole (PRILOSEC) 40 MG DR capsule Take 1 capsule (40 mg) by mouth daily Take 30-60 minutes before a meal. 90 capsule 3     Allergies   Allergen Reactions     Blood Transfusion Related (Informational Only) Other (See Comments)     Patient has a history of a clinically significant antibody against RBC antigens.  A delay in compatible RBCs may occur.     Penicillins Anaphylaxis and Hives       Reviewed and updated as needed this visit by Provider         Review of Systems   As noted in the HPI.        Assessment/Plan:  1. Migraine with aura and without status migrainosus, not intractable  It sounds like patient is having migraines. It is reassuring that her head CT and brain MRI are normal. She does not smoke. She denies dizziness, lightheadedness, or syncope. I do not think the falls are r/t her headaches as her scans have been normal. Patient feels she is falling due to her ankle giving out. Will try using Topamax 25 mg BID to prevent the headaches. She will take 25 mg once daily and then increase to BID after one week. She was made aware of the side effects. Will also give her some Imitrex to take  "as needed. She was also made aware of the side effects of this. Will then reassess at her clinic appointment on 5/21/20. She will follow up sooner with new symptoms.     - topiramate (TOPAMAX) 25 MG tablet; Take 1 tablet (25 mg) by mouth 2 times daily  Dispense: 60 tablet; Refill: 1  - SUMAtriptan (IMITREX) 25 MG tablet; Take 1 tablet (25 mg) by mouth at onset of headache for migraine May repeat in 2 hours. Max 8 tablets/24 hours.  Dispense: 16 tablet; Refill: 0    2. Pain in joint, ankle and foot, right  3. Hip pain, right  Pain has been occurring for months. She feels this is why she falls and she notes that \"her ankle goes out.\" Will have her present to the walk in clinic on 5/21 as I will be there. Will then be able to do physical exam and take images of both joints.       No follow-ups on file.      Phone call duration:  21 minutes    Doreen Galo NP        "

## 2020-05-04 ENCOUNTER — VIRTUAL VISIT (OUTPATIENT)
Dept: FAMILY MEDICINE | Facility: OTHER | Age: 45
End: 2020-05-04
Attending: NURSE PRACTITIONER
Payer: COMMERCIAL

## 2020-05-04 DIAGNOSIS — M25.551 HIP PAIN, RIGHT: ICD-10-CM

## 2020-05-04 DIAGNOSIS — M25.571 PAIN IN JOINT, ANKLE AND FOOT, RIGHT: ICD-10-CM

## 2020-05-04 DIAGNOSIS — G43.109 MIGRAINE WITH AURA AND WITHOUT STATUS MIGRAINOSUS, NOT INTRACTABLE: Primary | ICD-10-CM

## 2020-05-04 PROCEDURE — 99213 OFFICE O/P EST LOW 20 MIN: CPT | Mod: 95 | Performed by: NURSE PRACTITIONER

## 2020-05-04 RX ORDER — SUMATRIPTAN 25 MG/1
25 TABLET, FILM COATED ORAL
Qty: 16 TABLET | Refills: 0 | Status: SHIPPED | OUTPATIENT
Start: 2020-05-04 | End: 2020-05-28

## 2020-05-04 RX ORDER — TOPIRAMATE 25 MG/1
25 TABLET, FILM COATED ORAL 2 TIMES DAILY
Qty: 60 TABLET | Refills: 1 | Status: SHIPPED | OUTPATIENT
Start: 2020-05-04 | End: 2020-08-04

## 2020-05-04 ASSESSMENT — ANXIETY QUESTIONNAIRES
IF YOU CHECKED OFF ANY PROBLEMS ON THIS QUESTIONNAIRE, HOW DIFFICULT HAVE THESE PROBLEMS MADE IT FOR YOU TO DO YOUR WORK, TAKE CARE OF THINGS AT HOME, OR GET ALONG WITH OTHER PEOPLE: SOMEWHAT DIFFICULT
3. WORRYING TOO MUCH ABOUT DIFFERENT THINGS: SEVERAL DAYS
GAD7 TOTAL SCORE: 7
6. BECOMING EASILY ANNOYED OR IRRITABLE: SEVERAL DAYS
2. NOT BEING ABLE TO STOP OR CONTROL WORRYING: SEVERAL DAYS
7. FEELING AFRAID AS IF SOMETHING AWFUL MIGHT HAPPEN: SEVERAL DAYS
1. FEELING NERVOUS, ANXIOUS, OR ON EDGE: SEVERAL DAYS
4. TROUBLE RELAXING: SEVERAL DAYS
5. BEING SO RESTLESS THAT IT IS HARD TO SIT STILL: SEVERAL DAYS

## 2020-05-04 ASSESSMENT — PATIENT HEALTH QUESTIONNAIRE - PHQ9: SUM OF ALL RESPONSES TO PHQ QUESTIONS 1-9: 4

## 2020-05-04 ASSESSMENT — PAIN SCALES - GENERAL: PAINLEVEL: NO PAIN (0)

## 2020-05-04 NOTE — PROGRESS NOTES
"Lenora Gage is a 45 year old female who is being evaluated via a billable telephone visit.      The patient has been notified of following:     \"This telephone visit will be conducted via a call between you and your physician/provider. We have found that certain health care needs can be provided without the need for a physical exam.  This service lets us provide the care you need with a short phone conversation.  If a prescription is necessary we can send it directly to your pharmacy.  If lab work is needed we can place an order for that and you can then stop by our lab to have the test done at a later time.    Telephone visits are billed at different rates depending on your insurance coverage. During this emergency period, for some insurers they may be billed the same as an in-person visit.  Please reach out to your insurance provider with any questions.    If during the course of the call the physician/provider feels a telephone visit is not appropriate, you will not be charged for this service.\"    Patient has given verbal consent for Telephone visit?  Yes    What phone number would you like to be contacted at?on file    How would you like to obtain your AVS? Mail a copy    Subjective     Lenora Gage is a 45 year old female who presents to clinic today for the following health issues:    HPI  MRI of brain      Duration: wanting results    Description (location/character/radiation): falling and left sided headaches    Intensity:  moderate    Accompanying signs and symptoms: none    History (similar episodes/previous evaluation): MRI    Precipitating or alleviating factors: None    Therapies tried and outcome: None     {PEDS Chronic and Acute Problems (Optional):890577}     {additonal problems for provider to add (Optional):563925}    {HIST REVIEW/ LINKS 2 (Optional):478484}    Reviewed and updated as needed this visit by Provider         Review of Systems   {ROS COMP (Optional):915401}       Objective " "  Reported vitals:  There were no vitals taken for this visit.   {GENERAL APPEARANCE:50::\"healthy\",\"alert\",\"no distress\"}  PSYCH: Alert and oriented times 3; coherent speech, normal   rate and volume, able to articulate logical thoughts, able   to abstract reason, no tangential thoughts, no hallucinations   or delusions  Her affect is { :5158342::\"normal\"}  RESP: No cough, no audible wheezing, able to talk in full sentences  Remainder of exam unable to be completed due to telephone visits    {Diagnostic Test Results (Optional):130612::\"Diagnostic Test Results:\",\"Labs reviewed in Epic\"}        Assessment/Plan:  {Diagnosis, Associated Orders and Comment:215977}    No follow-ups on file.      Phone call duration:  *** minutes    {signature options:862087}        "

## 2020-05-05 ASSESSMENT — ANXIETY QUESTIONNAIRES: GAD7 TOTAL SCORE: 7

## 2020-05-24 DIAGNOSIS — G43.109 MIGRAINE WITH AURA AND WITHOUT STATUS MIGRAINOSUS, NOT INTRACTABLE: ICD-10-CM

## 2020-05-28 RX ORDER — SUMATRIPTAN 25 MG/1
TABLET, FILM COATED ORAL
Qty: 12 TABLET | Refills: 1 | Status: SHIPPED | OUTPATIENT
Start: 2020-05-28 | End: 2022-11-08

## 2020-05-28 NOTE — TELEPHONE ENCOUNTER
Imitrex 25 MG      Last Written Prescription Date:  05/04/20  Last Fill Quantity: 16,   # refills: 0  Last Office Visit: 05/04/20  Future Office visit:       Routing refill request to provider for review/approval because:    Serotonin Agonists [id number]/24/2020 09:27 AM   Serotonin Agonist request needs review.     PCP Doreen Galo

## 2020-06-05 DIAGNOSIS — F90.2 ADHD (ATTENTION DEFICIT HYPERACTIVITY DISORDER), COMBINED TYPE: ICD-10-CM

## 2020-06-05 RX ORDER — DEXTROAMPHETAMINE SACCHARATE, AMPHETAMINE ASPARTATE, DEXTROAMPHETAMINE SULFATE AND AMPHETAMINE SULFATE 2.5; 2.5; 2.5; 2.5 MG/1; MG/1; MG/1; MG/1
10 TABLET ORAL 2 TIMES DAILY
Qty: 60 TABLET | Refills: 0 | Status: SHIPPED | OUTPATIENT
Start: 2020-06-05 | End: 2020-07-06

## 2020-06-05 NOTE — TELEPHONE ENCOUNTER
amphetamine-dextroamphetamine (ADDERALL) 10 MG tablet        Last Written Prescription Date:  4/27/20  Last Fill Quantity: 60,   # refills: 0  Last Office Visit: 5/4/20  Future Office visit:       Routing refill request to provider for review/approval because:  Drug not on the [facility], [facility] or [facility] refill protocol or controlled substance

## 2020-07-06 DIAGNOSIS — F90.2 ADHD (ATTENTION DEFICIT HYPERACTIVITY DISORDER), COMBINED TYPE: ICD-10-CM

## 2020-07-06 RX ORDER — DEXTROAMPHETAMINE SACCHARATE, AMPHETAMINE ASPARTATE, DEXTROAMPHETAMINE SULFATE AND AMPHETAMINE SULFATE 2.5; 2.5; 2.5; 2.5 MG/1; MG/1; MG/1; MG/1
10 TABLET ORAL 2 TIMES DAILY
Qty: 60 TABLET | Refills: 0 | Status: SHIPPED | OUTPATIENT
Start: 2020-07-06 | End: 2020-08-05

## 2020-08-02 DIAGNOSIS — G43.109 MIGRAINE WITH AURA AND WITHOUT STATUS MIGRAINOSUS, NOT INTRACTABLE: ICD-10-CM

## 2020-08-04 RX ORDER — TOPIRAMATE 25 MG/1
TABLET, FILM COATED ORAL
Qty: 60 TABLET | Refills: 1 | Status: SHIPPED | OUTPATIENT
Start: 2020-08-04 | End: 2020-08-05

## 2020-08-04 NOTE — TELEPHONE ENCOUNTER
Topamax 25 mg      Last Written Prescription Date:  5-4-2020  Last Fill Quantity: 60,   # refills: 0  Last Office Visit: 5-4-2020  Future Office visit:

## 2020-08-05 DIAGNOSIS — G43.109 MIGRAINE WITH AURA AND WITHOUT STATUS MIGRAINOSUS, NOT INTRACTABLE: ICD-10-CM

## 2020-08-05 DIAGNOSIS — F90.2 ADHD (ATTENTION DEFICIT HYPERACTIVITY DISORDER), COMBINED TYPE: ICD-10-CM

## 2020-08-05 RX ORDER — TOPIRAMATE 25 MG/1
TABLET, FILM COATED ORAL
Qty: 180 TABLET | Refills: 0 | Status: SHIPPED | OUTPATIENT
Start: 2020-08-05 | End: 2020-10-27

## 2020-08-05 RX ORDER — DEXTROAMPHETAMINE SACCHARATE, AMPHETAMINE ASPARTATE, DEXTROAMPHETAMINE SULFATE AND AMPHETAMINE SULFATE 2.5; 2.5; 2.5; 2.5 MG/1; MG/1; MG/1; MG/1
10 TABLET ORAL 2 TIMES DAILY
Qty: 60 TABLET | Refills: 0 | Status: SHIPPED | OUTPATIENT
Start: 2020-08-05 | End: 2020-09-08

## 2020-08-05 NOTE — TELEPHONE ENCOUNTER
Adderall 10 mg  Last Written Prescription Date:  7-6-2020  Last Fill Quantity: 60,   # refills: 0  Last Office Visit: 5-4-2020   Future Office visit:

## 2020-09-03 ENCOUNTER — HOSPITAL ENCOUNTER (OUTPATIENT)
Facility: HOSPITAL | Age: 45
Setting detail: OBSERVATION
Discharge: HOME | End: 2020-09-04
Attending: NURSE PRACTITIONER | Admitting: SURGERY
Payer: COMMERCIAL

## 2020-09-03 ENCOUNTER — ANESTHESIA EVENT (OUTPATIENT)
Dept: SURGERY | Facility: HOSPITAL | Age: 45
End: 2020-09-03
Payer: COMMERCIAL

## 2020-09-03 ENCOUNTER — ANESTHESIA (OUTPATIENT)
Dept: SURGERY | Facility: HOSPITAL | Age: 45
End: 2020-09-03
Payer: COMMERCIAL

## 2020-09-03 ENCOUNTER — APPOINTMENT (OUTPATIENT)
Dept: CT IMAGING | Facility: HOSPITAL | Age: 45
End: 2020-09-03
Attending: NURSE PRACTITIONER
Payer: COMMERCIAL

## 2020-09-03 DIAGNOSIS — K35.201 ACUTE APPENDICITIS WITH PERFORATION AND GENERALIZED PERITONITIS, WITHOUT ABSCESS, UNSPECIFIED WHETHER GANGRENE PRESENT: ICD-10-CM

## 2020-09-03 DIAGNOSIS — K35.80 ACUTE APPENDICITIS: Primary | ICD-10-CM

## 2020-09-03 DIAGNOSIS — K35.30 ACUTE APPENDICITIS WITH LOCALIZED PERITONITIS, WITHOUT PERFORATION OR ABSCESS, UNSPECIFIED WHETHER GANGRENE PRESENT: ICD-10-CM

## 2020-09-03 DIAGNOSIS — K59.03 DRUG-INDUCED CONSTIPATION: ICD-10-CM

## 2020-09-03 LAB
ALBUMIN SERPL-MCNC: 4.1 G/DL (ref 3.4–5)
ALBUMIN UR-MCNC: NEGATIVE MG/DL
ALP SERPL-CCNC: 126 U/L (ref 40–150)
ALT SERPL W P-5'-P-CCNC: 90 U/L (ref 0–50)
AMORPH CRY #/AREA URNS HPF: ABNORMAL /HPF
ANION GAP SERPL CALCULATED.3IONS-SCNC: 7 MMOL/L (ref 3–14)
APPEARANCE UR: ABNORMAL
AST SERPL W P-5'-P-CCNC: 25 U/L (ref 0–45)
BACTERIA #/AREA URNS HPF: ABNORMAL /HPF
BASOPHILS # BLD AUTO: 0 10E9/L (ref 0–0.2)
BASOPHILS NFR BLD AUTO: 0.1 %
BILIRUB SERPL-MCNC: 1 MG/DL (ref 0.2–1.3)
BILIRUB UR QL STRIP: NEGATIVE
BUN SERPL-MCNC: 11 MG/DL (ref 7–30)
CALCIUM SERPL-MCNC: 10 MG/DL (ref 8.5–10.1)
CHLORIDE SERPL-SCNC: 106 MMOL/L (ref 94–109)
CO2 SERPL-SCNC: 25 MMOL/L (ref 20–32)
COLOR UR AUTO: YELLOW
CREAT SERPL-MCNC: 0.72 MG/DL (ref 0.52–1.04)
DIFFERENTIAL METHOD BLD: ABNORMAL
EOSINOPHIL # BLD AUTO: 0 10E9/L (ref 0–0.7)
EOSINOPHIL NFR BLD AUTO: 0.1 %
ERYTHROCYTE [DISTWIDTH] IN BLOOD BY AUTOMATED COUNT: 11.9 % (ref 10–15)
GFR SERPL CREATININE-BSD FRML MDRD: >90 ML/MIN/{1.73_M2}
GLUCOSE SERPL-MCNC: 120 MG/DL (ref 70–99)
GLUCOSE UR STRIP-MCNC: NEGATIVE MG/DL
HCT VFR BLD AUTO: 44.1 % (ref 35–47)
HGB BLD-MCNC: 15.1 G/DL (ref 11.7–15.7)
HGB UR QL STRIP: NEGATIVE
IMM GRANULOCYTES # BLD: 0.1 10E9/L (ref 0–0.4)
IMM GRANULOCYTES NFR BLD: 0.3 %
KETONES UR STRIP-MCNC: NEGATIVE MG/DL
LACTATE BLD-SCNC: 1.5 MMOL/L (ref 0.7–2)
LEUKOCYTE ESTERASE UR QL STRIP: NEGATIVE
LIPASE SERPL-CCNC: 76 U/L (ref 73–393)
LYMPHOCYTES # BLD AUTO: 0.5 10E9/L (ref 0.8–5.3)
LYMPHOCYTES NFR BLD AUTO: 3.6 %
MCH RBC QN AUTO: 29.7 PG (ref 26.5–33)
MCHC RBC AUTO-ENTMCNC: 34.2 G/DL (ref 31.5–36.5)
MCV RBC AUTO: 87 FL (ref 78–100)
MONOCYTES # BLD AUTO: 0.6 10E9/L (ref 0–1.3)
MONOCYTES NFR BLD AUTO: 4.3 %
MUCOUS THREADS #/AREA URNS LPF: PRESENT /LPF
NEUTROPHILS # BLD AUTO: 13.6 10E9/L (ref 1.6–8.3)
NEUTROPHILS NFR BLD AUTO: 91.6 %
NITRATE UR QL: NEGATIVE
NRBC # BLD AUTO: 0 10*3/UL
NRBC BLD AUTO-RTO: 0 /100
PH UR STRIP: 7 PH (ref 4.7–8)
PLATELET # BLD AUTO: 254 10E9/L (ref 150–450)
POTASSIUM SERPL-SCNC: 3.5 MMOL/L (ref 3.4–5.3)
PROT SERPL-MCNC: 7.7 G/DL (ref 6.8–8.8)
RADIOLOGIST FLAGS: ABNORMAL
RBC # BLD AUTO: 5.08 10E12/L (ref 3.8–5.2)
RBC #/AREA URNS AUTO: 0 /HPF (ref 0–2)
SODIUM SERPL-SCNC: 138 MMOL/L (ref 133–144)
SOURCE: ABNORMAL
SP GR UR STRIP: 1.01 (ref 1–1.03)
SQUAMOUS #/AREA URNS AUTO: 7 /HPF (ref 0–1)
UROBILINOGEN UR STRIP-MCNC: NORMAL MG/DL (ref 0–2)
WBC # BLD AUTO: 14.8 10E9/L (ref 4–11)
WBC #/AREA URNS AUTO: 0 /HPF (ref 0–5)

## 2020-09-03 PROCEDURE — 96375 TX/PRO/DX INJ NEW DRUG ADDON: CPT | Mod: 59

## 2020-09-03 PROCEDURE — 25500064 ZZH RX 255 OP 636: Performed by: RADIOLOGY

## 2020-09-03 PROCEDURE — 96374 THER/PROPH/DIAG INJ IV PUSH: CPT | Mod: 59

## 2020-09-03 PROCEDURE — G0378 HOSPITAL OBSERVATION PER HR: HCPCS

## 2020-09-03 PROCEDURE — 36000058 ZZH SURGERY LEVEL 3 EA 15 ADDTL MIN: Performed by: SURGERY

## 2020-09-03 PROCEDURE — 25000128 H RX IP 250 OP 636: Performed by: NURSE ANESTHETIST, CERTIFIED REGISTERED

## 2020-09-03 PROCEDURE — 36000056 ZZH SURGERY LEVEL 3 1ST 30 MIN: Performed by: SURGERY

## 2020-09-03 PROCEDURE — 96376 TX/PRO/DX INJ SAME DRUG ADON: CPT

## 2020-09-03 PROCEDURE — 25000128 H RX IP 250 OP 636: Performed by: SURGERY

## 2020-09-03 PROCEDURE — 83690 ASSAY OF LIPASE: CPT | Performed by: NURSE PRACTITIONER

## 2020-09-03 PROCEDURE — 25800030 ZZH RX IP 258 OP 636: Performed by: NURSE PRACTITIONER

## 2020-09-03 PROCEDURE — 37000009 ZZH ANESTHESIA TECHNICAL FEE, EACH ADDTL 15 MIN: Performed by: SURGERY

## 2020-09-03 PROCEDURE — 99284 EMERGENCY DEPT VISIT MOD MDM: CPT | Mod: Z6 | Performed by: NURSE PRACTITIONER

## 2020-09-03 PROCEDURE — 25800030 ZZH RX IP 258 OP 636: Performed by: SURGERY

## 2020-09-03 PROCEDURE — 25800030 ZZH RX IP 258 OP 636: Performed by: NURSE ANESTHETIST, CERTIFIED REGISTERED

## 2020-09-03 PROCEDURE — 27210794 ZZH OR GENERAL SUPPLY STERILE: Performed by: SURGERY

## 2020-09-03 PROCEDURE — 80053 COMPREHEN METABOLIC PANEL: CPT | Performed by: NURSE PRACTITIONER

## 2020-09-03 PROCEDURE — 85025 COMPLETE CBC W/AUTO DIFF WBC: CPT | Performed by: NURSE PRACTITIONER

## 2020-09-03 PROCEDURE — 44970 LAPAROSCOPY APPENDECTOMY: CPT | Performed by: SURGERY

## 2020-09-03 PROCEDURE — 83605 ASSAY OF LACTIC ACID: CPT | Performed by: NURSE PRACTITIONER

## 2020-09-03 PROCEDURE — 71000014 ZZH RECOVERY PHASE 1 LEVEL 2 FIRST HR: Performed by: SURGERY

## 2020-09-03 PROCEDURE — 88304 TISSUE EXAM BY PATHOLOGIST: CPT | Mod: TC | Performed by: SURGERY

## 2020-09-03 PROCEDURE — 25000125 ZZHC RX 250: Performed by: NURSE ANESTHETIST, CERTIFIED REGISTERED

## 2020-09-03 PROCEDURE — 37000008 ZZH ANESTHESIA TECHNICAL FEE, 1ST 30 MIN: Performed by: SURGERY

## 2020-09-03 PROCEDURE — 81001 URINALYSIS AUTO W/SCOPE: CPT | Performed by: NURSE PRACTITIONER

## 2020-09-03 PROCEDURE — 27110028 ZZH OR GENERAL SUPPLY NON-STERILE: Performed by: SURGERY

## 2020-09-03 PROCEDURE — 25000128 H RX IP 250 OP 636: Performed by: NURSE PRACTITIONER

## 2020-09-03 PROCEDURE — 71000015 ZZH RECOVERY PHASE 1 LEVEL 2 EA ADDTL HR: Performed by: SURGERY

## 2020-09-03 PROCEDURE — 99285 EMERGENCY DEPT VISIT HI MDM: CPT | Mod: 25

## 2020-09-03 PROCEDURE — 74177 CT ABD & PELVIS W/CONTRAST: CPT | Mod: TC

## 2020-09-03 PROCEDURE — 44970 LAPAROSCOPY APPENDECTOMY: CPT | Performed by: NURSE ANESTHETIST, CERTIFIED REGISTERED

## 2020-09-03 RX ORDER — NALOXONE HYDROCHLORIDE 0.4 MG/ML
.1-.4 INJECTION, SOLUTION INTRAMUSCULAR; INTRAVENOUS; SUBCUTANEOUS
Status: DISCONTINUED | OUTPATIENT
Start: 2020-09-03 | End: 2020-09-04 | Stop reason: HOSPADM

## 2020-09-03 RX ORDER — ONDANSETRON 2 MG/ML
4 INJECTION INTRAMUSCULAR; INTRAVENOUS EVERY 6 HOURS PRN
Status: DISCONTINUED | OUTPATIENT
Start: 2020-09-03 | End: 2020-09-04 | Stop reason: HOSPADM

## 2020-09-03 RX ORDER — DULOXETIN HYDROCHLORIDE 60 MG/1
60 CAPSULE, DELAYED RELEASE ORAL DAILY
COMMUNITY
End: 2020-11-11

## 2020-09-03 RX ORDER — LIDOCAINE HYDROCHLORIDE 20 MG/ML
INJECTION, SOLUTION INFILTRATION; PERINEURAL PRN
Status: DISCONTINUED | OUTPATIENT
Start: 2020-09-03 | End: 2020-09-03

## 2020-09-03 RX ORDER — BUPIVACAINE HYDROCHLORIDE 2.5 MG/ML
INJECTION, SOLUTION INFILTRATION; PERINEURAL PRN
Status: DISCONTINUED | OUTPATIENT
Start: 2020-09-03 | End: 2020-09-03 | Stop reason: HOSPADM

## 2020-09-03 RX ORDER — DULOXETIN HYDROCHLORIDE 30 MG/1
30 CAPSULE, DELAYED RELEASE ORAL DAILY
COMMUNITY
End: 2020-11-11

## 2020-09-03 RX ORDER — LIDOCAINE 40 MG/G
CREAM TOPICAL
Status: DISCONTINUED | OUTPATIENT
Start: 2020-09-03 | End: 2020-09-04 | Stop reason: HOSPADM

## 2020-09-03 RX ORDER — ALBUTEROL SULFATE 0.83 MG/ML
2.5 SOLUTION RESPIRATORY (INHALATION) EVERY 4 HOURS PRN
Status: DISCONTINUED | OUTPATIENT
Start: 2020-09-03 | End: 2020-09-03 | Stop reason: HOSPADM

## 2020-09-03 RX ORDER — FENTANYL CITRATE 50 UG/ML
INJECTION, SOLUTION INTRAMUSCULAR; INTRAVENOUS PRN
Status: DISCONTINUED | OUTPATIENT
Start: 2020-09-03 | End: 2020-09-03

## 2020-09-03 RX ORDER — GLYCOPYRROLATE 0.2 MG/ML
INJECTION, SOLUTION INTRAMUSCULAR; INTRAVENOUS PRN
Status: DISCONTINUED | OUTPATIENT
Start: 2020-09-03 | End: 2020-09-03

## 2020-09-03 RX ORDER — LIDOCAINE 40 MG/G
CREAM TOPICAL
Status: CANCELLED | OUTPATIENT
Start: 2020-09-03

## 2020-09-03 RX ORDER — DEXTROSE MONOHYDRATE, SODIUM CHLORIDE, AND POTASSIUM CHLORIDE 50; 1.49; 4.5 G/1000ML; G/1000ML; G/1000ML
INJECTION, SOLUTION INTRAVENOUS CONTINUOUS
Status: DISCONTINUED | OUTPATIENT
Start: 2020-09-03 | End: 2020-09-04 | Stop reason: HOSPADM

## 2020-09-03 RX ORDER — ONDANSETRON 2 MG/ML
4 INJECTION INTRAMUSCULAR; INTRAVENOUS EVERY 30 MIN PRN
Status: DISCONTINUED | OUTPATIENT
Start: 2020-09-03 | End: 2020-09-03 | Stop reason: HOSPADM

## 2020-09-03 RX ORDER — HYDROMORPHONE HYDROCHLORIDE 1 MG/ML
.3-.5 INJECTION, SOLUTION INTRAMUSCULAR; INTRAVENOUS; SUBCUTANEOUS EVERY 10 MIN PRN
Status: DISCONTINUED | OUTPATIENT
Start: 2020-09-03 | End: 2020-09-03 | Stop reason: HOSPADM

## 2020-09-03 RX ORDER — NALOXONE HYDROCHLORIDE 0.4 MG/ML
.1-.4 INJECTION, SOLUTION INTRAMUSCULAR; INTRAVENOUS; SUBCUTANEOUS
Status: DISCONTINUED | OUTPATIENT
Start: 2020-09-03 | End: 2020-09-03 | Stop reason: HOSPADM

## 2020-09-03 RX ORDER — HYDROMORPHONE HYDROCHLORIDE 1 MG/ML
0.2 INJECTION, SOLUTION INTRAMUSCULAR; INTRAVENOUS; SUBCUTANEOUS
Status: DISCONTINUED | OUTPATIENT
Start: 2020-09-03 | End: 2020-09-04 | Stop reason: HOSPADM

## 2020-09-03 RX ORDER — SODIUM CHLORIDE, SODIUM LACTATE, POTASSIUM CHLORIDE, CALCIUM CHLORIDE 600; 310; 30; 20 MG/100ML; MG/100ML; MG/100ML; MG/100ML
INJECTION, SOLUTION INTRAVENOUS CONTINUOUS
Status: DISCONTINUED | OUTPATIENT
Start: 2020-09-03 | End: 2020-09-03 | Stop reason: HOSPADM

## 2020-09-03 RX ORDER — CIPROFLOXACIN 2 MG/ML
400 INJECTION, SOLUTION INTRAVENOUS ONCE
Status: COMPLETED | OUTPATIENT
Start: 2020-09-03 | End: 2020-09-03

## 2020-09-03 RX ORDER — ONDANSETRON 4 MG/1
4 TABLET, ORALLY DISINTEGRATING ORAL EVERY 6 HOURS PRN
Status: DISCONTINUED | OUTPATIENT
Start: 2020-09-03 | End: 2020-09-04 | Stop reason: HOSPADM

## 2020-09-03 RX ORDER — MEPERIDINE HYDROCHLORIDE 25 MG/ML
12.5 INJECTION INTRAMUSCULAR; INTRAVENOUS; SUBCUTANEOUS
Status: DISCONTINUED | OUTPATIENT
Start: 2020-09-03 | End: 2020-09-03 | Stop reason: HOSPADM

## 2020-09-03 RX ORDER — KETOROLAC TROMETHAMINE 30 MG/ML
30 INJECTION, SOLUTION INTRAMUSCULAR; INTRAVENOUS EVERY 6 HOURS
Status: COMPLETED | OUTPATIENT
Start: 2020-09-03 | End: 2020-09-04

## 2020-09-03 RX ORDER — FENTANYL CITRATE 50 UG/ML
25-50 INJECTION, SOLUTION INTRAMUSCULAR; INTRAVENOUS
Status: DISCONTINUED | OUTPATIENT
Start: 2020-09-03 | End: 2020-09-03 | Stop reason: HOSPADM

## 2020-09-03 RX ORDER — IOPAMIDOL 612 MG/ML
100 INJECTION, SOLUTION INTRAVASCULAR ONCE
Status: COMPLETED | OUTPATIENT
Start: 2020-09-03 | End: 2020-09-03

## 2020-09-03 RX ORDER — ONDANSETRON 4 MG/1
4 TABLET, ORALLY DISINTEGRATING ORAL EVERY 30 MIN PRN
Status: DISCONTINUED | OUTPATIENT
Start: 2020-09-03 | End: 2020-09-03 | Stop reason: HOSPADM

## 2020-09-03 RX ORDER — SODIUM CHLORIDE, SODIUM LACTATE, POTASSIUM CHLORIDE, CALCIUM CHLORIDE 600; 310; 30; 20 MG/100ML; MG/100ML; MG/100ML; MG/100ML
INJECTION, SOLUTION INTRAVENOUS CONTINUOUS
Status: CANCELLED | OUTPATIENT
Start: 2020-09-03

## 2020-09-03 RX ORDER — DEXAMETHASONE SODIUM PHOSPHATE 10 MG/ML
INJECTION, SOLUTION INTRAMUSCULAR; INTRAVENOUS PRN
Status: DISCONTINUED | OUTPATIENT
Start: 2020-09-03 | End: 2020-09-03

## 2020-09-03 RX ORDER — OXYCODONE HYDROCHLORIDE 5 MG/1
5-10 TABLET ORAL
Status: DISCONTINUED | OUTPATIENT
Start: 2020-09-03 | End: 2020-09-04 | Stop reason: HOSPADM

## 2020-09-03 RX ORDER — ONDANSETRON 2 MG/ML
INJECTION INTRAMUSCULAR; INTRAVENOUS PRN
Status: DISCONTINUED | OUTPATIENT
Start: 2020-09-03 | End: 2020-09-03

## 2020-09-03 RX ORDER — SODIUM CHLORIDE, SODIUM LACTATE, POTASSIUM CHLORIDE, CALCIUM CHLORIDE 600; 310; 30; 20 MG/100ML; MG/100ML; MG/100ML; MG/100ML
INJECTION, SOLUTION INTRAVENOUS CONTINUOUS PRN
Status: DISCONTINUED | OUTPATIENT
Start: 2020-09-03 | End: 2020-09-03

## 2020-09-03 RX ORDER — HYDRALAZINE HYDROCHLORIDE 20 MG/ML
2.5-5 INJECTION INTRAMUSCULAR; INTRAVENOUS EVERY 10 MIN PRN
Status: DISCONTINUED | OUTPATIENT
Start: 2020-09-03 | End: 2020-09-03 | Stop reason: HOSPADM

## 2020-09-03 RX ORDER — HYDROMORPHONE HYDROCHLORIDE 1 MG/ML
0.5 INJECTION, SOLUTION INTRAMUSCULAR; INTRAVENOUS; SUBCUTANEOUS EVERY 30 MIN PRN
Status: DISCONTINUED | OUTPATIENT
Start: 2020-09-03 | End: 2020-09-04 | Stop reason: HOSPADM

## 2020-09-03 RX ORDER — ONDANSETRON 2 MG/ML
4 INJECTION INTRAMUSCULAR; INTRAVENOUS
Status: COMPLETED | OUTPATIENT
Start: 2020-09-03 | End: 2020-09-03

## 2020-09-03 RX ORDER — NEOSTIGMINE METHYLSULFATE 1 MG/ML
VIAL (ML) INJECTION PRN
Status: DISCONTINUED | OUTPATIENT
Start: 2020-09-03 | End: 2020-09-03

## 2020-09-03 RX ADMIN — MIDAZOLAM 2 MG: 1 INJECTION INTRAMUSCULAR; INTRAVENOUS at 18:27

## 2020-09-03 RX ADMIN — SODIUM CHLORIDE, POTASSIUM CHLORIDE, SODIUM LACTATE AND CALCIUM CHLORIDE: 600; 310; 30; 20 INJECTION, SOLUTION INTRAVENOUS at 18:24

## 2020-09-03 RX ADMIN — FENTANYL CITRATE 50 MCG: 50 INJECTION, SOLUTION INTRAMUSCULAR; INTRAVENOUS at 19:59

## 2020-09-03 RX ADMIN — ONDANSETRON 4 MG: 2 INJECTION INTRAMUSCULAR; INTRAVENOUS at 19:09

## 2020-09-03 RX ADMIN — HYDROMORPHONE HYDROCHLORIDE 1 MG: 1 INJECTION, SOLUTION INTRAMUSCULAR; INTRAVENOUS; SUBCUTANEOUS at 16:38

## 2020-09-03 RX ADMIN — ONDANSETRON 4 MG: 2 INJECTION INTRAMUSCULAR; INTRAVENOUS at 15:12

## 2020-09-03 RX ADMIN — HYDROMORPHONE HYDROCHLORIDE 0.2 MG: 1 INJECTION, SOLUTION INTRAMUSCULAR; INTRAVENOUS; SUBCUTANEOUS at 23:47

## 2020-09-03 RX ADMIN — Medication 100 MG: at 18:28

## 2020-09-03 RX ADMIN — ROCURONIUM BROMIDE 10 MG: 10 INJECTION INTRAVENOUS at 18:27

## 2020-09-03 RX ADMIN — KETOROLAC TROMETHAMINE 30 MG: 30 INJECTION, SOLUTION INTRAMUSCULAR at 20:20

## 2020-09-03 RX ADMIN — HYDROMORPHONE HYDROCHLORIDE 0.2 MG: 1 INJECTION, SOLUTION INTRAMUSCULAR; INTRAVENOUS; SUBCUTANEOUS at 21:42

## 2020-09-03 RX ADMIN — Medication 2 MG: at 19:18

## 2020-09-03 RX ADMIN — SODIUM CHLORIDE, POTASSIUM CHLORIDE, SODIUM LACTATE AND CALCIUM CHLORIDE: 600; 310; 30; 20 INJECTION, SOLUTION INTRAVENOUS at 20:25

## 2020-09-03 RX ADMIN — HYDROMORPHONE HYDROCHLORIDE 0.5 MG: 1 INJECTION, SOLUTION INTRAMUSCULAR; INTRAVENOUS; SUBCUTANEOUS at 17:55

## 2020-09-03 RX ADMIN — ROCURONIUM BROMIDE 20 MG: 10 INJECTION INTRAVENOUS at 18:44

## 2020-09-03 RX ADMIN — POTASSIUM CHLORIDE, DEXTROSE MONOHYDRATE AND SODIUM CHLORIDE: 150; 5; 450 INJECTION, SOLUTION INTRAVENOUS at 21:25

## 2020-09-03 RX ADMIN — SODIUM CHLORIDE 1000 ML: 9 INJECTION, SOLUTION INTRAVENOUS at 15:12

## 2020-09-03 RX ADMIN — DEXAMETHASONE SODIUM PHOSPHATE 10 MG: 10 INJECTION, SOLUTION INTRAMUSCULAR; INTRAVENOUS at 18:52

## 2020-09-03 RX ADMIN — CIPROFLOXACIN 400 MG: 2 INJECTION INTRAVENOUS at 18:24

## 2020-09-03 RX ADMIN — GLYCOPYRROLATE 0.2 MG: 0.2 INJECTION, SOLUTION INTRAMUSCULAR; INTRAVENOUS at 19:18

## 2020-09-03 RX ADMIN — METRONIDAZOLE 500 MG: 500 INJECTION, SOLUTION INTRAVENOUS at 17:31

## 2020-09-03 RX ADMIN — LIDOCAINE HYDROCHLORIDE 40 MG: 20 INJECTION, SOLUTION INFILTRATION; PERINEURAL at 18:28

## 2020-09-03 RX ADMIN — IOPAMIDOL 100 ML: 612 INJECTION, SOLUTION INTRAVENOUS at 16:07

## 2020-09-03 RX ADMIN — FENTANYL CITRATE 100 MCG: 50 INJECTION, SOLUTION INTRAMUSCULAR; INTRAVENOUS at 18:25

## 2020-09-03 RX ADMIN — SODIUM CHLORIDE 1000 ML: 9 INJECTION, SOLUTION INTRAVENOUS at 16:38

## 2020-09-03 RX ADMIN — HYDROMORPHONE HYDROCHLORIDE 0.5 MG: 1 INJECTION, SOLUTION INTRAMUSCULAR; INTRAVENOUS; SUBCUTANEOUS at 15:58

## 2020-09-03 ASSESSMENT — ENCOUNTER SYMPTOMS
DIARRHEA: 0
VOMITING: 0
NAUSEA: 1
DIFFICULTY URINATING: 0
CONSTIPATION: 0
LIGHT-HEADEDNESS: 0
HEMATURIA: 0
SHORTNESS OF BREATH: 0
FEVER: 0
WEAKNESS: 0
PALPITATIONS: 0
ABDOMINAL PAIN: 1
FREQUENCY: 0
HEADACHES: 0
DIZZINESS: 0
BLOOD IN STOOL: 0
DYSURIA: 0
CHILLS: 1
APPETITE CHANGE: 1
COUGH: 0
FLANK PAIN: 0

## 2020-09-03 ASSESSMENT — MIFFLIN-ST. JEOR: SCORE: 1273.65

## 2020-09-03 NOTE — ED NOTES
CRNA at bedside.  This writer gave report to surgical team.  CRNA took patient's next dose ABX-Cipro as Flagyl is still running.  Patient to OR via stretcher.

## 2020-09-03 NOTE — H&P
[facility] Surgery Consultation    CC:  Abdominal pain    HPI:  This 45 year old year old female is seen at the request of Socorro Donis for evaluation of abdominal pain. She reports 2-3 days of abdominal pain that became significantly worse last night. She was unable to sleep, had to lay still in order not to be in pain. The pain is located on the right side, though her whole abdomen hurts though. Denies nausea or vomiting. No fevers. History of hysterectomy. PCN allergy.           Past Medical History:   Diagnosis Date     Adjustment disorder with anxiety      Constipation 07/17/2012     Dysmenorrhea 06/20/2002     Dyspareunia 06/13/2007     Dysplasia of cervix (uteri)  07/25/2000     Endometriosis of uterus 12/29/2003     Esophageal reflux 02/07/2000     Helicobacter positive gastritis      IBS (Irritable colon syndrome) 07/06/2011     OCD (obsessive compulsive disorder)        Past Surgical History:   Procedure Laterality Date     bladder reconstruction and mesh removal  2012     BLADDER REPAIR  2010     colposcopy with biopsy-mutliple  10/2014     HYSTERECTOMY TOTAL ABDOMINAL, BILATERAL SALPINGO-OOPHORECTOMY, COMBINED Left     Right ovary remains.      TUBAL LIGATION         Allergies   Allergen Reactions     Blood Transfusion Related (Informational Only) Other (See Comments)     Patient has a history of a clinically significant antibody against RBC antigens.  A delay in compatible RBCs may occur.     Penicillins Anaphylaxis and Hives       Current Facility-Administered Medications   Medication     0.9% sodium chloride BOLUS     ciprofloxacin (CIPRO) infusion 400 mg     HYDROmorphone (PF) (DILAUDID) injection 0.5 mg     metroNIDAZOLE (FLAGYL) infusion 500 mg     Current Outpatient Medications   Medication     acyclovir (ZOVIRAX) 400 MG tablet     amphetamine-dextroamphetamine (ADDERALL) 10 MG tablet     DULoxetine (CYMBALTA) 60 MG capsule     naproxen (NAPROSYN) 500 MG tablet     omeprazole (PRILOSEC) 40  MG DR capsule     SUMAtriptan (IMITREX) 25 MG tablet     topiramate (TOPAMAX) 25 MG tablet       HABITS:    Social History     Tobacco Use     Smoking status: Never Smoker     Smokeless tobacco: Never Used   Substance Use Topics     Alcohol use: Yes     Alcohol/week: 0.0 standard drinks     Comment: RARELY     Drug use: No       Family History   Problem Relation Age of Onset     Other - See Comments Paternal Grandmother         Antitrysin Deficiency     Cancer Mother         Cervical     Cancer - colorectal Maternal Grandfather        REVIEW OF SYSTEMS:  Ten point review of systems negative except those mentioned in the HPI.     OBJECTIVE:    /66   Pulse 113   Temp 98.9  F (37.2  C) (Tympanic)   Resp 16   SpO2 97%     GENERAL: Generally appears well, in no distress with appropriate affect.  HEENT:   Sclerae anicteric - normocephalic atraumatic   Respiratory:  No acute distress, no splinting   Cardiovascular:  Tachy rate   Abdomen: focal tenderness in RLQ with rebound and guarding.   :  deferred  Extremities:  Extremities normal. No deformities, edema, or skin discoloration.  Skin:  no suspicious lesions or rashes  Neurological: grossly intact  Psych:  Alert, oriented, affect appropriate with normal decision making ability.    IMPRESSION:  45 y.o. female with mostly psychiatric medical history presents with abdominal pain, leukocytosis, CT and exam consistent with acute appendicitis. We discussed both operative and non-operative course and agreed to proceed with surgery.     PLAN:    One dose of antibiotics now   Lap appendectomy tonight   Admit overnight for observation    Jonathan Martinez MD,     9/3/2020  5:22 PM

## 2020-09-03 NOTE — DISCHARGE INSTRUCTIONS

## 2020-09-03 NOTE — ANESTHESIA PREPROCEDURE EVALUATION
"Anesthesia Pre-Procedure Evaluation    Patient: Lenora Gage   MRN: [id number] : 1975          Preoperative Diagnosis: Acute appendicitis with perforation and generalized peritonitis, without abscess, unspecified whether gangrene present [K35.20]    Procedure(s):  APPENDECTOMY, LAPAROSCOPIC    Past Medical History:   Diagnosis Date     Adjustment disorder with anxiety      Constipation 2012     Dysmenorrhea 2002     Dyspareunia 2007     Dysplasia of cervix (uteri)  2000     Endometriosis of uterus 2003     Esophageal reflux 2000     Helicobacter positive gastritis      IBS (Irritable colon syndrome) 2011     OCD (obsessive compulsive disorder)      Past Surgical History:   Procedure Laterality Date     bladder reconstruction and mesh removal       BLADDER REPAIR       colposcopy with biopsy-mutliple  10/2014     HYSTERECTOMY TOTAL ABDOMINAL, BILATERAL SALPINGO-OOPHORECTOMY, COMBINED Left     Right ovary remains.      TUBAL LIGATION         Anesthesia Evaluation     . Pt has had prior anesthetic. Type: General    History of anesthetic complications    pt's mom has trouble waking, but pt has not had problems with anesthesia      ROS/MED HX    ENT/Pulmonary:     (+)NA risk factors snores loudly, allergic rhinitis, , . .   (-) recent URI   Neurologic:     (+)other neuro tension headaches    Cardiovascular:  - neg cardiovascular ROS       METS/Exercise Tolerance:  >4 METS   Hematologic:     (+) History of Transfusion previous transfusion reaction - pt does not recall symptoms, was given card that states \"anti-KATHY\" antibodies, -      Musculoskeletal:   (+) arthritis (rheumatoid factor positive),  other musculoskeletal- back pain      GI/Hepatic:     (+) GERD Asymptomatic on medication, appendicitis, Other GI/Hepatic constipation, h pylori positive, RLQ pain      Renal/Genitourinary:     (+) Other Renal/ Genitourinary, uterine dysplasia, dysmenorrhea      Endo:  " "   (+) Obesity, .      Psychiatric:     (+) psychiatric history anxiety, other (comment) and depression (OCD, adjustment disorder)      Infectious Disease:  - neg infectious disease ROS      (-) Recent Fever   Malignancy:      - no malignancy   Other: Comment: Hx hysterectomy   (+) No chance of pregnancy                         Physical Exam  Normal systems: pulmonary and dental    Airway   Mallampati: III  TM distance: <3 FB  Neck ROM: full    Dental     Cardiovascular   Rhythm and rate: regular and abnormal      Pulmonary    breath sounds clear to auscultation    Other findings:  per pulse oximeter        Lab Results   Component Value Date    WBC 14.8 (H) 09/03/2020    HGB 15.1 09/03/2020    HCT 44.1 09/03/2020     09/03/2020    CRP <2.9 11/18/2019    SED 7 11/18/2019     09/03/2020    POTASSIUM 3.5 09/03/2020    CHLORIDE 106 09/03/2020    CO2 25 09/03/2020    BUN 11 09/03/2020    CR 0.72 09/03/2020     (H) 09/03/2020    LUIS 10.0 09/03/2020    ALBUMIN 4.1 09/03/2020    PROTTOTAL 7.7 09/03/2020    ALT 90 (H) 09/03/2020    AST 25 09/03/2020     (H) 11/18/2019    ALKPHOS 126 09/03/2020    BILITOTAL 1.0 09/03/2020    LIPASE 76 09/03/2020    AMYLASE 61 09/03/2014    TSH 3.64 11/18/2019    HCG Negative 12/25/2015       Preop Vitals  BP Readings from Last 3 Encounters:   09/03/20 97/70   04/11/20 100/71   03/02/20 100/62    Pulse Readings from Last 3 Encounters:   09/03/20 103   04/11/20 90   03/02/20 98      Resp Readings from Last 3 Encounters:   09/03/20 16   04/11/20 16   04/15/19 12    SpO2 Readings from Last 3 Encounters:   09/03/20 96%   04/11/20 98%   03/02/20 98%      Temp Readings from Last 1 Encounters:   09/03/20 98.9  F (37.2  C) (Tympanic)    Ht Readings from Last 1 Encounters:   03/02/20 1.575 m (5' 2\")      Wt Readings from Last 1 Encounters:   03/02/20 77.1 kg (170 lb)    Estimated body mass index is 31.09 kg/m  as calculated from the following:    Height as of 3/2/20: " "1.575 m (5' 2\").    Weight as of 3/2/20: 77.1 kg (170 lb).       Anesthesia Plan      History & Physical Review  History and physical reviewed and following examination; no interval change.    ASA Status:  3 .    NPO Status:  > 8 hours    Plan for General with Intravenous and Propofol induction. Maintenance will be Inhalation.    PONV prophylaxis:  Ondansetron (or other 5HT-3) and Dexamethasone or Solumedrol  Discussed risks and benefits with patient for general anesthesia including sore throat, nausea, vomiting, aspiration, dental damage, loss of airway, CV complications, stroke, MI, death. Pt wishes to proceed.           Postoperative Care  Postoperative pain management:  IV analgesics.      Consents  Anesthetic plan, risks, benefits and alternatives discussed with:  Patient.  Use of blood products discussed: Yes.   Use of blood products discussed with Patient.  Consented to blood products (verbal assent given).  .                 TRAVIS Salinas CRNA  "

## 2020-09-03 NOTE — ED PROVIDER NOTES
History     Chief Complaint   Patient presents with     Abdominal Pain     HPI     Lenora Gage is a 45 year old female who presents ambulatory for concerns of RLQ abdominal pain. Symptoms started with mild abdominal pain around umbilical area about 2-3 days ago. Last night pain increased and became almost intolerable. Currently 9/10, constant, soreness and aching with intermittent sharp pains. Walking, moving increases discomfort. She has associated nausea, chills. She has not had fever, vomiting, diarrhea, dysuria, hematuria, urinary frequency.   Usual bowel pattern is 1-2 times daily. No change in bowel habits since onset of pain. Last BM was yesterday. Denies feeling constipation, hematochezia, melena.   History of total hysterectomy, bilateral salpino-oopherectomy (left side)- states she still has right ovary. History of screening colonoscopy 4-5 years ago due to family history per her reports.   Advil- no relief. Last took yesterday.   Has not eaten or drank anything today.       Allergies:  Allergies   Allergen Reactions     Blood Transfusion Related (Informational Only) Other (See Comments)     Patient has a history of a clinically significant antibody against RBC antigens.  A delay in compatible RBCs may occur.     Penicillins Anaphylaxis and Hives       Problem List:    Patient Active Problem List    Diagnosis Date Noted     Acute appendicitis with perforation and generalized peritonitis, without abscess, unspecified whether gangrene present 09/03/2020     Priority: Medium     Added automatically from request for surgery [id number]       Myalgia 02/13/2020     Priority: Medium     Rheumatoid factor positive 02/13/2020     Priority: Medium     Lateral epicondylitis of right elbow 01/12/2018     Priority: Medium     Acute intractable tension-type headache 11/13/2017     Priority: Medium     AZ (generalized anxiety disorder) 06/05/2017     Priority: Medium     Prolonged grief reaction 11/01/2016      Priority: Medium     Major depressive disorder, recurrent episode, moderate (H) 11/01/2016     Priority: Medium     ACP (advance care planning) 08/24/2016     Priority: Medium     Advance Care Planning 8/24/2016: ACP Review of Chart / Resources Provided:  Reviewed chart for advance care plan.  Lenora Montelongo has no plan or code status on file. Discussed available resources and provided with information.   Added by Andrea Pulliam             Gastroesophageal reflux disease without esophagitis 04/08/2016     Priority: Medium     Helicobacter positive gastritis      Priority: Medium     Midline low back pain without sciatica 01/13/2016     Priority: Medium     Stress due to illness of family member 12/10/2014     Priority: Medium     OCD (obsessive compulsive disorder)      Priority: Medium        Past Medical History:    Past Medical History:   Diagnosis Date     Adjustment disorder with anxiety      Constipation 07/17/2012     Dysmenorrhea 06/20/2002     Dyspareunia 06/13/2007     Dysplasia of cervix (uteri)  07/25/2000     Endometriosis of uterus 12/29/2003     Esophageal reflux 02/07/2000     Helicobacter positive gastritis      IBS (Irritable colon syndrome) 07/06/2011     OCD (obsessive compulsive disorder)        Past Surgical History:    Past Surgical History:   Procedure Laterality Date     bladder reconstruction and mesh removal  2012     BLADDER REPAIR  2010     colposcopy with biopsy-mutliple  10/2014     HYSTERECTOMY TOTAL ABDOMINAL, BILATERAL SALPINGO-OOPHORECTOMY, COMBINED Left     Right ovary remains.      TUBAL LIGATION         Family History:    Family History   Problem Relation Age of Onset     Other - See Comments Paternal Grandmother         Antitrysin Deficiency     Cancer Mother         Cervical     Cancer - colorectal Maternal Grandfather        Social History:  Marital Status:   [2]  Social History     Tobacco Use     Smoking status: Never Smoker     Smokeless tobacco: Never Used    Substance Use Topics     Alcohol use: Yes     Alcohol/week: 0.0 standard drinks     Comment: RARELY     Drug use: No        Medications:    acyclovir (ZOVIRAX) 400 MG tablet  amphetamine-dextroamphetamine (ADDERALL) 10 MG tablet  DULoxetine (CYMBALTA) 60 MG capsule  naproxen (NAPROSYN) 500 MG tablet  omeprazole (PRILOSEC) 40 MG DR capsule  SUMAtriptan (IMITREX) 25 MG tablet  topiramate (TOPAMAX) 25 MG tablet          Review of Systems   Constitutional: Positive for appetite change and chills. Negative for fever.   Respiratory: Negative for cough and shortness of breath.    Cardiovascular: Negative for chest pain and palpitations.   Gastrointestinal: Positive for abdominal pain and nausea. Negative for blood in stool, constipation, diarrhea and vomiting.   Genitourinary: Negative for difficulty urinating, dysuria, flank pain, frequency and hematuria.   Neurological: Negative for dizziness, weakness, light-headedness and headaches.       Physical Exam   BP: 93/67  Pulse: 111  Temp: 98.9  F (37.2  C)  Resp: 16  SpO2: 100 %      Physical Exam  Constitutional:       General: She is in acute distress.      Appearance: She is not toxic-appearing or diaphoretic.   Cardiovascular:      Rate and Rhythm: Normal rate and regular rhythm.      Heart sounds: S1 normal and S2 normal. No murmur. No friction rub. No gallop.    Pulmonary:      Effort: Pulmonary effort is normal.      Breath sounds: Normal breath sounds.   Abdominal:      General: Bowel sounds are normal. There is no distension.      Palpations: Abdomen is soft.      Tenderness: There is abdominal tenderness in the right lower quadrant. There is guarding and rebound. There is no right CVA tenderness or left CVA tenderness. Positive signs include Rovsing's sign and McBurney's sign. Negative signs include Encinas's sign.   Skin:     General: Skin is warm and dry.      Capillary Refill: Capillary refill takes less than 2 seconds.   Neurological:      Mental Status: She  is alert and oriented to person, place, and time.      GCS: GCS eye subscore is 4. GCS verbal subscore is 5. GCS motor subscore is 6.   Psychiatric:         Mood and Affect: Mood normal.         Speech: Speech normal.         Behavior: Behavior normal. Behavior is cooperative.         ED Course     ED Course as of Sep 03 1815   Thu Sep 03, 2020   1626 Spoke with radiologist. CT scan shows appendicitis. Surgeon on his way to evaluated another patient in ED. Will have him evaluate patient while in department.  Socorro Donis CNP on 9/3/2020 at 4:27 PM        1631 Dr. Martinez in department and update don CT results  Socorro Donis CNP on 9/3/2020 at 4:31 PM        1709 Consulted with pharmacy regarding hives, anaphylaxis to penicillin. Meropenem would have least likelihood of cross reactivity compared to cefepime.   Socorro Donis CNP on 9/3/2020 at 5:10 PM        1714 Dr. Martinez evaluated patient and will resume care. Plan for appendectomy.   Socorro Donis CNP on 9/3/2020 at 5:14 PM          Procedures      Results for orders placed or performed during the hospital encounter of 09/03/20 (from the past 24 hour(s))   CBC with platelets differential   Result Value Ref Range    WBC 14.8 (H) 4.0 - 11.0 10e9/L    RBC Count 5.08 3.8 - 5.2 10e12/L    Hemoglobin 15.1 11.7 - 15.7 g/dL    Hematocrit 44.1 35.0 - 47.0 %    MCV 87 78 - 100 fl    MCH 29.7 26.5 - 33.0 pg    MCHC 34.2 31.5 - 36.5 g/dL    RDW 11.9 10.0 - 15.0 %    Platelet Count 254 150 - 450 10e9/L    Diff Method Automated Method     % Neutrophils 91.6 %    % Lymphocytes 3.6 %    % Monocytes 4.3 %    % Eosinophils 0.1 %    % Basophils 0.1 %    % Immature Granulocytes 0.3 %    Nucleated RBCs 0 0 /100    Absolute Neutrophil 13.6 (H) 1.6 - 8.3 10e9/L    Absolute Lymphocytes 0.5 (L) 0.8 - 5.3 10e9/L    Absolute Monocytes 0.6 0.0 - 1.3 10e9/L    Absolute Eosinophils 0.0 0.0 - 0.7 10e9/L    Absolute Basophils 0.0 0.0 - 0.2 10e9/L    Abs Immature Granulocytes 0.1 0  - 0.4 10e9/L    Absolute Nucleated RBC 0.0    Comprehensive metabolic panel   Result Value Ref Range    Sodium 138 133 - 144 mmol/L    Potassium 3.5 3.4 - 5.3 mmol/L    Chloride 106 94 - 109 mmol/L    Carbon Dioxide 25 20 - 32 mmol/L    Anion Gap 7 3 - 14 mmol/L    Glucose 120 (H) 70 - 99 mg/dL    Urea Nitrogen 11 7 - 30 mg/dL    Creatinine 0.72 0.52 - 1.04 mg/dL    GFR Estimate >90 >60 mL/min/[1.73_m2]    GFR Estimate If Black >90 >60 mL/min/[1.73_m2]    Calcium 10.0 8.5 - 10.1 mg/dL    Bilirubin Total 1.0 0.2 - 1.3 mg/dL    Albumin 4.1 3.4 - 5.0 g/dL    Protein Total 7.7 6.8 - 8.8 g/dL    Alkaline Phosphatase 126 40 - 150 U/L    ALT 90 (H) 0 - 50 U/L    AST 25 0 - 45 U/L   Lipase   Result Value Ref Range    Lipase 76 73 - 393 U/L   Lactic acid whole blood   Result Value Ref Range    Lactic Acid 1.5 0.7 - 2.0 mmol/L   CT Abdomen Pelvis w Contrast   Result Value Ref Range    Radiologist flags Appendicitis (AA)     Narrative    EXAMINATION: CT ABDOMEN PELVIS W CONTRAST, 9/3/2020 4:15 PM    TECHNIQUE:  Helical CT images from the lung bases through the  symphysis pubis were obtained  with IV contrast. Contrast dose: ISOVUE  300 100 mL    COMPARISON: none    HISTORY: Umbilical pain 2-3 days ago, now 9/10 RLQ pain, nausea    FINDINGS:    There is dependent atelectasis at the lung bases.    The liver is free of masses or biliary ductal enlargement. No  calcified gallstones are seen.    There is a small low-density lesion most likely a cyst in the lower  pole of the spleen. The pancreas is normal. The adrenal glands are  normal. The right left kidneys are free of masses or hydronephrosis.      The periaortic lymph nodes are normal in caliber.    The appendix is markedly thickened with surrounding inflammation  consistent with acute appendicitis. There is a small amount of free  fluid in the pelvis.    In the pelvis the bladder and rectum appear normal.    The regional skeleton is intact      Impression    IMPRESSION: Acute  appendicitis   [Critical Result: Appendicitis]    Finding was identified on 9/3/2020 4:22 PM.     The emergency room was contacted by me on 9/3/2020 4:26 PM and  verbalized understanding of the critical result.      JULIANNE COSME MD   UA reflex to Microscopic and Culture    Specimen: Midstream Urine   Result Value Ref Range    Color Urine Yellow     Appearance Urine Slightly Cloudy     Glucose Urine Negative NEG^Negative mg/dL    Bilirubin Urine Negative NEG^Negative    Ketones Urine Negative NEG^Negative mg/dL    Specific Gravity Urine 1.015 1.003 - 1.035    Blood Urine Negative NEG^Negative    pH Urine 7.0 4.7 - 8.0 pH    Protein Albumin Urine Negative NEG^Negative mg/dL    Urobilinogen mg/dL Normal 0.0 - 2.0 mg/dL    Nitrite Urine Negative NEG^Negative    Leukocyte Esterase Urine Negative NEG^Negative    Source Midstream Urine     RBC Urine 0 0 - 2 /HPF    WBC Urine 0 0 - 5 /HPF    Bacteria Urine None (A) NEG^Negative /HPF    Squamous Epithelial /HPF Urine 7 (H) 0 - 1 /HPF    Mucous Urine Present (A) NEG^Negative /LPF    Amorphous Crystals Many (A) NEG^Negative /HPF       Medications   HYDROmorphone (PF) (DILAUDID) injection 0.5 mg (0.5 mg Intravenous Given 9/3/20 1755)   metroNIDAZOLE (FLAGYL) infusion 500 mg (500 mg Intravenous New Bag 9/3/20 1731)   ciprofloxacin (CIPRO) infusion 400 mg (has no administration in time range)   0.9% sodium chloride BOLUS (0 mLs Intravenous Stopped 9/3/20 1638)   ondansetron (ZOFRAN) injection 4 mg (4 mg Intravenous Given 9/3/20 1512)   iopamidol (ISOVUE-300) IV solution 61% 100 mL (100 mLs Intravenous Given 9/3/20 1607)   sodium chloride (PF) 0.9% PF flush 60 mL (60 mLs Intravenous Given 9/3/20 1608)   HYDROmorphone (DILAUDID) injection 1 mg (1 mg Intravenous Given 9/3/20 1638)   0.9% sodium chloride BOLUS (0 mLs Intravenous Stopped 9/3/20 1729)       Assessments & Plan (with Medical Decision Making)     I have reviewed the nursing notes.    I have reviewed the findings,  diagnosis, plan and need for follow up with the patient.  (K35.80) Acute appendicitis  (primary encounter diagnosis)  45-year old female presents ambulatory for evaluation of 3 day history abdominal pain with worsening last night. She has exam findings concerning for appendicitis with peritoneal signs. Work-up as above, + leukocytosis. She is given IV fluids, zofran, dilaudid, cipro and flagyl in the ED. Abdominal CT shows acute appendicitis. Dr. Martinez in department to evaluate patient, plan to resume care and do appendectomy.     Socorro Donis CNP         New Prescriptions    No medications on file       Final diagnoses:   Acute appendicitis       9/3/2020   [facility]     Socorro Donis CNP  09/03/20 1815

## 2020-09-04 VITALS
HEIGHT: 62 IN | WEIGHT: 150 LBS | RESPIRATION RATE: 16 BRPM | SYSTOLIC BLOOD PRESSURE: 97 MMHG | DIASTOLIC BLOOD PRESSURE: 55 MMHG | BODY MASS INDEX: 27.6 KG/M2 | TEMPERATURE: 97.7 F | HEART RATE: 78 BPM | OXYGEN SATURATION: 93 %

## 2020-09-04 LAB — GLUCOSE BLDC GLUCOMTR-MCNC: 156 MG/DL (ref 70–99)

## 2020-09-04 PROCEDURE — 25000128 H RX IP 250 OP 636: Performed by: SURGERY

## 2020-09-04 PROCEDURE — 2894A VOIDCORRECT: CPT | Performed by: SURGERY

## 2020-09-04 PROCEDURE — G0378 HOSPITAL OBSERVATION PER HR: HCPCS

## 2020-09-04 PROCEDURE — 25000132 ZZH RX MED GY IP 250 OP 250 PS 637: Performed by: SURGERY

## 2020-09-04 PROCEDURE — 00000146 ZZHCL STATISTIC GLUCOSE BY METER IP

## 2020-09-04 RX ORDER — OXYCODONE HYDROCHLORIDE 5 MG/1
5-10 TABLET ORAL EVERY 6 HOURS PRN
Qty: 12 TABLET | Refills: 0 | Status: SHIPPED | OUTPATIENT
Start: 2020-09-04 | End: 2020-09-18

## 2020-09-04 RX ORDER — SENNA AND DOCUSATE SODIUM 50; 8.6 MG/1; MG/1
1 TABLET, FILM COATED ORAL AT BEDTIME
Qty: 30 TABLET | Refills: 0 | Status: SHIPPED | OUTPATIENT
Start: 2020-09-04 | End: 2020-09-18

## 2020-09-04 RX ADMIN — HYDROMORPHONE HYDROCHLORIDE 0.2 MG: 1 INJECTION, SOLUTION INTRAMUSCULAR; INTRAVENOUS; SUBCUTANEOUS at 04:26

## 2020-09-04 RX ADMIN — HYDROMORPHONE HYDROCHLORIDE 0.2 MG: 1 INJECTION, SOLUTION INTRAMUSCULAR; INTRAVENOUS; SUBCUTANEOUS at 06:51

## 2020-09-04 RX ADMIN — KETOROLAC TROMETHAMINE 30 MG: 30 INJECTION, SOLUTION INTRAMUSCULAR at 08:55

## 2020-09-04 RX ADMIN — KETOROLAC TROMETHAMINE 30 MG: 30 INJECTION, SOLUTION INTRAMUSCULAR at 02:48

## 2020-09-04 RX ADMIN — OXYCODONE HYDROCHLORIDE 5 MG: 5 TABLET ORAL at 14:22

## 2020-09-04 RX ADMIN — KETOROLAC TROMETHAMINE 30 MG: 30 INJECTION, SOLUTION INTRAMUSCULAR at 14:22

## 2020-09-04 RX ADMIN — OXYCODONE HYDROCHLORIDE 5 MG: 5 TABLET ORAL at 12:51

## 2020-09-04 RX ADMIN — OXYCODONE HYDROCHLORIDE 5 MG: 5 TABLET ORAL at 11:30

## 2020-09-04 NOTE — OP NOTE
PREOPERATIVE DIAGNOSIS:  Acute appendicitis.       POSTOPERATIVE DIAGNOSIS:  Acute appendicitis.       PROCEDURE:  Laparoscopic appendectomy.       HISTORY:  See consult note      OPERATIVE FINDINGS:  There was acute appendicitis without suggestion of perforation. Some slough on appendix, fluid serous.      DESCRIPTION OF PROCEDURE:  With the patient in the supine position on the operating table, general anesthesia was induced.  The abdomen was prepped and draped sterilely and the requisite timeout pause was observed during which her  correct identity and planned procedure were confirmed.  Using the 5mm optical trocar the abdomen was entered under direct visualization.  A pneumoperitoneum was achieved with insufflation of carbon dioxide to 15 mm hg and a second 5 mm port site was placed in the LLQ quadrant and 12mm port was placed in between under direct vision.  Inspection showed the above-described finding without suggestion of additional obvious intraabdominal pathology.  The base of the appendix was identified and gasped with a ratcheted clamp.  It was elevated cephalad and, using sharp and blunt dissection, the base was developed at its cecal origin.  It was ligated and divided using the linear stapling device with deep staples flush with the cecum.  The mesoappendix was taken with a fire of the linear stapling device using the vascular staples.  Hemostasis of both staple lines was reinforced with judicious use of electrocautery.  The specimen was retrieved intact through the umbilical port site in an Endocatch bag.      The 12mm trocar site was closed with 0-vicryl under direct visualization   With a final inspection confirming satisfactory hemostasis, the procedure was concluded by removing the instruments and trocars and evacuating the pneumoperitoneum.  The skin was reapproximated with subdermal 4-0 monocryl reinforced with steristrips.  Sterile dressings were applied and patient was transported to the  recovery room in satisfactory condition.  The estimated blood loss was minimal and there were no apparent complications.  The procedure was well tolerated and the patient left the operating room in good condition upon confirmation that the final sponge, needle and instrument counts were correct.     Jonathan Martinez MD

## 2020-09-04 NOTE — PROVIDER NOTIFICATION
Notified Dr. Martinez regarding low/soft blood pressures - no new orders at this time - continue to monitor

## 2020-09-04 NOTE — DISCHARGE SUMMARY
Surgery Discharge Summary     Lenora Gage MRN# [id number]   YOB: 1975 Age: 45 year old     Date of Admission:  9/3/2020  Date of Discharge:  9/4/2020  Admitting Physician:  Jonathan Martinez MD  Discharging Service:               General Surgery   Primary Provider: Doreen Galo    Discharge Diagnosis:   Principle Diagnosis:  Acute appendicitis [K35.80]  Acute appendicitis with perforation and generalized peritonitis, without abscess, unspecified whether gangrene present [K35.20]    Secondary Diagnosis:  None     Brief HPI: Lenora Gage is a 45 year old year-old female who presented to the emergency department with a 2 day history of sudden and progressive abdominal pain. Pain was  associated with nausea, emesis, anorexia and changes in bowel. Work-up revealed a leukocytosis of 14 and CT findings consistent with acute appendicitis. After pre-op screening, discussing the risks, benefits, and possible complications, an informed consent was obtained to proceed with a  laparoscopic appendectomy.      Hospital Course: Lenora Gage underwent a laparoscopic appendectomy without complications. Intra-op findings were consistent with grossly nonperforated acute appendicitis. Please see op note for further details. Post-op she was admitted to the floor. On POD #1, she was initiated on a diet and oral pain medications which she tolerated well. Her hospital course remained uncomplicated and she recovered as anticipated. On day of discharge, she was tolerating an oral diet, had good pain control on oral medications, was ambulating independently and remained afebrile thus medically appropriate for discharge. She will follow-up with us in two weeks and was advised to call with any questions or concerns.     Inpatient Consultations: No consultations were requested during this admission    Procedures:   Laparoscopic appendectomy      Labs/Imaging:   Results for orders placed or performed during the hospital  encounter of 09/03/20 (from the past 24 hour(s))   CBC with platelets differential   Result Value Ref Range    WBC 14.8 (H) 4.0 - 11.0 10e9/L    RBC Count 5.08 3.8 - 5.2 10e12/L    Hemoglobin 15.1 11.7 - 15.7 g/dL    Hematocrit 44.1 35.0 - 47.0 %    MCV 87 78 - 100 fl    MCH 29.7 26.5 - 33.0 pg    MCHC 34.2 31.5 - 36.5 g/dL    RDW 11.9 10.0 - 15.0 %    Platelet Count 254 150 - 450 10e9/L    Diff Method Automated Method     % Neutrophils 91.6 %    % Lymphocytes 3.6 %    % Monocytes 4.3 %    % Eosinophils 0.1 %    % Basophils 0.1 %    % Immature Granulocytes 0.3 %    Nucleated RBCs 0 0 /100    Absolute Neutrophil 13.6 (H) 1.6 - 8.3 10e9/L    Absolute Lymphocytes 0.5 (L) 0.8 - 5.3 10e9/L    Absolute Monocytes 0.6 0.0 - 1.3 10e9/L    Absolute Eosinophils 0.0 0.0 - 0.7 10e9/L    Absolute Basophils 0.0 0.0 - 0.2 10e9/L    Abs Immature Granulocytes 0.1 0 - 0.4 10e9/L    Absolute Nucleated RBC 0.0    Comprehensive metabolic panel   Result Value Ref Range    Sodium 138 133 - 144 mmol/L    Potassium 3.5 3.4 - 5.3 mmol/L    Chloride 106 94 - 109 mmol/L    Carbon Dioxide 25 20 - 32 mmol/L    Anion Gap 7 3 - 14 mmol/L    Glucose 120 (H) 70 - 99 mg/dL    Urea Nitrogen 11 7 - 30 mg/dL    Creatinine 0.72 0.52 - 1.04 mg/dL    GFR Estimate >90 >60 mL/min/[1.73_m2]    GFR Estimate If Black >90 >60 mL/min/[1.73_m2]    Calcium 10.0 8.5 - 10.1 mg/dL    Bilirubin Total 1.0 0.2 - 1.3 mg/dL    Albumin 4.1 3.4 - 5.0 g/dL    Protein Total 7.7 6.8 - 8.8 g/dL    Alkaline Phosphatase 126 40 - 150 U/L    ALT 90 (H) 0 - 50 U/L    AST 25 0 - 45 U/L   Lipase   Result Value Ref Range    Lipase 76 73 - 393 U/L   Lactic acid whole blood   Result Value Ref Range    Lactic Acid 1.5 0.7 - 2.0 mmol/L   CT Abdomen Pelvis w Contrast   Result Value Ref Range    Radiologist flags Appendicitis (AA)     Narrative    EXAMINATION: CT ABDOMEN PELVIS W CONTRAST, 9/3/2020 4:15 PM    TECHNIQUE:  Helical CT images from the lung bases through the  symphysis pubis were  obtained  with IV contrast. Contrast dose: ISOVUE  300 100 mL    COMPARISON: none    HISTORY: Umbilical pain 2-3 days ago, now 9/10 RLQ pain, nausea    FINDINGS:    There is dependent atelectasis at the lung bases.    The liver is free of masses or biliary ductal enlargement. No  calcified gallstones are seen.    There is a small low-density lesion most likely a cyst in the lower  pole of the spleen. The pancreas is normal. The adrenal glands are  normal. The right left kidneys are free of masses or hydronephrosis.      The periaortic lymph nodes are normal in caliber.    The appendix is markedly thickened with surrounding inflammation  consistent with acute appendicitis. There is a small amount of free  fluid in the pelvis.    In the pelvis the bladder and rectum appear normal.    The regional skeleton is intact      Impression    IMPRESSION: Acute appendicitis   [Critical Result: Appendicitis]    Finding was identified on 9/3/2020 4:22 PM.     The emergency room was contacted by me on 9/3/2020 4:26 PM and  verbalized understanding of the critical result.      JULIANNE COSME MD   UA reflex to Microscopic and Culture    Specimen: Midstream Urine   Result Value Ref Range    Color Urine Yellow     Appearance Urine Slightly Cloudy     Glucose Urine Negative NEG^Negative mg/dL    Bilirubin Urine Negative NEG^Negative    Ketones Urine Negative NEG^Negative mg/dL    Specific Gravity Urine 1.015 1.003 - 1.035    Blood Urine Negative NEG^Negative    pH Urine 7.0 4.7 - 8.0 pH    Protein Albumin Urine Negative NEG^Negative mg/dL    Urobilinogen mg/dL Normal 0.0 - 2.0 mg/dL    Nitrite Urine Negative NEG^Negative    Leukocyte Esterase Urine Negative NEG^Negative    Source Midstream Urine     RBC Urine 0 0 - 2 /HPF    WBC Urine 0 0 - 5 /HPF    Bacteria Urine None (A) NEG^Negative /HPF    Squamous Epithelial /HPF Urine 7 (H) 0 - 1 /HPF    Mucous Urine Present (A) NEG^Negative /LPF    Amorphous Crystals Many (A) NEG^Negative  "/HPF   Glucose by meter   Result Value Ref Range    Glucose 156 (H) 70 - 99 mg/dL       Disposition:   Discharged to home     Discharge Condition  Discharge condition: Stable   Discharge vitals: Blood pressure 100/66, pulse 72, temperature 97  F (36.1  C), temperature source Tympanic, resp. rate 14, height 1.575 m (5' 2\"), weight 68 kg (150 lb), SpO2 95 %.   # Discharge Pain Plan:   Oxycodone     Gen: , nad, pleasant  Resp: non labored breathing  CV: RRR  Abd: tender over the incisions, incisions c/d/i   Neuro: alert, moving all extremities equally   Ext:warm well perfused       Discharge Medications:   Current Discharge Medication List      START taking these medications    Details   oxyCODONE (ROXICODONE) 5 MG tablet Take 1-2 tablets (5-10 mg) by mouth every 6 hours as needed for moderate to severe pain  Qty: 12 tablet, Refills: 0    Associated Diagnoses: Acute appendicitis with perforation and generalized peritonitis, without abscess, unspecified whether gangrene present; Acute appendicitis with localized peritonitis, without perforation or abscess, unspecified whether gangrene present         CONTINUE these medications which have NOT CHANGED    Details   acyclovir (ZOVIRAX) 400 MG tablet Take 1 tablet (400 mg) by mouth 3 times daily For 10 days as needed for HSV breakout.  Qty: 90 tablet, Refills: 1    Associated Diagnoses: Herpes simplex virus infection      amphetamine-dextroamphetamine (ADDERALL) 10 MG tablet Take 1 tablet (10 mg) by mouth 2 times daily  Qty: 60 tablet, Refills: 0    Associated Diagnoses: ADHD (attention deficit hyperactivity disorder), combined type      !! DULoxetine (CYMBALTA) 30 MG capsule Take 30 mg by mouth 2 times daily      !! DULoxetine (CYMBALTA) 60 MG capsule Take 60 mg by mouth daily      omeprazole (PRILOSEC) 40 MG DR capsule Take 1 capsule (40 mg) by mouth daily Take 30-60 minutes before a meal.  Qty: 90 capsule, Refills: 3      SUMAtriptan (IMITREX) 25 MG tablet TAKE 1 TAB BY " MOUTH AT ONSET OF HEADACHE FOR MIGRAINE MAY REPEAT IN 2 HOURS. MAX 8 TABLETS/24 HOURS.  Qty: 12 tablet, Refills: 1    Associated Diagnoses: Migraine with aura and without status migrainosus, not intractable      topiramate (TOPAMAX) 25 MG tablet TAKE 1 TABLET BY MOUTH TWICE A DAY  Qty: 180 tablet, Refills: 0    Associated Diagnoses: Migraine with aura and without status migrainosus, not intractable      naproxen (NAPROSYN) 500 MG tablet TAKE 1 TABLET BY MOUTH TWICE A DAY WITH MEALS  Qty: 60 tablet, Refills: 1    Associated Diagnoses: Arthralgia, unspecified joint       !! - Potential duplicate medications found. Please discuss with provider.          Discharge Instructions:         Jonathan Martinez MD

## 2020-09-04 NOTE — PLAN OF CARE
Face to face report given with opportunity to observe patient.    Report given to Enriqueta Dukes RN   9/4/2020  7:06 AM

## 2020-09-04 NOTE — PLAN OF CARE
Prior to Admission Medication Reconciliation:     Medications added:   [x] None  [] As listed below:    Medications deleted:   [x] None  [] As listed below:    Changes made to existing medications:   [] None  [x] As listed below:    Duloxetine 30 mg- input as BID- pt takes 30 mg once daily with a 60 mg cap    Last times/dates taken verified with patient:  [] Yes- completed myself  [x] Nurse completed no changes made  [] Unable to review with patient:  [] Nurse completed/changes made:       Allergy modifications:    [x]Did not review  []Patient verified NKA  []Patient verified current existing allergies: no changes made  []New allergies: listed below      Medication reconciliation sources:   [x]Patient  []Patient family member/emergency contact: **  [][facility] Review  []Epic Chart Review  []Care Everywhere review  [x]Pharm[facility] med list: [facility]   Name Strength Instructions Last Fill Date QTY/DS Notes   [] adderall 10 mg BID 8/6/20 30    [] duloxetine 30 mg daily 5/6/90 90    [] duloxetine 60 mg daily 2/17/20     [] omeprazole 40 mg daily 2/17/20 90    [] topiramate 25 mg bid 6/3/20 60    []Pharmacy phone call  [x]Outside meds dispense report  []Nursing home or Assisted Living MAR:  []Other: **    Pharmacy desired at discharge[facility]    Is patient on coumadin?  [x]No      Requests for consultation by provider or pharmacist:   [] Patient understands why all of their meds were prescribed and how to take them. No questions.   [] Fill dates coincide with compliancy for all maintenance meds.   [] Fill dates do not coincide with compliancy with maintenance meds. See notes in PTA medlist about how patient is taking.   [] Patient has questions about the following:      Comments: spoke with patient briefly. Updated duloxetine. D/C'd.     Farzaneh Pabon on 9/4/2020 at 9:04 AM       Discrepancies: [x] No []Not Applicable []Yes: listed below    Time spent on medication reconciliation:   []5-20 mins  []20-40 mins  []> 40  mins    Issues completing PTA medication reconciliation:  [] On hold for a long time  [] Waited for a call back  [] Fax didn't come through  [] Fax took a long time  [] Other:    Notifying appropriate party of changes/additions/discrepancies:  []No pertinent changes made, notification not necessary.   [] Notified attending provider via text page  [] Notified attending provider in person  [] Notified pharmacy  [] Notified nurse  [] Attending provider not available, left detailed notes  [] Changes/additions made don't need provider notification because provider has not seen patient or input any orders  [x] Changes/additions made don't need provider notification because changes made are to medications not ordered    Medications Prior to Admission   Medication Sig Dispense Refill Last Dose     acyclovir (ZOVIRAX) 400 MG tablet Take 1 tablet (400 mg) by mouth 3 times daily For 10 days as needed for HSV breakout. 90 tablet 1 Past Month at Unknown time     amphetamine-dextroamphetamine (ADDERALL) 10 MG tablet Take 1 tablet (10 mg) by mouth 2 times daily 60 tablet 0 9/2/2020 at Unknown time     DULoxetine (CYMBALTA) 30 MG capsule Take 30 mg by mouth daily    9/2/2020 at Unknown time     DULoxetine (CYMBALTA) 60 MG capsule Take 60 mg by mouth daily   9/2/2020 at Unknown time     omeprazole (PRILOSEC) 40 MG DR capsule Take 1 capsule (40 mg) by mouth daily Take 30-60 minutes before a meal. 90 capsule 3 9/2/2020 at Unknown time     SUMAtriptan (IMITREX) 25 MG tablet TAKE 1 TAB BY MOUTH AT ONSET OF HEADACHE FOR MIGRAINE MAY REPEAT IN 2 HOURS. MAX 8 TABLETS/24 HOURS. 12 tablet 1 Past Week at Unknown time     topiramate (TOPAMAX) 25 MG tablet TAKE 1 TABLET BY MOUTH TWICE A  tablet 0 9/2/2020 at Unknown time     naproxen (NAPROSYN) 500 MG tablet TAKE 1 TABLET BY MOUTH TWICE A DAY WITH MEALS (Patient taking differently: 2 times daily as needed ) 60 tablet 1

## 2020-09-04 NOTE — PLAN OF CARE
Pt has been afebrile through the day.  Her blood pressures have been soft through the morning- did come up to her baseline.  Her O2 sats are in the low 90's on RA, lung sounds are clear.  She does rate her pain/discomfort 6-8/10 - received her scheduled Toradol, as needed Lorelei, also ice packs- stated adequate pain control.  Trochar incision sites are C/D/I, bowel sounds are active.  She was saline locked this morning - eating a full liquid diet - no complaints of nausea - advanced to regular diet for lunch.  She did get up and ambulate around the unit - tolerated well - slow moving. She has remained free of falls, call light within reach - makes her needs known, frequent rounding done to assess needs. Mom here visiting and awaiting discharge.

## 2020-09-04 NOTE — PLAN OF CARE
Patient admitted around 2100 from PACU for monitoring after lap appy. Has required O2 majority of night, sats will decrease into the upper 80's on RA when patient is sleeping. Did wean O2 off this morning. Lungs initially coarse and now dim/clear t/o. BPs remain soft. Patient states this is her baseline and is asymptomatic. Medicated for RLQ abd pain which is radiating to the left lower abdomin. Given scheduled Toradol, PRN Dilaudid, and ice packs which provide tolerable relief. Patient has been taking in minimal liquids, IV fluids infusing @ 100 ml.hr. Has been up to void every few hours. Patient's incision sites remains CDI with very scant amount of drainage. Patient is able to move around and ambulate to bathroom with little to no assistance.

## 2020-09-04 NOTE — ANESTHESIA CARE TRANSFER NOTE
Patient: Lenora Gage    Procedure(s):  APPENDECTOMY, LAPAROSCOPIC    Diagnosis: Acute appendicitis with perforation and generalized peritonitis, without abscess, unspecified whether gangrene present [K35.20]  Diagnosis Additional Information: No value filed.    Anesthesia Type:   General     Note:  Airway :Nasal Cannula  Patient transferred to:ICU  ICU Handoff: Call for PAUSE to initiate/utilize ICU HANDOFF, Identified Patient, Identified Responsible Provider, Reviewed the Pertinent Medical History, Discussed Surgical Course, Reviewed Intra-OP Anesthesia Management and Issues during Anesthesia, Set Expectations for Post Procedure Period and Allowed Opportunity for Questions and Acknowledgement of Understanding      Vitals: (Last set prior to Anesthesia Care Transfer)    CRNA VITALS  9/3/2020 1909 - 9/3/2020 2002      9/3/2020             Resp Rate (set):  8                Electronically Signed By: TRAVIS Palacios CRNA  September 3, 2020  8:02 PM

## 2020-09-04 NOTE — OR NURSING
Pt transferred to medical surgical floor from recovery. Pt in stable condition. Pain 4/10, pt states tolerable with ice. Oxygen saturations 92-93%. Bps run low per pt at baseline, pt running 90s/60s during recovery. Bedside report given to nurse and all questions answered. Rick 10/10.

## 2020-09-04 NOTE — PLAN OF CARE
[facility] Inpatient Admission Note:    Patient admitted to 3218/3218-1 at approximately 2100 via cart accompanied by other:Becky SCHMID from surgery . Report received from Becky SCHMID in SBAR format at 2100 via face to face in room. Patient ambulated to bed via self.. Patient is alert and oriented X 3, reports pain; rates at 5 on 0-10 scale.  Patient oriented to room, unit, hourly rounding, and plan of care. Explained admission packet and patient handbook with patient bill of rights brochure. Will continue to monitor and document as needed.     Inpatient Nursing criteria listed below was met:    Health care directives status obtained and documented: Yes    Care Everywhere authorization obtained No    MRSA swab completed for patient 65 years and older: N/A    Patient identifies a surrogate decision maker: Yes If yes, who:Spouse Ludwin Contact Information:[phone]     If initial lactic acid >2.0, repeat lactic acid drawn within one hour of arrival to unit: NA. If no, state reason: WDL    Vaccination assessment and education completed: Yes   Vaccinations received prior to admission: Pneumovax no  Influenza(seasonal)  YES   Vaccination(s) ordered: patient declines    Clergy visit ordered if patient requests: No    Skin issues/needs documented: Yes    Isolation Patient: no Education given, correct sign in place and documentation row added to PCS:  No    Fall Prevention Yes: Care plan updated, education given and documented, sticker and magnet in place: No    Care Plan initiated: Yes    Education Documented (including assessment): Yes    Patient has discharge needs : No If yes, please explain:Follow up with surgery

## 2020-09-04 NOTE — ANESTHESIA POSTPROCEDURE EVALUATION
Patient: Lenora Gage    Procedure(s):  APPENDECTOMY, LAPAROSCOPIC    Diagnosis:Acute appendicitis with perforation and generalized peritonitis, without abscess, unspecified whether gangrene present [K35.20]  Diagnosis Additional Information: No value filed.    Anesthesia Type:  General    Note:  Anesthesia Post Evaluation    Patient location during evaluation: Floor  Patient participation: Able to fully participate in evaluation  Level of consciousness: awake and alert  Pain management: adequate  Airway patency: patent  Cardiovascular status: acceptable  Respiratory status: acceptable  Hydration status: acceptable  PONV: none             Last vitals:  Vitals:    09/03/20 1945 09/03/20 1950 09/03/20 1955   BP: 102/61 112/63 99/59   Pulse: 85 76 68   Resp: (P) 16 (P) 14 (P) 16   Temp: (P) 97.4  F (36.3  C)     SpO2: (!) 90% 92% 95%         Electronically Signed By: TRAVIS Palacios CRNA  September 3, 2020  8:06 PM

## 2020-09-04 NOTE — PLAN OF CARE
Patient discharged at 2:55 PM via wheel chair accompanied by mother and staff. Prescriptions sent to patients preferred pharmacy. All belongings sent with patient.     Discharge instructions reviewed with Lenora. Listed belongings gathered and returned to patient. yes    Patient discharged to home.   Report called to NA    Core Measures and Vaccines  Core Measures applicable during stay: No. If yes, state diagnosis: NA  Pneumonia and Influenza given prior to discharge, if indicated: N/A    Surgical Patient   Surgical Procedures during stay: yes  Did patient receive discharge instruction on wound care and recognition of infection symptoms? Yes    MISC  Follow up appointment made:  Yes  Home and hospital aquired medications returned to patient: N/A  Patient reports pain was well managed at discharge: Yes

## 2020-09-08 DIAGNOSIS — F90.2 ADHD (ATTENTION DEFICIT HYPERACTIVITY DISORDER), COMBINED TYPE: ICD-10-CM

## 2020-09-08 LAB — COPATH REPORT: NORMAL

## 2020-09-08 RX ORDER — DEXTROAMPHETAMINE SACCHARATE, AMPHETAMINE ASPARTATE, DEXTROAMPHETAMINE SULFATE AND AMPHETAMINE SULFATE 2.5; 2.5; 2.5; 2.5 MG/1; MG/1; MG/1; MG/1
10 TABLET ORAL 2 TIMES DAILY
Qty: 60 TABLET | Refills: 0 | Status: SHIPPED | OUTPATIENT
Start: 2020-09-08 | End: 2020-10-07

## 2020-09-08 NOTE — TELEPHONE ENCOUNTER
Adderall 10 mg   Last Written Prescription Date:  8-5-2020   Last Fill Quantity: 60,   # refills: 0  Last Office Visit: 5-4-2020 virtual visit   Future Office visit:       Routing refill request to provider for review/approval because:

## 2020-09-16 NOTE — PATIENT INSTRUCTIONS
Thank you for allowing JOSÉ Duran and our surgical team to participate in your care. Please call our health unit coordinator at [phone] with scheduling questions or the nurse at [phone] with any other questions or concerns.

## 2020-09-18 ENCOUNTER — OFFICE VISIT (OUTPATIENT)
Dept: SURGERY | Facility: OTHER | Age: 45
End: 2020-09-18
Attending: CLINICAL NURSE SPECIALIST
Payer: COMMERCIAL

## 2020-09-18 VITALS
TEMPERATURE: 97.6 F | BODY MASS INDEX: 27.23 KG/M2 | WEIGHT: 148 LBS | HEART RATE: 90 BPM | DIASTOLIC BLOOD PRESSURE: 60 MMHG | SYSTOLIC BLOOD PRESSURE: 94 MMHG | OXYGEN SATURATION: 96 % | HEIGHT: 62 IN

## 2020-09-18 DIAGNOSIS — Z90.49 S/P APPENDECTOMY: Primary | ICD-10-CM

## 2020-09-18 PROCEDURE — 99024 POSTOP FOLLOW-UP VISIT: CPT | Performed by: CLINICAL NURSE SPECIALIST

## 2020-09-18 ASSESSMENT — PAIN SCALES - GENERAL: PAINLEVEL: MODERATE PAIN (4)

## 2020-09-18 ASSESSMENT — MIFFLIN-ST. JEOR: SCORE: 1264.57

## 2020-09-18 NOTE — NURSING NOTE
"Chief Complaint   Patient presents with     Surgical Followup     laparoscopic appendectomy with Dr. Martinez       Initial BP 94/60   Pulse 90   Temp 97.6  F (36.4  C)   Ht 1.575 m (5' 2\")   Wt 67.1 kg (148 lb)   SpO2 96%   BMI 27.07 kg/m   Estimated body mass index is 27.07 kg/m  as calculated from the following:    Height as of this encounter: 1.575 m (5' 2\").    Weight as of this encounter: 67.1 kg (148 lb).  Medication Reconciliation: complete  EVELIO GAMBOA LPN    "

## 2020-10-07 DIAGNOSIS — F90.2 ADHD (ATTENTION DEFICIT HYPERACTIVITY DISORDER), COMBINED TYPE: ICD-10-CM

## 2020-10-07 RX ORDER — DEXTROAMPHETAMINE SACCHARATE, AMPHETAMINE ASPARTATE, DEXTROAMPHETAMINE SULFATE AND AMPHETAMINE SULFATE 2.5; 2.5; 2.5; 2.5 MG/1; MG/1; MG/1; MG/1
10 TABLET ORAL 2 TIMES DAILY
Qty: 60 TABLET | Refills: 0 | Status: SHIPPED | OUTPATIENT
Start: 2020-10-07 | End: 2020-11-11

## 2020-10-19 DIAGNOSIS — Z12.31 ENCOUNTER FOR SCREENING MAMMOGRAM FOR BREAST CANCER: Primary | ICD-10-CM

## 2020-10-26 PROBLEM — K35.30 ACUTE APPENDICITIS WITH LOCALIZED PERITONITIS WITHOUT ABSCESS: Status: RESOLVED | Noted: 2020-09-03 | Resolved: 2020-10-26

## 2020-10-26 PROBLEM — K35.201 ACUTE APPENDICITIS WITH PERFORATION AND GENERALIZED PERITONITIS, WITHOUT ABSCESS, UNSPECIFIED WHETHER GANGRENE PRESENT: Status: RESOLVED | Noted: 2020-09-03 | Resolved: 2020-10-26

## 2020-10-27 DIAGNOSIS — G43.109 MIGRAINE WITH AURA AND WITHOUT STATUS MIGRAINOSUS, NOT INTRACTABLE: ICD-10-CM

## 2020-10-27 RX ORDER — TOPIRAMATE 25 MG/1
TABLET, FILM COATED ORAL
Qty: 180 TABLET | Refills: 0 | Status: SHIPPED | OUTPATIENT
Start: 2020-10-27 | End: 2020-11-11

## 2020-10-27 NOTE — TELEPHONE ENCOUNTER
topiramate 25 mg      Last Written Prescription Date:  8-5-2020  Last Fill Quantity: 180,   # refills: 0  Last Office Visit: 5-4-2020  Future Office visit:

## 2020-11-10 ENCOUNTER — TELEPHONE (OUTPATIENT)
Dept: FAMILY MEDICINE | Facility: OTHER | Age: 45
End: 2020-11-10

## 2020-11-10 NOTE — PROGRESS NOTES
".Subjective     Lenora Gage is a 45 year old female who presents to clinic today for the following health issues:    HPI         ADHD:  Patient was diagnosed with ADHD in 2/2020. Currently taking Adderall 10 mg twice daily. Feels it is working well. Able to get more done. Less distracted. Able to stay on track and focus. Denies any side effects from the medication including chest pain, shortness of breath, dizziness, palpitations, or syncope. No insomnia. Sleeping a lot better.     She also suffers from anxiety and depression. Currently taking Cymbalta 90 mg daily. Feels this is working well. No thoughts of suicide. Going to counseling once weekly-being seen at [facility]. Feels this is helping. Plans to continue.     H/O migraines; taking topamax 25 mg BID. Feels this is helping. Rarely has a headache. Also taking PRN imitrex. Does not remember the last time she had to use this.     Also has reflux; controlled with omeprazole. No melena. No nausea or vomiting. No abdominal pain.     Also has cold sores, requests a refill of her acyclovir tablets. Feels the masks are triggering her cold sores. Has also used Abreva cream in the past and would like a refill.     Due for the influenza vaccine. Declines.     Due for fasting labs. Would like done today.     Review of Systems   As noted in the HPI.       Objective    BP 98/62 (BP Location: Left arm, Patient Position: Chair, Cuff Size: Adult Regular)   Pulse 84   Temp 98.7  F (37.1  C) (Tympanic)   Ht 1.575 m (5' 2\")   Wt 66.6 kg (146 lb 12.8 oz)   SpO2 98%   BMI 26.85 kg/m    Body mass index is 26.85 kg/m .  Physical Exam   GENERAL: healthy, alert and no distress  EYES: Eyes grossly normal to inspection, PERRL and conjunctivae and sclerae normal  HENT: ear canals and TM's normal, nose and mouth without ulcers or lesions  NECK: no adenopathy, no asymmetry, masses, or scars and thyroid normal to palpation  RESP: lungs clear to auscultation - no rales, " rhonchi or wheezes  CV: regular rate and rhythm, normal S1 S2, no S3 or S4, no murmur, click or rub, no peripheral edema  ABDOMEN: soft, nontender, no hepatosplenomegaly, no masses and bowel sounds normal  NEURO: Normal strength and tone, mentation intact and speech normal  PSYCH: mentation appears normal, affect normal/bright    Labs Pending        Assessment & Plan     ADHD (attention deficit hyperactivity disorder), combined type  Controlled. Pt shows no signs of diversion or abuse. Up to 3 month of his/her ADD/ADHD medications given today. Pt is aware that he/she is solely responsible for protections of the prescriptions. I will not tolerate any lost or stolen prescriptions lightly. Will see pt back in office in 3 months. Sooner with worsening symptoms.     - amphetamine-dextroamphetamine (ADDERALL) 10 MG tablet; Take 1 tablet (10 mg) by mouth 2 times daily  - amphetamine-dextroamphetamine (ADDERALL) 10 MG tablet; Take 1 tablet (10 mg) by mouth 2 times daily  - amphetamine-dextroamphetamine (ADDERALL) 10 MG tablet; Take 1 tablet (10 mg) by mouth 2 times daily    AZ (generalized anxiety disorder)  Major depressive disorder, recurrent episode, moderate (H)  Stable. Continue Cymbalta 90 mg daily. Will also continue counseling. F/up 3 months. Sooner with new or worsening symptoms.     - DULoxetine (CYMBALTA) 30 MG capsule; Take 1 capsule (30 mg) by mouth daily  - DULoxetine (CYMBALTA) 60 MG capsule; Take 1 capsule (60 mg) by mouth daily    Lipid screening  - Lipid Profile  -Will notify patient of the results when available and intervene accordingly.     Screening for HIV (human immunodeficiency virus)  - HIV Antigen Antibody Combo  -Will notify patient of the results when available and intervene accordingly.     Migraine with aura and without status migrainosus, not intractable  Controlled with Topamax. Rarely occur.     - topiramate (TOPAMAX) 25 MG tablet; Take 1 tablet (25 mg) by mouth 2 times daily    Herpes  "simplex virus infection  - acyclovir (ZOVIRAX) 400 MG tablet; Take 1 tablet (400 mg) by mouth 3 times daily For 10 days as needed for HSV breakout.    Screening for diabetes mellitus  - Basic metabolic panel  -Will notify patient of the results when available and intervene accordingly.     Gastroesophageal reflux disease with esophagitis without hemorrhage  Controlled. Will continue omeprazole.     - omeprazole (PRILOSEC) 40 MG DR capsule; Take 1 capsule (40 mg) by mouth daily Take 30-60 minutes before a meal.     BMI:   Estimated body mass index is 26.85 kg/m  as calculated from the following:    Height as of this encounter: 1.575 m (5' 2\").    Weight as of this encounter: 66.6 kg (146 lb 12.8 oz).       Return in about 3 months (around 2/11/2021).    Doreen Galo NP  [facility] - [place]    "

## 2020-11-11 ENCOUNTER — OFFICE VISIT (OUTPATIENT)
Dept: FAMILY MEDICINE | Facility: OTHER | Age: 45
End: 2020-11-11
Attending: NURSE PRACTITIONER
Payer: COMMERCIAL

## 2020-11-11 VITALS
TEMPERATURE: 98.7 F | DIASTOLIC BLOOD PRESSURE: 62 MMHG | SYSTOLIC BLOOD PRESSURE: 98 MMHG | BODY MASS INDEX: 27.02 KG/M2 | HEART RATE: 84 BPM | OXYGEN SATURATION: 98 % | HEIGHT: 62 IN | WEIGHT: 146.8 LBS

## 2020-11-11 DIAGNOSIS — F41.1 GAD (GENERALIZED ANXIETY DISORDER): ICD-10-CM

## 2020-11-11 DIAGNOSIS — B00.9 HERPES SIMPLEX VIRUS INFECTION: ICD-10-CM

## 2020-11-11 DIAGNOSIS — K21.00 GASTROESOPHAGEAL REFLUX DISEASE WITH ESOPHAGITIS WITHOUT HEMORRHAGE: ICD-10-CM

## 2020-11-11 DIAGNOSIS — G43.109 MIGRAINE WITH AURA AND WITHOUT STATUS MIGRAINOSUS, NOT INTRACTABLE: ICD-10-CM

## 2020-11-11 DIAGNOSIS — Z13.220 LIPID SCREENING: ICD-10-CM

## 2020-11-11 DIAGNOSIS — F90.2 ADHD (ATTENTION DEFICIT HYPERACTIVITY DISORDER), COMBINED TYPE: Primary | ICD-10-CM

## 2020-11-11 DIAGNOSIS — Z11.4 SCREENING FOR HIV (HUMAN IMMUNODEFICIENCY VIRUS): ICD-10-CM

## 2020-11-11 DIAGNOSIS — Z13.1 SCREENING FOR DIABETES MELLITUS: ICD-10-CM

## 2020-11-11 DIAGNOSIS — F33.1 MAJOR DEPRESSIVE DISORDER, RECURRENT EPISODE, MODERATE (H): ICD-10-CM

## 2020-11-11 DIAGNOSIS — R73.09 ELEVATED GLUCOSE: Primary | ICD-10-CM

## 2020-11-11 LAB
ANION GAP SERPL CALCULATED.3IONS-SCNC: 5 MMOL/L (ref 3–14)
BUN SERPL-MCNC: 10 MG/DL (ref 7–30)
CALCIUM SERPL-MCNC: 8.7 MG/DL (ref 8.5–10.1)
CHLORIDE SERPL-SCNC: 106 MMOL/L (ref 94–109)
CHOLEST SERPL-MCNC: 178 MG/DL
CO2 SERPL-SCNC: 27 MMOL/L (ref 20–32)
CREAT SERPL-MCNC: 0.79 MG/DL (ref 0.52–1.04)
GFR SERPL CREATININE-BSD FRML MDRD: 89 ML/MIN/{1.73_M2}
GLUCOSE SERPL-MCNC: 111 MG/DL (ref 70–99)
HDLC SERPL-MCNC: 53 MG/DL
LDLC SERPL CALC-MCNC: 103 MG/DL
NONHDLC SERPL-MCNC: 125 MG/DL
POTASSIUM SERPL-SCNC: 3.3 MMOL/L (ref 3.4–5.3)
SODIUM SERPL-SCNC: 138 MMOL/L (ref 133–144)
TRIGL SERPL-MCNC: 108 MG/DL

## 2020-11-11 PROCEDURE — 36415 COLL VENOUS BLD VENIPUNCTURE: CPT | Performed by: NURSE PRACTITIONER

## 2020-11-11 PROCEDURE — 80048 BASIC METABOLIC PNL TOTAL CA: CPT | Performed by: NURSE PRACTITIONER

## 2020-11-11 PROCEDURE — 80061 LIPID PANEL: CPT | Performed by: NURSE PRACTITIONER

## 2020-11-11 PROCEDURE — 99214 OFFICE O/P EST MOD 30 MIN: CPT | Performed by: NURSE PRACTITIONER

## 2020-11-11 PROCEDURE — 87389 HIV-1 AG W/HIV-1&-2 AB AG IA: CPT | Performed by: NURSE PRACTITIONER

## 2020-11-11 RX ORDER — DEXTROAMPHETAMINE SACCHARATE, AMPHETAMINE ASPARTATE, DEXTROAMPHETAMINE SULFATE AND AMPHETAMINE SULFATE 2.5; 2.5; 2.5; 2.5 MG/1; MG/1; MG/1; MG/1
10 TABLET ORAL 2 TIMES DAILY
Qty: 60 TABLET | Refills: 0 | Status: SHIPPED | OUTPATIENT
Start: 2020-11-11 | End: 2021-08-16

## 2020-11-11 RX ORDER — TOPIRAMATE 25 MG/1
25 TABLET, FILM COATED ORAL 2 TIMES DAILY
Qty: 180 TABLET | Refills: 1 | Status: SHIPPED | OUTPATIENT
Start: 2020-11-11 | End: 2021-07-06

## 2020-11-11 RX ORDER — DULOXETIN HYDROCHLORIDE 30 MG/1
30 CAPSULE, DELAYED RELEASE ORAL DAILY
Qty: 90 CAPSULE | Refills: 3 | Status: SHIPPED | OUTPATIENT
Start: 2020-11-11 | End: 2021-05-10

## 2020-11-11 RX ORDER — DOCOSANOL 100 MG/G
CREAM TOPICAL
Qty: 2 G | Refills: 1 | Status: SHIPPED | OUTPATIENT
Start: 2020-11-11 | End: 2021-11-09

## 2020-11-11 RX ORDER — DEXTROAMPHETAMINE SACCHARATE, AMPHETAMINE ASPARTATE, DEXTROAMPHETAMINE SULFATE AND AMPHETAMINE SULFATE 2.5; 2.5; 2.5; 2.5 MG/1; MG/1; MG/1; MG/1
10 TABLET ORAL 2 TIMES DAILY
Qty: 60 TABLET | Refills: 0 | Status: SHIPPED | OUTPATIENT
Start: 2021-01-11 | End: 2021-02-10

## 2020-11-11 RX ORDER — OMEPRAZOLE 40 MG/1
40 CAPSULE, DELAYED RELEASE ORAL DAILY
Qty: 90 CAPSULE | Refills: 3 | Status: SHIPPED | OUTPATIENT
Start: 2020-11-11 | End: 2021-11-02

## 2020-11-11 RX ORDER — DEXTROAMPHETAMINE SACCHARATE, AMPHETAMINE ASPARTATE, DEXTROAMPHETAMINE SULFATE AND AMPHETAMINE SULFATE 2.5; 2.5; 2.5; 2.5 MG/1; MG/1; MG/1; MG/1
10 TABLET ORAL 2 TIMES DAILY
Qty: 60 TABLET | Refills: 0 | Status: SHIPPED | OUTPATIENT
Start: 2020-12-11 | End: 2021-08-16

## 2020-11-11 RX ORDER — DULOXETIN HYDROCHLORIDE 60 MG/1
60 CAPSULE, DELAYED RELEASE ORAL DAILY
Qty: 90 CAPSULE | Refills: 3 | Status: SHIPPED | OUTPATIENT
Start: 2020-11-11 | End: 2021-05-10

## 2020-11-11 RX ORDER — ACYCLOVIR 400 MG/1
400 TABLET ORAL 3 TIMES DAILY
Qty: 90 TABLET | Refills: 1 | Status: SHIPPED | OUTPATIENT
Start: 2020-11-11 | End: 2022-11-08

## 2020-11-11 ASSESSMENT — ANXIETY QUESTIONNAIRES
1. FEELING NERVOUS, ANXIOUS, OR ON EDGE: MORE THAN HALF THE DAYS
3. WORRYING TOO MUCH ABOUT DIFFERENT THINGS: SEVERAL DAYS
GAD7 TOTAL SCORE: 8
6. BECOMING EASILY ANNOYED OR IRRITABLE: SEVERAL DAYS
4. TROUBLE RELAXING: SEVERAL DAYS
2. NOT BEING ABLE TO STOP OR CONTROL WORRYING: SEVERAL DAYS
IF YOU CHECKED OFF ANY PROBLEMS ON THIS QUESTIONNAIRE, HOW DIFFICULT HAVE THESE PROBLEMS MADE IT FOR YOU TO DO YOUR WORK, TAKE CARE OF THINGS AT HOME, OR GET ALONG WITH OTHER PEOPLE: SOMEWHAT DIFFICULT
7. FEELING AFRAID AS IF SOMETHING AWFUL MIGHT HAPPEN: SEVERAL DAYS
5. BEING SO RESTLESS THAT IT IS HARD TO SIT STILL: SEVERAL DAYS

## 2020-11-11 ASSESSMENT — PAIN SCALES - GENERAL: PAINLEVEL: NO PAIN (0)

## 2020-11-11 ASSESSMENT — MIFFLIN-ST. JEOR: SCORE: 1259.13

## 2020-11-11 ASSESSMENT — PATIENT HEALTH QUESTIONNAIRE - PHQ9: SUM OF ALL RESPONSES TO PHQ QUESTIONS 1-9: 9

## 2020-11-11 NOTE — NURSING NOTE
"Chief Complaint   Patient presents with     A.D.H.D       Initial BP 98/62 (BP Location: Left arm, Patient Position: Chair, Cuff Size: Adult Regular)   Pulse 103   Temp 98.7  F (37.1  C) (Tympanic)   Ht 1.575 m (5' 2\")   Wt 66.6 kg (146 lb 12.8 oz)   SpO2 98%   BMI 26.85 kg/m   Estimated body mass index is 26.85 kg/m  as calculated from the following:    Height as of this encounter: 1.575 m (5' 2\").    Weight as of this encounter: 66.6 kg (146 lb 12.8 oz).  Medication Reconciliation: complete  Bindu Castle LPN  "

## 2020-11-12 LAB — HIV 1+2 AB+HIV1 P24 AG SERPL QL IA: NONREACTIVE

## 2020-11-12 ASSESSMENT — ANXIETY QUESTIONNAIRES: GAD7 TOTAL SCORE: 8

## 2020-11-18 DIAGNOSIS — R73.09 ELEVATED GLUCOSE: ICD-10-CM

## 2020-11-18 LAB
EST. AVERAGE GLUCOSE BLD GHB EST-MCNC: 103 MG/DL
HBA1C MFR BLD: 5.2 % (ref 0–5.6)

## 2020-11-18 PROCEDURE — 83036 HEMOGLOBIN GLYCOSYLATED A1C: CPT | Performed by: NURSE PRACTITIONER

## 2020-11-18 PROCEDURE — 36415 COLL VENOUS BLD VENIPUNCTURE: CPT | Performed by: NURSE PRACTITIONER

## 2020-12-14 NOTE — PROGRESS NOTES
SUBJECTIVE:   CC: Lenora Gage is an 45 year old woman who presents for preventive health visit.       Patient has been advised of split billing requirements and indicates understanding: Yes     Healthy Habits:    Do you get at least three servings of calcium containing foods daily (dairy, green leafy vegetables, etc.)? No     Amount of exercise or daily activities, outside of work: 0     Problems taking medications regularly No    Medication side effects: No    Have you had an eye exam in the past two years? yes    Do you see a dentist twice per year? yes    Do you have sleep apnea, excessive snoring or daytime drowsiness? no      Today's PHQ-2 Score:   PHQ-2 ( 1999 [employer]) 3/23/2020 11/5/2019   Q1: Little interest or pleasure in doing things 0 0   Q2: Feeling down, depressed or hopeless 0 0   PHQ-2 Score 0 0       Abuse: Current or Past(Physical, Sexual or Emotional)- No  Do you feel safe in your environment? Yes        Social History     Tobacco Use     Smoking status: Never Smoker     Smokeless tobacco: Never Used   Substance Use Topics     Alcohol use: Yes     Alcohol/week: 0.0 standard drinks     Comment: RARELY     If you drink alcohol do you typically have >3 drinks per day or >7 drinks per week? No                     Reviewed orders with patient.  Reviewed health maintenance and updated orders accordingly - Yes  Patient Active Problem List   Diagnosis     OCD (obsessive compulsive disorder)     Stress due to illness of family member     Midline low back pain without sciatica     Gastroesophageal reflux disease without esophagitis     ACP (advance care planning)     Prolonged grief reaction     Major depressive disorder, recurrent episode, moderate (H)     AZ (generalized anxiety disorder)     Acute intractable tension-type headache     Lateral epicondylitis of right elbow     Myalgia     Rheumatoid factor positive     Past Surgical History:   Procedure Laterality Date     bladder reconstruction and  mesh removal  2012     BLADDER REPAIR  2010     colposcopy with biopsy-mutliple  10/2014     HYSTERECTOMY TOTAL ABDOMINAL, BILATERAL SALPINGO-OOPHORECTOMY, COMBINED Left     Right ovary remains.      LAPAROSCOPIC APPENDECTOMY N/A 9/3/2020    Procedure: APPENDECTOMY, LAPAROSCOPIC;  Surgeon: Jonathan Martinez MD;  Location: [facility]     TUBAL LIGATION         Social History     Tobacco Use     Smoking status: Never Smoker     Smokeless tobacco: Never Used   Substance Use Topics     Alcohol use: Yes     Alcohol/week: 0.0 standard drinks     Comment: RARELY     Family History   Problem Relation Age of Onset     Other - See Comments Paternal Grandmother         Antitrysin Deficiency     Cancer Mother         Cervical     Cancer - colorectal Maternal Grandfather          Current Outpatient Medications   Medication Sig Dispense Refill     acyclovir (ZOVIRAX) 400 MG tablet Take 1 tablet (400 mg) by mouth 3 times daily For 10 days as needed for HSV breakout. 90 tablet 1     amphetamine-dextroamphetamine (ADDERALL) 10 MG tablet Take 1 tablet (10 mg) by mouth 2 times daily 60 tablet 0     amphetamine-dextroamphetamine (ADDERALL) 10 MG tablet Take 1 tablet (10 mg) by mouth 2 times daily 60 tablet 0     [START ON 1/11/2021] amphetamine-dextroamphetamine (ADDERALL) 10 MG tablet Take 1 tablet (10 mg) by mouth 2 times daily 60 tablet 0     docosanol (ABREVA) 10 % CREA cream Apply topically 5 times daily Do not use more than 10 days 2 g 1     DULoxetine (CYMBALTA) 30 MG capsule Take 1 capsule (30 mg) by mouth daily 90 capsule 3     DULoxetine (CYMBALTA) 60 MG capsule Take 1 capsule (60 mg) by mouth daily 90 capsule 3     omeprazole (PRILOSEC) 40 MG DR capsule Take 1 capsule (40 mg) by mouth daily Take 30-60 minutes before a meal. 90 capsule 3     SUMAtriptan (IMITREX) 25 MG tablet TAKE 1 TAB BY MOUTH AT ONSET OF HEADACHE FOR MIGRAINE MAY REPEAT IN 2 HOURS. MAX 8 TABLETS/24 HOURS. 12 tablet 1     topiramate (TOPAMAX) 25 MG tablet Take 1  tablet (25 mg) by mouth 2 times daily 180 tablet 1     Allergies   Allergen Reactions     Blood Transfusion Related (Informational Only) Other (See Comments)     Patient has a history of a clinically significant antibody against RBC antigens.  A delay in compatible RBCs may occur.     Penicillins Anaphylaxis and Hives       Mammogram Screening: Patient under age 50, mutual decision reflected in health maintenance. Pertinent mammograms are reviewed under the imaging tab. Last done in 8/2019-negative.     She thinks maternal grandfather had colon cancer, diagnosed at age 50.     History of abnormal Pap smear: NO - age 30- 65 PAP every 3 years recommended    PAP / HPV Latest Ref Rng & Units 8/2/2019   PAP - NIL   HPV 16 DNA NEG:Negative Negative   HPV 18 DNA NEG:Negative Negative   OTHER HR HPV NEG:Negative Negative     Reviewed and updated as needed this visit by clinical staff  Tobacco  Allergies  Meds  Problems  Med Hx  Surg Hx  Fam Hx          Reviewed and updated as needed this visit by Provider  Tobacco   Meds  Problems  Med Hx  Surg Hx  Fam Hx         Past Medical History:   Diagnosis Date     Adjustment disorder with anxiety      Constipation 07/17/2012     Dysmenorrhea 06/20/2002     Dyspareunia 06/13/2007     Dysplasia of cervix (uteri)  07/25/2000     Endometriosis of uterus 12/29/2003     Esophageal reflux 02/07/2000     Helicobacter positive gastritis      IBS (Irritable colon syndrome) 07/06/2011     OCD (obsessive compulsive disorder)       Past Surgical History:   Procedure Laterality Date     bladder reconstruction and mesh removal  2012     BLADDER REPAIR  2010     colposcopy with biopsy-mutliple  10/2014     HYSTERECTOMY TOTAL ABDOMINAL, BILATERAL SALPINGO-OOPHORECTOMY, COMBINED Left     Right ovary remains.      LAPAROSCOPIC APPENDECTOMY N/A 9/3/2020    Procedure: APPENDECTOMY, LAPAROSCOPIC;  Surgeon: Jonathan Martinez MD;  Location: [facility]     TUBAL LIGATION         ROS:  CONSTITUTIONAL:  "NEGATIVE for fever, chills, change in weight  INTEGUMENTARU/SKIN: NEGATIVE for worrisome rashes, moles or lesions  EYES: NEGATIVE for vision changes or irritation  ENT: NEGATIVE for ear, mouth and throat problems  RESP: NEGATIVE for significant cough or SOB  BREAST: NEGATIVE for masses, tenderness or discharge  CV: NEGATIVE for chest pain, palpitations or peripheral edema  GI: NEGATIVE for nausea, abdominal pain, heartburn, or change in bowel habits  : NEGATIVE for unusual urinary or vaginal symptoms. No longer gets a menses.   MUSCULOSKELETAL: NEGATIVE for significant arthralgias or myalgia  NEURO: NEGATIVE for weakness, dizziness or paresthesias  PSYCHIATRIC: NEGATIVE for changes in mood or affect    OBJECTIVE:   /64 (BP Location: Right arm, Patient Position: Chair, Cuff Size: Adult Regular)   Pulse 84   Temp 97.7  F (36.5  C) (Tympanic)   Ht 1.575 m (5' 2\")   Wt 66 kg (145 lb 6.4 oz)   BMI 26.59 kg/m    EXAM:  GENERAL: healthy, alert and no distress  EYES: Eyes grossly normal to inspection, PERRL and conjunctivae and sclerae normal  HENT: ear canals and TM's normal, nose and mouth without ulcers or lesions  NECK: no adenopathy, no asymmetry, masses, or scars and thyroid normal to palpation  RESP: lungs clear to auscultation - no rales, rhonchi or wheezes  BREAST: normal without masses, tenderness or nipple discharge and no palpable axillary masses or adenopathy  CV: regular rate and rhythm, normal S1 S2, no S3 or S4, no murmur, click or rub, no peripheral edema and peripheral pulses strong  ABDOMEN: soft, nontender, no hepatosplenomegaly, no masses and bowel sounds normal   (female): deferred, declined  MS: no gross musculoskeletal defects noted, no edema  SKIN: no suspicious lesions or rashes  NEURO: Normal strength and tone, mentation intact and speech normal  PSYCH: mentation appears normal, affect normal/bright      ASSESSMENT/PLAN:   (Z00.00) Routine general medical examination at [facility]" "care facility  (primary encounter diagnosis)  Plan: Pap UTD. Mammogram UTD.     (Z80.0) Family history of colon cancer  Comment: gpa diagnosed age 74  Plan: Will complete colonoscopy at age 49.       COUNSELING:   Reviewed preventive health counseling, as reflected in patient instructions       Regular exercise       Healthy diet/nutrition       Vision screening       Hearing screening       Immunizations    Declined: Influenza due to Concerns about side effects/safety            Estimated body mass index is 26.59 kg/m  as calculated from the following:    Height as of this encounter: 1.575 m (5' 2\").    Weight as of this encounter: 66 kg (145 lb 6.4 oz).        She reports that she has never smoked. She has never used smokeless tobacco.      Counseling Resources:  ATP IV Guidelines  Pooled Cohorts Equation Calculator  Breast Cancer Risk Calculator  BRCA-Related Cancer Risk Assessment: FHS-7 Tool  FRAX Risk Assessment  ICSI Preventive Guidelines  Dietary Guidelines for Americans, 2010  USDA's MyPlate  ASA Prophylaxis  Lung CA Screening    Doreen Galo NP  [facility] - [place]  "

## 2020-12-15 ENCOUNTER — APPOINTMENT (OUTPATIENT)
Dept: LAB | Facility: OTHER | Age: 45
End: 2020-12-15
Attending: NURSE PRACTITIONER
Payer: COMMERCIAL

## 2020-12-15 ENCOUNTER — OFFICE VISIT (OUTPATIENT)
Dept: FAMILY MEDICINE | Facility: OTHER | Age: 45
End: 2020-12-15
Attending: NURSE PRACTITIONER
Payer: COMMERCIAL

## 2020-12-15 VITALS
TEMPERATURE: 97.7 F | WEIGHT: 145.4 LBS | DIASTOLIC BLOOD PRESSURE: 64 MMHG | SYSTOLIC BLOOD PRESSURE: 110 MMHG | HEART RATE: 84 BPM | HEIGHT: 62 IN | BODY MASS INDEX: 26.76 KG/M2

## 2020-12-15 DIAGNOSIS — Z80.0 FAMILY HISTORY OF COLON CANCER: ICD-10-CM

## 2020-12-15 DIAGNOSIS — Z00.00 ROUTINE GENERAL MEDICAL EXAMINATION AT A HEALTH CARE FACILITY: Primary | ICD-10-CM

## 2020-12-15 PROBLEM — R76.8 RHEUMATOID FACTOR POSITIVE: Status: RESOLVED | Noted: 2020-02-13 | Resolved: 2020-12-15

## 2020-12-15 PROCEDURE — 99396 PREV VISIT EST AGE 40-64: CPT | Performed by: NURSE PRACTITIONER

## 2020-12-15 ASSESSMENT — ANXIETY QUESTIONNAIRES
GAD7 TOTAL SCORE: 14
3. WORRYING TOO MUCH ABOUT DIFFERENT THINGS: MORE THAN HALF THE DAYS
7. FEELING AFRAID AS IF SOMETHING AWFUL MIGHT HAPPEN: MORE THAN HALF THE DAYS
4. TROUBLE RELAXING: MORE THAN HALF THE DAYS
1. FEELING NERVOUS, ANXIOUS, OR ON EDGE: MORE THAN HALF THE DAYS
6. BECOMING EASILY ANNOYED OR IRRITABLE: MORE THAN HALF THE DAYS
5. BEING SO RESTLESS THAT IT IS HARD TO SIT STILL: MORE THAN HALF THE DAYS
2. NOT BEING ABLE TO STOP OR CONTROL WORRYING: MORE THAN HALF THE DAYS
IF YOU CHECKED OFF ANY PROBLEMS ON THIS QUESTIONNAIRE, HOW DIFFICULT HAVE THESE PROBLEMS MADE IT FOR YOU TO DO YOUR WORK, TAKE CARE OF THINGS AT HOME, OR GET ALONG WITH OTHER PEOPLE: VERY DIFFICULT

## 2020-12-15 ASSESSMENT — MIFFLIN-ST. JEOR: SCORE: 1252.78

## 2020-12-15 ASSESSMENT — PATIENT HEALTH QUESTIONNAIRE - PHQ9: SUM OF ALL RESPONSES TO PHQ QUESTIONS 1-9: 14

## 2020-12-15 ASSESSMENT — PAIN SCALES - GENERAL: PAINLEVEL: NO PAIN (0)

## 2020-12-15 NOTE — NURSING NOTE
"Chief Complaint   Patient presents with     Physical     complete physical        Initial /64 (BP Location: Right arm, Patient Position: Chair, Cuff Size: Adult Regular)   Pulse 108   Temp 97.7  F (36.5  C) (Tympanic)   Ht 1.575 m (5' 2\")   Wt 66 kg (145 lb 6.4 oz)   BMI 26.59 kg/m   Estimated body mass index is 26.59 kg/m  as calculated from the following:    Height as of this encounter: 1.575 m (5' 2\").    Weight as of this encounter: 66 kg (145 lb 6.4 oz).  Medication Reconciliation: complete  Bindu Castel LPN  "

## 2020-12-16 ASSESSMENT — ANXIETY QUESTIONNAIRES: GAD7 TOTAL SCORE: 14

## 2021-01-18 DIAGNOSIS — K59.03 DRUG-INDUCED CONSTIPATION: ICD-10-CM

## 2021-01-18 RX ORDER — DOCUSATE SODIUM AND SENNOSIDES 50; 8.6 MG/1; MG/1
TABLET ORAL
Qty: 30 TABLET | Refills: 0 | Status: SHIPPED | OUTPATIENT
Start: 2021-01-18 | End: 2021-04-05

## 2021-01-18 NOTE — TELEPHONE ENCOUNTER
[facility] St[facility]    Last Written Prescription Date:  09.04.2020  Last Fill Quantity: 30,   # refills: 0  Last Office Visit: 11.11.2020

## 2021-02-09 NOTE — PROGRESS NOTES
Assessment & Plan     (F90.2) ADHD (attention deficit hyperactivity disorder), combined type  (primary encounter diagnosis)  Comment: controlled  Plan: Pt shows no signs of diversion or abuse. Up to 3 month of his/her ADD/ADHD medications given today. Pt is aware that he/she is solely responsible for protections of the prescriptions. I will not tolerate any lost or stolen prescriptions lightly. Stimulant agreement up to date. She has had some weight loss. BMI normal. She admits that the medication does suppress her appetite. Will continue, but she agrees to push high protein foods. If more weight loss in 3 months, may need to consider stopping.       (F41.1) AZ (generalized anxiety disorder)  (F33.1) Major depressive disorder, recurrent episode, moderate (H)  Comment: stable, no thoughts of suicide  Plan: Will continue the Cymbalta. F/up 3 months.     (Z12.31) Encounter for screening mammogram for breast cancer  Plan: MA Screening Digital Bilateral        Will notify patient of the results when available and intervene accordingly.     (F51.04) Psychophysiological insomnia  Comment: mind often races before bed, she then can't sleep  Plan: traZODone (DESYREL) 50 MG tablet        Will begin trazodone 25-50 mg. Will return if this is not helping.         No follow-ups on file.    Doreen Galo NP  [facility] - [place] is a 45 year old who presents to clinic today for the following health issues    HPI     ADHD:  Patient was diagnosed with ADHD in 2/2020. Currently taking Adderall 10 mg twice daily. Feels it is working well. Able to get more done. Less distracted. Able to stay on track and focus. Denies any side effects from the medication including chest pain, shortness of breath, dizziness, palpitations, or syncope. Some insomnia. She notes that she gets anxious and her mind races. Some appetite suppression, will work on getting more protein.      She also suffers from  anxiety and depression. Currently taking Cymbalta 90 mg daily. Feels her depression is controlled, but often feels anxious before bed. She then can't sleep. Mind often races before bed. Often irritable. No thoughts of suicide. Going to counseling twice weekly-being seen at [facility]. Feels this is helping. Plans to continue. Really likes her therapist.     Review of Systems   As noted in the HPI.       Objective           Vitals:  No vitals were obtained today due to virtual visit.    Physical Exam   healthy, alert and no distress  PSYCH: Alert and oriented times 3; coherent speech, normal   rate and volume, able to articulate logical thoughts, able   to abstract reason, no tangential thoughts, no hallucinations   or delusions  Her affect is normal  RESP: No cough, no audible wheezing, able to talk in full sentences  Remainder of exam unable to be completed due to telephone visits

## 2021-02-10 ENCOUNTER — OFFICE VISIT (OUTPATIENT)
Dept: FAMILY MEDICINE | Facility: OTHER | Age: 46
End: 2021-02-10
Attending: NURSE PRACTITIONER
Payer: COMMERCIAL

## 2021-02-10 VITALS
WEIGHT: 141.5 LBS | BODY MASS INDEX: 26.04 KG/M2 | HEIGHT: 62 IN | OXYGEN SATURATION: 98 % | TEMPERATURE: 96.9 F | HEART RATE: 94 BPM | DIASTOLIC BLOOD PRESSURE: 60 MMHG | SYSTOLIC BLOOD PRESSURE: 100 MMHG

## 2021-02-10 DIAGNOSIS — Z12.31 ENCOUNTER FOR SCREENING MAMMOGRAM FOR BREAST CANCER: ICD-10-CM

## 2021-02-10 DIAGNOSIS — F90.2 ADHD (ATTENTION DEFICIT HYPERACTIVITY DISORDER), COMBINED TYPE: Primary | ICD-10-CM

## 2021-02-10 DIAGNOSIS — F51.04 PSYCHOPHYSIOLOGICAL INSOMNIA: ICD-10-CM

## 2021-02-10 DIAGNOSIS — F33.1 MAJOR DEPRESSIVE DISORDER, RECURRENT EPISODE, MODERATE (H): ICD-10-CM

## 2021-02-10 DIAGNOSIS — F41.1 GAD (GENERALIZED ANXIETY DISORDER): ICD-10-CM

## 2021-02-10 PROCEDURE — 99214 OFFICE O/P EST MOD 30 MIN: CPT | Performed by: NURSE PRACTITIONER

## 2021-02-10 RX ORDER — DEXTROAMPHETAMINE SACCHARATE, AMPHETAMINE ASPARTATE, DEXTROAMPHETAMINE SULFATE AND AMPHETAMINE SULFATE 2.5; 2.5; 2.5; 2.5 MG/1; MG/1; MG/1; MG/1
10 TABLET ORAL 2 TIMES DAILY
Qty: 60 TABLET | Refills: 0 | Status: SHIPPED | OUTPATIENT
Start: 2021-02-10 | End: 2021-05-10

## 2021-02-10 RX ORDER — TRAZODONE HYDROCHLORIDE 50 MG/1
25-50 TABLET, FILM COATED ORAL AT BEDTIME
Qty: 30 TABLET | Refills: 1 | Status: SHIPPED | OUTPATIENT
Start: 2021-02-10 | End: 2021-03-04

## 2021-02-10 RX ORDER — DEXTROAMPHETAMINE SACCHARATE, AMPHETAMINE ASPARTATE, DEXTROAMPHETAMINE SULFATE AND AMPHETAMINE SULFATE 2.5; 2.5; 2.5; 2.5 MG/1; MG/1; MG/1; MG/1
10 TABLET ORAL 2 TIMES DAILY
Qty: 60 TABLET | Refills: 0 | Status: SHIPPED | OUTPATIENT
Start: 2021-03-10 | End: 2021-05-10

## 2021-02-10 RX ORDER — DEXTROAMPHETAMINE SACCHARATE, AMPHETAMINE ASPARTATE, DEXTROAMPHETAMINE SULFATE AND AMPHETAMINE SULFATE 2.5; 2.5; 2.5; 2.5 MG/1; MG/1; MG/1; MG/1
10 TABLET ORAL 2 TIMES DAILY
Qty: 60 TABLET | Refills: 0 | Status: SHIPPED | OUTPATIENT
Start: 2021-04-09 | End: 2021-05-10

## 2021-02-10 ASSESSMENT — MIFFLIN-ST. JEOR: SCORE: 1235.09

## 2021-02-10 ASSESSMENT — ANXIETY QUESTIONNAIRES
6. BECOMING EASILY ANNOYED OR IRRITABLE: SEVERAL DAYS
7. FEELING AFRAID AS IF SOMETHING AWFUL MIGHT HAPPEN: NOT AT ALL
4. TROUBLE RELAXING: SEVERAL DAYS
IF YOU CHECKED OFF ANY PROBLEMS ON THIS QUESTIONNAIRE, HOW DIFFICULT HAVE THESE PROBLEMS MADE IT FOR YOU TO DO YOUR WORK, TAKE CARE OF THINGS AT HOME, OR GET ALONG WITH OTHER PEOPLE: SOMEWHAT DIFFICULT
GAD7 TOTAL SCORE: 7
1. FEELING NERVOUS, ANXIOUS, OR ON EDGE: NEARLY EVERY DAY
2. NOT BEING ABLE TO STOP OR CONTROL WORRYING: SEVERAL DAYS
5. BEING SO RESTLESS THAT IT IS HARD TO SIT STILL: NOT AT ALL
3. WORRYING TOO MUCH ABOUT DIFFERENT THINGS: SEVERAL DAYS

## 2021-02-10 ASSESSMENT — PAIN SCALES - GENERAL: PAINLEVEL: NO PAIN (0)

## 2021-02-10 ASSESSMENT — PATIENT HEALTH QUESTIONNAIRE - PHQ9: SUM OF ALL RESPONSES TO PHQ QUESTIONS 1-9: 8

## 2021-02-10 NOTE — NURSING NOTE
"Chief Complaint   Patient presents with     A.D.H.D     follow up        Initial /60 (BP Location: Left arm, Patient Position: Chair, Cuff Size: Adult Regular)   Pulse 112   Temp 96.9  F (36.1  C) (Tympanic)   Ht 1.575 m (5' 2\")   Wt 64.2 kg (141 lb 8 oz)   SpO2 98%   BMI 25.88 kg/m   Estimated body mass index is 25.88 kg/m  as calculated from the following:    Height as of this encounter: 1.575 m (5' 2\").    Weight as of this encounter: 64.2 kg (141 lb 8 oz).  Medication Reconciliation: complete  Bindu Castle LPN  "

## 2021-02-11 ASSESSMENT — ANXIETY QUESTIONNAIRES: GAD7 TOTAL SCORE: 7

## 2021-04-05 DIAGNOSIS — K59.03 DRUG-INDUCED CONSTIPATION: ICD-10-CM

## 2021-04-05 RX ORDER — DOCUSATE SODIUM 50MG AND SENNOSIDES 8.6MG 8.6; 5 MG/1; MG/1
TABLET, FILM COATED ORAL
Qty: 30 TABLET | Refills: 0 | Status: SHIPPED | OUTPATIENT
Start: 2021-04-05 | End: 2021-04-12

## 2021-04-05 NOTE — TELEPHONE ENCOUNTER
sennexon-S  8.6-50 mg      Last Written Prescription Date:  1-  Last Fill Quantity: 30,   # refills: 0  Last Office Visit: 2-  Future Office visit:    Next 5 appointments (look out 90 days)    May 10, 2021 11:40 AM  (Arrive by 11:25 AM)  Office Visit with Doreen Galo NP  [facility] - [place] ([facility] [place] ) [address]  [place] [address]  [phone]

## 2021-04-11 DIAGNOSIS — K59.03 DRUG-INDUCED CONSTIPATION: ICD-10-CM

## 2021-04-12 RX ORDER — DOCUSATE SODIUM 50MG AND SENNOSIDES 8.6MG 8.6; 5 MG/1; MG/1
TABLET, FILM COATED ORAL
Qty: 30 TABLET | Refills: 0 | Status: SHIPPED | OUTPATIENT
Start: 2021-04-12 | End: 2021-11-09

## 2021-04-12 NOTE — TELEPHONE ENCOUNTER
Senexon      Last Written Prescription Date:  4/5/2021   Last Fill Quantity: 30   # refills: 0  Last Office Visit: 4/5/2021  Future Office visit:    Next 5 appointments (look out 90 days)    May 10, 2021 11:40 AM  (Arrive by 11:25 AM)  Office Visit with Doreen Galo NP  [facility] [place] ([facility] [place] ) [address]  [place] [address]  [phone]

## 2021-05-05 NOTE — PROGRESS NOTES
Assessment & Plan     ADHD (attention deficit hyperactivity disorder), combined type  Pt shows no signs of diversion or abuse. Up to 3 month of his/her ADD/ADHD medications given today. Pt is aware that he/she is solely responsible for protections of the prescriptions. I will not tolerate any lost or stolen prescriptions lightly. Will see pt back in office in 3 months.     - amphetamine-dextroamphetamine (ADDERALL) 10 MG tablet; Take 1 tablet (10 mg) by mouth 2 times daily  - amphetamine-dextroamphetamine (ADDERALL) 10 MG tablet; Take 1 tablet (10 mg) by mouth 2 times daily  - amphetamine-dextroamphetamine (ADDERALL) 10 MG tablet; Take 1 tablet (10 mg) by mouth 2 times daily    AZ (generalized anxiety disorder)  Major depressive disorder, recurrent episode, moderate (H)  Depression controlled, but she admits that she is anxious. Will increase her Cymbalta to 120 mg from 90 mg and reassess in 3 months, she will come in sooner if she feels her anxiety is not controlled. Encouraged discussion with trusted relative or friend to monitor for negative mood changes and change in behaviour during medication initiation and titration. Recommended immediate help if increased thoughts of suicide and call if significant side effects occur. Encouraged patient that this type of medication is not effective immediately, and to be consistant with taking the medication.      - DULoxetine (CYMBALTA) 60 MG capsule; Take 2 capsules (120 mg) by mouth daily    Psychophysiological insomnia  Controlled with trazodone. Will continue.     Migraine with aura and without status migrainosus, not intractable  Stable. Continue the Topamax.     Rectal bleeding  Family history of colon cancer  Patient with rectal bleeding times 3 months. Normal rectal exam. Grandfather with h/o colon cancer at 71. She did have a colonoscopy in 2014 d/t abdominal pain and it was recommended she repeat this in 5 years. Denies any fevers or abdominal pain. Will  therefore refer for colonoscopy. Requests to complete this in [place]. Referral placed to [facility].     - GASTROENTEROLOGY ADULT REF CONSULT ONLY; Future    Review of external notes as documented elsewhere in note  Ordering of each unique test  Prescription drug management  40 minutes spent on the date of the encounter doing chart review, review of outside records, review of test results, interpretation of tests, patient visit and documentation Reviewed noted from the [facility] in 2014.       Doreen Galo NP  [facility] - [place] is a 46 year old who presents for the following health issues    HPI     ADHD:  Patient was diagnosed with ADHD in 2/2020. Currently taking Adderall 10 mg twice daily. Feels it is working well. Able to get more done. Less distracted. Able to stay on track and focus. Denies any side effects from the medication including chest pain, shortness of breath, dizziness, palpitations, or syncope. Some appetite suppression, will work on getting more protein.      She also suffers from anxiety and depression. Currently taking Cymbalta 90 mg daily. Feels her depression is controlled, but she is often anxious. Worried about her children. Feels she may need to raise her grandkids. Often irritable. No thoughts of suicide. Going to counseling twice weekly-being seen at [facility]. Feels this is helping. Plans to continue. Really likes her therapist.     Migraines controlled with Topamax 25 mg BID. Rarely has to take the Imitrex.    Due for a mammogram, has been ordered in the past. Declines today.     Concern - Rectal Problem   Onset:  3 months     Description:   On and off bleeding, bright red blood on the toilet paper when she wipes, also drips from rectum, has also passed quarter sized clots     Intensity: mild    Progression of Symptoms:  same    Accompanying Signs & Symptoms:  Bleeding states bright red      Previous history of similar  "problem:   No     Precipitating factors:   Worsened by: no     Alleviating factors:  Improved by: n/a     Therapies Tried and outcome: nothing    -No fevers. No abdominal pain. No nausea or vomiting. No unintentional weight loss.   -Does not feel constipated or have diarrhea, but notes that she does only poop every 3 days. Stools are soft. Grandfather with colon cancer at 71. She did have a colonoscopy in the past, 2014. Recommended to repeat in 5 years.       Review of Systems   As noted in the HPI.       Objective    BP 96/62 (BP Location: Left arm, Patient Position: Chair, Cuff Size: Adult Large)   Pulse 102   Temp 97.6  F (36.4  C) (Tympanic)   Ht 1.575 m (5' 2\")   Wt 65.3 kg (144 lb)   SpO2 100%   BMI 26.34 kg/m    Body mass index is 26.34 kg/m .  Physical Exam   GENERAL: healthy, alert and no distress  EYES: Eyes grossly normal to inspection, PERRL and conjunctivae and sclerae normal  HENT: ear canals and TM's normal, nose and mouth without ulcers or lesions  NECK: no adenopathy, no asymmetry, masses, or scars and thyroid normal to palpation  RESP: lungs clear to auscultation - no rales, rhonchi or wheezes  CV: regular rate and rhythm, normal S1 S2, no S3 or S4, no murmur, click or rub, no peripheral edema and peripheral pulses strong  ABDOMEN: soft, nontender, no hepatosplenomegaly, no masses and bowel sounds normal  RECTAL (female): normal sphincter tone, no rectal masses and no hemorrhoid, no fissure  NEURO: Normal strength and tone, mentation intact and speech normal  PSYCH: mentation appears normal, affect normal/bright    CBC and CMP pending          "

## 2021-05-10 ENCOUNTER — OFFICE VISIT (OUTPATIENT)
Dept: FAMILY MEDICINE | Facility: OTHER | Age: 46
End: 2021-05-10
Attending: NURSE PRACTITIONER
Payer: COMMERCIAL

## 2021-05-10 VITALS
DIASTOLIC BLOOD PRESSURE: 62 MMHG | BODY MASS INDEX: 26.5 KG/M2 | HEIGHT: 62 IN | TEMPERATURE: 97.6 F | HEART RATE: 102 BPM | SYSTOLIC BLOOD PRESSURE: 96 MMHG | OXYGEN SATURATION: 100 % | WEIGHT: 144 LBS

## 2021-05-10 DIAGNOSIS — Z80.0 FAMILY HISTORY OF COLON CANCER: ICD-10-CM

## 2021-05-10 DIAGNOSIS — F51.04 PSYCHOPHYSIOLOGICAL INSOMNIA: ICD-10-CM

## 2021-05-10 DIAGNOSIS — F90.2 ADHD (ATTENTION DEFICIT HYPERACTIVITY DISORDER), COMBINED TYPE: Primary | ICD-10-CM

## 2021-05-10 DIAGNOSIS — K62.5 RECTAL BLEEDING: ICD-10-CM

## 2021-05-10 DIAGNOSIS — F41.1 GAD (GENERALIZED ANXIETY DISORDER): ICD-10-CM

## 2021-05-10 DIAGNOSIS — F33.1 MAJOR DEPRESSIVE DISORDER, RECURRENT EPISODE, MODERATE (H): ICD-10-CM

## 2021-05-10 DIAGNOSIS — G43.109 MIGRAINE WITH AURA AND WITHOUT STATUS MIGRAINOSUS, NOT INTRACTABLE: ICD-10-CM

## 2021-05-10 LAB
ALBUMIN SERPL-MCNC: 4.2 G/DL (ref 3.4–5)
ALP SERPL-CCNC: 83 U/L (ref 40–150)
ALT SERPL W P-5'-P-CCNC: 30 U/L (ref 0–50)
ANION GAP SERPL CALCULATED.3IONS-SCNC: 4 MMOL/L (ref 3–14)
AST SERPL W P-5'-P-CCNC: 17 U/L (ref 0–45)
BASOPHILS # BLD AUTO: 0 10E9/L (ref 0–0.2)
BASOPHILS NFR BLD AUTO: 0.3 %
BILIRUB SERPL-MCNC: 0.7 MG/DL (ref 0.2–1.3)
BUN SERPL-MCNC: 9 MG/DL (ref 7–30)
CALCIUM SERPL-MCNC: 8.6 MG/DL (ref 8.5–10.1)
CHLORIDE SERPL-SCNC: 109 MMOL/L (ref 94–109)
CO2 SERPL-SCNC: 26 MMOL/L (ref 20–32)
CREAT SERPL-MCNC: 0.81 MG/DL (ref 0.52–1.04)
DIFFERENTIAL METHOD BLD: NORMAL
EOSINOPHIL # BLD AUTO: 0.1 10E9/L (ref 0–0.7)
EOSINOPHIL NFR BLD AUTO: 1.4 %
ERYTHROCYTE [DISTWIDTH] IN BLOOD BY AUTOMATED COUNT: 12.2 % (ref 10–15)
GFR SERPL CREATININE-BSD FRML MDRD: 86 ML/MIN/{1.73_M2}
GLUCOSE SERPL-MCNC: 58 MG/DL (ref 70–99)
HCT VFR BLD AUTO: 42.9 % (ref 35–47)
HGB BLD-MCNC: 14.7 G/DL (ref 11.7–15.7)
IMM GRANULOCYTES # BLD: 0 10E9/L (ref 0–0.4)
IMM GRANULOCYTES NFR BLD: 0.2 %
LYMPHOCYTES # BLD AUTO: 1.4 10E9/L (ref 0.8–5.3)
LYMPHOCYTES NFR BLD AUTO: 23.9 %
MCH RBC QN AUTO: 29.9 PG (ref 26.5–33)
MCHC RBC AUTO-ENTMCNC: 34.3 G/DL (ref 31.5–36.5)
MCV RBC AUTO: 87 FL (ref 78–100)
MONOCYTES # BLD AUTO: 0.5 10E9/L (ref 0–1.3)
MONOCYTES NFR BLD AUTO: 8.7 %
NEUTROPHILS # BLD AUTO: 3.8 10E9/L (ref 1.6–8.3)
NEUTROPHILS NFR BLD AUTO: 65.5 %
NRBC # BLD AUTO: 0 10*3/UL
NRBC BLD AUTO-RTO: 0 /100
PLATELET # BLD AUTO: 247 10E9/L (ref 150–450)
POTASSIUM SERPL-SCNC: 3.6 MMOL/L (ref 3.4–5.3)
PROT SERPL-MCNC: 6.9 G/DL (ref 6.8–8.8)
RBC # BLD AUTO: 4.91 10E12/L (ref 3.8–5.2)
SODIUM SERPL-SCNC: 139 MMOL/L (ref 133–144)
WBC # BLD AUTO: 5.7 10E9/L (ref 4–11)

## 2021-05-10 PROCEDURE — 99215 OFFICE O/P EST HI 40 MIN: CPT | Performed by: NURSE PRACTITIONER

## 2021-05-10 PROCEDURE — 85025 COMPLETE CBC W/AUTO DIFF WBC: CPT | Performed by: NURSE PRACTITIONER

## 2021-05-10 PROCEDURE — 80053 COMPREHEN METABOLIC PANEL: CPT | Performed by: NURSE PRACTITIONER

## 2021-05-10 PROCEDURE — 36415 COLL VENOUS BLD VENIPUNCTURE: CPT | Performed by: NURSE PRACTITIONER

## 2021-05-10 RX ORDER — DEXTROAMPHETAMINE SACCHARATE, AMPHETAMINE ASPARTATE, DEXTROAMPHETAMINE SULFATE AND AMPHETAMINE SULFATE 2.5; 2.5; 2.5; 2.5 MG/1; MG/1; MG/1; MG/1
10 TABLET ORAL 2 TIMES DAILY
Qty: 60 TABLET | Refills: 0 | Status: SHIPPED | OUTPATIENT
Start: 2021-05-10 | End: 2021-08-16

## 2021-05-10 RX ORDER — DEXTROAMPHETAMINE SACCHARATE, AMPHETAMINE ASPARTATE, DEXTROAMPHETAMINE SULFATE AND AMPHETAMINE SULFATE 2.5; 2.5; 2.5; 2.5 MG/1; MG/1; MG/1; MG/1
10 TABLET ORAL 2 TIMES DAILY
Qty: 60 TABLET | Refills: 0 | Status: SHIPPED | OUTPATIENT
Start: 2021-06-10 | End: 2021-08-16

## 2021-05-10 RX ORDER — DULOXETIN HYDROCHLORIDE 60 MG/1
120 CAPSULE, DELAYED RELEASE ORAL DAILY
Qty: 180 CAPSULE | Refills: 1 | Status: SHIPPED | OUTPATIENT
Start: 2021-05-10 | End: 2021-10-27

## 2021-05-10 RX ORDER — DEXTROAMPHETAMINE SACCHARATE, AMPHETAMINE ASPARTATE, DEXTROAMPHETAMINE SULFATE AND AMPHETAMINE SULFATE 2.5; 2.5; 2.5; 2.5 MG/1; MG/1; MG/1; MG/1
10 TABLET ORAL 2 TIMES DAILY
Qty: 60 TABLET | Refills: 0 | Status: SHIPPED | OUTPATIENT
Start: 2021-07-10 | End: 2022-03-28

## 2021-05-10 ASSESSMENT — PAIN SCALES - GENERAL: PAINLEVEL: NO PAIN (0)

## 2021-05-10 ASSESSMENT — ANXIETY QUESTIONNAIRES
6. BECOMING EASILY ANNOYED OR IRRITABLE: MORE THAN HALF THE DAYS
GAD7 TOTAL SCORE: 14
1. FEELING NERVOUS, ANXIOUS, OR ON EDGE: MORE THAN HALF THE DAYS
5. BEING SO RESTLESS THAT IT IS HARD TO SIT STILL: MORE THAN HALF THE DAYS
IF YOU CHECKED OFF ANY PROBLEMS ON THIS QUESTIONNAIRE, HOW DIFFICULT HAVE THESE PROBLEMS MADE IT FOR YOU TO DO YOUR WORK, TAKE CARE OF THINGS AT HOME, OR GET ALONG WITH OTHER PEOPLE: SOMEWHAT DIFFICULT
4. TROUBLE RELAXING: MORE THAN HALF THE DAYS
3. WORRYING TOO MUCH ABOUT DIFFERENT THINGS: MORE THAN HALF THE DAYS
7. FEELING AFRAID AS IF SOMETHING AWFUL MIGHT HAPPEN: MORE THAN HALF THE DAYS
2. NOT BEING ABLE TO STOP OR CONTROL WORRYING: MORE THAN HALF THE DAYS

## 2021-05-10 ASSESSMENT — MIFFLIN-ST. JEOR: SCORE: 1241.43

## 2021-05-10 ASSESSMENT — PATIENT HEALTH QUESTIONNAIRE - PHQ9: SUM OF ALL RESPONSES TO PHQ QUESTIONS 1-9: 11

## 2021-05-10 NOTE — LETTER
[facility]  05/10/21  Patient: Lenora Gage  YOB: 1975  Medical Record Number: [id number]                                                                                  Non-Opioid Controlled Substance Agreement    This is an agreement between you and your provider regarding safe and appropriate controlled substance prescribing.? Controlled substances are?medicines that can cause physical and mental dependence. The manufacturing, possession and use of these medicines are regulated by law.  We here at [facility] are making a commitment to work with you in your efforts to get better.? To support you in this work, we will help you schedule regular appointments for medicine refills. If we must cancel or change your appointment for any reason, we will make sure you have enough medication to last until your next appointment.      As a Provider, I will:     Listen carefully to your concerns while treating you with dignity.     Recommend a treatment plan that I believe is in your best interest and may involve therapies other than medication.      Review the chance of benefit and the chance of harm of this medicine with you regularly and evaluate the safety and effectiveness of this therapy.       Provide a plan on how to discontinue if the decision is made to stop this medicine.       As a Patient, I understand controlled substances:       Are prescribed by my care provider to help me function or work and manage my condition(s).?    Are strong medicines and can cause serious side effects.       Need to be taken exactly as prescribed.?Combining controlled substances with certain medicines or chemicals (such as illegal drugs, alcohol, sedatives, sleeping pills, and benzodiazepines) can be dangerous or even fatal.? If I stop taking my medicines suddenly, I may have severe withdrawal symptoms.     The risks, benefits, and side effects of these medicine(s) were explained to me. I agree  that:    1. I will take part in other treatments as advised by my care team. This may be psychiatry or counseling, physical therapy, behavioral therapy, group treatment or a referral to specialist.    2. I will keep all my appointments and understand this is part of the monitoring of controlled substance.?My care team may require an office visit for EVERY controlled substance refill. If I miss appointments or don t follow instructions, my care team may stop my medicine    3. I will take my medicines as prescribed. I will not change the dose or schedule unless my care team tells me to. There will be no refills if I run out early.      4. I may be contactedwithout warning and asked to complete a urine drug test or pill count at any time. If I don t give a urine sample or participate in a pill count, the care team may stop my medicine.    5. I will only receive controlled substance prescriptions from this clinic. If treated by another provider, I will let them know I am taking controlled substances and that I have a treatment agreement with this provider. I will inform this care team within one business day if I am given a prescription for any controlled substance by another healthcare provider. This care team may contact other providers and pharmacists about my use of the medicines.     6. It is up to me to make sure that I do not run out of my medicines on weekends or holidays.?If my care team is willing to refill my prescription without a visit, I must request refills only during office hours. Refills may take up to 3 business days to process.  I will use one pharmacy to fill all my controlled substance prescriptions.  I will notify the clinic if any changes are made due to insurance changes or medication availability.    7. I am responsible for my prescriptions. If the medicine/prescription is lost, stolen or destroyed, it will not be replaced.?I also agree not to share controlled substance medicines with anyone.      8. I am aware I should not use any illegal or recreational drugs. I agree not to drink alcohol unless my care team says I can.     9. If I enroll in the [place] Medical Cannabis program, I will tell my care team.?    10. I will tell my care team right away if I become pregnant, have a new medical problem treated outside of my regular clinic, or have a change in my medications.     11. I understand that this medicine can affect my thinking, judgment and reaction time.? Alcohol and drugs affect the brain and body, which can affect the safety of a person s driving. Being under the influence of alcohol or drugs can affect a person s decision-making, behaviors, personal safety, and the safety of others. Driving while impaired (DWI) can occur if a person is driving, operating, or in physical control of a car, motorcycle, boat, snowmobile, ATV, motorbike, off-road vehicle, or any other motor vehicle ([place] Statute 169A.20). I understand the risk if I choose to drive or operate machinery  I understand that if I do not follow any of the conditions above, my prescriptions or treatment may be stopped or changed.     I agree that my provider, clinic care team, and pharmacy may work with any city, state or federal law enforcement agency that investigates the misuse, sale, or other diversion of my controlled medicine. I will allow my provider to discuss my care with or share a copy of this agreement with any other treating provider, pharmacy or emergency room where I receive care.?    I have read this agreement and have asked questions about anything I did not understand.    ________________________________________________________  Patient Signature - Lenora F Spotts     ___________________                   Date     ________________________________________________________  Provider Signature - Doreen Galo, NP       ___________________                   Date      ________________________________________________________  Witness Signature (required if provider not present while patient signing)          ___________________                   Date

## 2021-05-11 ASSESSMENT — ANXIETY QUESTIONNAIRES: GAD7 TOTAL SCORE: 14

## 2021-05-24 ENCOUNTER — TRANSFERRED RECORDS (OUTPATIENT)
Dept: HEALTH INFORMATION MANAGEMENT | Facility: CLINIC | Age: 46
End: 2021-05-24

## 2021-06-02 ENCOUNTER — OFFICE VISIT (OUTPATIENT)
Dept: FAMILY MEDICINE | Facility: OTHER | Age: 46
End: 2021-06-02
Attending: FAMILY MEDICINE
Payer: COMMERCIAL

## 2021-06-02 DIAGNOSIS — Z20.822 COVID-19 RULED OUT: Primary | ICD-10-CM

## 2021-06-02 PROCEDURE — U0005 INFEC AGEN DETEC AMPLI PROBE: HCPCS | Performed by: FAMILY MEDICINE

## 2021-06-02 PROCEDURE — U0003 INFECTIOUS AGENT DETECTION BY NUCLEIC ACID (DNA OR RNA); SEVERE ACUTE RESPIRATORY SYNDROME CORONAVIRUS 2 (SARS-COV-2) (CORONAVIRUS DISEASE [COVID-19]), AMPLIFIED PROBE TECHNIQUE, MAKING USE OF HIGH THROUGHPUT TECHNOLOGIES AS DESCRIBED BY CMS-2020-01-R: HCPCS | Performed by: FAMILY MEDICINE

## 2021-06-03 LAB
LABORATORY COMMENT REPORT: NORMAL
SARS-COV-2 RNA RESP QL NAA+PROBE: NEGATIVE
SARS-COV-2 RNA RESP QL NAA+PROBE: NORMAL
SPECIMEN SOURCE: NORMAL
SPECIMEN SOURCE: NORMAL

## 2021-06-07 ENCOUNTER — TRANSFERRED RECORDS (OUTPATIENT)
Dept: HEALTH INFORMATION MANAGEMENT | Facility: CLINIC | Age: 46
End: 2021-06-07

## 2021-06-18 DIAGNOSIS — F51.04 PSYCHOPHYSIOLOGICAL INSOMNIA: ICD-10-CM

## 2021-06-18 RX ORDER — TRAZODONE HYDROCHLORIDE 50 MG/1
TABLET, FILM COATED ORAL
Qty: 90 TABLET | Refills: 1 | Status: SHIPPED | OUTPATIENT
Start: 2021-06-18 | End: 2022-02-17

## 2021-06-18 NOTE — TELEPHONE ENCOUNTER
Trazodone  50 mg   Last Written Prescription Date:  3-4-2021  Last Fill Quantity: 90,   # refills: 1  Last Office Visit: 5-10-2021Future Office visit:    Next 5 appointments (look out 90 days)    Aug 16, 2021  2:10 PM  (Arrive by 1:55 PM)  Office Visit with Doreen Galo NP  [facility] [place] ([facility] [place] ) [address]  [place] [address]  [phone]

## 2021-07-03 DIAGNOSIS — G43.109 MIGRAINE WITH AURA AND WITHOUT STATUS MIGRAINOSUS, NOT INTRACTABLE: ICD-10-CM

## 2021-07-06 RX ORDER — TOPIRAMATE 25 MG/1
TABLET, FILM COATED ORAL
Qty: 180 TABLET | Refills: 1 | Status: SHIPPED | OUTPATIENT
Start: 2021-07-06 | End: 2021-09-14

## 2021-07-06 NOTE — TELEPHONE ENCOUNTER
topiramate (TOPAMAX) 25 MG tablet      Last Written Prescription Date:  11/11/2020  Last Fill Quantity: 180,   # refills: 1  Last Office Visit: 6/2/2021  Future Office visit:    Next 5 appointments (look out 90 days)    Aug 16, 2021  2:10 PM  (Arrive by 1:55 PM)  Office Visit with Doreen Galo NP  [facility] [place] ([facility] [place] ) [address]  [place] [address]  [phone]

## 2021-08-07 NOTE — PROGRESS NOTES
"    Assessment & Plan     AZ (generalized anxiety disorder)  Major depressive disorder, recurrent episode, moderate (H)  Poorly controlled, but she denies thoughts of suicide. Will continue the Cymbalta, but refer her to [facility] for med management. She will also continue to work with her counselor. F/up 4 weeks for a recheck. Sooner with new or worsening symptoms.     - MENTAL HEALTH REFERRAL  - Adult; Psychiatry; Psychiatry; [facility]: [facility] [phone]; We will contact you to schedule the appointment or please call with any questions    ADHD (attention deficit hyperactivity disorder), combined type  Pt shows no signs of diversion or abuse. Up to 3 month of his/her ADD/ADHD medications given today. Pt is aware that he/she is solely responsible for protections of the prescriptions. I will not tolerate any lost or stolen prescriptions lightly. Will see pt back in office in 3 months. Stimulant agreement up to date.  reviewed and accurate.      - amphetamine-dextroamphetamine (ADDERALL) 10 MG tablet; Take 1 tablet (10 mg) by mouth 2 times daily  - amphetamine-dextroamphetamine (ADDERALL) 10 MG tablet; Take 1 tablet (10 mg) by mouth 2 times daily  - amphetamine-dextroamphetamine (ADDERALL) 10 MG tablet; Take 1 tablet (10 mg) by mouth 2 times daily    Infected nailbed of finger, left  Skin surrounding left pinky nail appears infected. Will treat with bactrim DS times 10 days. She was made aware of the side effects. Denies fevers. Agrees to return with new or worsening symptoms.     - sulfamethoxazole-trimethoprim (BACTRIM DS) 800-160 MG tablet; Take 1 tablet by mouth 2 times daily for 10 days    Prescription drug management  50 minutes spent on the date of the encounter doing chart review, patient visit and documentation        BMI:   Estimated body mass index is 25.61 kg/m  as calculated from the following:    Height as of this encounter: 1.575 m (5' 2\").    Weight as of this " "encounter: 63.5 kg (140 lb).         Return in about 4 weeks (around 2021).    Doreen Galo NP  [facility] - [place] is a 46 year old who presents for the following health issues    HPI     ADHD:  Patient was diagnosed with ADHD in 2020. Currently taking Adderall 10 mg twice daily. Feels it is working well. Able to get more done. Less distracted. Able to stay on track and focus. Denies any side effects from the medication including chest pain, shortness of breath, dizziness, palpitations, or syncope. Some appetite suppression, will work on getting more protein.      She also suffers from anxiety and depression. Currently taking Cymbalta 120 mg daily. Currently does not feel her anxiety or depression are controlled. Worried about her grand children. Parents do not pay attention to them and houses are often messy. Her children also rent from her and do not pay the mortgage. Feels her children take advantage of her. She does not want to cut them off, however, because she feels her grandkids will suffer. Daughter  years ago and her heart went to a boy who now has his cancer back. This hurts her. Often irritable. No thoughts of suicide. Going to counseling twice weekly-being seen at [facility]. Unsure if it is helping.     Due for a mammogram. Declines at this time.     No chest pain or shortness of breath. No abdominal pain.     She also has had erythema around her left pinky times 4 weeks. Was draining purulent fluid, but that has improved slightly. No fevers. Area is painful to the touch. Currently has a dental abscess and completes her 5th day of azithromycin today.     Review of Systems   As noted in the HPI.       Objective    /68 (BP Location: Right arm, Patient Position: Chair, Cuff Size: Adult Regular)   Pulse 92   Temp 97.7  F (36.5  C) (Tympanic)   Ht 1.575 m (5' 2\")   Wt 63.5 kg (140 lb)   SpO2 98%   BMI 25.61 kg/m    Body mass index is " 25.61 kg/m .  Physical Exam   GENERAL: alert and tearful when discussing children  RESP: lungs clear to auscultation - no rales, rhonchi or wheezes  CV: regular rate and rhythm, no murmur, no peripheral edema  MS: no gross musculoskeletal defects noted, no edema  NEURO: Normal strength and tone, mentation intact and speech normal  PSYCH: mentation appears normal and tearful  LEFT PINKY: Skin surrounding medial side of left pinky nail is edematous and erythematous, small amount of purulent drainage present. Area is tender to palpation. Appears infected.

## 2021-08-09 PROBLEM — R10.9 ABDOMINAL PAIN, UNSPECIFIED ABDOMINAL LOCATION: Status: ACTIVE | Noted: 2021-05-24

## 2021-08-09 PROBLEM — K62.5 RECTAL BLEEDING: Status: ACTIVE | Noted: 2021-05-24

## 2021-08-09 PROBLEM — R63.4 UNINTENTIONAL WEIGHT LOSS: Status: ACTIVE | Noted: 2021-05-24

## 2021-08-16 ENCOUNTER — OFFICE VISIT (OUTPATIENT)
Dept: FAMILY MEDICINE | Facility: OTHER | Age: 46
End: 2021-08-16
Attending: NURSE PRACTITIONER
Payer: COMMERCIAL

## 2021-08-16 VITALS
TEMPERATURE: 97.7 F | DIASTOLIC BLOOD PRESSURE: 68 MMHG | WEIGHT: 140 LBS | BODY MASS INDEX: 25.76 KG/M2 | HEIGHT: 62 IN | HEART RATE: 92 BPM | SYSTOLIC BLOOD PRESSURE: 104 MMHG | OXYGEN SATURATION: 98 %

## 2021-08-16 DIAGNOSIS — L03.012 INFECTED NAILBED OF FINGER, LEFT: ICD-10-CM

## 2021-08-16 DIAGNOSIS — F90.2 ADHD (ATTENTION DEFICIT HYPERACTIVITY DISORDER), COMBINED TYPE: ICD-10-CM

## 2021-08-16 DIAGNOSIS — F41.1 GAD (GENERALIZED ANXIETY DISORDER): Primary | ICD-10-CM

## 2021-08-16 DIAGNOSIS — F33.1 MAJOR DEPRESSIVE DISORDER, RECURRENT EPISODE, MODERATE (H): ICD-10-CM

## 2021-08-16 PROBLEM — R63.4 UNINTENTIONAL WEIGHT LOSS: Status: RESOLVED | Noted: 2021-05-24 | Resolved: 2021-08-16

## 2021-08-16 PROCEDURE — 99215 OFFICE O/P EST HI 40 MIN: CPT | Performed by: NURSE PRACTITIONER

## 2021-08-16 RX ORDER — DEXTROAMPHETAMINE SACCHARATE, AMPHETAMINE ASPARTATE, DEXTROAMPHETAMINE SULFATE AND AMPHETAMINE SULFATE 2.5; 2.5; 2.5; 2.5 MG/1; MG/1; MG/1; MG/1
10 TABLET ORAL 2 TIMES DAILY
Qty: 60 TABLET | Refills: 0 | Status: SHIPPED | OUTPATIENT
Start: 2021-08-25 | End: 2022-03-28

## 2021-08-16 RX ORDER — DEXTROAMPHETAMINE SACCHARATE, AMPHETAMINE ASPARTATE, DEXTROAMPHETAMINE SULFATE AND AMPHETAMINE SULFATE 2.5; 2.5; 2.5; 2.5 MG/1; MG/1; MG/1; MG/1
10 TABLET ORAL 2 TIMES DAILY
Qty: 60 TABLET | Refills: 0 | Status: SHIPPED | OUTPATIENT
Start: 2021-10-25 | End: 2022-03-28

## 2021-08-16 RX ORDER — SULFAMETHOXAZOLE/TRIMETHOPRIM 800-160 MG
1 TABLET ORAL 2 TIMES DAILY
Qty: 20 TABLET | Refills: 0 | Status: SHIPPED | OUTPATIENT
Start: 2021-08-16 | End: 2021-08-26

## 2021-08-16 RX ORDER — DEXTROAMPHETAMINE SACCHARATE, AMPHETAMINE ASPARTATE, DEXTROAMPHETAMINE SULFATE AND AMPHETAMINE SULFATE 2.5; 2.5; 2.5; 2.5 MG/1; MG/1; MG/1; MG/1
10 TABLET ORAL 2 TIMES DAILY
Qty: 60 TABLET | Refills: 0 | Status: SHIPPED | OUTPATIENT
Start: 2021-09-24 | End: 2022-03-28

## 2021-08-16 ASSESSMENT — ANXIETY QUESTIONNAIRES
IF YOU CHECKED OFF ANY PROBLEMS ON THIS QUESTIONNAIRE, HOW DIFFICULT HAVE THESE PROBLEMS MADE IT FOR YOU TO DO YOUR WORK, TAKE CARE OF THINGS AT HOME, OR GET ALONG WITH OTHER PEOPLE: VERY DIFFICULT
5. BEING SO RESTLESS THAT IT IS HARD TO SIT STILL: MORE THAN HALF THE DAYS
4. TROUBLE RELAXING: MORE THAN HALF THE DAYS
7. FEELING AFRAID AS IF SOMETHING AWFUL MIGHT HAPPEN: MORE THAN HALF THE DAYS
2. NOT BEING ABLE TO STOP OR CONTROL WORRYING: MORE THAN HALF THE DAYS
3. WORRYING TOO MUCH ABOUT DIFFERENT THINGS: MORE THAN HALF THE DAYS
GAD7 TOTAL SCORE: 14
6. BECOMING EASILY ANNOYED OR IRRITABLE: MORE THAN HALF THE DAYS
1. FEELING NERVOUS, ANXIOUS, OR ON EDGE: MORE THAN HALF THE DAYS

## 2021-08-16 ASSESSMENT — PATIENT HEALTH QUESTIONNAIRE - PHQ9: SUM OF ALL RESPONSES TO PHQ QUESTIONS 1-9: 18

## 2021-08-16 ASSESSMENT — MIFFLIN-ST. JEOR: SCORE: 1223.29

## 2021-08-16 ASSESSMENT — PAIN SCALES - GENERAL: PAINLEVEL: EXTREME PAIN (8)

## 2021-08-16 NOTE — NURSING NOTE
"Chief Complaint   Patient presents with     A.D.H.D       Initial /68 (BP Location: Right arm, Patient Position: Chair, Cuff Size: Adult Regular)   Pulse 118   Temp 97.7  F (36.5  C) (Tympanic)   Ht 1.575 m (5' 2\")   Wt 63.5 kg (140 lb)   SpO2 98%   BMI 25.61 kg/m   Estimated body mass index is 25.61 kg/m  as calculated from the following:    Height as of this encounter: 1.575 m (5' 2\").    Weight as of this encounter: 63.5 kg (140 lb).  Medication Reconciliation: complete  Bindu Castle LPN  "

## 2021-08-17 ASSESSMENT — ANXIETY QUESTIONNAIRES: GAD7 TOTAL SCORE: 14

## 2021-09-13 NOTE — PROGRESS NOTES
Assessment & Plan     AZ (generalized anxiety disorder)  Major depressive disorder, recurrent episode, moderate (H)  Poorly controlled, but denies thoughts of suicide. On max dose Cymbalta. Will continue. Will also continue to f/up with psychiatry and psychology. See me in 2 months for a recheck. Sooner with new or worsening symptoms.     ADHD (attention deficit hyperactivity disorder), combined type  Well controlled with Adderall. Will continue. Pt shows no signs of diversion or abuse.  Pt is aware that he/she is solely responsible for protections of the prescriptions. I will not tolerate any lost or stolen prescriptions lightly. Will see pt back in office in 2 months.  reviewed and accurate.       Hip pain, left  Pain has greatly improved. Exam unremarkable. Will hold off on all imaging and she will return with new or worsening symptoms.     Acute URI  No red flags noted. Oxygen sats 98 percent. Symptomatic cares encouraged. The lack of efficacy of antibiotics was discussed. Will also swab for Covid. Will notify patient of the results when available and intervene accordingly. The patient will follow up with new or worsening symptoms.     Migraine with aura and without status migrainosus, not intractable  Controlled with Topamax. Denies any side effects. Will continue.       Return in about 8 weeks (around 11/9/2021).    Doreen Galo NP  [facility] - [place] is a 46 year old who presents for the following health issues    HPI     Depression and Anxiety Follow-Up    Last seen on 8/16/21. Anxiety and depression were poorly controlled. Had been taking Cymbalta 120 mg daily. This was continued and she was referred to [facility] for med management. Today she notes that she feels about the same. Only met with psychology once. No med changes were made.       How are you doing with your depression since your last visit? No change    How are you doing with your  anxiety since your last visit?  No change    Are you having other symptoms that might be associated with depression or anxiety? No    Have you had a significant life event? Grief or Loss- just lost brother in-law; CPS involved with grandkids    Do you have any concerns with your use of alcohol or other drugs? No     No thoughts of suicide.     She is in counseling-going twice weekly.    She does have ADHD; this is controlled with 10 mg BID.     Social History     Tobacco Use     Smoking status: Never Smoker     Smokeless tobacco: Never Used   Substance Use Topics     Alcohol use: Yes     Alcohol/week: 0.0 standard drinks     Comment: RARELY     Drug use: No     PHQ 2/10/2021 5/10/2021 8/16/2021   PHQ-9 Total Score 8 11 18   Q9: Thoughts of better off dead/self-harm past 2 weeks Not at all Several days Not at all   F/U: Thoughts of suicide or self-harm - - -   F/U: Safety concerns - - -     AZ-7 SCORE 2/10/2021 5/10/2021 8/16/2021   Total Score 7 14 14     Last PHQ-9 8/16/2021   1.  Little interest or pleasure in doing things 2   2.  Feeling down, depressed, or hopeless 2   3.  Trouble falling or staying asleep, or sleeping too much 2   4.  Feeling tired or having little energy 3   5.  Poor appetite or overeating 3   6.  Feeling bad about yourself 2   7.  Trouble concentrating 2   8.  Moving slowly or restless 2   Q9: Thoughts of better off dead/self-harm past 2 weeks 0   PHQ-9 Total Score 18   Difficulty at work, home, or with people Very difficult   In the past two weeks have you had thoughts of suicide or self harm? -   Do you have concerns about your personal safety or the safety of others? -     AZ-7  8/16/2021   1. Feeling nervous, anxious, or on edge 2   2. Not being able to stop or control worrying 2   3. Worrying too much about different things 2   4. Trouble relaxing 2   5. Being so restless that it is hard to sit still 2   6. Becoming easily annoyed or irritable 2   7. Feeling afraid, as if something  "awful might happen 2   AZ-7 Total Score 14   If you checked any problems, how difficult have they made it for you to do your work, take care of things at home, or get along with other people? Very difficult         Musculoskeletal problem/pain-Left Hip Pain  Onset/Duration: 1 year; comes and goes, does feel it has recently improved    Description  Location: hip  - left  Joint Swelling: no  Redness: no  Pain: no  Warmth: no  Intensity:  Mild, mild comes and goes   Progression of Symptoms:  same  Accompanying signs and symptoms:   Fevers: no  Numbness/tingling/weakness: no  History  Trauma to the area: no  Recent illness:  no  Previous similar problem: no  Previous evaluation:  YES  Precipitating or alleviating factors:  Aggravating factors include: walking, climbing stairs and exercise  Therapies tried and outcome: rest/inactivity , naproxen     Acute Illness  Acute illness concerns: Dry cough  Onset/Duration: 3 dats  Symptoms:  Fever: no  Chills/Sweats: no  Headache (location?): no  Sinus Pressure: no  Conjunctivitis:  no  Ear Pain: YES: bilateral  Rhinorrhea: YES  Congestion: YES  Sore Throat: no  Cough: YES-barking  Wheeze: no  Decreased Appetite: no  Nausea: no  Vomiting: no  Diarrhea: no  Dysuria/Freq.: no  Dysuria or Hematuria: no  Fatigue/Achiness: YES, some fatigue, no achiness  Sick/Strep Exposure: YES, children were ill  Therapies tried and outcome: Dayquil with some relief.   She does not smoke.   No known asthma or COPD.      She also requests a refill of her Topamax. Working well for her migraines. Rarely occur.     Review of Systems   As noted in the HPI.       Objective    /64 (BP Location: Left arm, Patient Position: Chair, Cuff Size: Adult Regular)   Pulse 104   Temp 98.1  F (36.7  C) (Tympanic)   Ht 1.575 m (5' 2\")   Wt 65.3 kg (144 lb)   SpO2 98%   BMI 26.34 kg/m    Body mass index is 26.34 kg/m .  Physical Exam   GENERAL: healthy, alert and no distress  EYES: Eyes grossly normal to " inspection, PERRL and conjunctivae and sclerae normal  HENT: ear canals and TM's normal, nose and mouth without ulcers or lesions  NECK: no adenopathy, no asymmetry, masses, or scars and thyroid normal to palpation  RESP: lungs clear to auscultation - no rales, rhonchi or wheezes  CV: regular rate and rhythm, no murmur,  no peripheral edema   ABDOMEN: soft, nontender,  no masses and bowel sounds normal  MS: no gross musculoskeletal defects noted, no edema  NEURO: Normal strength and tone, mentation intact and speech normal  PSYCH: mentation appears normal, affect normal/bright  LEFT HIP: Skin intact. No pain with palpation. No erythema, edema, or ecchymosis. No pain with flexion, extension, adduction, abduction, or internal/external rotation. Normal sensation. No lower leg swelling, PT and DP pulses 2+.

## 2021-09-14 ENCOUNTER — OFFICE VISIT (OUTPATIENT)
Dept: FAMILY MEDICINE | Facility: OTHER | Age: 46
End: 2021-09-14
Attending: NURSE PRACTITIONER
Payer: COMMERCIAL

## 2021-09-14 ENCOUNTER — TELEPHONE (OUTPATIENT)
Dept: INTERNAL MEDICINE | Facility: CLINIC | Age: 46
End: 2021-09-14

## 2021-09-14 VITALS
HEART RATE: 104 BPM | BODY MASS INDEX: 26.5 KG/M2 | OXYGEN SATURATION: 98 % | DIASTOLIC BLOOD PRESSURE: 64 MMHG | TEMPERATURE: 98.1 F | HEIGHT: 62 IN | SYSTOLIC BLOOD PRESSURE: 110 MMHG | WEIGHT: 144 LBS

## 2021-09-14 DIAGNOSIS — F90.2 ADHD (ATTENTION DEFICIT HYPERACTIVITY DISORDER), COMBINED TYPE: ICD-10-CM

## 2021-09-14 DIAGNOSIS — J06.9 ACUTE URI: ICD-10-CM

## 2021-09-14 DIAGNOSIS — M25.552 HIP PAIN, LEFT: ICD-10-CM

## 2021-09-14 DIAGNOSIS — F33.1 MAJOR DEPRESSIVE DISORDER, RECURRENT EPISODE, MODERATE (H): ICD-10-CM

## 2021-09-14 DIAGNOSIS — F41.1 GAD (GENERALIZED ANXIETY DISORDER): Primary | ICD-10-CM

## 2021-09-14 DIAGNOSIS — G43.109 MIGRAINE WITH AURA AND WITHOUT STATUS MIGRAINOSUS, NOT INTRACTABLE: ICD-10-CM

## 2021-09-14 LAB — SARS-COV-2 RNA RESP QL NAA+PROBE: POSITIVE

## 2021-09-14 PROCEDURE — U0005 INFEC AGEN DETEC AMPLI PROBE: HCPCS | Performed by: NURSE PRACTITIONER

## 2021-09-14 PROCEDURE — 99214 OFFICE O/P EST MOD 30 MIN: CPT | Performed by: NURSE PRACTITIONER

## 2021-09-14 PROCEDURE — U0003 INFECTIOUS AGENT DETECTION BY NUCLEIC ACID (DNA OR RNA); SEVERE ACUTE RESPIRATORY SYNDROME CORONAVIRUS 2 (SARS-COV-2) (CORONAVIRUS DISEASE [COVID-19]), AMPLIFIED PROBE TECHNIQUE, MAKING USE OF HIGH THROUGHPUT TECHNOLOGIES AS DESCRIBED BY CMS-2020-01-R: HCPCS | Performed by: NURSE PRACTITIONER

## 2021-09-14 RX ORDER — TOPIRAMATE 25 MG/1
TABLET, FILM COATED ORAL
Qty: 180 TABLET | Refills: 1 | Status: SHIPPED | OUTPATIENT
Start: 2021-09-14 | End: 2022-10-04

## 2021-09-14 ASSESSMENT — ANXIETY QUESTIONNAIRES
3. WORRYING TOO MUCH ABOUT DIFFERENT THINGS: NEARLY EVERY DAY
2. NOT BEING ABLE TO STOP OR CONTROL WORRYING: NEARLY EVERY DAY
5. BEING SO RESTLESS THAT IT IS HARD TO SIT STILL: MORE THAN HALF THE DAYS
GAD7 TOTAL SCORE: 19
4. TROUBLE RELAXING: MORE THAN HALF THE DAYS
IF YOU CHECKED OFF ANY PROBLEMS ON THIS QUESTIONNAIRE, HOW DIFFICULT HAVE THESE PROBLEMS MADE IT FOR YOU TO DO YOUR WORK, TAKE CARE OF THINGS AT HOME, OR GET ALONG WITH OTHER PEOPLE: VERY DIFFICULT
6. BECOMING EASILY ANNOYED OR IRRITABLE: NEARLY EVERY DAY
1. FEELING NERVOUS, ANXIOUS, OR ON EDGE: NEARLY EVERY DAY
7. FEELING AFRAID AS IF SOMETHING AWFUL MIGHT HAPPEN: NEARLY EVERY DAY

## 2021-09-14 ASSESSMENT — PAIN SCALES - GENERAL: PAINLEVEL: SEVERE PAIN (6)

## 2021-09-14 ASSESSMENT — MIFFLIN-ST. JEOR: SCORE: 1241.43

## 2021-09-14 ASSESSMENT — PATIENT HEALTH QUESTIONNAIRE - PHQ9: SUM OF ALL RESPONSES TO PHQ QUESTIONS 1-9: 19

## 2021-09-14 NOTE — TELEPHONE ENCOUNTER
Coronavirus (COVID-19) Notification    Reason for call  Notify of POSITIVE  COVID-19 lab result, assess symptoms,  review [facility] recommendations    Lab Result   Lab test for 2019-nCoV rRt-PCR or SARS-COV-2 PCR  Oropharyngeal AND/OR nasopharyngeal swabs were POSITIVE for 2019-nCoV RNA [OR] SARS-COV-2 RNA (COVID-19) RNA     We have been unable to reach Patient by phone at this time to notify of their Positive COVID-19 result.  Left voicemail message requesting a call back to [phone] [facility] for results.        POSITIVE COVID-19 Letter sent.    Iveth Dill LPN

## 2021-09-14 NOTE — NURSING NOTE
"Chief Complaint   Patient presents with     Depression     Anxiety       Initial There were no vitals taken for this visit. Estimated body mass index is 25.61 kg/m  as calculated from the following:    Height as of 8/16/21: 1.575 m (5' 2\").    Weight as of 8/16/21: 63.5 kg (140 lb).  Medication Reconciliation: complete  Bindu Castle LPN  "

## 2021-09-15 ASSESSMENT — ANXIETY QUESTIONNAIRES: GAD7 TOTAL SCORE: 19

## 2021-10-11 DIAGNOSIS — M25.50 ARTHRALGIA, UNSPECIFIED JOINT: ICD-10-CM

## 2021-10-11 RX ORDER — NAPROXEN 500 MG/1
500 TABLET ORAL 2 TIMES DAILY PRN
Qty: 30 TABLET | Refills: 0 | Status: SHIPPED | OUTPATIENT
Start: 2021-10-11 | End: 2021-10-27

## 2021-10-11 NOTE — TELEPHONE ENCOUNTER
naproxen      Last Written Prescription Date:  Not on medication list  Last Fill Quantity: ,   # refills:   Last Office Visit: 9/14/21  Future Office visit:    Next 5 appointments (look out 90 days)    Nov 09, 2021  2:30 PM  (Arrive by 2:15 PM)  Office Visit with Doreen Galo NP  [facility] [place] ([facility] - [place] ) [address]  [place] [address]  [phone]

## 2021-10-27 DIAGNOSIS — F41.1 GAD (GENERALIZED ANXIETY DISORDER): ICD-10-CM

## 2021-10-27 DIAGNOSIS — M25.50 ARTHRALGIA, UNSPECIFIED JOINT: ICD-10-CM

## 2021-10-27 DIAGNOSIS — F33.1 MAJOR DEPRESSIVE DISORDER, RECURRENT EPISODE, MODERATE (H): ICD-10-CM

## 2021-10-27 RX ORDER — NAPROXEN 500 MG/1
500 TABLET ORAL 2 TIMES DAILY PRN
Qty: 30 TABLET | Refills: 0 | Status: SHIPPED | OUTPATIENT
Start: 2021-10-27 | End: 2021-12-01

## 2021-10-27 RX ORDER — DULOXETIN HYDROCHLORIDE 60 MG/1
120 CAPSULE, DELAYED RELEASE ORAL DAILY
Qty: 180 CAPSULE | Refills: 1 | Status: SHIPPED | OUTPATIENT
Start: 2021-10-27 | End: 2021-11-29

## 2021-11-01 DIAGNOSIS — K21.00 GASTROESOPHAGEAL REFLUX DISEASE WITH ESOPHAGITIS WITHOUT HEMORRHAGE: ICD-10-CM

## 2021-11-02 RX ORDER — OMEPRAZOLE 40 MG/1
40 CAPSULE, DELAYED RELEASE ORAL DAILY
Qty: 90 CAPSULE | Refills: 3 | Status: SHIPPED | OUTPATIENT
Start: 2021-11-02 | End: 2022-11-08

## 2021-11-02 NOTE — TELEPHONE ENCOUNTER
Prilosec      Last Written Prescription Date:  11/11/20  Last Fill Quantity: 90,   # refills: 3  Last Office Visit: 09/14/21  Future Office visit:    Next 5 appointments (look out 90 days)    Nov 09, 2021  2:30 PM  (Arrive by 2:15 PM)  Office Visit with Doreen Galo NP  [facility] - [place] ([facility] - [place] ) [address]  [place] [address]  [phone]

## 2021-11-08 NOTE — PROGRESS NOTES
Assessment & Plan     AZ (generalized anxiety disorder)  Major depressive disorder, recurrent episode, moderate (H)  Stable. Continue f/up with Sherwin Guadalupe. See me 6 months.     ADHD (attention deficit hyperactivity disorder), combined type  Stable. Continue f/up with Sherwin Guadalupe.    Canker Sore  Pain slowly improving. Ok to try OTC topical lidocaine. Will return if does not continue to resolve. Will then send to ENT.    No follow-ups on file.    Doreen Galo NP  [facility] - SITA Cooley is a 46 year old who presents for the following health issues  accompanied by her grandmother.    HPI     Depression and Anxiety Follow-Up    How are you doing with your depression since your last visit? No change    How are you doing with your anxiety since your last visit?  No change    Are you having other symptoms that might be associated with depression or anxiety? Yes:  emotional outbursts     Have you had a significant life event? Relationship Concerns     Do you have any concerns with your use of alcohol or other drugs? No     No thoughts of suicide.     Following with a counselor. Seeing her twice weekly. Feels it is helping.      Following with Sherwin Guadalupe, weaning her off the Cymbalta.     Stressed; taking care of her father in-law. Brother in-law recently .     Children cause her stress. Trying to put boundaries up.     She also has ADHD, symptoms controlled with Adderall. Taking Adderall 15 mg ER in the morning and Adderall IM 10 mg in the afternoon. Denies any side effects.     No chest pain or shortness of breath.     She also has a sore on her left tongue. Present times 2 weeks, but slowly going away. Occurred after eating walnuts.     Social History     Tobacco Use     Smoking status: Never Smoker     Smokeless tobacco: Never Used   Substance Use Topics     Alcohol use: Yes     Alcohol/week: 0.0 standard drinks     Comment: RARELY     Drug use: No     PHQ 2021  "9/14/2021 11/9/2021   PHQ-9 Total Score 18 19 9   Q9: Thoughts of better off dead/self-harm past 2 weeks Not at all Not at all Not at all   F/U: Thoughts of suicide or self-harm - - -   F/U: Safety concerns - - -     AZ-7 SCORE 8/16/2021 9/14/2021 11/9/2021   Total Score 14 19 6     Last PHQ-9 11/9/2021   1.  Little interest or pleasure in doing things 1   2.  Feeling down, depressed, or hopeless 1   3.  Trouble falling or staying asleep, or sleeping too much 1   4.  Feeling tired or having little energy 1   5.  Poor appetite or overeating 2   6.  Feeling bad about yourself 1   7.  Trouble concentrating 1   8.  Moving slowly or restless 1   Q9: Thoughts of better off dead/self-harm past 2 weeks 0   PHQ-9 Total Score 9   Difficulty at work, home, or with people Somewhat difficult   In the past two weeks have you had thoughts of suicide or self harm? -   Do you have concerns about your personal safety or the safety of others? -     AZ-7  11/9/2021   1. Feeling nervous, anxious, or on edge 1   2. Not being able to stop or control worrying 1   3. Worrying too much about different things 1   4. Trouble relaxing 1   5. Being so restless that it is hard to sit still 0   6. Becoming easily annoyed or irritable 1   7. Feeling afraid, as if something awful might happen 1   AZ-7 Total Score 6   If you checked any problems, how difficult have they made it for you to do your work, take care of things at home, or get along with other people? Somewhat difficult       Review of Systems   As noted in the HPI.       Objective    /70 (BP Location: Right arm, Patient Position: Chair, Cuff Size: Adult Regular)   Pulse 88   Temp 98.6  F (37  C) (Tympanic)   Ht 1.575 m (5' 2\")   Wt 61.2 kg (135 lb)   SpO2 99%   BMI 24.69 kg/m    Body mass index is 24.69 kg/m .  Physical Exam   GENERAL: healthy, alert and no distress  Head: Normocephalic. No masses, lesions, tenderness or abnormalities  Neck: Neck supple. No adenopathy. " Thyroid symmetric, normal size,  ENT: no neck nodes or sinus tenderness and throat normal without erythema or exudate, she does have small ulcer on left tongue-appears like canker sore  RESP: lungs clear to auscultation - no rales, rhonchi or wheezes  CV: regular rate and rhythm, no murmur, no peripheral edema  NEURO: Normal strength and tone, mentation intact and speech normal  PSYCH: mentation appears normal, affect normal/bright

## 2021-11-09 ENCOUNTER — OFFICE VISIT (OUTPATIENT)
Dept: FAMILY MEDICINE | Facility: OTHER | Age: 46
End: 2021-11-09
Attending: NURSE PRACTITIONER
Payer: COMMERCIAL

## 2021-11-09 VITALS
TEMPERATURE: 98.6 F | SYSTOLIC BLOOD PRESSURE: 106 MMHG | OXYGEN SATURATION: 99 % | BODY MASS INDEX: 24.84 KG/M2 | DIASTOLIC BLOOD PRESSURE: 70 MMHG | HEART RATE: 88 BPM | WEIGHT: 135 LBS | HEIGHT: 62 IN

## 2021-11-09 DIAGNOSIS — F33.1 MAJOR DEPRESSIVE DISORDER, RECURRENT EPISODE, MODERATE (H): ICD-10-CM

## 2021-11-09 DIAGNOSIS — F41.1 GAD (GENERALIZED ANXIETY DISORDER): Primary | ICD-10-CM

## 2021-11-09 DIAGNOSIS — F90.2 ADHD (ATTENTION DEFICIT HYPERACTIVITY DISORDER), COMBINED TYPE: ICD-10-CM

## 2021-11-09 DIAGNOSIS — K12.0 CANKER SORE: ICD-10-CM

## 2021-11-09 PROCEDURE — 99214 OFFICE O/P EST MOD 30 MIN: CPT | Performed by: NURSE PRACTITIONER

## 2021-11-09 RX ORDER — DEXTROAMPHETAMINE SACCHARATE, AMPHETAMINE ASPARTATE, DEXTROAMPHETAMINE SULFATE AND AMPHETAMINE SULFATE 2.5; 2.5; 2.5; 2.5 MG/1; MG/1; MG/1; MG/1
10 TABLET ORAL 2 TIMES DAILY
Qty: 60 TABLET | Refills: 0 | Status: CANCELLED | OUTPATIENT
Start: 2021-12-24

## 2021-11-09 RX ORDER — DEXTROAMPHETAMINE SACCHARATE, AMPHETAMINE ASPARTATE, DEXTROAMPHETAMINE SULFATE AND AMPHETAMINE SULFATE 2.5; 2.5; 2.5; 2.5 MG/1; MG/1; MG/1; MG/1
10 TABLET ORAL 2 TIMES DAILY
Qty: 60 TABLET | Refills: 0 | Status: CANCELLED | OUTPATIENT
Start: 2022-01-25

## 2021-11-09 RX ORDER — DEXTROAMPHETAMINE SACCHARATE, AMPHETAMINE ASPARTATE, DEXTROAMPHETAMINE SULFATE AND AMPHETAMINE SULFATE 2.5; 2.5; 2.5; 2.5 MG/1; MG/1; MG/1; MG/1
10 TABLET ORAL 2 TIMES DAILY
Qty: 60 TABLET | Refills: 0 | Status: CANCELLED | OUTPATIENT
Start: 2021-11-25

## 2021-11-09 ASSESSMENT — ANXIETY QUESTIONNAIRES
3. WORRYING TOO MUCH ABOUT DIFFERENT THINGS: SEVERAL DAYS
4. TROUBLE RELAXING: SEVERAL DAYS
6. BECOMING EASILY ANNOYED OR IRRITABLE: SEVERAL DAYS
IF YOU CHECKED OFF ANY PROBLEMS ON THIS QUESTIONNAIRE, HOW DIFFICULT HAVE THESE PROBLEMS MADE IT FOR YOU TO DO YOUR WORK, TAKE CARE OF THINGS AT HOME, OR GET ALONG WITH OTHER PEOPLE: SOMEWHAT DIFFICULT
2. NOT BEING ABLE TO STOP OR CONTROL WORRYING: SEVERAL DAYS
7. FEELING AFRAID AS IF SOMETHING AWFUL MIGHT HAPPEN: SEVERAL DAYS
5. BEING SO RESTLESS THAT IT IS HARD TO SIT STILL: NOT AT ALL
GAD7 TOTAL SCORE: 6
1. FEELING NERVOUS, ANXIOUS, OR ON EDGE: SEVERAL DAYS

## 2021-11-09 ASSESSMENT — MIFFLIN-ST. JEOR: SCORE: 1200.61

## 2021-11-09 ASSESSMENT — PAIN SCALES - GENERAL: PAINLEVEL: EXTREME PAIN (8)

## 2021-11-09 NOTE — NURSING NOTE
"Chief Complaint   Patient presents with     Depression     Anxiety       Initial /70 (BP Location: Right arm, Patient Position: Chair, Cuff Size: Adult Regular)   Pulse 108   Temp 98.6  F (37  C) (Tympanic)   Ht 1.575 m (5' 2\")   Wt 61.2 kg (135 lb)   SpO2 99%   BMI 24.69 kg/m   Estimated body mass index is 24.69 kg/m  as calculated from the following:    Height as of this encounter: 1.575 m (5' 2\").    Weight as of this encounter: 61.2 kg (135 lb).  Medication Reconciliation: complete  Bindu Castle LPN  "

## 2021-11-10 ASSESSMENT — PATIENT HEALTH QUESTIONNAIRE - PHQ9: SUM OF ALL RESPONSES TO PHQ QUESTIONS 1-9: 9

## 2021-11-10 ASSESSMENT — ANXIETY QUESTIONNAIRES: GAD7 TOTAL SCORE: 6

## 2021-11-26 DIAGNOSIS — F41.1 GAD (GENERALIZED ANXIETY DISORDER): ICD-10-CM

## 2021-11-26 DIAGNOSIS — F33.1 MAJOR DEPRESSIVE DISORDER, RECURRENT EPISODE, MODERATE (H): ICD-10-CM

## 2021-11-29 DIAGNOSIS — M25.50 ARTHRALGIA, UNSPECIFIED JOINT: ICD-10-CM

## 2021-11-29 RX ORDER — DULOXETIN HYDROCHLORIDE 60 MG/1
CAPSULE, DELAYED RELEASE ORAL
Qty: 90 CAPSULE | Refills: 0 | Status: SHIPPED | OUTPATIENT
Start: 2021-11-29 | End: 2022-05-31

## 2021-11-29 NOTE — TELEPHONE ENCOUNTER
DULOXETINE HCL DR 60 MG CAP   Last Written Prescription Date:  10/27/2021  Last Fill Quantity: 180,   # refills: 1  Last Office Visit: 11/9/2021  Future Office visit:       Routing refill request to provider for review/approval because:

## 2021-12-01 RX ORDER — NAPROXEN 500 MG/1
500 TABLET ORAL 2 TIMES DAILY PRN
Qty: 30 TABLET | Refills: 0 | Status: SHIPPED | OUTPATIENT
Start: 2021-12-01 | End: 2022-01-28

## 2022-01-27 DIAGNOSIS — M25.50 ARTHRALGIA, UNSPECIFIED JOINT: ICD-10-CM

## 2022-01-28 RX ORDER — NAPROXEN 500 MG/1
500 TABLET ORAL 2 TIMES DAILY PRN
Qty: 30 TABLET | Refills: 4 | Status: SHIPPED | OUTPATIENT
Start: 2022-01-28 | End: 2022-05-09

## 2022-02-17 DIAGNOSIS — F51.04 PSYCHOPHYSIOLOGICAL INSOMNIA: ICD-10-CM

## 2022-02-17 RX ORDER — TRAZODONE HYDROCHLORIDE 50 MG/1
TABLET, FILM COATED ORAL
Qty: 90 TABLET | Refills: 1 | Status: SHIPPED | OUTPATIENT
Start: 2022-02-17 | End: 2022-03-28

## 2022-03-24 NOTE — PROGRESS NOTES
"  Assessment & Plan     Bilateral hand pain  Numbness and tingling in both hands  Patient with bilateral hand numbness and tingling with pain. Worse at night. Worse in right hand. XR negative. Will place in bilateral thumb spica braces and send for EMGs. Most likely carpal tunnel. If positive, will send to ortho.   - Adult Neurology  Referral    Encounter for screening mammogram for breast cancer  - MA Screen Bilateral w/Oskar; Future  -Will notify patient of the results when available and intervene accordingly.     No follow-ups on file.    Doreen Galo NP  [facility] - SITA Cooley is a 47 year old who presents for the following health issues    HPI     Concern - Bilateral Hand Pain  Onset:  One Year   Description:  bilat hand pain and numbness; worse at night or when she is curling her hair, worse in right hand-she is right handed   Intensity: moderate  Progression of Symptoms:  worsening  Accompanying Signs & Symptoms: numbness, no fevers, no erythema or edema, no weakness  Previous history of similar problem: no   Precipitating factors:        Worsened by:  In the morning   Alleviating factors:        Improved by: will get better as the day goes on   Therapies tried and outcome: nothing   -She had bilateral hand XRs in 2019 and they were unremarkable.   -SANDEE was negative on 11/2019.   -CCP negative in 2019, RF was 26. She met with rheumatology in 3/2020, did not feel she had RA.   -She did fall about one year ago and \"jammed\" her right middle finger.         Review of Systems   Constitutional, HEENT, cardiovascular, pulmonary, gi and gu systems are negative, except as otherwise noted.      Objective    /74 (BP Location: Left arm, Patient Position: Chair, Cuff Size: Adult Regular)   Pulse 118   Temp 98.4  F (36.9  C) (Tympanic)   Ht 1.575 m (5' 2\")   Wt 60.3 kg (133 lb)   SpO2 99%   BMI 24.33 kg/m    Body mass index is 24.33 kg/m .  Physical Exam   GENERAL: " healthy, alert and no distress  PSYCH: mentation appears normal, affect normal/bright  BILATERAL HANDS: Skin intact. No edema. No erythema. Normal sensation. No pain over joints. Strength 5/5. Bilateral radial pulses 2+.

## 2022-03-28 ENCOUNTER — OFFICE VISIT (OUTPATIENT)
Dept: FAMILY MEDICINE | Facility: OTHER | Age: 47
End: 2022-03-28
Attending: NURSE PRACTITIONER
Payer: COMMERCIAL

## 2022-03-28 ENCOUNTER — ANCILLARY PROCEDURE (OUTPATIENT)
Dept: GENERAL RADIOLOGY | Facility: OTHER | Age: 47
End: 2022-03-28
Attending: NURSE PRACTITIONER
Payer: COMMERCIAL

## 2022-03-28 VITALS
DIASTOLIC BLOOD PRESSURE: 74 MMHG | BODY MASS INDEX: 24.48 KG/M2 | HEIGHT: 62 IN | TEMPERATURE: 98.4 F | HEART RATE: 118 BPM | OXYGEN SATURATION: 99 % | WEIGHT: 133 LBS | SYSTOLIC BLOOD PRESSURE: 112 MMHG

## 2022-03-28 DIAGNOSIS — M79.642 BILATERAL HAND PAIN: Primary | ICD-10-CM

## 2022-03-28 DIAGNOSIS — M79.641 BILATERAL HAND PAIN: ICD-10-CM

## 2022-03-28 DIAGNOSIS — R20.2 NUMBNESS AND TINGLING IN BOTH HANDS: ICD-10-CM

## 2022-03-28 DIAGNOSIS — M79.642 BILATERAL HAND PAIN: ICD-10-CM

## 2022-03-28 DIAGNOSIS — Z12.31 ENCOUNTER FOR SCREENING MAMMOGRAM FOR BREAST CANCER: ICD-10-CM

## 2022-03-28 DIAGNOSIS — M79.641 BILATERAL HAND PAIN: Primary | ICD-10-CM

## 2022-03-28 DIAGNOSIS — R20.0 NUMBNESS AND TINGLING IN BOTH HANDS: ICD-10-CM

## 2022-03-28 PROCEDURE — 99214 OFFICE O/P EST MOD 30 MIN: CPT | Performed by: NURSE PRACTITIONER

## 2022-03-28 PROCEDURE — 73130 X-RAY EXAM OF HAND: CPT | Mod: TC | Performed by: RADIOLOGY

## 2022-03-28 RX ORDER — DEXTROAMPHETAMINE SACCHARATE, AMPHETAMINE ASPARTATE MONOHYDRATE, DEXTROAMPHETAMINE SULFATE AND AMPHETAMINE SULFATE 7.5; 7.5; 7.5; 7.5 MG/1; MG/1; MG/1; MG/1
CAPSULE, EXTENDED RELEASE ORAL
COMMUNITY
Start: 2022-03-14 | End: 2022-07-18

## 2022-03-28 RX ORDER — DEXTROAMPHETAMINE SACCHARATE, AMPHETAMINE ASPARTATE, DEXTROAMPHETAMINE SULFATE AND AMPHETAMINE SULFATE 5; 5; 5; 5 MG/1; MG/1; MG/1; MG/1
20 TABLET ORAL
COMMUNITY
Start: 2022-03-14 | End: 2022-11-08

## 2022-03-28 RX ORDER — IBUPROFEN 600 MG/1
TABLET, FILM COATED ORAL
COMMUNITY
Start: 2022-02-06 | End: 2022-11-08

## 2022-03-28 ASSESSMENT — ANXIETY QUESTIONNAIRES
IF YOU CHECKED OFF ANY PROBLEMS ON THIS QUESTIONNAIRE, HOW DIFFICULT HAVE THESE PROBLEMS MADE IT FOR YOU TO DO YOUR WORK, TAKE CARE OF THINGS AT HOME, OR GET ALONG WITH OTHER PEOPLE: SOMEWHAT DIFFICULT
GAD7 TOTAL SCORE: 2
2. NOT BEING ABLE TO STOP OR CONTROL WORRYING: NOT AT ALL
3. WORRYING TOO MUCH ABOUT DIFFERENT THINGS: NOT AT ALL
1. FEELING NERVOUS, ANXIOUS, OR ON EDGE: SEVERAL DAYS
6. BECOMING EASILY ANNOYED OR IRRITABLE: NOT AT ALL
4. TROUBLE RELAXING: SEVERAL DAYS
7. FEELING AFRAID AS IF SOMETHING AWFUL MIGHT HAPPEN: NOT AT ALL
5. BEING SO RESTLESS THAT IT IS HARD TO SIT STILL: NOT AT ALL

## 2022-03-28 ASSESSMENT — PATIENT HEALTH QUESTIONNAIRE - PHQ9: SUM OF ALL RESPONSES TO PHQ QUESTIONS 1-9: 4

## 2022-03-28 ASSESSMENT — PAIN SCALES - GENERAL: PAINLEVEL: SEVERE PAIN (6)

## 2022-03-28 NOTE — NURSING NOTE
"Chief Complaint   Patient presents with     Hand Pain     bilat hand numbness , right middle finger pain        Initial /74 (BP Location: Left arm, Patient Position: Chair, Cuff Size: Adult Regular)   Pulse 118   Temp 98.4  F (36.9  C) (Tympanic)   Ht 1.575 m (5' 2\")   Wt 60.3 kg (133 lb)   SpO2 99%   BMI 24.33 kg/m   Estimated body mass index is 24.33 kg/m  as calculated from the following:    Height as of this encounter: 1.575 m (5' 2\").    Weight as of this encounter: 60.3 kg (133 lb).  Medication Reconciliation: complete  Bindu Castle LPN  "

## 2022-03-28 NOTE — LETTER
[facility]  03/28/22  Patient: Lenora Gage  YOB: 1975  Medical Record Number: [id number]                                                                                  Non-Opioid Controlled Substance Agreement    This is an agreement between you and your provider regarding safe and appropriate use of controlled substances prescribed by your care team. Controlled substances are?medicines that can cause physical and mental dependence (abuse).     There are strict laws about having and using these medicines. We here at [facility] are  committed to working with you in your efforts to get better. To support you in this work, we'll help you schedule regular office appointments for medicine refills. If we must cancel or change your appointment for any reason, we'll make sure you have enough medicine to last until your next appointment.     As a Provider, I will:     Listen carefully to your concerns while treating you with respect.     Recommend a treatment plan that I believe is in your best interest and may involve therapies other than medicine.      Talk with you often about the possible benefits and the risk of harm of any medicine that we prescribe for you.    Assess the safety of this medicine and check how well it works.      Provide a plan on how to taper (discontinue or go off) using this medicine if the decision is made to stop its use.      ::  As a Patient, I understand controlled substances:       Are prescribed by my care provider to help me function or work and manage my condition(s).?    Are strong medicines and can cause serious side effects.       Need to be taken exactly as prescribed.?Combining controlled substances with certain medicines or chemicals (such as illegal drugs, alcohol, sedatives, sleeping pills, and benzodiazepines) can be dangerous or even fatal.? If I stop taking my medicines suddenly, I may have severe withdrawal symptoms.     The risks, benefits,  and side effects of these medicine(s) were explained to me. I agree that:    1. I will take part in other treatments as advised by my care team. This may be psychiatry or counseling, physical therapy, behavioral therapy, group treatment or a referral to specialist.    2. I will keep all my appointments and understand this is part of the monitoring of controlled substances.?My care team may require an office visit for EVERY controlled substance refill. If I miss appointments or don t follow instructions, my care team may stop my medicine    3. I will take my medicines as prescribed. I will not change the dose or schedule unless my care team tells me to. There will be no refills if I run out early.      4. I may be asked to come to the clinic and complete a urine drug test or complete a pill count. If I don t give a urine sample or participate in a pill count, the care team may stop my medicine.    5. I will only receive controlled substance prescriptions from this clinic. If I am treated by another provider, I will tell them that I am taking controlled substances and that I have a treatment agreement with this provider. I will inform my [facility] care team within one business day if I am given a prescription for any controlled substance by another healthcare provider. My [facility] care team can contact other providers and pharmacists about my use of any medicines.    6. It is up to me to make sure that I don't run out of my medicines on weekends or holidays.?If my care team is willing to refill my prescription without a visit, I must request refills only during office hours. Refills may take up to 3 business days to process. I will use one pharmacy to fill all my controlled substance prescriptions. I will notify the clinic about any changes to my insurance or medicine availability.    7. I am responsible for my prescriptions. If the medicine/prescription is lost, stolen or destroyed, it will not be  replaced.?I also agree not to share controlled substance medicines with anyone.     8. I am aware I should not use any illegal or recreational drugs. I agree not to drink alcohol unless my care team says I can.     9. If I enroll in the [place] Medical Cannabis program, I will tell my care team before my next refill.    10. I will tell my care team right away if I become pregnant, have a new medical problem treated outside of my regular clinic, or have a change in my medicines.     11. I understand that this medicine can affect my thinking, judgment and reaction time.? Alcohol and drugs affect the brain and body, which can affect the safety of my driving. Being under the influence of alcohol or drugs can affect my decision-making, behaviors, personal safety and the safety of others. Driving while impaired (DWI) can occur if a person is driving, operating or in physical control of a car, motorcycle, boat, snowmobile, ATV, motorbike, off-road vehicle or any other motor vehicle ([place] Statute 169A.20). I understand the risk if I choose to drive or operate any vehicle or machinery.    I understand that if I do not follow any of the conditions above, my prescriptions or treatment may be stopped or changed.   I agree that my provider, clinic care team and pharmacy may work with any city, state or federal law enforcement agency that investigates the misuse, sale or other diversion of my controlled medicine. I will allow my provider to discuss my care with, or share a copy of, this agreement with any other treating provider, pharmacy or emergency room where I receive care.     I have read this agreement and have asked questions about anything I did not understand.    ________________________________________________________  Patient Signature - Lenora STEIN Spotts     ___________________                   Date     ________________________________________________________  Provider Signature - Doreen Galo NP        ___________________                   Date     ________________________________________________________  Witness Signature (required if provider not present while patient signing)          ___________________                   Date

## 2022-03-29 ASSESSMENT — ANXIETY QUESTIONNAIRES: GAD7 TOTAL SCORE: 2

## 2022-04-10 NOTE — PATIENT INSTRUCTIONS
Preventive Health Recommendations  Female Ages 40 to 49    Yearly exam:     See your health care provider every year in order to  1. Review health changes.   2. Discuss preventive care.    3. Review your medicines if your doctor prescribed any.      Get a Pap test every three years (unless you have an abnormal result and your provider advises testing more often).      If you get Pap tests with HPV test, you only need to test every 5 years, unless you have an abnormal result. You do not need a Pap test if your uterus was removed (hysterectomy) and you have not had cancer.      You should be tested each year for STDs (sexually transmitted diseases), if you're at risk.     Ask your doctor if you should have a mammogram.      Have a colonoscopy (test for colon cancer) if someone in your family has had colon cancer or polyps before age 50.       Have a cholesterol test every 5 years.       Have a diabetes test (fasting glucose) after age 45. If you are at risk for diabetes, you should have this test every 3 years.    Shots: Get a flu shot each year. Get a tetanus shot every 10 years.     Nutrition:     Eat at least 5 servings of fruits and vegetables each day.    Eat whole-grain bread, whole-wheat pasta and brown rice instead of white grains and rice.    Get adequate Calcium and Vitamin D.      Lifestyle    Exercise at least 150 minutes a week (an average of 30 minutes a day, 5 days a week). This will help you control your weight and prevent disease.    Limit alcohol to one drink per day.    No smoking.     Wear sunscreen to prevent skin cancer.    See your dentist every six months for an exam and cleaning.   12-Apr-2022

## 2022-05-07 DIAGNOSIS — M25.50 ARTHRALGIA, UNSPECIFIED JOINT: ICD-10-CM

## 2022-05-09 RX ORDER — NAPROXEN 500 MG/1
500 TABLET ORAL 2 TIMES DAILY PRN
Qty: 30 TABLET | Refills: 4 | Status: SHIPPED | OUTPATIENT
Start: 2022-05-09 | End: 2022-11-08

## 2022-05-09 NOTE — TELEPHONE ENCOUNTER
Naproxen       Last Written Prescription Date:  1-28-22  Last Fill Quantity: 30,   # refills: 4  Last Office Visit: 3-28-22  Future Office visit:

## 2022-05-29 DIAGNOSIS — F33.1 MAJOR DEPRESSIVE DISORDER, RECURRENT EPISODE, MODERATE (H): ICD-10-CM

## 2022-05-29 DIAGNOSIS — F41.1 GAD (GENERALIZED ANXIETY DISORDER): ICD-10-CM

## 2022-05-31 RX ORDER — DULOXETIN HYDROCHLORIDE 60 MG/1
CAPSULE, DELAYED RELEASE ORAL
Qty: 90 CAPSULE | Refills: 2 | Status: SHIPPED | OUTPATIENT
Start: 2022-05-31 | End: 2022-11-08

## 2022-07-18 ENCOUNTER — TELEPHONE (OUTPATIENT)
Dept: FAMILY MEDICINE | Facility: OTHER | Age: 47
End: 2022-07-18

## 2022-07-18 DIAGNOSIS — F90.2 ADHD (ATTENTION DEFICIT HYPERACTIVITY DISORDER), COMBINED TYPE: Primary | ICD-10-CM

## 2022-07-18 RX ORDER — DEXTROAMPHETAMINE SACCHARATE, AMPHETAMINE ASPARTATE MONOHYDRATE, DEXTROAMPHETAMINE SULFATE AND AMPHETAMINE SULFATE 7.5; 7.5; 7.5; 7.5 MG/1; MG/1; MG/1; MG/1
30 CAPSULE, EXTENDED RELEASE ORAL 2 TIMES DAILY
Qty: 60 CAPSULE | Refills: 0 | Status: SHIPPED | OUTPATIENT
Start: 2022-07-18 | End: 2022-11-08

## 2022-07-18 NOTE — TELEPHONE ENCOUNTER
Spoke with patient who is very upset . Stated that she has been out of her Adderall for 2 weeks and has been unable to get ahold of Aristides Guadalupe .  Contacted [employer] and was informed that he was no longer with the company and had moved over to [employer] . I contacted [employer] and they stated they have tried contacting patient but patient states she has not received any calls. Patient inquiring if PCP can fill x 1 month until she gets everything straightened out with Aristides Jiménez .     Patient is taking Adderall XR 30 mg 1 tab b.i.d. and Aderall 20 mg  Once daily .  Patient using  [facility] pharmacy .

## 2022-07-18 NOTE — TELEPHONE ENCOUNTER
Sheryl called PCP's nurse back. Caller stated she wanted to update Bindu she has attempted to call the patient multiple times.

## 2022-07-18 NOTE — TELEPHONE ENCOUNTER
Patient notified that PCP will fill the Adderall 30 mg b.i.d. but will have to wait for the 20 mg once she speaks with her psych provider. Adderall 30 mg b.i.d. has been pended to PCP

## 2022-10-04 DIAGNOSIS — G43.109 MIGRAINE WITH AURA AND WITHOUT STATUS MIGRAINOSUS, NOT INTRACTABLE: ICD-10-CM

## 2022-10-04 RX ORDER — TOPIRAMATE 25 MG/1
TABLET, FILM COATED ORAL
Qty: 180 TABLET | Refills: 1 | Status: SHIPPED | OUTPATIENT
Start: 2022-10-04 | End: 2022-11-08

## 2022-10-04 NOTE — TELEPHONE ENCOUNTER
topiramate      Last Written Prescription Date:  9/14/22  Last Fill Quantity: 180,   # refills: 1  Last Office Visit: 8/2/22  Future Office visit:       Routing refill request to provider for review/approval because:

## 2022-10-05 ENCOUNTER — HOSPITAL ENCOUNTER (EMERGENCY)
Facility: HOSPITAL | Age: 47
Discharge: HOME | End: 2022-10-05
Attending: PHYSICIAN ASSISTANT | Admitting: PHYSICIAN ASSISTANT
Payer: COMMERCIAL

## 2022-10-05 ENCOUNTER — TELEPHONE (OUTPATIENT)
Dept: FAMILY MEDICINE | Facility: OTHER | Age: 47
End: 2022-10-05

## 2022-10-05 VITALS
WEIGHT: 138.89 LBS | RESPIRATION RATE: 16 BRPM | OXYGEN SATURATION: 100 % | BODY MASS INDEX: 25.4 KG/M2 | SYSTOLIC BLOOD PRESSURE: 125 MMHG | TEMPERATURE: 97.8 F | DIASTOLIC BLOOD PRESSURE: 90 MMHG | HEART RATE: 91 BPM

## 2022-10-05 DIAGNOSIS — M62.81 GENERALIZED MUSCLE WEAKNESS: ICD-10-CM

## 2022-10-05 LAB
ALBUMIN SERPL-MCNC: 4.1 G/DL (ref 3.4–5)
ALP SERPL-CCNC: 78 U/L (ref 40–150)
ALT SERPL W P-5'-P-CCNC: 29 U/L (ref 0–50)
ANION GAP SERPL CALCULATED.3IONS-SCNC: 7 MMOL/L (ref 3–14)
AST SERPL W P-5'-P-CCNC: 20 U/L (ref 0–45)
BASOPHILS # BLD AUTO: 0 10E3/UL (ref 0–0.2)
BASOPHILS NFR BLD AUTO: 0 %
BILIRUB DIRECT SERPL-MCNC: 0.2 MG/DL (ref 0–0.2)
BILIRUB SERPL-MCNC: 0.8 MG/DL (ref 0.2–1.3)
BUN SERPL-MCNC: 8 MG/DL (ref 7–30)
CALCIUM SERPL-MCNC: 9.3 MG/DL (ref 8.5–10.1)
CHLORIDE BLD-SCNC: 106 MMOL/L (ref 94–109)
CO2 SERPL-SCNC: 27 MMOL/L (ref 20–32)
CREAT SERPL-MCNC: 0.71 MG/DL (ref 0.52–1.04)
CRP SERPL-MCNC: <2.9 MG/L (ref 0–8)
EOSINOPHIL # BLD AUTO: 0.1 10E3/UL (ref 0–0.7)
EOSINOPHIL NFR BLD AUTO: 2 %
ERYTHROCYTE [DISTWIDTH] IN BLOOD BY AUTOMATED COUNT: 12.1 % (ref 10–15)
ERYTHROCYTE [SEDIMENTATION RATE] IN BLOOD BY WESTERGREN METHOD: 5 MM/HR (ref 0–20)
GFR SERPL CREATININE-BSD FRML MDRD: >90 ML/MIN/1.73M2
GLUCOSE BLD-MCNC: 94 MG/DL (ref 70–99)
HCT VFR BLD AUTO: 44.3 % (ref 35–47)
HGB BLD-MCNC: 15.1 G/DL (ref 11.7–15.7)
HOLD SPECIMEN: NORMAL
IMM GRANULOCYTES # BLD: 0 10E3/UL
IMM GRANULOCYTES NFR BLD: 0 %
LIPASE SERPL-CCNC: 160 U/L (ref 73–393)
LYMPHOCYTES # BLD AUTO: 1.4 10E3/UL (ref 0.8–5.3)
LYMPHOCYTES NFR BLD AUTO: 24 %
MAGNESIUM SERPL-MCNC: 2.2 MG/DL (ref 1.6–2.3)
MCH RBC QN AUTO: 29.1 PG (ref 26.5–33)
MCHC RBC AUTO-ENTMCNC: 34.1 G/DL (ref 31.5–36.5)
MCV RBC AUTO: 85 FL (ref 78–100)
MONOCYTES # BLD AUTO: 0.4 10E3/UL (ref 0–1.3)
MONOCYTES NFR BLD AUTO: 7 %
NEUTROPHILS # BLD AUTO: 3.8 10E3/UL (ref 1.6–8.3)
NEUTROPHILS NFR BLD AUTO: 67 %
NRBC # BLD AUTO: 0 10E3/UL
NRBC BLD AUTO-RTO: 0 /100
PHOSPHATE SERPL-MCNC: 3.7 MG/DL (ref 2.5–4.5)
PLATELET # BLD AUTO: 239 10E3/UL (ref 150–450)
POTASSIUM BLD-SCNC: 3.7 MMOL/L (ref 3.4–5.3)
PROT SERPL-MCNC: 7.4 G/DL (ref 6.8–8.8)
RBC # BLD AUTO: 5.19 10E6/UL (ref 3.8–5.2)
SODIUM SERPL-SCNC: 140 MMOL/L (ref 133–144)
TROPONIN I SERPL HS-MCNC: <3 NG/L
TSH SERPL DL<=0.005 MIU/L-ACNC: 2.03 MU/L (ref 0.4–4)
WBC # BLD AUTO: 5.8 10E3/UL (ref 4–11)

## 2022-10-05 PROCEDURE — 99284 EMERGENCY DEPT VISIT MOD MDM: CPT | Performed by: PHYSICIAN ASSISTANT

## 2022-10-05 PROCEDURE — 84484 ASSAY OF TROPONIN QUANT: CPT | Performed by: PHYSICIAN ASSISTANT

## 2022-10-05 PROCEDURE — 85025 COMPLETE CBC W/AUTO DIFF WBC: CPT | Performed by: PHYSICIAN ASSISTANT

## 2022-10-05 PROCEDURE — 85652 RBC SED RATE AUTOMATED: CPT | Performed by: PHYSICIAN ASSISTANT

## 2022-10-05 PROCEDURE — 80069 RENAL FUNCTION PANEL: CPT | Performed by: STUDENT IN AN ORGANIZED HEALTH CARE EDUCATION/TRAINING PROGRAM

## 2022-10-05 PROCEDURE — 83690 ASSAY OF LIPASE: CPT | Performed by: PHYSICIAN ASSISTANT

## 2022-10-05 PROCEDURE — 84443 ASSAY THYROID STIM HORMONE: CPT | Performed by: PHYSICIAN ASSISTANT

## 2022-10-05 PROCEDURE — 36415 COLL VENOUS BLD VENIPUNCTURE: CPT | Performed by: STUDENT IN AN ORGANIZED HEALTH CARE EDUCATION/TRAINING PROGRAM

## 2022-10-05 PROCEDURE — 80053 COMPREHEN METABOLIC PANEL: CPT | Performed by: STUDENT IN AN ORGANIZED HEALTH CARE EDUCATION/TRAINING PROGRAM

## 2022-10-05 PROCEDURE — 83735 ASSAY OF MAGNESIUM: CPT | Performed by: PHYSICIAN ASSISTANT

## 2022-10-05 PROCEDURE — 85025 COMPLETE CBC W/AUTO DIFF WBC: CPT | Performed by: STUDENT IN AN ORGANIZED HEALTH CARE EDUCATION/TRAINING PROGRAM

## 2022-10-05 PROCEDURE — 87476 LYME DIS DNA AMP PROBE: CPT | Performed by: PHYSICIAN ASSISTANT

## 2022-10-05 PROCEDURE — 80048 BASIC METABOLIC PNL TOTAL CA: CPT | Performed by: PHYSICIAN ASSISTANT

## 2022-10-05 PROCEDURE — 82248 BILIRUBIN DIRECT: CPT | Performed by: PHYSICIAN ASSISTANT

## 2022-10-05 PROCEDURE — 86140 C-REACTIVE PROTEIN: CPT | Performed by: PHYSICIAN ASSISTANT

## 2022-10-05 PROCEDURE — 99283 EMERGENCY DEPT VISIT LOW MDM: CPT

## 2022-10-05 PROCEDURE — 36415 COLL VENOUS BLD VENIPUNCTURE: CPT | Performed by: PHYSICIAN ASSISTANT

## 2022-10-05 PROCEDURE — 84100 ASSAY OF PHOSPHORUS: CPT | Performed by: PHYSICIAN ASSISTANT

## 2022-10-05 ASSESSMENT — ACTIVITIES OF DAILY LIVING (ADL): ADLS_ACUITY_SCORE: 37

## 2022-10-05 ASSESSMENT — ENCOUNTER SYMPTOMS
ABDOMINAL PAIN: 1
WEAKNESS: 1
BRUISES/BLEEDS EASILY: 1
DIFFICULTY URINATING: 0
APPETITE CHANGE: 1
SINUS PRESSURE: 0
SORE THROAT: 0
FREQUENCY: 0
DYSURIA: 0
CHILLS: 1
DIARRHEA: 0
DIAPHORESIS: 1
TROUBLE SWALLOWING: 0
ACTIVITY CHANGE: 1
HEADACHES: 1
VOMITING: 0
SINUS PAIN: 0
NUMBNESS: 1
BACK PAIN: 1
DIZZINESS: 1
FEVER: 0
FATIGUE: 1
CONSTIPATION: 0
TREMORS: 0
FLANK PAIN: 1
UNEXPECTED WEIGHT CHANGE: 0
NAUSEA: 1
LIGHT-HEADEDNESS: 1

## 2022-10-05 NOTE — ED NOTES
Patient reports that she has been having a variety of symptoms gradually worsening over the last few months. Patient has been having increased weakness, joint pain and difficulty getting out of bed, specifically in the morning. Patient also reports changes in smell of both urine and stool.  also reports that patient has been having issues with feeling full after eating only small amounts of food. Patient reports when this happens, she feels discomfort in her chest and then has some hot and cold flashes. Patient denies any episodes of choking when she eats or drinks.

## 2022-10-05 NOTE — ED NOTES
"Patient has been having pain for months. Head, neck, left shoulder into the chest, lower back pain, lower abdominal pain. LLQ tender. States her \"poop and pee smell odd\". Very fatigued. Voiding a lot. Belly feels bloated, even more bloated even with small amounts of food. Nausea, a lot of dry heaving. Last few months it has been progressively getting worse. Starting to have difficulty even getting out of bed some days  "

## 2022-10-05 NOTE — ED PROVIDER NOTES
History     Chief Complaint   Patient presents with     Pain     HPI  Lenora Gage is a 47 year old female who presents to the ER for multitude of symptoms.  Patient has not a lot of vague symptoms ongoing for the last couple months with the last few weeks her symptoms getting worse quickly.  Past medical history positive for rheumatoid factor tension type headaches migraines major depressive disorder prolonged grief reaction generalized anxiety disorder currently not taking any medications patient notes that she has generalized pain to the body most significantly in the pelvis abdomen left shoulder, low back and neck.  Patient has had increased generalized weakness having difficulty getting out of bed feeling weakness when moving around getting tired easily difficulty opening doors.  no previous history of this in her past unsure of any tick bites.  Denies alcohol or drug use.    Allergies:  Allergies   Allergen Reactions     Blood Transfusion Related (Informational Only) Other (See Comments)     Patient has a history of a clinically significant antibody against RBC antigens.  A delay in compatible RBCs may occur.     Penicillins Anaphylaxis and Hives       Problem List:    Patient Active Problem List    Diagnosis Date Noted     Abdominal pain, unspecified abdominal location 05/24/2021     Priority: Medium     Rectal bleeding 05/24/2021     Priority: Medium     Myalgia 02/13/2020     Priority: Medium     Rheumatoid factor positive 02/13/2020     Priority: Medium     Met with rheumatology, was not felt to have RA       Lateral epicondylitis of right elbow 01/12/2018     Priority: Medium     Acute intractable tension-type headache 11/13/2017     Priority: Medium     AZ (generalized anxiety disorder) 06/05/2017     Priority: Medium     Prolonged grief reaction 11/01/2016     Priority: Medium     Major depressive disorder, recurrent episode, moderate (H) 11/01/2016     Priority: Medium     ACP (advance care  planning) 08/24/2016     Priority: Medium     Advance Care Planning 8/24/2016: ACP Review of Chart / Resources Provided:  Reviewed chart for advance care plan.  Lenora Montelongo has no plan or code status on file. Discussed available resources and provided with information.   Added by Andrea Pulliam             Gastroesophageal reflux disease without esophagitis 04/08/2016     Priority: Medium     Midline low back pain without sciatica 01/13/2016     Priority: Medium     Stress due to illness of family member 12/10/2014     Priority: Medium     OCD (obsessive compulsive disorder)      Priority: Medium        Past Medical History:    Past Medical History:   Diagnosis Date     Adjustment disorder with anxiety      Constipation 07/17/2012     Dysmenorrhea 06/20/2002     Dyspareunia 06/13/2007     Dysplasia of cervix (uteri)  07/25/2000     Endometriosis of uterus 12/29/2003     Esophageal reflux 02/07/2000     Helicobacter positive gastritis      IBS (Irritable colon syndrome) 07/06/2011     OCD (obsessive compulsive disorder)        Past Surgical History:    Past Surgical History:   Procedure Laterality Date     bladder reconstruction and mesh removal  2012     BLADDER REPAIR  2010     COLONOSCOPY - HIM SCAN N/A 06/21/2007    Colonoscopy to the cecum with biopsy terminal ileum, cecum and descending colon ([facility]) normal     colposcopy with biopsy-mutliple  10/2014     HYSTERECTOMY TOTAL ABDOMINAL, BILATERAL SALPINGO-OOPHORECTOMY, COMBINED Left     Right ovary remains.      LAPAROSCOPIC APPENDECTOMY N/A 09/03/2020    Procedure: APPENDECTOMY, LAPAROSCOPIC;  Surgeon: Jonathan Martinez MD;  Location: [facility] OR     TUBAL LIGATION         Family History:    Family History   Problem Relation Age of Onset     Other - See Comments Paternal Grandmother         Antitrysin Deficiency     Cancer Mother         Cervical     Cancer - colorectal Maternal Grandfather        Social History:  Marital Status:    [2]  Social History     Tobacco Use     Smoking status: Never Smoker     Smokeless tobacco: Never Used   Substance Use Topics     Alcohol use: Yes     Alcohol/week: 0.0 standard drinks     Comment: RARELY     Drug use: No        Medications:    acyclovir (ZOVIRAX) 400 MG tablet  amphetamine-dextroamphetamine (ADDERALL XR) 30 MG 24 hr capsule  amphetamine-dextroamphetamine (ADDERALL) 20 MG tablet  DULoxetine (CYMBALTA) 60 MG capsule  ibuprofen (ADVIL/MOTRIN) 600 MG tablet  naproxen (NAPROSYN) 500 MG tablet  omeprazole (PRILOSEC) 40 MG DR capsule  SUMAtriptan (IMITREX) 25 MG tablet  topiramate (TOPAMAX) 25 MG tablet          Review of Systems   Constitutional: Positive for activity change, appetite change, chills, diaphoresis and fatigue. Negative for fever and unexpected weight change.   HENT: Negative for congestion, sinus pressure, sinus pain, sore throat and trouble swallowing.    Cardiovascular: Negative for chest pain and leg swelling.   Gastrointestinal: Positive for abdominal pain and nausea. Negative for constipation, diarrhea and vomiting.   Genitourinary: Positive for flank pain and pelvic pain. Negative for difficulty urinating, dysuria and frequency.   Musculoskeletal: Positive for back pain.   Skin: Negative for rash.   Neurological: Positive for dizziness (occasional), weakness, light-headedness, numbness (head, neck, hands, feet) and headaches. Negative for tremors and syncope.   Hematological: Bruises/bleeds easily.       Physical Exam   BP: 125/90  Pulse: 91  Temp: 97.8  F (36.6  C)  Resp: 16  Weight: 63 kg (138 lb 14.2 oz)  SpO2: 99 %      Physical Exam  Constitutional:       General: She is not in acute distress.     Appearance: Normal appearance. She is normal weight. She is not ill-appearing, toxic-appearing or diaphoretic.   HENT:      Head: Normocephalic and atraumatic.      Right Ear: External ear normal.      Left Ear: External ear normal.   Eyes:      Extraocular Movements:  Extraocular movements intact.      Conjunctiva/sclera: Conjunctivae normal.      Pupils: Pupils are equal, round, and reactive to light.   Neck:      Trachea: Trachea and phonation normal.   Cardiovascular:      Rate and Rhythm: Normal rate and regular rhythm.      Pulses: Normal pulses.      Heart sounds: Normal heart sounds.   Pulmonary:      Effort: Pulmonary effort is normal. No respiratory distress.      Breath sounds: Normal breath sounds and air entry. No decreased breath sounds, wheezing, rhonchi or rales.   Chest:      Chest wall: No tenderness.   Abdominal:      General: Abdomen is flat. Bowel sounds are normal.      Palpations: Abdomen is soft.      Tenderness: There is generalized abdominal tenderness. There is no guarding or rebound.   Musculoskeletal:         General: Normal range of motion.      Cervical back: No edema, erythema, rigidity, torticollis or crepitus. Muscular tenderness present. Normal range of motion.      Right lower leg: No edema.      Left lower leg: No edema.   Lymphadenopathy:      Cervical:      Right cervical: No superficial cervical adenopathy.     Left cervical: No superficial cervical adenopathy.   Skin:     General: Skin is warm and dry.      Coloration: Skin is not jaundiced or pale.   Neurological:      General: No focal deficit present.      Mental Status: She is alert and oriented to person, place, and time. Mental status is at baseline.      GCS: GCS eye subscore is 4. GCS verbal subscore is 5. GCS motor subscore is 6.      Cranial Nerves: Cranial nerves 2-12 are intact. No cranial nerve deficit.      Sensory: Sensation is intact. No sensory deficit.      Motor: Motor function is intact.      Coordination: Coordination is intact.      Deep Tendon Reflexes: Reflexes are normal and symmetric.   Psychiatric:         Mood and Affect: Mood normal.         ED Course        I have reviewed the epic chart, the nurses note and triage note. vital signs were reviewed.  Patient is a  lot of vague symptoms ongoing here I did check some lab work and I think imaging would not be of any benefit here symptoms are not of an acute nature.  Lab work obtained evaluating for electrolyte abnormality thyroid dysfunction cardiac cause. hepatic renal pancreatic labs were evaluated for the cause of the abdominal pain CRP and ESR were obtained.  Phosphorus magnesium CBC CMP ESR CRP TSH troponin are all normal.  Patient was unable to void for me while in the department here I am unable to check for UTI.  Lyme disease screen is still pending.  At this time I think the patient will require further outpatient work-up and I will believe there is any again emergent cause of her symptoms here.  Discussed all the findings tonight and discussed my concern for needing follow-up and further eval patient agrees to plan she has no further questions at this time she is discharged home.         Procedures           Results for orders placed or performed during the hospital encounter of 10/05/22 (from the past 24 hour(s))   CBC with Platelets & Differential    Narrative    The following orders were created for panel order CBC with Platelets & Differential.  Procedure                               Abnormality         Status                     ---------                               -----------         ------                     CBC with platelets and d...[[id number]]                      Final result                 Please view results for these tests on the individual orders.   Basic metabolic panel   Result Value Ref Range    Sodium 140 133 - 144 mmol/L    Potassium 3.7 3.4 - 5.3 mmol/L    Chloride 106 94 - 109 mmol/L    Carbon Dioxide (CO2) 27 20 - 32 mmol/L    Anion Gap 7 3 - 14 mmol/L    Urea Nitrogen 8 7 - 30 mg/dL    Creatinine 0.71 0.52 - 1.04 mg/dL    Calcium 9.3 8.5 - 10.1 mg/dL    Glucose 94 70 - 99 mg/dL    GFR Estimate >90 >60 mL/min/1.73m2   [place] Draw    Narrative    The following orders were created for panel  order [place] Draw.  Procedure                               Abnormality         Status                     ---------                               -----------         ------                     Extra Blue Top Tube[[id number]]                              Final result               Extra Red Top Tube[[id number]]                               Final result               Extra Green Top (Lithium...[[id number]]                      Final result               Extra Purple Top Tube[[id number]]                            Final result               Extra Green Top (Lithium...[[id number]]                      Final result                 Please view results for these tests on the individual orders.   CBC with platelets and differential   Result Value Ref Range    WBC Count 5.8 4.0 - 11.0 10e3/uL    RBC Count 5.19 3.80 - 5.20 10e6/uL    Hemoglobin 15.1 11.7 - 15.7 g/dL    Hematocrit 44.3 35.0 - 47.0 %    MCV 85 78 - 100 fL    MCH 29.1 26.5 - 33.0 pg    MCHC 34.1 31.5 - 36.5 g/dL    RDW 12.1 10.0 - 15.0 %    Platelet Count 239 150 - 450 10e3/uL    % Neutrophils 67 %    % Lymphocytes 24 %    % Monocytes 7 %    % Eosinophils 2 %    % Basophils 0 %    % Immature Granulocytes 0 %    NRBCs per 100 WBC 0 <1 /100    Absolute Neutrophils 3.8 1.6 - 8.3 10e3/uL    Absolute Lymphocytes 1.4 0.8 - 5.3 10e3/uL    Absolute Monocytes 0.4 0.0 - 1.3 10e3/uL    Absolute Eosinophils 0.1 0.0 - 0.7 10e3/uL    Absolute Basophils 0.0 0.0 - 0.2 10e3/uL    Absolute Immature Granulocytes 0.0 <=0.4 10e3/uL    Absolute NRBCs 0.0 10e3/uL   Extra Blue Top Tube   Result Value Ref Range    Hold Specimen JIC    Extra Red Top Tube   Result Value Ref Range    Hold Specimen JIC    Extra Green Top (Lithium Heparin) Tube   Result Value Ref Range    Hold Specimen JIC    Extra Purple Top Tube   Result Value Ref Range    Hold Specimen JIC    Extra Green Top (Lithium Heparin) ON ICE   Result Value Ref Range    Hold Specimen JIC    Magnesium   Result Value Ref Range     Magnesium 2.2 1.6 - 2.3 mg/dL   Phosphorus   Result Value Ref Range    Phosphorus 3.7 2.5 - 4.5 mg/dL   TSH with free T4 reflex   Result Value Ref Range    TSH 2.03 0.40 - 4.00 mU/L   Hepatic panel   Result Value Ref Range    Bilirubin Total 0.8 0.2 - 1.3 mg/dL    Bilirubin Direct 0.2 0.0 - 0.2 mg/dL    Protein Total 7.4 6.8 - 8.8 g/dL    Albumin 4.1 3.4 - 5.0 g/dL    Alkaline Phosphatase 78 40 - 150 U/L    AST 20 0 - 45 U/L    ALT 29 0 - 50 U/L   Troponin I   Result Value Ref Range    Troponin I High Sensitivity <3 <54 ng/L   CRP inflammation   Result Value Ref Range    CRP Inflammation <2.9 0.0 - 8.0 mg/L   Lipase   Result Value Ref Range    Lipase 160 73 - 393 U/L   Erythrocyte sedimentation rate auto   Result Value Ref Range    Erythrocyte Sedimentation Rate 5 0 - 20 mm/hr   Extra Tube    Narrative    The following orders were created for panel order Extra Tube.  Procedure                               Abnormality         Status                     ---------                               -----------         ------                     Extra Blue Top Tube[[id number]]                              Final result               Extra Green Top (Lithium...[[id number]]                      Final result                 Please view results for these tests on the individual orders.   Extra Blue Top Tube   Result Value Ref Range    Hold Specimen JIC    Extra Green Top (Lithium Heparin) Tube   Result Value Ref Range    Hold Specimen JIC        Medications - No data to display    Assessments & Plan (with Medical Decision Making)     I have reviewed the nursing notes.    I have reviewed the findings, diagnosis, plan and need for follow up with the patient.  She will need to follow-up with her primary care doctor.  I do not believe there is any emergent evaluation required here at this time.    Discharge Medication List as of 10/5/2022  7:41 PM          Final diagnoses:   Generalized muscle weakness       10/5/2022   [facility] EMERGENCY  DEPARTMENT     Louis Eastman PA-C  10/05/22 2007

## 2022-10-05 NOTE — ED TRIAGE NOTES
Patient presents with complaints of whole body pain that she states has been going on for months. As well as abdominal pain. Per significant other she's here today as she couldn't even get out of bed. States they have only called primary today for all of this.

## 2022-10-06 NOTE — DISCHARGE INSTRUCTIONS
Follow-up with your primary care doctor in the next couple weeks.  Return to the ER if symptoms worsen.  Your lab work today looked quite reassuring we are still waiting on a Lyme disease screen.  We will see him back positive you will be notified

## 2022-10-06 NOTE — ED NOTES
"Patient opened door and states \"can I leave yet?\". OREN Dewey notified that patient would like to leave. Patient and  provided written and verbal discharge instructions and both verbalize understanding. Patient advised lyme's test is pending due to send out. Patient advised to follow up with PCP. Patient declined discharge vitals. Patient left department ambulatory.   "

## 2022-10-07 ENCOUNTER — TELEPHONE (OUTPATIENT)
Dept: FAMILY MEDICINE | Facility: OTHER | Age: 47
End: 2022-10-07

## 2022-10-07 NOTE — PROGRESS NOTES
Assessment & Plan     Generalized muscle weakness  Arthralgia, unspecified joint  Myofascial Pain  Cervicalgia  Patient with generalized weakness and muscle and joint pain times 3 months. Neuro exam normal. Recent blood work normal. Will repeat blood work and also add on a CK level and do all tick testing. Will notify patient of the results when available and intervene accordingly. Given the weakness and neck pain, will also order a brain and cervical MRI. Will notify patient of the results when available and intervene accordingly.       AZ (generalized anxiety disorder  Major depressive disorder, recurrent episode, moderate (H)  I do think a lot of her symptoms are related to her mental health. Very tearful and stressed, but no thoughts of self harm. She was taking several medications and stopped everything 3 months ago. She will continue counseling. She declines to start anything.  Will start walking regularly and eating nutritiously. She has started to go to [employer] and will continue. Will reassess in 4 weeks. Sooner with new or worsening symptoms.     - ESR: Erythrocyte sedimentation rate; Future  - CRP, inflammation; Future  - Comprehensive metabolic panel (BMP + Alb, Alk Phos, ALT, AST, Total. Bili, TP); Future  - Parasite stain; Future  - Babesia antibody IgG IgM; Future  - Anaplasma phagocytoph Antibody IgM; Future  - CK total; Future  - Rheumatoid factor; Future  - Cyclic Citrullinated Peptide Antibody IgG; Future  - Anti Nuclear Alethea IgG by IFA with Reflex; Future  - DNA double stranded antibodies; Future  - MR Cervical Spine w/o Contrast; Future    Ordering of each unique test  Prescription drug management  40 minutes spent on the date of the encounter doing chart review, interpretation of tests, patient visit and documentation         Return in about 4 weeks (around 11/8/2022).    Doreen Galo NP  [facility] - SITA Cooley is a 47 year old, presenting for the following  health issues:  ER F/U      HPI     ED/UC Followup:    Facility:  [facility]  Date of visit: 10/5/22  Reason for visit: Generalized Muscle Weakness       Patient presented to the ER on 10/5/22 with generalized muscle weakness. Note was reviewed. CBC, CMP, ESR, CRP, TSH, magnesium, phosphorus, and troponin was normal. Lyme screen in process. Discharged home.   Current Status: Today she notes that she feels very weak. Symptoms occurring for about 3 months. When she tries to push herself, she gets nauseated. She also has chronic pain. Most of her pain is located in her right lower back and neck. No fevers. No joint swelling or erythema. Eating and drinking well. No weight loss. No chest pain or shortness of breath.     No family history of neurological disease.      She does have a history of a positive RA factor and has seen rheumatology. Was last seen on 2/13/2020. Pain felt to be more myofascial pain.     She is not sleeping the best.     Mental health is poorly controlled.  Very stressed. She and her 's marriage is struggling. Kids causing her stress and expect a lot from her. Her children do not clean their home and she is worried their children will be taken away. She was taking Adderall 30 mg ER twice daily with an additional 20 mg during the day, but recently stopped all her medications. Was also taking Celexa, Cymbalta, and Lamictal. Did not feel her medications were helping and stopped them all. Continues to do counseling weekly.      Review of Systems   Constitutional, HEENT, cardiovascular, pulmonary, gi and gu systems are negative, except as otherwise noted.      Objective    /62   Pulse 105   Temp 98.5  F (36.9  C) (Tympanic)   Resp 18   Wt 64.4 kg (142 lb)   SpO2 99%   BMI 25.97 kg/m    Body mass index is 25.97 kg/m .  Physical Exam   GENERAL: healthy, alert, tearful the entire visit-very stressed  EYES: Eyes grossly normal to inspection, PERRL and conjunctivae and sclerae  normal  HENT: ear canals and TM's normal, nose and mouth without ulcers or lesions  NECK: no adenopathy, no asymmetry, masses, or scars and thyroid normal to palpation  RESP: lungs clear to auscultation - no rales, rhonchi or wheezes  CV: regular rate and rhythm, no murmur, click or rub, no peripheral edema  ABDOMEN: soft, nontender, no hepatosplenomegaly, no masses and bowel sounds normal  MS: no gross musculoskeletal defects noted, no edema  NEURO: Normal strength and tone, sensory exam grossly normal, mentation intact, oriented times 3, speech normal, cranial nerves 2-12 intact, DTR's normal and symmetric, gait normal including heel/toe/tandem walking, Romberg normal and rapid alternating movements normal  PSYCH: mentation appears normal, tearful    Labs pending

## 2022-10-07 NOTE — TELEPHONE ENCOUNTER
Emergency Department and Urgent Care Follow-up     Reason for ER/UC visit: Generalized muscle weakness  o Date seen: 10/5/22      New or Worsening symptoms:  No, same.       Prescription Received/Picked up from Pharmacy?: None   o Medications started?   o Any questions or issues regarding your prescription?:       Follow-up Results or Labs that are pending: Lyme disease      Questions or concerns?: no      ER Recommends Follow-up by: next available      RN Recommendations: f/u in clinic    o Appointment scheduled: Tues 10/11  1:30 Provider [place]    If you start feeling worse, or have any further questions, please feel free to contact Nurse Triage at [phone].  If needing immediate medical attention at any time please call 911/Go to the ER.

## 2022-10-11 ENCOUNTER — OFFICE VISIT (OUTPATIENT)
Dept: FAMILY MEDICINE | Facility: OTHER | Age: 47
End: 2022-10-11
Attending: NURSE PRACTITIONER
Payer: COMMERCIAL

## 2022-10-11 VITALS
HEART RATE: 105 BPM | WEIGHT: 142 LBS | SYSTOLIC BLOOD PRESSURE: 110 MMHG | DIASTOLIC BLOOD PRESSURE: 62 MMHG | TEMPERATURE: 98.5 F | OXYGEN SATURATION: 99 % | BODY MASS INDEX: 25.97 KG/M2 | RESPIRATION RATE: 18 BRPM

## 2022-10-11 DIAGNOSIS — F33.1 MAJOR DEPRESSIVE DISORDER, RECURRENT EPISODE, MODERATE (H): ICD-10-CM

## 2022-10-11 DIAGNOSIS — M25.50 ARTHRALGIA, UNSPECIFIED JOINT: ICD-10-CM

## 2022-10-11 DIAGNOSIS — M54.2 CERVICALGIA: ICD-10-CM

## 2022-10-11 DIAGNOSIS — M79.18 MYOFASCIAL PAIN: ICD-10-CM

## 2022-10-11 DIAGNOSIS — F41.1 GAD (GENERALIZED ANXIETY DISORDER): ICD-10-CM

## 2022-10-11 DIAGNOSIS — M62.81 GENERALIZED MUSCLE WEAKNESS: Primary | ICD-10-CM

## 2022-10-11 LAB
ALBUMIN SERPL-MCNC: 3.9 G/DL (ref 3.4–5)
ALP SERPL-CCNC: 83 U/L (ref 40–150)
ALT SERPL W P-5'-P-CCNC: 35 U/L (ref 0–50)
ANION GAP SERPL CALCULATED.3IONS-SCNC: 4 MMOL/L (ref 3–14)
AST SERPL W P-5'-P-CCNC: 21 U/L (ref 0–45)
BILIRUB SERPL-MCNC: 0.5 MG/DL (ref 0.2–1.3)
BUN SERPL-MCNC: 9 MG/DL (ref 7–30)
CALCIUM SERPL-MCNC: 9 MG/DL (ref 8.5–10.1)
CHLORIDE BLD-SCNC: 105 MMOL/L (ref 94–109)
CK SERPL-CCNC: 65 U/L (ref 30–225)
CO2 SERPL-SCNC: 29 MMOL/L (ref 20–32)
CREAT SERPL-MCNC: 0.65 MG/DL (ref 0.52–1.04)
CRP SERPL-MCNC: <2.9 MG/L (ref 0–8)
ERYTHROCYTE [SEDIMENTATION RATE] IN BLOOD BY WESTERGREN METHOD: 7 MM/HR (ref 0–20)
GFR SERPL CREATININE-BSD FRML MDRD: >90 ML/MIN/1.73M2
GLUCOSE BLD-MCNC: 87 MG/DL (ref 70–99)
POTASSIUM BLD-SCNC: 3.6 MMOL/L (ref 3.4–5.3)
PROT SERPL-MCNC: 7.6 G/DL (ref 6.8–8.8)
SODIUM SERPL-SCNC: 138 MMOL/L (ref 133–144)

## 2022-10-11 PROCEDURE — 86225 DNA ANTIBODY NATIVE: CPT | Performed by: NURSE PRACTITIONER

## 2022-10-11 PROCEDURE — 86431 RHEUMATOID FACTOR QUANT: CPT | Performed by: NURSE PRACTITIONER

## 2022-10-11 PROCEDURE — 80053 COMPREHEN METABOLIC PANEL: CPT | Performed by: NURSE PRACTITIONER

## 2022-10-11 PROCEDURE — 86038 ANTINUCLEAR ANTIBODIES: CPT | Performed by: NURSE PRACTITIONER

## 2022-10-11 PROCEDURE — 87207 SMEAR SPECIAL STAIN: CPT | Performed by: NURSE PRACTITIONER

## 2022-10-11 PROCEDURE — 36415 COLL VENOUS BLD VENIPUNCTURE: CPT | Performed by: NURSE PRACTITIONER

## 2022-10-11 PROCEDURE — 86200 CCP ANTIBODY: CPT | Performed by: NURSE PRACTITIONER

## 2022-10-11 PROCEDURE — 86666 EHRLICHIA ANTIBODY: CPT | Mod: 90 | Performed by: NURSE PRACTITIONER

## 2022-10-11 PROCEDURE — 82550 ASSAY OF CK (CPK): CPT | Performed by: NURSE PRACTITIONER

## 2022-10-11 PROCEDURE — 86753 PROTOZOA ANTIBODY NOS: CPT | Mod: 90 | Performed by: NURSE PRACTITIONER

## 2022-10-11 PROCEDURE — 99215 OFFICE O/P EST HI 40 MIN: CPT | Performed by: NURSE PRACTITIONER

## 2022-10-11 PROCEDURE — 86140 C-REACTIVE PROTEIN: CPT | Performed by: NURSE PRACTITIONER

## 2022-10-11 PROCEDURE — 85652 RBC SED RATE AUTOMATED: CPT | Performed by: NURSE PRACTITIONER

## 2022-10-11 RX ORDER — LAMOTRIGINE 25 MG/1
TABLET ORAL
COMMUNITY
Start: 2022-07-21 | End: 2022-11-08

## 2022-10-11 RX ORDER — CITALOPRAM HYDROBROMIDE 10 MG/1
10 TABLET ORAL DAILY
COMMUNITY
Start: 2022-02-17 | End: 2022-11-08

## 2022-10-11 RX ORDER — DULOXETIN HYDROCHLORIDE 30 MG/1
30 CAPSULE, DELAYED RELEASE ORAL DAILY
COMMUNITY
Start: 2021-11-23 | End: 2022-11-08

## 2022-10-11 ASSESSMENT — PAIN SCALES - GENERAL: PAINLEVEL: SEVERE PAIN (7)

## 2022-10-11 NOTE — NURSING NOTE
"Chief Complaint   Patient presents with     ER F/U       Initial /62   Pulse 105   Temp 98.5  F (36.9  C) (Tympanic)   Resp 18   Wt 64.4 kg (142 lb)   SpO2 99%   BMI 25.97 kg/m   Estimated body mass index is 25.97 kg/m  as calculated from the following:    Height as of 3/28/22: 1.575 m (5' 2\").    Weight as of this encounter: 64.4 kg (142 lb).  Medication Reconciliation: complete  Melissa Hodge LPN    "

## 2022-10-12 LAB — B BURGDOR DNA SPEC QL NAA+PROBE: NOT DETECTED

## 2022-10-13 LAB
ANA SER QL IF: NEGATIVE
B MICROTI BLD SMEAR: NEGATIVE
CCP AB SER IA-ACNC: 0.6 U/ML
DSDNA AB SER-ACNC: <0.6 IU/ML
EHRLICHIA SPEC QL MICRO: NEGATIVE
RHEUMATOID FACT SER NEPH-ACNC: <6 IU/ML

## 2022-10-17 LAB
A PHAGOCYTOPH IGM TITR SER IF: NORMAL {TITER}
B MICROTI IGG TITR SER: NORMAL {TITER}
B MICROTI IGM TITR SER: NORMAL {TITER}

## 2022-10-31 ENCOUNTER — HOSPITAL ENCOUNTER (OUTPATIENT)
Dept: MRI IMAGING | Facility: HOSPITAL | Age: 47
Discharge: HOME | End: 2022-10-31
Attending: NURSE PRACTITIONER
Payer: COMMERCIAL

## 2022-10-31 DIAGNOSIS — M54.2 CERVICALGIA: ICD-10-CM

## 2022-10-31 DIAGNOSIS — M62.81 GENERALIZED MUSCLE WEAKNESS: ICD-10-CM

## 2022-10-31 PROCEDURE — 255N000002 HC RX 255 OP 636: Performed by: RADIOLOGY

## 2022-10-31 PROCEDURE — 70553 MRI BRAIN STEM W/O & W/DYE: CPT

## 2022-10-31 PROCEDURE — 72141 MRI NECK SPINE W/O DYE: CPT

## 2022-10-31 PROCEDURE — A9585 GADOBUTROL INJECTION: HCPCS | Performed by: RADIOLOGY

## 2022-10-31 RX ORDER — GADOBUTROL 604.72 MG/ML
7.5 INJECTION INTRAVENOUS ONCE
Status: COMPLETED | OUTPATIENT
Start: 2022-10-31 | End: 2022-10-31

## 2022-10-31 RX ADMIN — GADOBUTROL 7.5 ML: 604.72 INJECTION INTRAVENOUS at 17:47

## 2022-11-02 ENCOUNTER — TELEPHONE (OUTPATIENT)
Dept: FAMILY MEDICINE | Facility: OTHER | Age: 47
End: 2022-11-02

## 2022-11-02 DIAGNOSIS — M54.2 CERVICALGIA: Primary | ICD-10-CM

## 2022-11-03 ENCOUNTER — HOSPITAL ENCOUNTER (OUTPATIENT)
Dept: INTERVENTIONAL RADIOLOGY/VASCULAR | Facility: HOSPITAL | Age: 47
Discharge: HOME | End: 2022-11-03
Attending: NURSE PRACTITIONER | Admitting: RADIOLOGY
Payer: COMMERCIAL

## 2022-11-03 DIAGNOSIS — M54.2 CERVICALGIA: ICD-10-CM

## 2022-11-03 PROCEDURE — G0463 HOSPITAL OUTPT CLINIC VISIT: HCPCS

## 2022-11-04 ENCOUNTER — HOSPITAL ENCOUNTER (OUTPATIENT)
Facility: HOSPITAL | Age: 47
End: 2022-11-04
Admitting: RADIOLOGY
Payer: COMMERCIAL

## 2022-11-07 NOTE — PROGRESS NOTES
"  Assessment & Plan     Cervicalgia  Myofascial pain  Episodic tension-type headache, not intractable  Patient with neck pain and right UE weakness. Recent brain MRI normal. Her cervical MRI did show some nerve impingement. Will send to PT, but also IR for a possible injection. PT can also assess her headaches as I think they are coming from her neck. Will reassess in 8 weeks, sooner with new or worsening symptoms.     - Physical Therapy Referral; Future    AZ (generalized anxiety disorder)  Major depressive disorder, recurrent episode, moderate (H)  Anxiety and depression high, but she does not want to take medications. She does not feel suicidal. She will continue to work with her therapist. Her therapist is recommending inpatient treatment and she is considering this. Will return in 8 weeks to reassess. Sooner with new or worsening symptoms.     6}     BMI:   Estimated body mass index is 26.43 kg/m  as calculated from the following:    Height as of this encounter: 1.575 m (5' 2\").    Weight as of this encounter: 65.5 kg (144 lb 8 oz).         Return in about 8 weeks (around 1/3/2023).    Doreen Galo NP  [facility] - [facility] is a 47 year old, presenting for the following health issues:  Follow Up (MRI)      HPI     Concern - Weakness Follow-up    Patient was last seen on 10/11/22 and complained of generalized weakness, myalgias, and neck pain. Weakness was greatest in her right hand. Blood work including tick testing was negative. Brain MRI was normal. Cervical MRI did show some arthritic changes and she was referred to IR for a possible injection.    Today she notes that she feels about the same. Still has a lot of neck pain and right arm weakness. Also some numbness in this arm. She also has generalized muscle aches, but they are worst in her upper back.     She admits that her stress levels are very high right now and she has a lot of anxiety and depression. Caring for " "her father in-law who lives with her. She admits that she never has any privacy as he is always around. Grand-kids with Autism and very needy. One recently tested high for lead and this concerns her.     Also recall, she was taking many medications for her mental health, including Cymbalta, Celexa, Lamictal, and Adderall, and stopped them all at once. Today she notes that her mental health is about the same. As noted above, anxiety and depression are bad, but she denies thoughts of suicide. Does state that she often would not care if she would not wake up. She is working with her therapist weekly.      Review of Systems   Constitutional, HEENT, cardiovascular, pulmonary, gi and gu systems are negative, except as otherwise noted.      Objective    /70 (BP Location: Right arm, Patient Position: Sitting, Cuff Size: Adult Regular)   Pulse 89   Temp 99  F (37.2  C) (Tympanic)   Ht 1.575 m (5' 2\")   Wt 65.5 kg (144 lb 8 oz)   SpO2 99%   BMI 26.43 kg/m    Body mass index is 26.43 kg/m .  Physical Exam   GENERAL: alert and tearful when talking about her stress  EYES: Eyes grossly normal to inspection, PERRL and conjunctivae and sclerae normal  HENT: ear canals and TM's normal, nose and mouth without ulcers or lesions  NECK: no adenopathy, no asymmetry, masses, or scars and thyroid normal to palpation  RESP: lungs clear to auscultation - no rales, rhonchi or wheezes  CV: regular rate and rhythm, no murmur, click or rub, no peripheral edema   ABDOMEN: soft, nontender, no masses and bowel sounds normal  MS: no gross musculoskeletal defects noted, no edema  NEURO: Normal strength and tone, mentation intact and speech normal  PSYCH: mentation appears normal, tearful and anxious    EXAM: MR CERVICAL SPINE W/O CONTRAST 10/31/2022 5:53 PM     PROVIDED HISTORY: Cervicalgia     COMPARISON: None available     TECHNIQUE: Sagittal T1-weighted, sagittal T2-weighted, sagittal STIR,  axial T2-weighted, and axial T2* gradient " echo images of the cervical  spine were obtained without intravenous contrast.     FINDINGS:  The cervical vertebrae are normally aligned. There is mild disc height  narrowing at C5-6. Straightening of the normal cervical lordotic  curvature. There is normal signal within the cervical spinal cord.  Regarding bone marrow signal intensity, no suspicious abnormality is  visualized on STIR images.      The findings on a level by level basis are as follows:     C2-3: No spinal canal or neural foraminal stenosis.     C3-4:  Right greater than left uncinate and facet hypertrophy.  Moderate right foraminal narrowing. Mild left foraminal narrowing. No  spinal canal narrowing.     C4-5:  Right greater than left uncinate and facet hypertrophy.  Moderate right foraminal narrowing. No significant left foraminal or  spinal canal narrowing.     C5-6:  Disc bulge with superimposed small right central disc  protrusion. Uncinate and facet hypertrophy. Severe right foraminal  narrowing with suspected abutment of the exiting right C6 nerve.  Moderate left foraminal narrowing. Mild spinal canal narrowing.     C6-7:  No spinal canal or neural foraminal  stenosis.     C7-T1:  No spinal canal or neural foraminal stenosis.     No abnormality of the paraspinous soft tissues.                                                                      IMPRESSION:   Multilevel cervical spondylosis, most prominent at C5-6 where there is  severe right foraminal narrowing and suspected abutment of the exiting  right C6 nerve.     EVELYN MURPHY MD       EXAM:  MR BRAIN W/O & W CONTRAST     HISTORY:  Generalized muscle weakness.     TECHNIQUE:  Multiplanar, multisequence MR imaging of the head obtained  prior to, and after, intravenous contrast administration     MEDS/CONTRAST: Gadavist  6.5   mL     COMPARISON:  MR head 4/17/2020; CT head 4/11/2020      FINDINGS:    There is no mass effect, midline shift or evidence of acute  intracranial hemorrhage. The  ventricles are proportionate to the  cerebral sulci. Normal major vascular intracranial flow voids.     Postcontrast images demonstrate no suspicious intracranial  enhancement. Incidental benign left occipital developmental venous  anomaly (series 18 image 130).     No suspicious abnormality of the skull marrow signal. Mild right  maxillary sinus mucosal thickening, left maxillary sinus mucus  retention cyst. Mastoid air cells are clear. The orbits are grossly  unremarkable.                                                                      IMPRESSION:  1. No focal lesion or structural abnormality to explain patient's  symptoms.  2. No suspicious intracranial enhancement.     EVELYN MURPHY MD

## 2022-11-08 ENCOUNTER — OFFICE VISIT (OUTPATIENT)
Dept: FAMILY MEDICINE | Facility: OTHER | Age: 47
End: 2022-11-08
Attending: NURSE PRACTITIONER
Payer: COMMERCIAL

## 2022-11-08 VITALS
HEIGHT: 62 IN | OXYGEN SATURATION: 99 % | TEMPERATURE: 99 F | WEIGHT: 144.5 LBS | BODY MASS INDEX: 26.59 KG/M2 | DIASTOLIC BLOOD PRESSURE: 70 MMHG | SYSTOLIC BLOOD PRESSURE: 110 MMHG | HEART RATE: 89 BPM

## 2022-11-08 DIAGNOSIS — M54.2 CERVICALGIA: Primary | ICD-10-CM

## 2022-11-08 DIAGNOSIS — G44.219 EPISODIC TENSION-TYPE HEADACHE, NOT INTRACTABLE: ICD-10-CM

## 2022-11-08 DIAGNOSIS — M79.18 MYOFASCIAL PAIN: ICD-10-CM

## 2022-11-08 DIAGNOSIS — F33.1 MAJOR DEPRESSIVE DISORDER, RECURRENT EPISODE, MODERATE (H): ICD-10-CM

## 2022-11-08 DIAGNOSIS — F41.1 GAD (GENERALIZED ANXIETY DISORDER): ICD-10-CM

## 2022-11-08 PROBLEM — R10.9 ABDOMINAL PAIN, UNSPECIFIED ABDOMINAL LOCATION: Status: RESOLVED | Noted: 2021-05-24 | Resolved: 2022-11-08

## 2022-11-08 PROBLEM — M77.11 LATERAL EPICONDYLITIS OF RIGHT ELBOW: Status: RESOLVED | Noted: 2018-01-12 | Resolved: 2022-11-08

## 2022-11-08 PROCEDURE — 99214 OFFICE O/P EST MOD 30 MIN: CPT | Performed by: NURSE PRACTITIONER

## 2022-11-08 ASSESSMENT — ANXIETY QUESTIONNAIRES
3. WORRYING TOO MUCH ABOUT DIFFERENT THINGS: MORE THAN HALF THE DAYS
1. FEELING NERVOUS, ANXIOUS, OR ON EDGE: MORE THAN HALF THE DAYS
4. TROUBLE RELAXING: MORE THAN HALF THE DAYS
GAD7 TOTAL SCORE: 16
6. BECOMING EASILY ANNOYED OR IRRITABLE: NEARLY EVERY DAY
7. FEELING AFRAID AS IF SOMETHING AWFUL MIGHT HAPPEN: NEARLY EVERY DAY
GAD7 TOTAL SCORE: 16
IF YOU CHECKED OFF ANY PROBLEMS ON THIS QUESTIONNAIRE, HOW DIFFICULT HAVE THESE PROBLEMS MADE IT FOR YOU TO DO YOUR WORK, TAKE CARE OF THINGS AT HOME, OR GET ALONG WITH OTHER PEOPLE: SOMEWHAT DIFFICULT
2. NOT BEING ABLE TO STOP OR CONTROL WORRYING: NEARLY EVERY DAY
5. BEING SO RESTLESS THAT IT IS HARD TO SIT STILL: SEVERAL DAYS

## 2022-11-08 ASSESSMENT — PATIENT HEALTH QUESTIONNAIRE - PHQ9: SUM OF ALL RESPONSES TO PHQ QUESTIONS 1-9: 19

## 2022-11-08 ASSESSMENT — PAIN SCALES - GENERAL: PAINLEVEL: SEVERE PAIN (6)

## 2022-11-08 NOTE — NURSING NOTE
"Chief Complaint   Patient presents with     Follow Up     MRI       Initial /70 (BP Location: Right arm, Patient Position: Sitting, Cuff Size: Adult Regular)   Pulse 89   Temp 99  F (37.2  C) (Tympanic)   Ht 1.575 m (5' 2\")   Wt 65.5 kg (144 lb 8 oz)   SpO2 99%   BMI 26.43 kg/m   Estimated body mass index is 26.43 kg/m  as calculated from the following:    Height as of this encounter: 1.575 m (5' 2\").    Weight as of this encounter: 65.5 kg (144 lb 8 oz).  Medication Reconciliation: complete  Supriya Buchanan LPN  "

## 2022-11-11 ENCOUNTER — HOSPITAL ENCOUNTER (OUTPATIENT)
Dept: PHYSICAL THERAPY | Facility: HOSPITAL | Age: 47
Setting detail: THERAPIES SERIES
Discharge: HOME | End: 2022-11-11
Attending: NURSE PRACTITIONER
Payer: COMMERCIAL

## 2022-11-11 DIAGNOSIS — G44.219 EPISODIC TENSION-TYPE HEADACHE, NOT INTRACTABLE: ICD-10-CM

## 2022-11-11 DIAGNOSIS — M79.18 MYOFASCIAL PAIN: ICD-10-CM

## 2022-11-11 DIAGNOSIS — M54.2 CERVICALGIA: ICD-10-CM

## 2022-11-11 PROCEDURE — 97110 THERAPEUTIC EXERCISES: CPT | Mod: GP

## 2022-11-11 PROCEDURE — 97161 PT EVAL LOW COMPLEX 20 MIN: CPT | Mod: GP

## 2022-11-11 PROCEDURE — 97535 SELF CARE MNGMENT TRAINING: CPT | Mod: GP

## 2022-11-11 NOTE — PROGRESS NOTES
Initial Physical Therapy Evaluations       Name: Lenora Gage MRN# [id number]   Age: 47 year old YOB: 1975     Date of Consultation: November 11, 2022  Primary care provider: Doreen Galo    Referring Physician: Doreen Galo, NP  Orders: Eval and Treat  Medical Diagnosis: Cervicalgia [M54.2], Myofascial pain [M79.18], Episodic tension-type headache, not intractable [G44.219]  Onset of Illness/Injury: 11/8/2022    Reason for PT Visit: Patient is a 48 y/o Female who presents with neck and dull headache pain that has been constant for the past 6-1/2 months. Pt denies any acute injury. Pt states that some days her HA symptoms get really back and wrap up and around her head from her neck.  Pt endorses some intermittent numbness and tingling into her R hand (thumb and palm--radial nerve distribution).  Pt states that it tends to get worse at the end of the day. Pt has tried OTC NSAIDs, self massage, and some heat/ice with limited relief.     Prior Level of Function: IND with all ADLs   Pain: 4-9/10  Aching, Sharp and Shooting    Community Support/Living Environment/Employment History: Patient denies difficulty navigating home environment and endorses a strong support system that is accessible in times of need.      Patient/Family Goal: Relief from pain.     Fall Screen:   Have you fallen 2 or more times in the last year? No  Have you fallen and had an injury in the last year? No  Timed up & go: NT  Is patient a fall risk? No    Past Medical History:   Past Medical History:   Diagnosis Date     Adjustment disorder with anxiety      Constipation 07/17/2012     Dysmenorrhea 06/20/2002     Dyspareunia 06/13/2007     Dysplasia of cervix (uteri)  07/25/2000     Endometriosis of uterus 12/29/2003     Esophageal reflux 02/07/2000     Helicobacter positive gastritis      IBS (Irritable colon syndrome) 07/06/2011     OCD (obsessive compulsive disorder)        Past Surgical History:  Past Surgical History:    Procedure Laterality Date     bladder reconstruction and mesh removal  2012     BLADDER REPAIR  2010     COLONOSCOPY - HIM SCAN N/A 06/21/2007    Colonoscopy to the cecum with biopsy terminal ileum, cecum and descending colon ([facility]) normal     colposcopy with biopsy-mutliple  10/2014     HYSTERECTOMY TOTAL ABDOMINAL, BILATERAL SALPINGO-OOPHORECTOMY, COMBINED Left     Right ovary remains.      IR CONSULTATION FOR IR EXAM  11/3/2022     LAPAROSCOPIC APPENDECTOMY N/A 09/03/2020    Procedure: APPENDECTOMY, LAPAROSCOPIC;  Surgeon: Jonathan Martinez MD;  Location: [facility]     TUBAL LIGATION         Medications:   No current outpatient medications on file.     No current facility-administered medications for this encounter.       Imaging:   EXAM: MR CERVICAL SPINE W/O CONTRAST 10/31/2022 5:53 PM     PROVIDED HISTORY: Cervicalgia     COMPARISON: None available     TECHNIQUE: Sagittal T1-weighted, sagittal T2-weighted, sagittal STIR,  axial T2-weighted, and axial T2* gradient echo images of the cervical  spine were obtained without intravenous contrast.     FINDINGS:  The cervical vertebrae are normally aligned. There is mild disc height  narrowing at C5-6. Straightening of the normal cervical lordotic  curvature. There is normal signal within the cervical spinal cord.  Regarding bone marrow signal intensity, no suspicious abnormality is  visualized on STIR images.      The findings on a level by level basis are as follows:     C2-3: No spinal canal or neural foraminal stenosis.     C3-4:  Right greater than left uncinate and facet hypertrophy.  Moderate right foraminal narrowing. Mild left foraminal narrowing. No  spinal canal narrowing.     C4-5:  Right greater than left uncinate and facet hypertrophy.  Moderate right foraminal narrowing. No significant left foraminal or  spinal canal narrowing.     C5-6:  Disc bulge with superimposed small right central disc  protrusion. Uncinate and  facet hypertrophy. Severe right foraminal  narrowing with suspected abutment of the exiting right C6 nerve.  Moderate left foraminal narrowing. Mild spinal canal narrowing.     C6-7:  No spinal canal or neural foraminal  stenosis.     C7-T1:  No spinal canal or neural foraminal stenosis.     No abnormality of the paraspinous soft tissues.                                                                      IMPRESSION:   Multilevel cervical spondylosis, most prominent at C5-6 where there is  severe right foraminal narrowing and suspected abutment of the exiting  right C6 nerve.     EVELYN MURPHY MD     EXAM:  MR BRAIN W/O & W CONTRAST     HISTORY:  Generalized muscle weakness.     TECHNIQUE:  Multiplanar, multisequence MR imaging of the head obtained  prior to, and after, intravenous contrast administration     MEDS/CONTRAST: Gadavist  6.5   mL     COMPARISON:  MR head 4/17/2020; CT head 4/11/2020      FINDINGS:    There is no mass effect, midline shift or evidence of acute  intracranial hemorrhage. The ventricles are proportionate to the  cerebral sulci. Normal major vascular intracranial flow voids.     Postcontrast images demonstrate no suspicious intracranial  enhancement. Incidental benign left occipital developmental venous  anomaly (series 18 image 130).     No suspicious abnormality of the skull marrow signal. Mild right  maxillary sinus mucosal thickening, left maxillary sinus mucus  retention cyst. Mastoid air cells are clear. The orbits are grossly  unremarkable.                                                                      IMPRESSION:  1. No focal lesion or structural abnormality to explain patient's  symptoms.  2. No suspicious intracranial enhancement.     EVELYN MURPHY MD   Musculoskeletal Findings:     OBJECTIVE   Observation: Patient presents to department in no acute distress.     Palpation: TTP over cervical paraspinal mm BL (R>L); Pt endorses familiar HA symptoms. Grossly NTTP  "elsewhere    Posture: Forward head, protracted shoulders     Neurological Assessment:   Myotomes:   Right upper extremity: within functional limits and symmetrical   Left upper extremity: within functional limits and symmetrical     Dermatomes:   Right upper extremity: within functional limits and symmetrical   Left upper extremity: within functional limits and symmetrical     Range of Motion/Strength:   Cervical Spine Range of Motion   Active Motion as a percent of expected range, \"*\" denotes pain  Flexion 100  Extension 80   Side Bend Right 80*  Side Bend Left 80 (tightness on R)  Rotation Right 80*   Rotation Left 80 (tightness on R)   Right upper extremity range of motion: WNL   Left upper extremity range of motion: WNL     Right upper extremity strength: WNL except lower trapezius 4/5 and middle trapezius 4/5  Left upper extremity strength: WNL except lower trapezius 4/5 and middle trapezius 4/5  Deep Neck Flexor Endurance Test: less than 5 seconds prior to substitution    Special Tests:   Cervical Spine Tests  Passive Intervertebral Movement Testing: Hypomobile C3-6   Median Nerve Tension Test: NEG  Radial Nerve Tension Test: POS R  Ulnar Nerve Tension Test: NEG  AO mobility: WNL  AA rotation: WNL  Vickie's Test: POS R       Outcome Measures:   NDI performed  Score = 46%    Prognosis/Plan of Care: Good for goal achievement with consistent session attendance and HEP compliance.     Appropriate for Physical Therapy Intervention: Yes     GOALS:   Short-term goals:  To be achieved in 2-3 weeks:    Instruct in home program  1.) Patient will be independent with a short-term home exercise program.  2.) Patient will understand and demonstrate improved posture and techniques such that patient places less strain over cervical spine.  3.) Patient will report a 25% or greater improvement in symptoms in order to allow for increased comfort with activities of daily living.        Long-term goals:  To be achieved in 6-8 " weeks:    Self management of symptoms.  Pain free activities of daily living.  1.) Improve score on Neck Disability Index by 50% to correlate with clinically significant change.  2.) Patient will report a 75% or greater improvement in symptoms in order to allow for increased comfort with activities of daily living.       Discharge goals: Patient will be independent with a home program for self-management of symptoms.      Treatment plan:  1.) Modalities including ultrasound, electrical stimulation, and mechanical traction as needed for pain management and increasing tissue extensibility  2.) Manual therapy to include cervical and thoracic spine joint and soft tissue mobilization for improved mobility and decreased pain  3.) Therapeutic exercise to consist of cervical stabilization and scapular strengthening and range of motion activities    Treatment Rendered/Intervention:   Evaluation completed as described above followed by discussion of exam findings and plan of care.    Therapeutic exercise: Patient instructed in and demonstrated proper performance of home exercise program consisting of:      Access Code: [email or web address]  URL: [email or web address]  Date: 11/11/2022  Prepared by: Maco Brock    Exercises  Seated Assisted Cervical Rotation with Towel - 2 x daily - 7 x weekly - 3 sets - 10 reps - short hold  Seated Scapular Retraction - 2 x daily - 7 x weekly - 3 sets - 10 reps - short hold  Neck Retraction - 2 x daily - 7 x weekly - 3 sets - 10 reps - short hold  Seated Levator Scapulae Stretch - 2 x daily - 7 x weekly - 2 minute hold  Standing Radial Nerve Glide - 6-8 x daily - 7 x weekly - 10-20 reps  Seated Diaphragmatic Breathing - 12 x daily - 7 x weekly - 6 reps      Educated in posture principles and neutral spine positioning. Patient was instructed in home use of heat and/or ice for pain management      Clinical Impressions:  Criteria for Skilled Therapeutic Intervention Met: Yes  PT Diagnosis:  Neck pain with headaches, cervicogenic HA presentation  Influenced by the following impairments: Reduced, pain free cervical AROM in multiple planes  Functional limitations due to impairment: Difficulty with home, work, and leisure roles  Clinical presentation: Stable/Uncomplicated  Clinical presentation rationale: Clinical reasoning in the absence of compounding factors.  Clinical Decision making (complexity): Low Complexity  Predicted Duration of Therapy Intervention (days/wks): 8-12 weeks  Risks and Benefits of therapy have been explained: Yes  Patient, Family & other staff in agreement with plan of care: Yes  Comments: Patient presents today with signs and symptoms consistent of cervical myofascial pain with radicular symptoms of facetogenic presentation with mechanical drivers. Therapy today consisted of evaluation, patient education, and therapeutic exercise. Patient would benefit from continued PT sessions addressing overall pain and dysfunction with use of appropriate interventions.      Total Evaluation Time: 20 minutes     Date Range: 11/11/2022 to 2/9/23

## 2022-12-12 ENCOUNTER — TELEPHONE (OUTPATIENT)
Dept: INTERVENTIONAL RADIOLOGY/VASCULAR | Facility: HOSPITAL | Age: 47
End: 2022-12-12

## 2022-12-12 NOTE — TELEPHONE ENCOUNTER
Left a message to remind patient of their alexandra on 12/14. Also reminded patient to not take any antibiotics, steroids, or immunizations two weeks before and after this appt. And they need a .      Oriana Renae

## 2023-01-02 NOTE — PROGRESS NOTES
Assessment & Plan     Major depressive disorder, recurrent episode, moderate (H)  AZ (generalized anxiety disorder)  Mental health has worsened, but she has no plans to hurt herself. Will continue follow-up with Sherwin Guadalupe and her counselor. Will see her back in 8 weeks for a recheck, sooner with new or worsening symptoms.     Myofascial pain  Cervicalgia  Still having neck pain. Was set up with PT and an injection, but she had this done. Will try to set up another injection. Declines PT at this time, but will let me know if she changes her mind. Will reassess in 8 weeks. Sooner with new or worsening symptoms.     Bilateral Hand Numbness  Thumb Spica braces did not help. May be coming from her neck, but will also send for EMGs. Possible carpal tunnel. Will notify patient of the results when available and intervene accordingly.     Doreen Galo NP  [facility] - [facility] is a 47 year old, presenting for the following health issues:  Generalized Weakness and Follow Up      HPI     Depression and Anxiety Follow-Up    How are you doing with your depression since your last visit? Worsened     How are you doing with your anxiety since your last visit?  Worsened     Are you having other symptoms that might be associated with depression or anxiety? Yes:  low energy    Have you had a significant life event? No     Do you have any concerns with your use of alcohol or other drugs? No     No thoughts of self harm.     Still seeing her counselor weekly.     Stress levels still high-she has a lot of anxiety and depression. Family fighting-issues with caring for her father in-law. Marriage in trouble. Step-daughter very rude to her. Grand-kids with Autism and very needy. One recently tested high for lead and this concerns her.    Brain MRI normal in 10/2022.     Also recall, she was taking many medications for her mental health, including Cymbalta, Celexa, Lamictal, and Adderall, and  stopped them all at once. Did not feel they were helping.     She did talk with Sherwin Guadalupe yesterday who did restart one of her medications. She thinks this was Adderall. She also scheduled a follow-up with him.     Has very little support.     She was also having some neck pain. Cervical MRI showed arthritic changes with some nerve impingement. She was referred to PT, but only completed one session. She was also schedule for an injection, but never had this completed.     Continues to have bilateral hand numbness, worse in the right hand. Symptoms worsen at night. Often dropping things. She has tried wearing thumb spica braces, but they did not help.     Social History     Tobacco Use     Smoking status: Never     Smokeless tobacco: Never   Substance Use Topics     Alcohol use: Yes     Alcohol/week: 0.0 standard drinks     Comment: RARELY     Drug use: No     PHQ 3/28/2022 11/8/2022 1/4/2023   PHQ-9 Total Score 4 19 15   Q9: Thoughts of better off dead/self-harm past 2 weeks Not at all Several days Several days   F/U: Thoughts of suicide or self-harm - - -   F/U: Safety concerns - - -     AZ-7 SCORE 3/28/2022 11/8/2022 1/4/2023   Total Score 2 16 18     Last PHQ-9 1/4/2023   1.  Little interest or pleasure in doing things 2   2.  Feeling down, depressed, or hopeless 2   3.  Trouble falling or staying asleep, or sleeping too much 2   4.  Feeling tired or having little energy 2   5.  Poor appetite or overeating 2   6.  Feeling bad about yourself 2   7.  Trouble concentrating 2   8.  Moving slowly or restless 0   Q9: Thoughts of better off dead/self-harm past 2 weeks 1   PHQ-9 Total Score 15   Difficulty at work, home, or with people Somewhat difficult   In the past two weeks have you had thoughts of suicide or self harm? -   Do you have concerns about your personal safety or the safety of others? -     AZ-7  1/4/2023   1. Feeling nervous, anxious, or on edge 3   2. Not being able to stop or control worrying 3  "  3. Worrying too much about different things 3   4. Trouble relaxing 2   5. Being so restless that it is hard to sit still 2   6. Becoming easily annoyed or irritable 2   7. Feeling afraid, as if something awful might happen 3   AZ-7 Total Score 18   If you checked any problems, how difficult have they made it for you to do your work, take care of things at home, or get along with other people? Very difficult       Review of Systems   Constitutional, HEENT, cardiovascular, pulmonary, gi and gu systems are negative, except as otherwise noted.      Objective    /68 (BP Location: Right arm, Patient Position: Sitting, Cuff Size: Adult Regular)   Pulse 86   Temp 98.1  F (36.7  C) (Tympanic)   Ht 1.575 m (5' 2\")   Wt 67.6 kg (149 lb)   SpO2 100%   BMI 27.25 kg/m    Body mass index is 27.25 kg/m .  Physical Exam   GENERAL: healthy, alert and no distress  EYES: Eyes grossly normal to inspection, PERRL and conjunctivae and sclerae normal  HENT: ear canals and TM's normal, nose and mouth without ulcers or lesions  NECK: no adenopathy, no asymmetry, masses, or scars and thyroid normal to palpation  RESP: lungs clear to auscultation - no rales, rhonchi or wheezes  CV: regular rate and rhythm, no murmur, click or rub, no peripheral edema  MS: no hand edema or erythema, radial pulses 2+ bilaterally, positive tinels test  NEURO: Normal strength and tone, mentation intact and speech normal  PSYCH: mentation appears normal, affect normal/bright                "

## 2023-01-04 ENCOUNTER — OFFICE VISIT (OUTPATIENT)
Dept: FAMILY MEDICINE | Facility: OTHER | Age: 48
End: 2023-01-04
Attending: NURSE PRACTITIONER
Payer: COMMERCIAL

## 2023-01-04 VITALS
TEMPERATURE: 98.1 F | HEART RATE: 86 BPM | OXYGEN SATURATION: 100 % | HEIGHT: 62 IN | DIASTOLIC BLOOD PRESSURE: 68 MMHG | WEIGHT: 149 LBS | SYSTOLIC BLOOD PRESSURE: 102 MMHG | BODY MASS INDEX: 27.42 KG/M2

## 2023-01-04 DIAGNOSIS — F33.1 MAJOR DEPRESSIVE DISORDER, RECURRENT EPISODE, MODERATE (H): Primary | ICD-10-CM

## 2023-01-04 DIAGNOSIS — F41.1 GAD (GENERALIZED ANXIETY DISORDER): ICD-10-CM

## 2023-01-04 DIAGNOSIS — M54.2 CERVICALGIA: ICD-10-CM

## 2023-01-04 DIAGNOSIS — R20.0 BILATERAL HAND NUMBNESS: ICD-10-CM

## 2023-01-04 DIAGNOSIS — M79.18 MYOFASCIAL PAIN: ICD-10-CM

## 2023-01-04 PROCEDURE — 99214 OFFICE O/P EST MOD 30 MIN: CPT | Performed by: NURSE PRACTITIONER

## 2023-01-04 ASSESSMENT — ANXIETY QUESTIONNAIRES
3. WORRYING TOO MUCH ABOUT DIFFERENT THINGS: NEARLY EVERY DAY
IF YOU CHECKED OFF ANY PROBLEMS ON THIS QUESTIONNAIRE, HOW DIFFICULT HAVE THESE PROBLEMS MADE IT FOR YOU TO DO YOUR WORK, TAKE CARE OF THINGS AT HOME, OR GET ALONG WITH OTHER PEOPLE: VERY DIFFICULT
GAD7 TOTAL SCORE: 18
7. FEELING AFRAID AS IF SOMETHING AWFUL MIGHT HAPPEN: NEARLY EVERY DAY
1. FEELING NERVOUS, ANXIOUS, OR ON EDGE: NEARLY EVERY DAY
6. BECOMING EASILY ANNOYED OR IRRITABLE: MORE THAN HALF THE DAYS
GAD7 TOTAL SCORE: 18
2. NOT BEING ABLE TO STOP OR CONTROL WORRYING: NEARLY EVERY DAY
5. BEING SO RESTLESS THAT IT IS HARD TO SIT STILL: MORE THAN HALF THE DAYS
4. TROUBLE RELAXING: MORE THAN HALF THE DAYS

## 2023-01-04 ASSESSMENT — PATIENT HEALTH QUESTIONNAIRE - PHQ9: SUM OF ALL RESPONSES TO PHQ QUESTIONS 1-9: 15

## 2023-01-04 ASSESSMENT — PAIN SCALES - GENERAL: PAINLEVEL: MODERATE PAIN (4)

## 2023-02-22 ENCOUNTER — TRANSFERRED RECORDS (OUTPATIENT)
Dept: HEALTH INFORMATION MANAGEMENT | Facility: CLINIC | Age: 48
End: 2023-02-22

## 2023-02-24 ENCOUNTER — TELEPHONE (OUTPATIENT)
Dept: FAMILY MEDICINE | Facility: OTHER | Age: 48
End: 2023-02-24

## 2023-02-24 DIAGNOSIS — G56.03 BILATERAL CARPAL TUNNEL SYNDROME: Primary | ICD-10-CM

## 2023-02-24 NOTE — TELEPHONE ENCOUNTER
"Called patient regarding [facility] results.   \"Notify Pt. Severe bilateral carpal tunel. Should see ortho. Please pend referral.\"    Ortho referral is pending.    Supriya Buchanan LPN    "

## 2023-04-15 ENCOUNTER — APPOINTMENT (OUTPATIENT)
Dept: CT IMAGING | Facility: HOSPITAL | Age: 48
End: 2023-04-15
Attending: STUDENT IN AN ORGANIZED HEALTH CARE EDUCATION/TRAINING PROGRAM
Payer: COMMERCIAL

## 2023-04-15 ENCOUNTER — HOSPITAL ENCOUNTER (EMERGENCY)
Facility: HOSPITAL | Age: 48
Discharge: HOME | End: 2023-04-15
Attending: STUDENT IN AN ORGANIZED HEALTH CARE EDUCATION/TRAINING PROGRAM | Admitting: STUDENT IN AN ORGANIZED HEALTH CARE EDUCATION/TRAINING PROGRAM
Payer: COMMERCIAL

## 2023-04-15 VITALS
SYSTOLIC BLOOD PRESSURE: 110 MMHG | DIASTOLIC BLOOD PRESSURE: 73 MMHG | HEART RATE: 76 BPM | OXYGEN SATURATION: 96 % | RESPIRATION RATE: 18 BRPM | TEMPERATURE: 96 F

## 2023-04-15 DIAGNOSIS — R10.9 LEFT SIDED ABDOMINAL PAIN: ICD-10-CM

## 2023-04-15 DIAGNOSIS — R10.9 FLANK PAIN: ICD-10-CM

## 2023-04-15 LAB
ALBUMIN SERPL BCG-MCNC: 3.8 G/DL (ref 3.5–5.2)
ALBUMIN UR-MCNC: NEGATIVE MG/DL
ALP SERPL-CCNC: 88 U/L (ref 35–104)
ALT SERPL W P-5'-P-CCNC: 44 U/L (ref 10–35)
ANION GAP SERPL CALCULATED.3IONS-SCNC: 8 MMOL/L (ref 7–15)
APPEARANCE UR: CLEAR
AST SERPL W P-5'-P-CCNC: 48 U/L (ref 10–35)
BACTERIA #/AREA URNS HPF: ABNORMAL /HPF
BASOPHILS # BLD AUTO: 0 10E3/UL (ref 0–0.2)
BASOPHILS NFR BLD AUTO: 1 %
BILIRUB SERPL-MCNC: 0.3 MG/DL
BILIRUB UR QL STRIP: NEGATIVE
BUN SERPL-MCNC: 9.1 MG/DL (ref 6–20)
CALCIUM SERPL-MCNC: 9.1 MG/DL (ref 8.6–10)
CHLORIDE SERPL-SCNC: 105 MMOL/L (ref 98–107)
COLOR UR AUTO: ABNORMAL
CREAT SERPL-MCNC: 0.79 MG/DL (ref 0.51–0.95)
DEPRECATED HCO3 PLAS-SCNC: 27 MMOL/L (ref 22–29)
EOSINOPHIL # BLD AUTO: 0.1 10E3/UL (ref 0–0.7)
EOSINOPHIL NFR BLD AUTO: 2 %
ERYTHROCYTE [DISTWIDTH] IN BLOOD BY AUTOMATED COUNT: 11.9 % (ref 10–15)
GFR SERPL CREATININE-BSD FRML MDRD: >90 ML/MIN/1.73M2
GLUCOSE SERPL-MCNC: 115 MG/DL (ref 70–99)
GLUCOSE UR STRIP-MCNC: NEGATIVE MG/DL
HCG UR QL: NEGATIVE
HCT VFR BLD AUTO: 41.4 % (ref 35–47)
HGB BLD-MCNC: 14.3 G/DL (ref 11.7–15.7)
HGB UR QL STRIP: NEGATIVE
HOLD SPECIMEN: NORMAL
IMM GRANULOCYTES # BLD: 0 10E3/UL
IMM GRANULOCYTES NFR BLD: 0 %
KETONES UR STRIP-MCNC: NEGATIVE MG/DL
LEUKOCYTE ESTERASE UR QL STRIP: NEGATIVE
LIPASE SERPL-CCNC: 55 U/L (ref 13–60)
LYMPHOCYTES # BLD AUTO: 1 10E3/UL (ref 0.8–5.3)
LYMPHOCYTES NFR BLD AUTO: 16 %
MCH RBC QN AUTO: 29.8 PG (ref 26.5–33)
MCHC RBC AUTO-ENTMCNC: 34.5 G/DL (ref 31.5–36.5)
MCV RBC AUTO: 86 FL (ref 78–100)
MONOCYTES # BLD AUTO: 0.4 10E3/UL (ref 0–1.3)
MONOCYTES NFR BLD AUTO: 7 %
MUCOUS THREADS #/AREA URNS LPF: PRESENT /LPF
NEUTROPHILS # BLD AUTO: 4.4 10E3/UL (ref 1.6–8.3)
NEUTROPHILS NFR BLD AUTO: 74 %
NITRATE UR QL: NEGATIVE
NRBC # BLD AUTO: 0 10E3/UL
NRBC BLD AUTO-RTO: 0 /100
PH UR STRIP: 7 [PH] (ref 4.7–8)
PLATELET # BLD AUTO: 212 10E3/UL (ref 150–450)
POTASSIUM SERPL-SCNC: 4.5 MMOL/L (ref 3.4–5.3)
PROT SERPL-MCNC: 6.2 G/DL (ref 6.4–8.3)
RBC # BLD AUTO: 4.8 10E6/UL (ref 3.8–5.2)
RBC URINE: 0 /HPF
SODIUM SERPL-SCNC: 140 MMOL/L (ref 136–145)
SP GR UR STRIP: 1.01 (ref 1–1.03)
SQUAMOUS EPITHELIAL: 3 /HPF
UROBILINOGEN UR STRIP-MCNC: NORMAL MG/DL
WBC # BLD AUTO: 6 10E3/UL (ref 4–11)
WBC URINE: 2 /HPF

## 2023-04-15 PROCEDURE — 81025 URINE PREGNANCY TEST: CPT | Performed by: STUDENT IN AN ORGANIZED HEALTH CARE EDUCATION/TRAINING PROGRAM

## 2023-04-15 PROCEDURE — 258N000003 HC RX IP 258 OP 636: Performed by: STUDENT IN AN ORGANIZED HEALTH CARE EDUCATION/TRAINING PROGRAM

## 2023-04-15 PROCEDURE — 99285 EMERGENCY DEPT VISIT HI MDM: CPT | Mod: 25

## 2023-04-15 PROCEDURE — 250N000011 HC RX IP 250 OP 636: Performed by: STUDENT IN AN ORGANIZED HEALTH CARE EDUCATION/TRAINING PROGRAM

## 2023-04-15 PROCEDURE — 80053 COMPREHEN METABOLIC PANEL: CPT | Performed by: STUDENT IN AN ORGANIZED HEALTH CARE EDUCATION/TRAINING PROGRAM

## 2023-04-15 PROCEDURE — 81001 URINALYSIS AUTO W/SCOPE: CPT | Performed by: STUDENT IN AN ORGANIZED HEALTH CARE EDUCATION/TRAINING PROGRAM

## 2023-04-15 PROCEDURE — 99284 EMERGENCY DEPT VISIT MOD MDM: CPT | Performed by: STUDENT IN AN ORGANIZED HEALTH CARE EDUCATION/TRAINING PROGRAM

## 2023-04-15 PROCEDURE — 74177 CT ABD & PELVIS W/CONTRAST: CPT

## 2023-04-15 PROCEDURE — 36415 COLL VENOUS BLD VENIPUNCTURE: CPT | Performed by: STUDENT IN AN ORGANIZED HEALTH CARE EDUCATION/TRAINING PROGRAM

## 2023-04-15 PROCEDURE — 85025 COMPLETE CBC W/AUTO DIFF WBC: CPT | Performed by: STUDENT IN AN ORGANIZED HEALTH CARE EDUCATION/TRAINING PROGRAM

## 2023-04-15 PROCEDURE — 83690 ASSAY OF LIPASE: CPT | Performed by: STUDENT IN AN ORGANIZED HEALTH CARE EDUCATION/TRAINING PROGRAM

## 2023-04-15 PROCEDURE — 96376 TX/PRO/DX INJ SAME DRUG ADON: CPT

## 2023-04-15 PROCEDURE — 96374 THER/PROPH/DIAG INJ IV PUSH: CPT | Mod: XU

## 2023-04-15 RX ORDER — IOPAMIDOL 755 MG/ML
74 INJECTION, SOLUTION INTRAVASCULAR ONCE
Status: COMPLETED | OUTPATIENT
Start: 2023-04-15 | End: 2023-04-15

## 2023-04-15 RX ORDER — HYDROMORPHONE HYDROCHLORIDE 1 MG/ML
0.5 INJECTION, SOLUTION INTRAMUSCULAR; INTRAVENOUS; SUBCUTANEOUS
Status: DISCONTINUED | OUTPATIENT
Start: 2023-04-15 | End: 2023-04-15 | Stop reason: HOSPADM

## 2023-04-15 RX ORDER — SODIUM CHLORIDE 9 MG/ML
INJECTION, SOLUTION INTRAVENOUS CONTINUOUS
Status: DISCONTINUED | OUTPATIENT
Start: 2023-04-15 | End: 2023-04-15 | Stop reason: HOSPADM

## 2023-04-15 RX ADMIN — SODIUM CHLORIDE 1000 ML: 9 INJECTION, SOLUTION INTRAVENOUS at 10:01

## 2023-04-15 RX ADMIN — HYDROMORPHONE HYDROCHLORIDE 0.5 MG: 1 INJECTION, SOLUTION INTRAMUSCULAR; INTRAVENOUS; SUBCUTANEOUS at 10:05

## 2023-04-15 RX ADMIN — IOPAMIDOL 74 ML: 755 INJECTION, SOLUTION INTRAVENOUS at 10:27

## 2023-04-15 RX ADMIN — HYDROMORPHONE HYDROCHLORIDE 0.5 MG: 1 INJECTION, SOLUTION INTRAMUSCULAR; INTRAVENOUS; SUBCUTANEOUS at 09:25

## 2023-04-15 ASSESSMENT — ACTIVITIES OF DAILY LIVING (ADL)
ADLS_ACUITY_SCORE: 33
ADLS_ACUITY_SCORE: 35

## 2023-04-15 NOTE — ED NOTES
Patient presents to emergency room with complaints of left flank pain. Left flank pain started one month ago. The last two days the pain has worsened. Denies any urinary symptoms. Denies any nausea at this time. Appears uncomfortable with ambulating to room 6. Tender on palpation to left lower abdomen and left flank region. Significant other at bedside.

## 2023-04-15 NOTE — ED PROVIDER NOTES
History     Chief Complaint   Patient presents with     Flank Pain     HPI  Lenora Gage is a 48 year old female with past medical history of obsessive-compulsive disorder, GERD, anxiety who presents to the emergency department with concerns of flank pain.  Patient states she has had some issues with pain of the left side for the better part of the last month.  However, patient states that the pain has gotten significantly worse over the last 48 hours.  It is described as a left-sided sharp pain.  Denies any particular alleviating or aggravating factors.  No associated dysuria.  Patient denies any nausea or vomiting.  No chest pain or shortness of breath.  No changes with bowels.    Allergies:  Allergies   Allergen Reactions     Blood Transfusion Related (Informational Only) Other (See Comments)     Patient has a history of a clinically significant antibody against RBC antigens.  A delay in compatible RBCs may occur.     Penicillins Anaphylaxis and Hives       Problem List:    Patient Active Problem List    Diagnosis Date Noted     Rectal bleeding 05/24/2021     Priority: Medium     Myalgia 02/13/2020     Priority: Medium     Rheumatoid factor positive 02/13/2020     Priority: Medium     Met with rheumatology, was not felt to have RA       Acute intractable tension-type headache 11/13/2017     Priority: Medium     AZ (generalized anxiety disorder) 06/05/2017     Priority: Medium     Prolonged grief reaction 11/01/2016     Priority: Medium     Major depressive disorder, recurrent episode, moderate (H) 11/01/2016     Priority: Medium     ACP (advance care planning) 08/24/2016     Priority: Medium     Advance Care Planning 8/24/2016: ACP Review of Chart / Resources Provided:  Reviewed chart for advance care plan.  Lenora Montelongo has no plan or code status on file. Discussed available resources and provided with information.   Added by Andrea Pulliam             Gastroesophageal reflux disease without esophagitis  04/08/2016     Priority: Medium     Midline low back pain without sciatica 01/13/2016     Priority: Medium     Stress due to illness of family member 12/10/2014     Priority: Medium     OCD (obsessive compulsive disorder)      Priority: Medium        Past Medical History:    Past Medical History:   Diagnosis Date     Adjustment disorder with anxiety      Constipation 07/17/2012     Dysmenorrhea 06/20/2002     Dyspareunia 06/13/2007     Dysplasia of cervix (uteri)  07/25/2000     Endometriosis of uterus 12/29/2003     Esophageal reflux 02/07/2000     Helicobacter positive gastritis      IBS (Irritable colon syndrome) 07/06/2011     OCD (obsessive compulsive disorder)        Past Surgical History:    Past Surgical History:   Procedure Laterality Date     bladder reconstruction and mesh removal  2012     BLADDER REPAIR  2010     COLONOSCOPY - HIM SCAN N/A 06/21/2007    Colonoscopy to the cecum with biopsy terminal ileum, cecum and descending colon ([facility] [place]) normal     colposcopy with biopsy-mutliple  10/2014     HYSTERECTOMY TOTAL ABDOMINAL, BILATERAL SALPINGO-OOPHORECTOMY, COMBINED Left     Right ovary remains.      IR CONSULTATION FOR IR EXAM  11/3/2022     LAPAROSCOPIC APPENDECTOMY N/A 09/03/2020    Procedure: APPENDECTOMY, LAPAROSCOPIC;  Surgeon: Jonathan Martinez MD;  Location: [facility]     TUBAL LIGATION         Family History:    Family History   Problem Relation Age of Onset     Other - See Comments Paternal Grandmother         Antitrysin Deficiency     Cancer Mother         Cervical     Cancer - colorectal Maternal Grandfather        Social History:  Marital Status:   [2]  Social History     Tobacco Use     Smoking status: Never     Smokeless tobacco: Never   Substance Use Topics     Alcohol use: Yes     Alcohol/week: 0.0 standard drinks of alcohol     Comment: RARELY     Drug use: No        Medications:    No current outpatient medications on file.        Review of  Systems  See HPI  Physical Exam   BP: 105/66  Pulse: 71  Temp: 97  F (36.1  C)  Resp: 18  SpO2: 100 %      Physical Exam  Constitutional: Alert and conversant. In Mild distress.  HENT: Atraumatic  Eyes: Normal pupils   Neck: Normal ROM  CV: Normal rate, regular rhythm, no murmur   Pulmonary/Chest: Non-labored respirations, clear to auscultation bilaterally   Abdominal: Soft, L sided abdominal tenderness and L sided flank tenderness  MSK: PRATT.   Neuro: Alert and appropriate. Cnx, Strength, and Sensation grossly intact  Skin: Warm and dry. No diaphoresis. No rashes on exposed skin    Psych: Appropriate mood and affect     ED Course              ED Course as of 04/21/23 0813   Sat Apr 15, 2023   0902 Impression and Plan: Patient presents with concern of left-sided flank pain.  Vitals reviewed, within normal limits.  Patient with exam when she has both left-sided abdominal tenderness as well as left flank tenderness.  Does appear mildly uncomfortable on exam.  Wide differential exist at this time including but not limited to urologic pathology versus bowel pathology.  Bradley at this time will be to obtain a CT scan of the patient's abdomen for further evaluation as well as to obtain abdominal labs.  Pain will be treated with Dilaudid.  We will follow-up with results and determine plan from there.   0941 Cbc wnl.    0954 CMP with mild AST and ALT elevation.  Otherwise unremarkable.   0954 Lipase within normal limits.   1030 UA inconsistent with infection.    1106 CT Abdomen Pelvis w Contrast  IMPRESSION:       Trace pelvic free fluid.   1112 Patient with overall reassuring work-up in the emergency department.  No further concerning symptoms.  Patient states that his symptoms are significantly improved.  I notified patient of results.  Results were overall quite reassuring with no concerning findings on CT scan, labs, or urinalysis.  Considering this, I felt that the patient was safe for discharge.  Patient was in agreement  with this plan.  Repeat abdominal examination very reassuring.  No concerning findings whatsoever.  For, we will progress with discharge.  Return precautions were discussed extensively, all questions were answered, and patient was advised to follow-up with primary care in the next 48 hours for follow-up.  Patient was then discharged in good condition.     Procedures              Critical Care time:  none               No results found for this or any previous visit (from the past 24 hour(s)).    Medications   0.9% sodium chloride BOLUS (0 mLs Intravenous Stopped 4/15/23 1117)   sodium chloride (PF) 0.9% PF flush 50 mL (50 mLs Intravenous $Given 4/15/23 1027)   iopamidol (ISOVUE-370) solution 74 mL (74 mLs Intravenous $Given 4/15/23 1027)       Assessments & Plan (with Medical Decision Making)     I have reviewed the nursing notes.    I have reviewed the findings, diagnosis, plan and need for follow up with the patient.           Medical Decision Making  The patient's presentation was of straightforward complexity (a clearly self-limited or minor problem).    The patient's evaluation involved:  history and exam without other MDM data elements    The patient's management necessitated only low risk treatment.        There are no discharge medications for this patient.      Final diagnoses:   Flank pain - left side.    Left sided abdominal pain       4/15/2023   [facility]     Cliff Farah MD  04/15/23 1116       Cliff Farah MD  04/21/23 1918

## 2023-04-15 NOTE — DISCHARGE INSTRUCTIONS
You were evaluated today for concern of left-sided flank/abdominal pain.  Our evaluation including CT scan and extensive labs were overall quite reassuring.  Please continue to monitor your symptoms closely.  If you have any severe worsening of your symptoms, please return.  Otherwise, please follow-up with your primary care within the next 48 hours for follow-up of this visit.

## 2023-06-30 ENCOUNTER — APPOINTMENT (OUTPATIENT)
Dept: OCCUPATIONAL MEDICINE | Facility: OTHER | Age: 48
End: 2023-06-30

## 2023-06-30 PROCEDURE — 86580 TB INTRADERMAL TEST: CPT

## 2023-09-15 ENCOUNTER — TELEPHONE (OUTPATIENT)
Dept: FAMILY MEDICINE | Facility: OTHER | Age: 48
End: 2023-09-15

## 2023-09-15 NOTE — TELEPHONE ENCOUNTER
"Called patient to discuss  Patient states she has \"mesh down there\"  Feeling certain that \"my bladder is falling out again\"  Writer recommended ED/UC for assessment  PCP is out   Covering Providers are over 100% booked  Patient agreeable to recommendation  No further concerns at calls end.  Phone call ended pleasantly.   "

## 2023-09-15 NOTE — TELEPHONE ENCOUNTER
12:15 PM    Reason for Call: OVERBOOK    Patient is having the following symptoms: pt called she thinks that her bladder is falling out for the third time, and is just very concerned would like to be seen as soon as possible   The patient is requesting an appointment for sometime soon with stephie.    Was an appointment offered for this call? No  If yes : Appointment type              Date    Preferred method for responding to this message: Telephone Call  What is your phone number ? [phone]    If we cannot reach you directly, may we leave a detailed response at the number you provided? Yes    Can this message wait until your PCP/provider returns, if unavailable today? Not applicable    Sharon Blanco

## 2023-09-22 PROBLEM — K62.5 RECTAL BLEEDING: Status: RESOLVED | Noted: 2021-05-24 | Resolved: 2023-09-22

## 2023-09-22 NOTE — PROGRESS NOTES
SUBJECTIVE:   CC: Lenora is an 48 year old who presents for preventive health visit.       Healthy Habits:     Getting at least 3 servings of Calcium per day:  NO    Bi-annual eye exam:  Yes    Dental care twice a year:  Yes    Sleep apnea or symptoms of sleep apnea:  None    Diet:  Regular (no restrictions)    Frequency of exercise:  None    Taking medications regularly:  Yes    Medication side effects:  None    Additional concerns today:  Yes    Will start a calcium supplement.    She has a bulge in her vagina. [place] is painful. No urinary frequency. No dysuria. No hematuria.     Due for several vaccines. Declines.     Depression and Anxiety Follow-Up  How are you doing with your depression since your last visit? Improved.   How are you doing with your anxiety since your last visit?  Improved  Are you having other symptoms that might be associated with depression or anxiety? No  Have you had a significant life event? No   Do you have any concerns with your use of alcohol or other drugs? No  No thoughts of self harm.   Working with Sherwin Guadalupe  Taking Adderall with Lexapro-managed by psychiatry.     Some increased dyspepsia. Occasionally occurs. Was taking omeprazole, but stopped. Would like to restart. All foods and liquids triggering this. No nausea or vomiting. No melena.     Social History     Tobacco Use    Smoking status: Never     Passive exposure: Never    Smokeless tobacco: Never   Vaping Use    Vaping Use: Never used   Substance Use Topics    Alcohol use: Yes     Alcohol/week: 0.0 standard drinks of alcohol     Comment: RARELY    Drug use: No         11/8/2022     1:00 PM 1/4/2023     3:00 PM 9/25/2023    12:59 PM   PHQ   PHQ-9 Total Score 19 15 7   Q9: Thoughts of better off dead/self-harm past 2 weeks Several days Several days Not at all         11/8/2022     1:00 PM 1/4/2023     3:00 PM 9/25/2023     1:00 PM   AZ-7 SCORE   Total Score   7 (mild anxiety)   Total Score 16 18 7          9/25/2023    12:59 PM   Last PHQ-9   1.  Little interest or pleasure in doing things 1   2.  Feeling down, depressed, or hopeless 1   3.  Trouble falling or staying asleep, or sleeping too much 1   4.  Feeling tired or having little energy 1   5.  Poor appetite or overeating 0   6.  Feeling bad about yourself 1   7.  Trouble concentrating 1   8.  Moving slowly or restless 1   Q9: Thoughts of better off dead/self-harm past 2 weeks 0   PHQ-9 Total Score 7         9/25/2023     1:00 PM   AZ-7    1. Feeling nervous, anxious, or on edge 1   2. Not being able to stop or control worrying 1   3. Worrying too much about different things 1   4. Trouble relaxing 1   5. Being so restless that it is hard to sit still 1   6. Becoming easily annoyed or irritable 1   7. Feeling afraid, as if something awful might happen 1   AZ-7 Total Score 7   If you checked any problems, how difficult have they made it for you to do your work, take care of things at home, or get along with other people? Somewhat difficult       Have you ever done Advance Care Planning? (For example, a Health Directive, POLST, or a discussion with a medical provider or your loved ones about your wishes): No, advance care planning information given to patient to review.  Advanced care planning was discussed at today's visit.    Social History     Tobacco Use    Smoking status: Never     Passive exposure: Never    Smokeless tobacco: Never   Substance Use Topics    Alcohol use: Yes     Alcohol/week: 0.0 standard drinks of alcohol     Comment: RARELY       Rarely drinks alcohol.     Reviewed orders with patient.  Reviewed health maintenance and updated orders accordingly - Yes  BP Readings from Last 3 Encounters:   09/25/23 112/70   04/15/23 110/73   01/04/23 102/68    Wt Readings from Last 3 Encounters:   09/25/23 67.1 kg (148 lb)   01/04/23 67.6 kg (149 lb)   11/08/22 65.5 kg (144 lb 8 oz)                  Patient Active Problem List   Diagnosis    OCD (obsessive  compulsive disorder)    Stress due to illness of family member    Midline low back pain without sciatica    Gastroesophageal reflux disease without esophagitis    ACP (advance care planning)    Prolonged grief reaction    Major depressive disorder, recurrent episode, moderate (H)    AZ (generalized anxiety disorder)    Acute intractable tension-type headache    Myalgia    Rheumatoid factor positive     Past Surgical History:   Procedure Laterality Date    bladder reconstruction and mesh removal  2012    BLADDER REPAIR  2010    COLONOSCOPY - HIM SCAN N/A 06/21/2007    Colonoscopy to the cecum with biopsy terminal ileum, cecum and descending colon ([facility] [place]) normal    colposcopy with biopsy-mutliple  10/2014    HYSTERECTOMY TOTAL ABDOMINAL, BILATERAL SALPINGO-OOPHORECTOMY, COMBINED Left     Right ovary remains.     IR CONSULTATION FOR IR EXAM  11/3/2022    LAPAROSCOPIC APPENDECTOMY N/A 09/03/2020    Procedure: APPENDECTOMY, LAPAROSCOPIC;  Surgeon: Jonathan Martinez MD;  Location: [facility]    TUBAL LIGATION         Social History     Tobacco Use    Smoking status: Never     Passive exposure: Never    Smokeless tobacco: Never   Substance Use Topics    Alcohol use: Yes     Alcohol/week: 0.0 standard drinks of alcohol     Comment: RARELY     Family History   Problem Relation Age of Onset    Other - See Comments Paternal Grandmother         Antitrysin Deficiency    Cancer Mother         Cervical    Cancer - colorectal Maternal Grandfather          Current Outpatient Medications   Medication Sig Dispense Refill    amphetamine-dextroamphetamine (ADDERALL) 20 MG tablet Take 20 mg by mouth 3 times daily      escitalopram (LEXAPRO) 5 MG tablet Take 1 tablet by mouth daily at 2 pm      omeprazole (PRILOSEC) 20 MG DR capsule Take 1 capsule (20 mg) by mouth daily 90 capsule 1       Breast Cancer Screening:  Any new diagnosis of family breast, ovarian, or bowel cancer? No        Mammogram Screening:  Recommended annual mammography  Pertinent mammograms are reviewed under the imaging tab. Due again. Ordered.     History of abnormal Pap smear: Status post benign hysterectomy. Health Maintenance and Surgical History updated.      Latest Ref Rng & Units 8/2/2019    10:12 AM   PAP / HPV   PAP (Historical)  NIL    HPV 16 DNA NEG^Negative Negative    HPV 18 DNA NEG^Negative Negative    Other HR HPV NEG^Negative Negative         Past Medical History:   Diagnosis Date    Adjustment disorder with anxiety     Constipation 07/17/2012    Dysmenorrhea 06/20/2002    Dyspareunia 06/13/2007    Dysplasia of cervix (uteri)  07/25/2000    Endometriosis of uterus 12/29/2003    Esophageal reflux 02/07/2000    Helicobacter positive gastritis     IBS (Irritable colon syndrome) 07/06/2011    OCD (obsessive compulsive disorder)       Past Surgical History:   Procedure Laterality Date    bladder reconstruction and mesh removal  2012    BLADDER REPAIR  2010    COLONOSCOPY - HIM SCAN N/A 06/21/2007    Colonoscopy to the cecum with biopsy terminal ileum, cecum and descending colon ([facility] [place]) normal    colposcopy with biopsy-mutliple  10/2014    HYSTERECTOMY TOTAL ABDOMINAL, BILATERAL SALPINGO-OOPHORECTOMY, COMBINED Left     Right ovary remains.     IR CONSULTATION FOR IR EXAM  11/3/2022    LAPAROSCOPIC APPENDECTOMY N/A 09/03/2020    Procedure: APPENDECTOMY, LAPAROSCOPIC;  Surgeon: Jonathan Martinez MD;  Location: [facility]    TUBAL LIGATION         Review of Systems   Constitutional:  Negative for chills and fever.   HENT:  Negative for congestion, ear pain, hearing loss and sore throat.    Eyes:  Negative for pain and visual disturbance.   Respiratory:  Negative for cough and shortness of breath.    Cardiovascular:  Negative for chest pain, palpitations and peripheral edema.   Gastrointestinal:  Positive for heartburn. Negative for abdominal pain, constipation, diarrhea, hematochezia and nausea.  "  Genitourinary:  Positive for dysuria, frequency, pelvic pain and urgency. Negative for genital sores, hematuria and vaginal bleeding.        She also has a bulge in her vagina.   Musculoskeletal:  Positive for arthralgias. Negative for joint swelling and myalgias.        Known carpal tunnel-canceled surgery, follows with ortho   Skin:  Negative for rash.   Neurological:  Negative for dizziness, weakness, headaches and paresthesias.   Psychiatric/Behavioral:  Positive for mood changes. The patient is nervous/anxious.         OBJECTIVE:   /70   Pulse 78   Temp 98.1  F (36.7  C) (Tympanic)   Ht 1.575 m (5' 2\")   Wt 67.1 kg (148 lb)   SpO2 99%   BMI 27.07 kg/m    Physical Exam  GENERAL: healthy, alert and no distress  EYES: Eyes grossly normal to inspection, PERRL and conjunctivae and sclerae normal  HENT: ear canals and TM's normal, nose and mouth without ulcers or lesions  NECK: no adenopathy, no asymmetry, masses, or scars and thyroid normal to palpation  RESP: lungs clear to auscultation - no rales, rhonchi or wheezes  BREAST: declined exam  CV: regular rate and rhythm, normal S1 S2, no S3 or S4, no murmur, click or rub, no peripheral edema and peripheral pulses strong  ABDOMEN: soft, nontender, no hepatosplenomegaly, no masses and bowel sounds normal   (female): normal female external genitalia, she does have a bulge in her vagina  MS: no gross musculoskeletal defects noted, no edema  SKIN: no suspicious lesions or rashes  NEURO: Normal strength and tone, mentation intact and speech normal  PSYCH: mentation appears normal, affect normal/bright    Labs in process    ASSESSMENT/PLAN:   (Z00.00) Health maintenance examination  (primary encounter diagnosis)  Plan: Due for a mammogram. Ordered. Declines all vaccines. No longer needs a pap-hysterectomy. Colonoscopy UTD. Will update labs today.     (Z12.31) Encounter for screening mammogram for breast cancer  Plan: MA Screen Bilateral w/Oskar        Will " "notify patient of the results when available and intervene accordingly.     (F33.1) Major depressive disorder, recurrent episode, moderate (H)  (F41.1) AZ (generalized anxiety disorder)  Plan: Stable. Continue follow-up with Sherwin Guadalupe.     (Z13.220) Lipid screening  Plan: Lipid Profile (Chol, Trig, HDL, LDL calc)        Will notify patient of the results when available and intervene accordingly.     (Z13.1) Screening for diabetes mellitus  Plan: Comprehensive metabolic panel (BMP + Alb, Alk Phos, ALT, AST, Total. Bili, TP)        Will notify patient of the results when available and intervene accordingly.     (R10.13) Dyspepsia  Comment: increased heartburn  Plan: omeprazole (PRILOSEC) 20 MG DR capsule        Will restart omeprazole-ok to take as needed. Will also avoid trigger foods.     (R39.9) UTI symptoms  (N94.10) Dyspareunia, female  (N81.10) Bladder prolapse, female, acquired  Comment: patient with urinary frequency, no dysuria  Plan: She does have bulge in vagina. Also having pain with intercourse. Will do a UA to be sure she does not have an infection, but also send to OB-GYN. She did have bladder reconstruction in the past and mesh was placed.       Patient has been advised of split billing requirements and indicates understanding: Yes      COUNSELING:  Reviewed preventive health counseling, as reflected in patient instructions       Regular exercise       Healthy diet/nutrition       Vision screening       Hearing screening       Immunizations  Declined: Hepatitis B and Influenza       BMI:   Estimated body mass index is 27.07 kg/m  as calculated from the following:    Height as of this encounter: 1.575 m (5' 2\").    Weight as of this encounter: 67.1 kg (148 lb).         She reports that she has never smoked. She has never been exposed to tobacco smoke. She has never used smokeless tobacco.          Doreen Gaol NP  [facility] - HIBBINGAnswers submitted by the patient for this " visit:  Patient Health Questionnaire (Submitted on 9/25/2023)  If you checked off any problems, how difficult have these problems made it for you to do your work, take care of things at home, or get along with other people?: Somewhat difficult  PHQ9 TOTAL SCORE: 7  AZ-7 (Submitted on 9/25/2023)  AZ 7 TOTAL SCORE: 7

## 2023-09-25 ENCOUNTER — OFFICE VISIT (OUTPATIENT)
Dept: FAMILY MEDICINE | Facility: OTHER | Age: 48
End: 2023-09-25
Attending: NURSE PRACTITIONER
Payer: COMMERCIAL

## 2023-09-25 VITALS
WEIGHT: 148 LBS | SYSTOLIC BLOOD PRESSURE: 112 MMHG | TEMPERATURE: 98.1 F | OXYGEN SATURATION: 99 % | HEIGHT: 62 IN | DIASTOLIC BLOOD PRESSURE: 70 MMHG | BODY MASS INDEX: 27.23 KG/M2 | HEART RATE: 78 BPM

## 2023-09-25 DIAGNOSIS — N81.10 BLADDER PROLAPSE, FEMALE, ACQUIRED: ICD-10-CM

## 2023-09-25 DIAGNOSIS — F41.1 GAD (GENERALIZED ANXIETY DISORDER): ICD-10-CM

## 2023-09-25 DIAGNOSIS — Z12.31 ENCOUNTER FOR SCREENING MAMMOGRAM FOR BREAST CANCER: ICD-10-CM

## 2023-09-25 DIAGNOSIS — Z13.220 LIPID SCREENING: ICD-10-CM

## 2023-09-25 DIAGNOSIS — N94.10 DYSPAREUNIA, FEMALE: ICD-10-CM

## 2023-09-25 DIAGNOSIS — F33.1 MAJOR DEPRESSIVE DISORDER, RECURRENT EPISODE, MODERATE (H): ICD-10-CM

## 2023-09-25 DIAGNOSIS — R10.13 DYSPEPSIA: ICD-10-CM

## 2023-09-25 DIAGNOSIS — R39.9 UTI SYMPTOMS: ICD-10-CM

## 2023-09-25 DIAGNOSIS — Z00.00 HEALTH MAINTENANCE EXAMINATION: Primary | ICD-10-CM

## 2023-09-25 DIAGNOSIS — Z13.1 SCREENING FOR DIABETES MELLITUS: ICD-10-CM

## 2023-09-25 LAB
ALBUMIN SERPL BCG-MCNC: 4.5 G/DL (ref 3.5–5.2)
ALBUMIN UR-MCNC: NEGATIVE MG/DL
ALP SERPL-CCNC: 86 U/L (ref 35–104)
ALT SERPL W P-5'-P-CCNC: 37 U/L (ref 0–50)
ANION GAP SERPL CALCULATED.3IONS-SCNC: 10 MMOL/L (ref 7–15)
APPEARANCE UR: CLEAR
AST SERPL W P-5'-P-CCNC: 25 U/L (ref 0–45)
BILIRUB SERPL-MCNC: 0.5 MG/DL
BILIRUB UR QL STRIP: NEGATIVE
BUN SERPL-MCNC: 13.1 MG/DL (ref 6–20)
CALCIUM SERPL-MCNC: 9.5 MG/DL (ref 8.6–10)
CHLORIDE SERPL-SCNC: 103 MMOL/L (ref 98–107)
CHOLEST SERPL-MCNC: 182 MG/DL
COLOR UR AUTO: ABNORMAL
CREAT SERPL-MCNC: 0.79 MG/DL (ref 0.51–0.95)
DEPRECATED HCO3 PLAS-SCNC: 27 MMOL/L (ref 22–29)
EGFRCR SERPLBLD CKD-EPI 2021: >90 ML/MIN/1.73M2
GLUCOSE SERPL-MCNC: 101 MG/DL (ref 70–99)
GLUCOSE UR STRIP-MCNC: NEGATIVE MG/DL
HDLC SERPL-MCNC: 54 MG/DL
HGB UR QL STRIP: NEGATIVE
KETONES UR STRIP-MCNC: NEGATIVE MG/DL
LDLC SERPL CALC-MCNC: 92 MG/DL
LEUKOCYTE ESTERASE UR QL STRIP: NEGATIVE
MUCOUS THREADS #/AREA URNS LPF: PRESENT /LPF
NITRATE UR QL: NEGATIVE
NONHDLC SERPL-MCNC: 128 MG/DL
PH UR STRIP: 6.5 [PH] (ref 4.7–8)
POTASSIUM SERPL-SCNC: 3.6 MMOL/L (ref 3.4–5.3)
PROT SERPL-MCNC: 6.9 G/DL (ref 6.4–8.3)
RBC URINE: 1 /HPF
SODIUM SERPL-SCNC: 140 MMOL/L (ref 136–145)
SP GR UR STRIP: 1.02 (ref 1–1.03)
SQUAMOUS EPITHELIAL: 6 /HPF
TRIGL SERPL-MCNC: 178 MG/DL
UROBILINOGEN UR STRIP-MCNC: NORMAL MG/DL
WBC URINE: 3 /HPF

## 2023-09-25 PROCEDURE — 99396 PREV VISIT EST AGE 40-64: CPT | Performed by: NURSE PRACTITIONER

## 2023-09-25 PROCEDURE — 80053 COMPREHEN METABOLIC PANEL: CPT | Performed by: NURSE PRACTITIONER

## 2023-09-25 PROCEDURE — 36415 COLL VENOUS BLD VENIPUNCTURE: CPT | Performed by: NURSE PRACTITIONER

## 2023-09-25 PROCEDURE — 80061 LIPID PANEL: CPT | Performed by: NURSE PRACTITIONER

## 2023-09-25 PROCEDURE — 81001 URINALYSIS AUTO W/SCOPE: CPT | Performed by: NURSE PRACTITIONER

## 2023-09-25 RX ORDER — ESCITALOPRAM OXALATE 5 MG/1
1 TABLET ORAL
COMMUNITY
Start: 2023-09-17 | End: 2024-06-18

## 2023-09-25 RX ORDER — DEXTROAMPHETAMINE SACCHARATE, AMPHETAMINE ASPARTATE, DEXTROAMPHETAMINE SULFATE AND AMPHETAMINE SULFATE 5; 5; 5; 5 MG/1; MG/1; MG/1; MG/1
20 TABLET ORAL 3 TIMES DAILY
COMMUNITY
Start: 2023-08-24

## 2023-09-25 ASSESSMENT — ANXIETY QUESTIONNAIRES
3. WORRYING TOO MUCH ABOUT DIFFERENT THINGS: SEVERAL DAYS
5. BEING SO RESTLESS THAT IT IS HARD TO SIT STILL: SEVERAL DAYS
GAD7 TOTAL SCORE: 7
GAD7 TOTAL SCORE: 7
7. FEELING AFRAID AS IF SOMETHING AWFUL MIGHT HAPPEN: SEVERAL DAYS
6. BECOMING EASILY ANNOYED OR IRRITABLE: SEVERAL DAYS
IF YOU CHECKED OFF ANY PROBLEMS ON THIS QUESTIONNAIRE, HOW DIFFICULT HAVE THESE PROBLEMS MADE IT FOR YOU TO DO YOUR WORK, TAKE CARE OF THINGS AT HOME, OR GET ALONG WITH OTHER PEOPLE: SOMEWHAT DIFFICULT
1. FEELING NERVOUS, ANXIOUS, OR ON EDGE: SEVERAL DAYS
2. NOT BEING ABLE TO STOP OR CONTROL WORRYING: SEVERAL DAYS
4. TROUBLE RELAXING: SEVERAL DAYS

## 2023-09-25 ASSESSMENT — ENCOUNTER SYMPTOMS
EYE PAIN: 0
HEMATOCHEZIA: 0
DYSURIA: 1
HEMATURIA: 0
WEAKNESS: 0
MYALGIAS: 1
ARTHRALGIAS: 1
MYALGIAS: 0
ABDOMINAL PAIN: 1
ABDOMINAL PAIN: 0
SHORTNESS OF BREATH: 0
FREQUENCY: 1
JOINT SWELLING: 0
WEAKNESS: 1
COUGH: 0
HEADACHES: 0
FEVER: 0
CONSTIPATION: 0
HEARTBURN: 1
NERVOUS/ANXIOUS: 1
NAUSEA: 0
PALPITATIONS: 0
DIARRHEA: 0
DIZZINESS: 0
PARESTHESIAS: 0
SORE THROAT: 0
CHILLS: 0

## 2023-09-25 ASSESSMENT — PATIENT HEALTH QUESTIONNAIRE - PHQ9
SUM OF ALL RESPONSES TO PHQ QUESTIONS 1-9: 7
SUM OF ALL RESPONSES TO PHQ QUESTIONS 1-9: 7
10. IF YOU CHECKED OFF ANY PROBLEMS, HOW DIFFICULT HAVE THESE PROBLEMS MADE IT FOR YOU TO DO YOUR WORK, TAKE CARE OF THINGS AT HOME, OR GET ALONG WITH OTHER PEOPLE: SOMEWHAT DIFFICULT

## 2023-10-01 ENCOUNTER — HEALTH MAINTENANCE LETTER (OUTPATIENT)
Age: 48
End: 2023-10-01

## 2023-10-12 ENCOUNTER — OFFICE VISIT (OUTPATIENT)
Dept: OBGYN | Facility: OTHER | Age: 48
End: 2023-10-12
Attending: OBSTETRICS & GYNECOLOGY
Payer: COMMERCIAL

## 2023-10-12 ENCOUNTER — LAB (OUTPATIENT)
Dept: LAB | Facility: OTHER | Age: 48
End: 2023-10-12
Payer: COMMERCIAL

## 2023-10-12 VITALS
OXYGEN SATURATION: 98 % | HEIGHT: 62 IN | WEIGHT: 148 LBS | HEART RATE: 85 BPM | SYSTOLIC BLOOD PRESSURE: 118 MMHG | BODY MASS INDEX: 27.23 KG/M2 | DIASTOLIC BLOOD PRESSURE: 68 MMHG

## 2023-10-12 DIAGNOSIS — N95.1 MENOPAUSAL SYNDROME (HOT FLASHES): ICD-10-CM

## 2023-10-12 DIAGNOSIS — N39.46 URINARY INCONTINENCE, MIXED: ICD-10-CM

## 2023-10-12 DIAGNOSIS — N81.89 OTHER FEMALE GENITAL PROLAPSE: ICD-10-CM

## 2023-10-12 DIAGNOSIS — N32.81 OAB (OVERACTIVE BLADDER): ICD-10-CM

## 2023-10-12 DIAGNOSIS — R33.9 URINARY RETENTION: Primary | ICD-10-CM

## 2023-10-12 LAB
ALBUMIN UR-MCNC: NEGATIVE MG/DL
APPEARANCE UR: CLEAR
BILIRUB UR QL STRIP: NEGATIVE
COLOR UR AUTO: ABNORMAL
GLUCOSE UR STRIP-MCNC: NEGATIVE MG/DL
HGB UR QL STRIP: NEGATIVE
KETONES UR STRIP-MCNC: NEGATIVE MG/DL
LEUKOCYTE ESTERASE UR QL STRIP: NEGATIVE
MUCOUS THREADS #/AREA URNS LPF: PRESENT /LPF
NITRATE UR QL: NEGATIVE
PH UR STRIP: 6 [PH] (ref 4.7–8)
RBC URINE: 2 /HPF
SP GR UR STRIP: 1.02 (ref 1–1.03)
SQUAMOUS EPITHELIAL: 2 /HPF
UROBILINOGEN UR STRIP-MCNC: NORMAL MG/DL
WBC URINE: 4 /HPF

## 2023-10-12 PROCEDURE — 99204 OFFICE O/P NEW MOD 45 MIN: CPT | Performed by: OBSTETRICS & GYNECOLOGY

## 2023-10-12 PROCEDURE — 81001 URINALYSIS AUTO W/SCOPE: CPT | Performed by: OBSTETRICS & GYNECOLOGY

## 2023-10-12 PROCEDURE — 83001 ASSAY OF GONADOTROPIN (FSH): CPT

## 2023-10-12 PROCEDURE — 36415 COLL VENOUS BLD VENIPUNCTURE: CPT

## 2023-10-12 PROCEDURE — 82670 ASSAY OF TOTAL ESTRADIOL: CPT

## 2023-10-12 RX ORDER — SOLIFENACIN SUCCINATE 10 MG/1
10 TABLET, FILM COATED ORAL DAILY
Qty: 90 TABLET | Refills: 3 | Status: SHIPPED | OUTPATIENT
Start: 2023-10-12 | End: 2024-07-26

## 2023-10-12 ASSESSMENT — PAIN SCALES - GENERAL: PAINLEVEL: MILD PAIN (3)

## 2023-10-12 NOTE — PROGRESS NOTES
CC:  Consult from Doreen Galo for urinary incontinence, dyspareunia, pelvic organ prolapse.       HPI:  Lenora Gage is a 48 year old female P2 (foceps/).   + menopausal vasomotor sx.     Frequency: Yes  Urgency:  Yes  Stress:  Yes  Nocturia:  Yes  Pelvic pain:Yes  Dyspareunia: Yes  Incomplete emptying:  :Does not know  Sexually active:  Yes  Pelvic pressure:  Yes  Dysuria: Yes, occasional   Bulging:  Yes  Splinting: No  Constipation:  Yes, occasional    Past GYN history:        Last PAP smear:  Normal  Hysterectomy: Yes    Patients records are available and reviewed at today's visit.    GYN history:  1. Prior history of urinary tract infections, recurrent.   2. History of dyspareunia in the past.   3. Chronic pain syndrome.       PAST SURGICAL HISTORY:   1. Bilateral tubal ligation.   2. Exploratory laparoscopy with laparotomy after trauma.   3. Abdominal hysterectomy with left salpingo-oophorectomy.   4. Anterior and posterior colporrhaphy with transobturator tape.   5. Subsequent posterior colporrhaphy with release of TOT.         Past Medical History:   Diagnosis Date    Adjustment disorder with anxiety     Constipation 2012    Dysmenorrhea 2002    Dyspareunia 2007    Dysplasia of cervix (uteri)  2000    Endometriosis of uterus 2003    Esophageal reflux 2000    Helicobacter positive gastritis     IBS (Irritable colon syndrome) 2011    OCD (obsessive compulsive disorder)        Past Surgical History:   Procedure Laterality Date    bladder reconstruction and mesh removal      BLADDER REPAIR      COLONOSCOPY - HIM SCAN N/A 2007    Colonoscopy to the cecum with biopsy terminal ileum, cecum and descending colon ([facility] [place]) normal    colposcopy with biopsy-mutliple  10/2014    HYSTERECTOMY TOTAL ABDOMINAL, BILATERAL SALPINGO-OOPHORECTOMY, COMBINED Left     Right ovary remains.     IR CONSULTATION FOR IR EXAM  11/3/2022     "LAPAROSCOPIC APPENDECTOMY N/A 09/03/2020    Procedure: APPENDECTOMY, LAPAROSCOPIC;  Surgeon: Jonathan Martinez MD;  Location: [facility]    TUBAL LIGATION         Family History   Problem Relation Age of Onset    Other - See Comments Paternal Grandmother         Antitrysin Deficiency    Cancer Mother         Cervical    Cancer - colorectal Maternal Grandfather        Current Outpatient Medications   Medication Sig Dispense Refill    amphetamine-dextroamphetamine (ADDERALL) 20 MG tablet Take 20 mg by mouth 3 times daily      escitalopram (LEXAPRO) 5 MG tablet Take 1 tablet by mouth daily at 2 pm      omeprazole (PRILOSEC) 20 MG DR capsule Take 1 capsule (20 mg) by mouth daily 90 capsule 1    solifenacin (VESICARE) 10 MG tablet Take 1 tablet (10 mg) by mouth daily 90 tablet 3       Allergies: Blood transfusion related (informational only) and Penicillins    ROS:  CONSTITUTIONAL: NEGATIVE for fever, chills, change in weight  RESP: NEGATIVE for significant cough or SOB  CV: NEGATIVE for chest pain, palpitations or peripheral edema  GI: NEGATIVE except for above  : As Above  PSYCHIATRIC: NEGATIVE for changes in mood or affect    EXAM:  Blood pressure 118/68, pulse 85, height 1.575 m (5' 2\"), weight 67.1 kg (148 lb), SpO2 98%.   BMI= Body mass index is 27.07 kg/m .  General - pleasant female in no acute distress.  Abdomen - soft, nontender, nondistended, no hepatosplenomegaly.  Pelvic - EG: normal adult female, BUS: within normal limits, Vagina: mild atrophy, no discharge, Cervix/uterus absent.  Adnexa: no masses or tenderness.  She is tender to palpation anteriorly near bladder nec bilaterally in area of TOT tape, no strictures present.   Prolapse:  Vaginal vault 0.5, cystocele 2 with valsalva, rectocele 1   Urethral hypermobility:  Yes    Rectovaginal - deferred.  Musculoskeletal - no gross deformities or edema  Neurological - normal mental status.    Supine stress test:  negative  Post-void residual " 21cc      ASSESSMENT/PLAN:  (N32.81) OAB (overactive bladder)  Comment: Mixed urinary incontinence with predominant urge.   Plan: solifenacin (VESICARE) 10 MG tablet   : Physical Therapy Referral        Bladder retraining/behavioral modification  UA + micro, reflex to culture    (N81.89) Other female genital prolapse, dyspareunia possibly related to prior sling/mesh  Comment: Mild recurrent cystocele  Plan: Adult Uro/Gyn  Referral          (N95.1) Menopausal syndrome (hot flashes)  Plan: Follicle stimulating hormone, Estradiol            Symptomatic pelvic organ prolapse.    Discussed expectant, PT, pessary, and surgical options with pt.  Risks and benefits of each. The natural h/o POP discussed.  Patient has my card and phone number if questions or if she does not here her results or from referrals.    45  minutes were spent on the day of the encounter, outside of a procedure,  doing face-to-face counseling, review or records, charting,  and coordination of care

## 2023-10-13 ENCOUNTER — TRANSCRIBE ORDERS (OUTPATIENT)
Dept: OTHER | Age: 48
End: 2023-10-13

## 2023-10-13 DIAGNOSIS — N81.89 OTHER FEMALE GENITAL PROLAPSE: Primary | ICD-10-CM

## 2023-10-14 LAB
ESTRADIOL SERPL-MCNC: 11 PG/ML
FSH SERPL IRP2-ACNC: 94 MIU/ML

## 2023-10-25 ENCOUNTER — OFFICE VISIT (OUTPATIENT)
Dept: UROLOGY | Facility: CLINIC | Age: 48
End: 2023-10-25
Attending: OBSTETRICS & GYNECOLOGY
Payer: COMMERCIAL

## 2023-10-25 VITALS
WEIGHT: 146 LBS | HEART RATE: 91 BPM | BODY MASS INDEX: 26.87 KG/M2 | HEIGHT: 62 IN | DIASTOLIC BLOOD PRESSURE: 70 MMHG | SYSTOLIC BLOOD PRESSURE: 107 MMHG

## 2023-10-25 DIAGNOSIS — N81.89 OTHER FEMALE GENITAL PROLAPSE: ICD-10-CM

## 2023-10-25 PROCEDURE — 99204 OFFICE O/P NEW MOD 45 MIN: CPT | Performed by: OBSTETRICS & GYNECOLOGY

## 2023-10-25 PROCEDURE — 99213 OFFICE O/P EST LOW 20 MIN: CPT | Performed by: OBSTETRICS & GYNECOLOGY

## 2023-10-25 ASSESSMENT — PAIN SCALES - GENERAL: PAINLEVEL: MODERATE PAIN (4)

## 2023-10-25 NOTE — PROGRESS NOTES
1October 25, 2023    Referring Provider: Ramiro Mcclelland MD  [address] [place] [address]    Primary Care Provider: Doreen Galo    CC: recurrent prolapse    HPI:  Lenora Gage is a 48 year old female who presents for evaluation of her pelvic floor symptoms.  She has prior A&P repair with placement of a TOT then a subsequent repeat of the posterior repair and release of the the TOT.  She has had recurrence of her prolapse that is bothersome. She has mixed urinary incontinence U>S.    Prolapse:  Do you feel a vaginal bulge? yes                                      Pressure? yes   Do you have to place your fingers in the vagina or in the rectum to have a bowel movement? no  Impact to quality of life? moderate     Stress Incontinence:  Do you leak urine with cough, sneeze, exercise? yes  How often do you leak with cough, sneeze, exercise?  4-5/week  How much do you usually leak? yes   Do you wear a pad? no If so; -  Impact to quality of life? moderate    Urinating:  Difficulty starting urination or strain to void? no  Weak or intermittent stream? yes  Incomplete emptying or dribbling? yes  Pain or burning with urination? yes  Any blood in your urine? no    GI:  Constipation? yes  Frequency stools QOD    Straining for stools occ  Stool consistency varies     Ever leak stool (Accidental Bowel Leakage)? no      If so, how often?               -      If so, do you leak?                   -      Soiling without sensation? -  History of irritable bowel or Crohn's? -    Sexual/Pain:  Are you currently having sex?. no  Pain with sex?   yes   Sexual Partner: male  Do any of these symptoms interfere with sex? yes  Impact to quality of life? moderate    Prior therapy:  Ever done pelvic floor physical therapy? Referred not started  Trial of medication? vesicare  Have you ever tried a pessary? no    Medical History:  Do you have?   High Cholesterol? no     Diabetes? no  High Blood pressure? no     Recurrent UTIs?  no  Sleep Apnea? no  Other medical problems: no    Surgical History:    Hysterectomy? yes   Bladder Surgery? yes   Other? -     OB/Gyn History:  Pregnancies? 2  Deliveries? 2  Vaginal 2  Section 0  Current birth control? -  Periods? -  When was the first day of your last period? -  Last Pap smear? - Any abnormal? -  Last mammogram? -  Last colonoscopy? -    Medications/Vitamins/Supplements: reviewed    Drug Allergies: reviewed    Latex Allergy: no  Iodine Allergy no    Family History: (list relationship and age at diagnosis)  Breast cancer? no   Ovarian cancer? no   Colon cancer? yes  Other? no    Social History:  Marital status:   Do you/ have you ever smoke(d)  cigarettes? no  Drink more than 1 alcoholic beverage a day?  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 no                   Unusual headaches? no  Leg pain w/ walking/exercise? no                       Easy bruising? no  Difficulty breathing w/ walking/exercise? no  Problems with vision? no  Dizziness, falls, or fainting? no  Excessive bleeding from cuts, gums, surgery? no  Other: no    Past Medical History:   Diagnosis Date    Adjustment disorder with anxiety     Constipation 2012    Dysmenorrhea 2002    Dyspareunia 2007    Dysplasia of cervix (uteri)  2000    Endometriosis of uterus 2003    Esophageal reflux 2000    Helicobacter positive gastritis     IBS (Irritable colon syndrome) 2011    OCD (obsessive compulsive disorder)        Past Surgical History:   Procedure Laterality Date    bladder reconstruction and mesh removal      BLADDER REPAIR      COLONOSCOPY - HIM SCAN N/A 2007    Colonoscopy to the cecum with biopsy terminal ileum, cecum and descending colon ([facility] [place]) normal    colposcopy with biopsy-mutliple  10/2014    HYSTERECTOMY TOTAL ABDOMINAL, BILATERAL  SALPINGO-OOPHORECTOMY, COMBINED Left     Right ovary remains.     IR CONSULTATION FOR IR EXAM  11/3/2022    LAPAROSCOPIC APPENDECTOMY N/A 2020    Procedure: APPENDECTOMY, LAPAROSCOPIC;  Surgeon: Jonathan Martinez MD;  Location: [facility]    TUBAL LIGATION         Social History     Socioeconomic History    Marital status:      Spouse name: Not on file    Number of children: Not on file    Years of education: Not on file    Highest education level: Not on file   Occupational History    Not on file   Tobacco Use    Smoking status: Never     Passive exposure: Never    Smokeless tobacco: Never   Vaping Use    Vaping Use: Never used   Substance and Sexual Activity    Alcohol use: Yes     Alcohol/week: 0.0 standard drinks of alcohol     Comment: RARELY    Drug use: No    Sexual activity: Yes     Partners: Male     Birth control/protection: None   Other Topics Concern    Parent/sibling w/ CABG, MI or angioplasty before 65F 55M? Not Asked   Social History Narrative    2019: , daughter  one year ago from drug overdose, son who recently stopped using IV drugs     Social Determinants of Health     Financial Resource Strain: Low Risk  (2023)    Financial Resource Strain     Within the past 12 months, have you or your family members you live with been unable to get utilities (heat, electricity) when it was really needed?: No   Food Insecurity: Low Risk  (2023)    Food Insecurity     Within the past 12 months, did you worry that your food would run out before you got money to buy more?: No     Within the past 12 months, did the food you bought just not last and you didn t have money to get more?: No   Transportation Needs: Low Risk  (2023)    Transportation Needs     Within the past 12 months, has lack of transportation kept you from medical appointments, getting your medicines, non-medical meetings or appointments, work, or from getting things that you need?: No   Physical Activity: Not on  "file   Stress: Not on file   Social Connections: Not on file   Interpersonal Safety: Low Risk  (9/25/2023)    Interpersonal Safety     Do you feel physically and emotionally safe where you currently live?: Yes     Within the past 12 months, have you been hit, slapped, kicked or otherwise physically hurt by someone?: No     Within the past 12 months, have you been humiliated or emotionally abused in other ways by your partner or ex-partner?: No   Housing Stability: Low Risk  (9/25/2023)    Housing Stability     Do you have housing? : Yes     Are you worried about losing your housing?: No       Family History   Problem Relation Age of Onset    Other - See Comments Paternal Grandmother         Antitrysin Deficiency    Cancer Mother         Cervical    Cancer - colorectal Maternal Grandfather        ROS    Allergies   Allergen Reactions    Blood Transfusion Related (Informational Only) Other (See Comments)     Patient has a history of a clinically significant antibody against RBC antigens.  A delay in compatible RBCs may occur.    Penicillins Anaphylaxis and Hives       Current Outpatient Medications   Medication    amphetamine-dextroamphetamine (ADDERALL) 20 MG tablet    escitalopram (LEXAPRO) 5 MG tablet    omeprazole (PRILOSEC) 20 MG DR capsule    solifenacin (VESICARE) 10 MG tablet     No current facility-administered medications for this visit.       /70   Pulse 91   Ht 1.575 m (5' 2.01\")   Wt 66.2 kg (146 lb)   BMI 26.70 kg/m   No LMP recorded. Patient has had a hysterectomy. Body mass index is 26.7 kg/m .  Ms. Gage is alert, comfortable in no acute distress, non-labored breathing.   Abdomen is soft, non-tender, non-distended, no CVAT.    Normal external female genitalia. The urethra was normal appearing without mass, NT.    She has a cystocele to the HR, with apical descent to -4 and good posterior support on supine strain.  Speculum and bimanual exam are remarkable for atrophic vaginal epithelium.    "   3/5 gerry.      POPQ  Aa 0   Ba 0  C -4  D -  GH 5  PB 3  TVL 9  Ap -2  Bp -2    A/P: Lenora Gage is a 48 year old F with recurrent cystocele and mixed incontinence    Will schedule UDS and cystoscopy. Ms Gage has a h/o multiple abdominal surgeries with apparent scar tissue. Would need to consider open sacrocolpopexy versus RA versus limited anterior repair.    I spent a total of 45 minutes with  Ms. Gage  on the date of the encounter in chart review, face to face patient visit, review of tests, documentation and/or discussion with other providers about the issues documented above.    Sherwin Lea MD  Professor, OB/GYN  Urogynecologist  CC  Patient Care Team:  Doreen Galo NP as PCP - General (Family Practice)  Doreen Galo NP as Assigned PCP  FAVIOLA GHOTRA

## 2023-10-25 NOTE — LETTER
10/25/2023       RE: Lenora Gage  [address]  [facility] [address]     Dear Colleague,    Thank you for referring your patient, Lenora Gage, to the [facility] [place] [facility]. Please see a copy of my visit note below.    1October 25, 2023    Referring Provider: Ramiro Mcclelland MD  [address] [place] [address]    Primary Care Provider: Doreen Galo    CC: recurrent prolapse    HPI:  Lenora Gage is a 48 year old female who presents for evaluation of her pelvic floor symptoms.  She has prior A&P repair with placement of a TOT then a subsequent repeat of the posterior repair and release of the the TOT.  She has had recurrence of her prolapse that is bothersome. She has mixed urinary incontinence U>S.    Prolapse:  Do you feel a vaginal bulge? yes                                      Pressure? yes   Do you have to place your fingers in the vagina or in the rectum to have a bowel movement? no  Impact to quality of life? moderate     Stress Incontinence:  Do you leak urine with cough, sneeze, exercise? yes  How often do you leak with cough, sneeze, exercise?  4-5/week  How much do you usually leak? yes   Do you wear a pad? no If so; -  Impact to quality of life? moderate    Urinating:  Difficulty starting urination or strain to void? no  Weak or intermittent stream? yes  Incomplete emptying or dribbling? yes  Pain or burning with urination? yes  Any blood in your urine? no    GI:  Constipation? yes  Frequency stools QOD    Straining for stools occ  Stool consistency varies     Ever leak stool (Accidental Bowel Leakage)? no      If so, how often?               -      If so, do you leak?                   -      Soiling without sensation? -  History of irritable bowel or Crohn's? -    Sexual/Pain:  Are you currently having sex?. no  Pain with sex?   yes   Sexual Partner: male  Do any of these symptoms interfere with sex?  yes  Impact to quality of life? moderate    Prior therapy:  Ever done pelvic floor physical therapy? Referred not started  Trial of medication? vesicare  Have you ever tried a pessary? no    Medical History:  Do you have?   High Cholesterol? no     Diabetes? no  High Blood pressure? no     Recurrent UTIs? no  Sleep Apnea? no  Other medical problems: no    Surgical History:    Hysterectomy? yes   Bladder Surgery? yes   Other? -     OB/Gyn History:  Pregnancies? 2  Deliveries? 2  Vaginal 2  Section 0  Current birth control? -  Periods? -  When was the first day of your last period? -  Last Pap smear? - Any abnormal? -  Last mammogram? -  Last colonoscopy? -    Medications/Vitamins/Supplements: reviewed    Drug Allergies: reviewed    Latex Allergy: no  Iodine Allergy no    Family History: (list relationship and age at diagnosis)  Breast cancer? no   Ovarian cancer? no   Colon cancer? yes  Other? no    Social History:  Marital status:   Do you/ have you ever smoke(d)  cigarettes? no  Drink more than 1 alcoholic beverage a day?  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 no                   Unusual headaches? no  Leg pain w/ walking/exercise? no                       Easy bruising? no  Difficulty breathing w/ walking/exercise? no  Problems with vision? no  Dizziness, falls, or fainting? no  Excessive bleeding from cuts, gums, surgery? no  Other: no    Past Medical History:   Diagnosis Date    Adjustment disorder with anxiety     Constipation 2012    Dysmenorrhea 2002    Dyspareunia 2007    Dysplasia of cervix (uteri)  2000    Endometriosis of uterus 2003    Esophageal reflux 2000    Helicobacter positive gastritis     IBS (Irritable colon syndrome) 2011    OCD (obsessive compulsive disorder)        Past Surgical History:   Procedure Laterality Date    bladder reconstruction and mesh removal       BLADDER REPAIR      COLONOSCOPY - HIM SCAN N/A 2007    Colonoscopy to the cecum with biopsy terminal ileum, cecum and descending colon ([facility] [place]) normal    colposcopy with biopsy-mutliple  10/2014    HYSTERECTOMY TOTAL ABDOMINAL, BILATERAL SALPINGO-OOPHORECTOMY, COMBINED Left     Right ovary remains.     IR CONSULTATION FOR IR EXAM  11/3/2022    LAPAROSCOPIC APPENDECTOMY N/A 2020    Procedure: APPENDECTOMY, LAPAROSCOPIC;  Surgeon: Jonathan Martinez MD;  Location: [facility]    TUBAL LIGATION         Social History     Socioeconomic History    Marital status:      Spouse name: Not on file    Number of children: Not on file    Years of education: Not on file    Highest education level: Not on file   Occupational History    Not on file   Tobacco Use    Smoking status: Never     Passive exposure: Never    Smokeless tobacco: Never   Vaping Use    Vaping Use: Never used   Substance and Sexual Activity    Alcohol use: Yes     Alcohol/week: 0.0 standard drinks of alcohol     Comment: RARELY    Drug use: No    Sexual activity: Yes     Partners: Male     Birth control/protection: None   Other Topics Concern    Parent/sibling w/ CABG, MI or angioplasty before 65F 55M? Not Asked   Social History Narrative    2019: , daughter  one year ago from drug overdose, son who recently stopped using IV drugs     Social Determinants of Health     Financial Resource Strain: Low Risk  (2023)    Financial Resource Strain     Within the past 12 months, have you or your family members you live with been unable to get utilities (heat, electricity) when it was really needed?: No   Food Insecurity: Low Risk  (2023)    Food Insecurity     Within the past 12 months, did you worry that your food would run out before you got money to buy more?: No     Within the past 12 months, did the food you bought just not last and you didn t have money to get more?: No   Transportation  "Needs: Low Risk  (9/25/2023)    Transportation Needs     Within the past 12 months, has lack of transportation kept you from medical appointments, getting your medicines, non-medical meetings or appointments, work, or from getting things that you need?: No   Physical Activity: Not on file   Stress: Not on file   Social Connections: Not on file   Interpersonal Safety: Low Risk  (9/25/2023)    Interpersonal Safety     Do you feel physically and emotionally safe where you currently live?: Yes     Within the past 12 months, have you been hit, slapped, kicked or otherwise physically hurt by someone?: No     Within the past 12 months, have you been humiliated or emotionally abused in other ways by your partner or ex-partner?: No   Housing Stability: Low Risk  (9/25/2023)    Housing Stability     Do you have housing? : Yes     Are you worried about losing your housing?: No       Family History   Problem Relation Age of Onset    Other - See Comments Paternal Grandmother         Antitrysin Deficiency    Cancer Mother         Cervical    Cancer - colorectal Maternal Grandfather        ROS    Allergies   Allergen Reactions    Blood Transfusion Related (Informational Only) Other (See Comments)     Patient has a history of a clinically significant antibody against RBC antigens.  A delay in compatible RBCs may occur.    Penicillins Anaphylaxis and Hives       Current Outpatient Medications   Medication    amphetamine-dextroamphetamine (ADDERALL) 20 MG tablet    escitalopram (LEXAPRO) 5 MG tablet    omeprazole (PRILOSEC) 20 MG DR capsule    solifenacin (VESICARE) 10 MG tablet     No current facility-administered medications for this visit.       /70   Pulse 91   Ht 1.575 m (5' 2.01\")   Wt 66.2 kg (146 lb)   BMI 26.70 kg/m   No LMP recorded. Patient has had a hysterectomy. Body mass index is 26.7 kg/m .  Ms. Gage is alert, comfortable in no acute distress, non-labored breathing.   Abdomen is soft, non-tender, " non-distended, no CVAT.    Normal external female genitalia. The urethra was normal appearing without mass, NT.    She has a cystocele to the HR, with apical descent to -4 and good posterior support on supine strain.  Speculum and bimanual exam are remarkable for atrophic vaginal epithelium.      3/5 kegels.      POPQ  Aa 0   Ba 0  C -4  D -  GH 5  PB 3  TVL 9  Ap -2  Bp -2    A/P: Lenora Gage is a 48 year old F with recurrent cystocele and mixed incontinence    Will schedule UDS and cystoscopy. Ms Gage has a h/o multiple abdominal surgeries with apparent scar tissue. Would need to consider open sacrocolpopexy versus RA versus limited anterior repair.    I spent a total of 45 minutes with  Ms. Gage  on the date of the encounter in chart review, face to face patient visit, review of tests, documentation and/or discussion with other providers about the issues documented above.    Sherwin Lea MD  Professor, OB/GYN  Urogynecologist  CC  Patient Care Team:  Doreen Galo NP as PCP - General (Family Practice)  Doreen Galo NP as Assigned PCP  FAVIOLA GHOTRA

## 2023-11-16 NOTE — NURSING NOTE
"Chief Complaint   Patient presents with     A.D.H.D     follow up        Initial BP 96/62 (BP Location: Left arm, Patient Position: Chair, Cuff Size: Adult Large)   Pulse 102   Temp 97.6  F (36.4  C) (Tympanic)   Ht 1.575 m (5' 2\")   Wt 65.3 kg (144 lb)   SpO2 100%   BMI 26.34 kg/m   Estimated body mass index is 26.34 kg/m  as calculated from the following:    Height as of this encounter: 1.575 m (5' 2\").    Weight as of this encounter: 65.3 kg (144 lb).  Medication Reconciliation: complete  Bindu Castle LPN  " As we discussed you should be sure that your son stays hydrated.  You can use Motrin every 6 hours as needed for pain.  If he has trouble breathing, unable to eat or drink, uncontrolled vomiting or abdominal pain he should return to the emergency department

## 2023-12-01 ENCOUNTER — PRE VISIT (OUTPATIENT)
Dept: UROLOGY | Facility: CLINIC | Age: 48
End: 2023-12-01
Payer: COMMERCIAL

## 2024-02-03 DIAGNOSIS — R10.13 DYSPEPSIA: ICD-10-CM

## 2024-02-07 ENCOUNTER — ALLIED HEALTH/NURSE VISIT (OUTPATIENT)
Dept: UROLOGY | Facility: CLINIC | Age: 49
End: 2024-02-07
Payer: COMMERCIAL

## 2024-02-07 ENCOUNTER — TRANSFERRED RECORDS (OUTPATIENT)
Dept: HEALTH INFORMATION MANAGEMENT | Facility: CLINIC | Age: 49
End: 2024-02-07

## 2024-02-07 VITALS
DIASTOLIC BLOOD PRESSURE: 74 MMHG | BODY MASS INDEX: 26.87 KG/M2 | HEART RATE: 89 BPM | HEIGHT: 62 IN | SYSTOLIC BLOOD PRESSURE: 110 MMHG | WEIGHT: 146 LBS

## 2024-02-07 DIAGNOSIS — N39.46 URINARY INCONTINENCE, MIXED: Primary | ICD-10-CM

## 2024-02-07 DIAGNOSIS — N81.89 OTHER FEMALE GENITAL PROLAPSE: ICD-10-CM

## 2024-02-07 LAB
ALBUMIN UR-MCNC: NEGATIVE MG/DL
APPEARANCE UR: CLEAR
BILIRUB UR QL STRIP: NEGATIVE
COLOR UR AUTO: YELLOW
GLUCOSE UR STRIP-MCNC: NEGATIVE MG/DL
HGB UR QL STRIP: NEGATIVE
KETONES UR STRIP-MCNC: NEGATIVE MG/DL
LEUKOCYTE ESTERASE UR QL STRIP: NEGATIVE
NITRATE UR QL: NEGATIVE
PH UR STRIP: 6.5 [PH] (ref 5–8)
SP GR UR STRIP: 1.01 (ref 1–1.03)
UROBILINOGEN UR STRIP-ACNC: 0.2 E.U./DL

## 2024-02-07 PROCEDURE — 51784 ANAL/URINARY MUSCLE STUDY: CPT | Performed by: PHYSICIAN ASSISTANT

## 2024-02-07 PROCEDURE — 51728 CYSTOMETROGRAM W/VP: CPT | Performed by: PHYSICIAN ASSISTANT

## 2024-02-07 PROCEDURE — 81003 URINALYSIS AUTO W/O SCOPE: CPT | Performed by: PATHOLOGY

## 2024-02-07 PROCEDURE — 51741 ELECTRO-UROFLOWMETRY FIRST: CPT | Performed by: PHYSICIAN ASSISTANT

## 2024-02-07 PROCEDURE — 51797 INTRAABDOMINAL PRESSURE TEST: CPT | Performed by: PHYSICIAN ASSISTANT

## 2024-02-07 RX ORDER — SULFAMETHOXAZOLE/TRIMETHOPRIM 800-160 MG
1 TABLET ORAL ONCE
Status: COMPLETED | OUTPATIENT
Start: 2024-02-07 | End: 2024-02-07

## 2024-02-07 RX ADMIN — SULFAMETHOXAZOLE AND TRIMETHOPRIM 1 TABLET: 800; 160 TABLET ORAL at 07:36

## 2024-02-07 ASSESSMENT — PAIN SCALES - GENERAL: PAINLEVEL: NO PAIN (0)

## 2024-02-07 NOTE — PATIENT INSTRUCTIONS
UROLOGY CLINIC VISIT PATIENT INSTRUCTIONS    Schedule cystoscopy with Dr. Lea next available.     CYSTOSCOPY    What is a Cystoscopy?  This is a procedure done to check for problems inside the bladder.  Problems may include polyps (growths), tumors, inflammation (swelling and redness) and other concerns.    The doctor inserts a thin tube (called a cystoscope) into the bladder.  The tube is about the size of a pencil.  We will give you numbing medicine to reduce the pain or discomfort you may feel.    The tube allows the doctor to:  The doctor will be able to see inside the bladder by filling the bladder with water.  The water makes it easier to see any problems that may be present.    If needed, the doctor may use the tube to:  The doctor is able to take tissue samples (biopsies).  Samples are sent to the lab for testing.  The doctor can also burn off any small growths or tumors that are found.  This is call fulguration.    What happens after the exam?  You may go back to your normal diet and activity as you feel ready, unless your doctor tells you not to.    For the next two days, you may notice:  Some blood in your urine.  Some burning when you urinate (use the toilet).  An urge to urinate more often.  Bladder spasms.    These are normal after the procedure. They should go away on their own after a day or two.      You can help to relieve the above listed symptoms by:  Drinking 6 to 8 large glasses of water each day (includes drinks at meals).  This will help clear the urine.  Take warm baths to relieve pain and bladder spasms.  Do not add anything to the bath water.  Your doctor may prescribe pain medicine.  You may also take Tylenol (acetaminophen) for pain.    When should I call my doctor?  A fever over 100.0 F (38 C) for more than a day.  (Before you call the doctor, check your temperature under your tongue.)  Chills.  Failure to urinate: No urine comes out when you try to use the toilet.  (Try soaking in  a bathtub full of warm water.  If still no urine, call your doctor.)  A lot of blood in the urine or blood clots larger than a nickel.  Pain in the back or abdomen (belly / stomach area).  Pain or spasms that are not relieved by warm tub baths and pain medicine.  Severe pain, burning or other problems while passing urine.  Pain that gets worse after two days.      If you have any issues, questions or concerns in the meantime, do not hesitate to contact us at [phone] or via [employer].     It was a pleasure meeting with you today.  Thank you for allowing me and my team the privilege of caring for you today.  YOU are the reason we are here, and I truly hope we provided you with the excellent service you deserve.  Please let us know if there is anything else we can do for you so that we can be sure you are leaving completely satisfied with your care experience.

## 2024-02-07 NOTE — PROGRESS NOTES
PREPROCEDURE DIAGNOSES:    1. Mixed urinary incontinence  2. History of A&P repair with TOT, then subsequent repeat of posterior repair and release of TOT  3. Recurrent anterior prolapse    POSTPROCEDURE DIAGNOSES:  -Maximum cystometric capacity 500 mL with normal filling sensations.  -Good bladder compliance without uninhibited detrusor contractions.   -No reproducible stress urinary incontinence despite maximum Pabd reaching 201 cm H2O at a volume of 300 mL with prolapse NOT reduced, and maximum Pabd reaching 131 cm H2O at a volume of 435 mL with prolapse REDUCED.  -Maximum detrusor contraction during voiding reaches 31 cm H2O. She voids 500 mL with Qmax 24 ml/s, bell-shape flow curve, increased EMG activity, complete bladder emptying (PVR 0 mL), and no evidence for bladder outlet obstruction (BOOI -40.4).    PROCEDURE:    1. Sterile urethral catheterization for measurement of residual urine volume.  2. Complex filling cystometrogram with measurement of bladder and rectal pressures.  3. Complex voiding cystometrogram with measurement of bladder and rectal pressures.  4. Electromyography of the pelvic floor during urodynamics.    INDICATIONS FOR PROCEDURE:  Ms. Lenora Gage is a pleasant 48 year old female with mixed urinary incontinence, prior A&P repair with TOT, then subsequent repeat of posterior repair and release of TOT, now with recurrent anterior prolapse. Baseline urodynamic assessment is requested today by Dr. Lea to better characterize Ms. Lenora Gage's voiding dysfunction.      VOIDING DIARY:  Did not complete.    DESCRIPTION OF PROCEDURE:  Risks, benefits, and alternatives to urodynamics were discussed with the patient and she wished to proceed.  Urodynamics are planned to better assess the primary etiology for Ms. Gage's urologic dysfunction.  The patient is not currently take anticholinergic or beta 3 agonist medications for her bladder.  After informed consent was obtained, the patient  was taken to the procedure room where uroflowmetry was performed. Findings below.     PRE-STUDY UROFLOWMETRY:  Not performed as patient had no urge to void.  Residual by catheter: 70 mL.  Pretest urine dipstick was negative for leukocytes and nitrites.    Next a 7F double-lumen urodynamics catheter was inserted into the bladder under sterile technique via urethra.  A 7F abdominal manometry catheter was placed in the vagina.  EMG pads were placed on both sides of the anal verge.  The bladder was filled with normal saline at 50 mL/minute and serial pressures were recorded.  With coughing there was an appropriate rise in vesical and abdominal pressures with no change in detrusor pressure, confirming good study catheter placement.    DURING THE FILLING PHASE:  First sensation: 111 mL.  First Desire: 292 mL.  Strong Desire: 312 mL.  Maximum Capacity: filled to 435 mL; true CHCF 500 mL based on final voided volume + PVR.    Uninhibited detrusor contractions: none.  Compliance: good. PDet=0.7 cmH20 at capacity.   Continence: no DOI. No stress leaks despite max Pabd 201 cm H2O at a volume of 300 mL with prolapse NOT reduced, and max Pabd 131 cm H2O at a volume of 435 mL with prolapse REDUCED.  EMG: mostly concordant during filling.    DURING THE VOIDING PHASE:  Maximum detrusor contraction with void: 31 cm of H2O pressure.  Voided volume: 498 mL.  Maximum flow rate: 24 mL/sec.  Average flow rate: 13 mL/sec.  Postvoid Residual: 0 mL.  EMG activity: increased.  Character of voiding curve: bell-shape .  BOOI: -40.4 (suggesting no obstruction - see key below)  [obstructed (COVARRUBIAS index [BOOI] ? 40); equivocal (no definite   obstruction; BOOI 20-40); and no obstruction (BOOI ? 20)]    ASSESSMENT/PLAN:  Ms. Lenora Gage is a pleasant 48 year old female with mixed incontinence and prolapse who demonstrated the following findings today on urodynamic evaluation:    -Maximum cystometric capacity 500 mL with normal filling  sensations.  -Good bladder compliance without uninhibited detrusor contractions.   -No reproducible stress urinary incontinence despite maximum Pabd reaching 201 cm H2O at a volume of 300 mL with prolapse NOT reduced, and maximum Pabd reaching 131 cm H2O at a volume of 435 mL with prolapse REDUCED.  -Maximum detrusor contraction during voiding reaches 31 cm H2O. She voids 500 mL with Qmax 24 ml/s, bell-shape flow curve, increased EMG activity, complete bladder emptying (PVR 0 mL), and no evidence for bladder outlet obstruction (BOOI -40.4).    The patient will follow up with Dr. Lea for cystoscopy to further discuss today's study results and make plans for how best to proceed.      - A single Bactrim antibiotic was provided for UTI prophylaxis following completion of today's study per department protocol.  The risk of UTI with VUDS is low at ~2.5-3%.      Thank you for allowing me to participate in the care of Ms. Lenora Gage and please don't hesitate to contact me with any questions or concerns.      Kaylin Hernandez PA-C  Urology Physician Assistant

## 2024-02-07 NOTE — NURSING NOTE
"  Chief Complaint   Patient presents with    Urodynamics Study     Urinary incontinence  Cystocele       Blood pressure 110/74, pulse 89, height 1.575 m (5' 2\"), weight 66.2 kg (146 lb). Body mass index is 26.7 kg/m .    Patient Active Problem List   Diagnosis    OCD (obsessive compulsive disorder)    Stress due to illness of family member    Midline low back pain without sciatica    Gastroesophageal reflux disease without esophagitis    ACP (advance care planning)    Prolonged grief reaction    Major depressive disorder, recurrent episode, moderate (H)    AZ (generalized anxiety disorder)    Acute intractable tension-type headache    Myalgia    Rheumatoid factor positive       Allergies   Allergen Reactions    Blood Transfusion Related (Informational Only) Other (See Comments)     Patient has a history of a clinically significant antibody against RBC antigens.  A delay in compatible RBCs may occur.    Penicillins Anaphylaxis and Hives       Current Outpatient Medications   Medication Sig Dispense Refill    amphetamine-dextroamphetamine (ADDERALL) 20 MG tablet Take 20 mg by mouth 3 times daily      escitalopram (LEXAPRO) 5 MG tablet Take 1 tablet by mouth daily at 2 pm      omeprazole (PRILOSEC) 20 MG DR capsule TAKE 1 CAPSULE BY MOUTH EVERY DAY 90 capsule 1    solifenacin (VESICARE) 10 MG tablet Take 1 tablet (10 mg) by mouth daily (Patient not taking: Reported on 2024) 90 tablet 3       Social History     Tobacco Use    Smoking status: Never     Passive exposure: Never    Smokeless tobacco: Never   Vaping Use    Vaping Use: Never used   Substance Use Topics    Alcohol use: Yes     Alcohol/week: 0.0 standard drinks of alcohol     Comment: RARELY    Drug use: No       Invasive Procedure Safety Checklist:    Procedure: Urodynamics    Action: Complete sections and checkboxes as appropriate.    Pre-procedure:    1. Patient ID Verified with 2 identifiers (Meghan and  or MRN) : YES    2. Procedure and site verified " with patient/designee (when able) : YES    3. Accurate consent documentation in medical record : YES    4. H&P (or appropriate assessment) documented in medical record : N/A    H&P must be up to 30 days prior to procedure an updated within 24 hours of Procedure as applicable.     5. Relevant diagnostic and radiology test results appropriately labeled and displayed as applicable : YES    6. Blood products, implants, devices, and/or special equipment available for the procedure as applicable : YES    7. Procedure site(s) marked with provider initials [Exclusions: none] : NO    8. Marking not required. Reason : Yes  Procedure does not require site marking    Time Out:     Time-Out performed immediately prior to starting procedure, including verbal and active participation of all team members addressing: YES    1. Correct patient identity.  2. Confirmed that the correct side and site are marked.  3. An accurate procedure to be done.  4. Agreement on the procedure to be done.  5. Correct patient position.  6. Relevant images and results are properly labeled and appropriately displayed.  7. The need to administer antibiotics or fluids for irrigation purposes during the procedure as applicable.  8. Safety precautions based on patient history or medication use.    During Procedure: Verification of correct person, site, and procedure occurs any time the responsibility for care of the patient is transferred to another member of the care team.          The following medication was given:     MEDICATION:  Bactrim (Trimethoprim / Sulfamethoxazole)  ROUTE: PO  SITE: Medication was given orally  DOSE: 800mg/160mg  LOT #: [id number]  : [facility]  EXPIRATION DATE: 06/2025  NDC#: 3566-3817-68   Was there drug waste? No        Lorrie Richey CMA  2/7/2024  7:35 AM

## 2024-02-09 ENCOUNTER — PRE VISIT (OUTPATIENT)
Dept: UROLOGY | Facility: CLINIC | Age: 49
End: 2024-02-09
Payer: COMMERCIAL

## 2024-02-09 NOTE — TELEPHONE ENCOUNTER
Reason for Visit: Cystoscopy    Diagnosis: Other female genital prolapse    Records/imaging/labs/orders: 02/07/24 UDS    Rooming Requirements:  UA dip prior to getting ready for cystoscopy. If positive for Leuks and/or Nitrites, will not do cystoscopy. If positive, send urine for official UA / UC.    Rohith Chiu MA  02/09/24  4:35 PM

## 2024-02-16 NOTE — PROGRESS NOTES
February 19, 2024    Lula Burnett  [address] [facility] [place] [address]      We have been unable to reach you:        Blood sugar was 105 with A1c coming down to 8.4.  This is much improved but you are spilling lots of protein in your urine.  We should consider starting Farxiga or Jardiance which I will send and if it is too expensive we will send the other medication.  Kidney function is still decreased but it has not deteriorated any further over the last several months liver functions are mildly elevated so avoid ibuprofen Aleve Motrin and lets cut the Crestor to every other day and repeat the liver functions in 6 weeks.  White blood cell count is slightly elevated and the majority of these are lymphocytes.  This has been elevated for the last several blood checks.  Would be glad to continue to follow or you can see one of the blood doctors or hematologists.  Thyroid test was normal  Call and let us know about the above or if you have any other questions or concerns.                  Yumiko Shah MD   surgery

## 2024-03-05 ENCOUNTER — TELEPHONE (OUTPATIENT)
Dept: FAMILY MEDICINE | Facility: OTHER | Age: 49
End: 2024-03-05

## 2024-03-05 ENCOUNTER — OFFICE VISIT (OUTPATIENT)
Dept: FAMILY MEDICINE | Facility: OTHER | Age: 49
End: 2024-03-05
Attending: NURSE PRACTITIONER
Payer: COMMERCIAL

## 2024-03-05 ENCOUNTER — ANCILLARY PROCEDURE (OUTPATIENT)
Dept: GENERAL RADIOLOGY | Facility: OTHER | Age: 49
End: 2024-03-05
Attending: NURSE PRACTITIONER
Payer: COMMERCIAL

## 2024-03-05 VITALS
HEIGHT: 62 IN | HEART RATE: 91 BPM | SYSTOLIC BLOOD PRESSURE: 112 MMHG | BODY MASS INDEX: 27.4 KG/M2 | WEIGHT: 148.9 LBS | DIASTOLIC BLOOD PRESSURE: 72 MMHG | RESPIRATION RATE: 16 BRPM | TEMPERATURE: 100.1 F | OXYGEN SATURATION: 93 %

## 2024-03-05 DIAGNOSIS — M25.511 ACUTE PAIN OF RIGHT SHOULDER: ICD-10-CM

## 2024-03-05 DIAGNOSIS — J02.9 SORE THROAT: ICD-10-CM

## 2024-03-05 DIAGNOSIS — J10.1 INFLUENZA B: Primary | ICD-10-CM

## 2024-03-05 LAB
FLUAV RNA SPEC QL NAA+PROBE: NEGATIVE
FLUBV RNA RESP QL NAA+PROBE: POSITIVE
GROUP A STREP BY PCR: NOT DETECTED
RSV RNA SPEC NAA+PROBE: NEGATIVE
SARS-COV-2 RNA RESP QL NAA+PROBE: NEGATIVE

## 2024-03-05 PROCEDURE — 87637 SARSCOV2&INF A&B&RSV AMP PRB: CPT | Performed by: NURSE PRACTITIONER

## 2024-03-05 PROCEDURE — 87651 STREP A DNA AMP PROBE: CPT | Performed by: NURSE PRACTITIONER

## 2024-03-05 PROCEDURE — 73030 X-RAY EXAM OF SHOULDER: CPT | Mod: TC | Performed by: RADIOLOGY

## 2024-03-05 PROCEDURE — 99214 OFFICE O/P EST MOD 30 MIN: CPT | Performed by: NURSE PRACTITIONER

## 2024-03-05 ASSESSMENT — PAIN SCALES - GENERAL: PAINLEVEL: MODERATE PAIN (5)

## 2024-03-05 NOTE — PROGRESS NOTES
Assessment & Plan     Influenza B  Headache  Sore throat  Influenza B positive. Symptomatic cares encouraged. The lack of efficacy of antibiotics was discussed. The patient will follow up with new or worsening symptoms.     - Group A Streptococcus PCR Throat Swab ([place] ONLY)  - Symptomatic Influenza A/B, RSV, & SARS-CoV2 PCR (COVID-19)  - Symptomatic Influenza A/B, RSV, & SARS-CoV2 PCR (COVID-19) Nose    Acute pain of right shoulder  Patient fell Friday evening and hit wall, hurting her right shoulder. Fall was unwitnessed. Does not recall hitting her head. No red flag symptoms. Fall most likely r/t dehydrated due to influenza B. XR shoulder unremarkable-most likely contusion. Discussed increasing hydration and symptomatic care. Return to clinic for new or worsening symptoms.       - XR SHOULDER RIGHT G/E 2 VIEWS (Clinic Performed)      Tonya Cooley is a 48 year old, presenting for the following health issues:  Pharyngitis and URI (Possible Brochitis)    HPI     Acute Illness  Acute illness concerns: Sore throat, URI  Onset/Duration: 3/02/2023  Symptoms:  Fever: Yes 100.1 this visit - has not checked at home   Chills/Sweats: YES  Headache (location?): YES - frontal, sinus  Sinus Pressure: YES  Conjunctivitis:  No  Ear Pain: YES: left  Rhinorrhea: YES - yellow/green  Congestion: YES  Sore Throat: YES  Cough: YES-non-productive  Wheeze: No  Decreased Appetite: YES, decreased fluids   Nausea: YES  Vomiting: No  Diarrhea: No  Dysuria/Freq.: No  Dysuria or Hematuria: No  Fatigue/Achiness: YES  Sick/Strep Exposure: YES- family members had strep recently  Therapies tried and outcome: Sudafed, Mucinex, Advil, not working    No asthma, smoking, COPD  No chest pain    MSK Pain - right shoulder to shoulder blade  Onset: Last Friday, middle of night walking to bathroom, got dizzy, hit the wall, unwitnessed, no headache  Description:   Location: right shoulder, shoulder blade, neck   Character: Constant dull ache with  "some sharp shooting pain  Intensity: moderate  Progression of Symptoms: better  Accompanying Signs & Symptoms:  Other symptoms: radiation of pain to neck  History:   Previous similar pain: No     Precipitating factors:   Trauma or overuse: YES - fall   Alleviating factors:  Improved by: acetaminophen and Ibuprofen only if take every 4 hours, not sure if helping  Therapies Tried and outcome: also tried ice, heat, cream helps a little     No tinnitus, no vision changes, no migraines, no previous head injuries      Review of Systems  Constitutional, HEENT, cardiovascular, pulmonary, gi and gu systems are negative, except as otherwise noted.      Objective    /72   Pulse 91   Temp 100.1  F (37.8  C) (Tympanic)   Resp 16   Ht 1.575 m (5' 2\")   Wt 67.5 kg (148 lb 14.4 oz)   SpO2 93%   BMI 27.23 kg/m    Body mass index is 27.23 kg/m .    Physical Exam   GENERAL: alert and no distress  EYES: Eyes grossly normal to inspection, PERRL and conjunctivae and sclerae normal  HENT: ear canals and TM's normal, nose and mouth without ulcers or lesions. Mild erythema of posterior pharynx. No pain on palpation of frontal and maxillary sinuses.   NECK: no adenopathy, no asymmetry, masses, or scars  RESP: lungs clear to auscultation - no rales, rhonchi or wheezes  CV: regular rate and rhythm, normal S1 S2, no S3 or S4, no murmur, click or rub, no peripheral edema  MS: no gross musculoskeletal defects noted, no edema. Pain on palpation of posterior and lateral right shoulder. Limited passive/active ROM of right shoulder. Positive empty can and lift-off tests. Negative Hawkin's test. No bruising, erythema, or edema noted.   SKIN: no suspicious lesions or rashes  NEURO: Normal strength and tone, mentation intact and speech normal  PSYCH: mentation appears normal, affect normal/bright    Results for orders placed or performed in visit on 03/05/24 (from the past 24 hour(s))   Group A Streptococcus PCR Throat Swab ([place] ONLY) "    Specimen: Throat; Swab   Result Value Ref Range    Group A strep by PCR Not Detected Not Detected    Narrative    The Xpert Xpress Strep A test, performed on the [employer], is a rapid, qualitative in vitro diagnostic test for the detection of Streptococcus pyogenes (Group A ß-hemolytic Streptococcus, Strep A) in throat swab specimens from patients with signs and symptoms of pharyngitis. The Xpert Xpress Strep A test can be used as an aid in the diagnosis of Group A Streptococcal pharyngitis. The assay is not intended to monitor treatment for Group A Streptococcus infections. The Xpert Xpress Strep A test utilizes an automated real-time polymerase chain reaction (PCR) to detect Streptococcus pyogenes DNA.   Symptomatic Influenza A/B, RSV, & SARS-CoV2 PCR (COVID-19) Nose    Specimen: Nose; Swab   Result Value Ref Range    Influenza A PCR Negative Negative    Influenza B PCR Positive (A) Negative    RSV PCR Negative Negative    SARS CoV2 PCR Negative Negative    Narrative    Testing was performed using the Xpert Xpress CoV2/Flu/RSV Assay on the [employer] Instrument. This test should be ordered for the detection of SARS-CoV-2, influenza, and RSV viruses in individuals who meet clinical and/or epidemiological criteria. Test performance is unknown in asymptomatic patients. This test is for in vitro diagnostic use under the FDA EUA for laboratories certified under CLIA to perform high or moderate complexity testing. This test has not been FDA cleared or approved. A negative result does not rule out the presence of PCR inhibitors in the specimen or target RNA in concentration below the limit of detection for the assay. If only one viral target is positive but coinfection with multiple targets is suspected, the sample should be re-tested with another FDA cleared, approved, or authorized test, if coinfection would change clinical management. This test was validated by the [facility]  [facility]. These laboratories are certified under the Clinical Laboratory Improvement Amendments of 1988 (CLIA-88) as qualified to perform high complexity laboratory testing.   XR SHOULDER RIGHT G/E 2 VIEWS (Clinic Performed)    Narrative    PROCEDURE:  XR SHOULDER RIGHT G/E 3 VIEWS    HISTORY: Acute pain of right shoulder    COMPARISON:  None.    TECHNIQUE:  4 views of the right shoulder were obtained.    FINDINGS:  No fracture or dislocation is identified. The joint spaces  are preserved.        Impression    IMPRESSION: Normal right shoulder      JULIANNE COSME MD         SYSTEM ID:  [id number]         OREN Rangel-S2     I was present with the nurse practitioner student who participated in the service and in the documentation of the note. I have verified the history and personally performed the physical exam and medical decision making. I agree with the assessment and plan of care as documented in the note.     Doreen Galo, CNP

## 2024-03-05 NOTE — TELEPHONE ENCOUNTER
12:35 PM    Reason for Call: OVERBOOK    Patient is having the following symptoms: possible strep and bronchitis for 3 days. Pt was watching grandkids have strep    The patient is requesting an appointment for today with BEULAH Galo.    Was an appointment offered for this call? No  If yes : Appointment type              Date    Preferred method for responding to this message: Telephone Call  What is your phone number ? [phone]     If we cannot reach you directly, may we leave a detailed response at the number you provided? Yes    Can this message wait until your PCP/provider returns, if unavailable today? Hawa Urena

## 2024-03-05 NOTE — TELEPHONE ENCOUNTER
Pt fell asleep and missed appointment. Wondering if can still come in please call [phone] or possibly tomorrow

## 2024-03-10 NOTE — TELEPHONE ENCOUNTER
Trazodone      Last Written Prescription Date:  6/18/21  Last Fill Quantity: 90,   # refills: 1  Last Office Visit: 11/9/21  Future Office visit:    Next 5 appointments (look out 90 days)    May 09, 2022  8:40 AM  (Arrive by 8:25 AM)  SHORT with Doreen Galo NP  [facility] [place] ([facility] ) [address]  [place] [address]  [phone]           Routing refill request to provider for review/approval because:       Ambulatory

## 2024-03-15 ENCOUNTER — OFFICE VISIT (OUTPATIENT)
Dept: UROLOGY | Facility: CLINIC | Age: 49
End: 2024-03-15
Payer: COMMERCIAL

## 2024-03-15 VITALS
WEIGHT: 147 LBS | HEIGHT: 62 IN | SYSTOLIC BLOOD PRESSURE: 98 MMHG | BODY MASS INDEX: 27.05 KG/M2 | HEART RATE: 94 BPM | DIASTOLIC BLOOD PRESSURE: 62 MMHG

## 2024-03-15 DIAGNOSIS — R32 URINARY INCONTINENCE, UNSPECIFIED TYPE: Primary | ICD-10-CM

## 2024-03-15 DIAGNOSIS — N81.11 CYSTOCELE, MIDLINE: ICD-10-CM

## 2024-03-15 LAB
ALBUMIN UR-MCNC: NEGATIVE MG/DL
APPEARANCE UR: CLEAR
BILIRUB UR QL STRIP: NEGATIVE
COLOR UR AUTO: YELLOW
GLUCOSE UR STRIP-MCNC: NEGATIVE MG/DL
HGB UR QL STRIP: NEGATIVE
KETONES UR STRIP-MCNC: NEGATIVE MG/DL
LEUKOCYTE ESTERASE UR QL STRIP: NEGATIVE
NITRATE UR QL: NEGATIVE
PH UR STRIP: 6.5 [PH] (ref 5–8)
SP GR UR STRIP: <=1.005 (ref 1–1.03)
UROBILINOGEN UR STRIP-ACNC: 0.2 E.U./DL

## 2024-03-15 PROCEDURE — 81003 URINALYSIS AUTO W/O SCOPE: CPT | Performed by: PATHOLOGY

## 2024-03-15 PROCEDURE — 52000 CYSTOURETHROSCOPY: CPT | Performed by: OBSTETRICS & GYNECOLOGY

## 2024-03-15 RX ORDER — ACETAMINOPHEN 325 MG/1
975 TABLET ORAL ONCE
Status: CANCELLED | OUTPATIENT
Start: 2024-03-15 | End: 2024-03-15

## 2024-03-15 RX ORDER — CLINDAMYCIN PHOSPHATE 900 MG/50ML
900 INJECTION, SOLUTION INTRAVENOUS
Status: CANCELLED | OUTPATIENT
Start: 2024-03-15

## 2024-03-15 RX ORDER — CLINDAMYCIN PHOSPHATE 900 MG/50ML
900 INJECTION, SOLUTION INTRAVENOUS SEE ADMIN INSTRUCTIONS
Status: CANCELLED | OUTPATIENT
Start: 2024-03-15

## 2024-03-15 RX ORDER — LIDOCAINE HYDROCHLORIDE 20 MG/ML
JELLY TOPICAL ONCE
Status: COMPLETED | OUTPATIENT
Start: 2024-03-15 | End: 2024-03-15

## 2024-03-15 RX ORDER — PHENAZOPYRIDINE HYDROCHLORIDE 200 MG/1
200 TABLET, FILM COATED ORAL ONCE
Status: CANCELLED | OUTPATIENT
Start: 2024-03-15 | End: 2024-03-15

## 2024-03-15 RX ADMIN — LIDOCAINE HYDROCHLORIDE: 20 JELLY TOPICAL at 14:40

## 2024-03-15 ASSESSMENT — PAIN SCALES - GENERAL: PAINLEVEL: SEVERE PAIN (6)

## 2024-03-15 NOTE — PROGRESS NOTES
Reason for Visit:  Cystoscopy    Clinical Data: Ms. Lenora Gage is a 49 year old female with a hx of cystocele and prior mesh procedures    Cystoscopy procedure:  Pt. Was consented and placed in the lithotomy position.  She was cleaned and preparred in the usual fashion.  Lidocain gel was inserted into the urethra and given time to take effect.  A 16 fr flexible cystoscope was then inserted through the urethra and into the bladder.  The urethra was wnl.  The bladder was with 1+ trabeculation.  No tumors, diverticulae, or stones.  Bilateral u/o's were effluxing clear urine. No evidence of mesh in the bladder. The cystoscope was then withdrawn.  The pt. Tolerated the procedure well.    A/P:  49 year old female with normal cystoscopy  -WIll schedule anterior repair     Thank you for allowing me to participate in the care of  Ms. Lenora Gage and I will keep you updated on her progress.    Sherwin Lea MD

## 2024-03-15 NOTE — NURSING NOTE
"Chief Complaint   Patient presents with    Cystoscopy       Blood pressure 98/62, pulse 94, height 1.575 m (5' 2\"), weight 66.7 kg (147 lb). Body mass index is 26.89 kg/m .    Patient Active Problem List   Diagnosis    OCD (obsessive compulsive disorder)    Stress due to illness of family member    Midline low back pain without sciatica    Gastroesophageal reflux disease without esophagitis    ACP (advance care planning)    Prolonged grief reaction    Major depressive disorder, recurrent episode, moderate (H)    AZ (generalized anxiety disorder)    Acute intractable tension-type headache    Myalgia    Rheumatoid factor positive       Allergies   Allergen Reactions    Blood Transfusion Related (Informational Only) Other (See Comments)     Patient has a history of a clinically significant antibody against RBC antigens.  A delay in compatible RBCs may occur.    Penicillins Anaphylaxis and Hives       Current Outpatient Medications   Medication Sig Dispense Refill    amphetamine-dextroamphetamine (ADDERALL) 20 MG tablet Take 20 mg by mouth 3 times daily      escitalopram (LEXAPRO) 5 MG tablet Take 1 tablet by mouth daily at 2 pm      omeprazole (PRILOSEC) 20 MG DR capsule TAKE 1 CAPSULE BY MOUTH EVERY DAY 90 capsule 1    solifenacin (VESICARE) 10 MG tablet Take 1 tablet (10 mg) by mouth daily (Patient not taking: Reported on 2024) 90 tablet 3       Social History     Tobacco Use    Smoking status: Never     Passive exposure: Never    Smokeless tobacco: Never   Vaping Use    Vaping Use: Never used   Substance Use Topics    Alcohol use: Yes     Alcohol/week: 0.0 standard drinks of alcohol     Comment: RARELY    Drug use: No       Invasive Procedure Safety Checklist:    Procedure: Cystoscopy    Action: Complete sections and checkboxes as appropriate.    Pre-procedure:  1. Patient ID Verified with 2 identifiers (Meghan and  or MRN) : YES    2. Procedure and site verified with patient/designee (when able) : YES    3. " Accurate consent documentation in medical record : YES    4. H&P (or appropriate assessment) documented in medical record : N/A  H&P must be up to 30 days prior to procedure an updated within 24 hours of                 Procedure as applicable.     5. Relevant diagnostic and radiology test results appropriately labeled and displayed as applicable : YES    6. Blood products, implants, devices, and/or special equipment available for the procedure as applicable : YES    7. Procedure site(s) marked with provider initials [Exclusions: none] : NO    8. Marking not required. Reason : Yes  Procedure does not require site marking    Time Out:     Time-Out performed immediately prior to starting procedure, including verbal and active participation of all team members addressing: YES    1. Correct patient identity.  2. Confirmed that the correct side and site are marked.  3. An accurate procedure to be done.  4. Agreement on the procedure to be done.  5. Correct patient position.  6. Relevant images and results are properly labeled and appropriately displayed.  7. The need to administer antibiotics or fluids for irrigation purposes during the procedure as applicable.  8. Safety precautions based on patient history or medication use.    During Procedure: Verification of correct person, site, and procedure occurs any time the responsibility for care of the patient is transferred to another member of the care team.    The following medication was given:     MEDICATION:  Lidocaine without epinephrine 2% jelly  ROUTE: urethral   SITE: urethral   DOSE: 10 mL  LOT #: [id number]  : [employer]  EXPIRATION DATE: 8-25  NDC#: [address]-6   Was there drug waste? No    Prior to med admin, verified patient identity using patient's name and date of birth.  Due to med administration, patient instructed to remain in clinic for 15 minutes  afterwards, and to report any adverse reaction to me  immediately.    Drug Amount Wasted:  None.  Vial/Syringe: Syringe      Aliya Solis  3/15/2024  2:23 PM

## 2024-03-15 NOTE — LETTER
3/15/2024       RE: Lenora Gage  [address] [place] [address]     Dear Colleague,    Thank you for referring your patient, Lenora Gage, to the [facility] [place] [facility]. Please see a copy of my visit note below.    Reason for Visit:  Cystoscopy    Clinical Data: Ms. Lenora Gage is a 49 year old female with a hx of cystocele and prior mesh procedures    Cystoscopy procedure:  Pt. Was consented and placed in the lithotomy position.  She was cleaned and preparred in the usual fashion.  Lidocain gel was inserted into the urethra and given time to take effect.  A 16 fr flexible cystoscope was then inserted through the urethra and into the bladder.  The urethra was wnl.  The bladder was with 1+ trabeculation.  No tumors, diverticulae, or stones.  Bilateral u/o's were effluxing clear urine. No evidence of mesh in the bladder. The cystoscope was then withdrawn.  The pt. Tolerated the procedure well.    A/P:  49 year old female with normal cystoscopy  -WIll schedule anterior repair     Thank you for allowing me to participate in the care of  Ms. Lenora Gage and I will keep you updated on her progress.    Sherwin Lea MD

## 2024-03-18 ENCOUNTER — HOSPITAL ENCOUNTER (OUTPATIENT)
Facility: AMBULATORY SURGERY CENTER | Age: 49
End: 2024-03-18
Attending: OBSTETRICS & GYNECOLOGY
Payer: COMMERCIAL

## 2024-03-18 PROBLEM — N81.11 CYSTOCELE, MIDLINE: Status: ACTIVE | Noted: 2024-03-15

## 2024-05-10 RX ORDER — LIDOCAINE 40 MG/G
CREAM TOPICAL
Status: CANCELLED | OUTPATIENT
Start: 2024-05-10

## 2024-05-10 RX ORDER — NALOXONE HYDROCHLORIDE 0.4 MG/ML
0.1 INJECTION, SOLUTION INTRAMUSCULAR; INTRAVENOUS; SUBCUTANEOUS
Status: CANCELLED | OUTPATIENT
Start: 2024-05-10

## 2024-05-10 RX ORDER — FENTANYL CITRATE 50 UG/ML
50 INJECTION, SOLUTION INTRAMUSCULAR; INTRAVENOUS EVERY 5 MIN PRN
Status: CANCELLED | OUTPATIENT
Start: 2024-05-10

## 2024-05-10 RX ORDER — ONDANSETRON 4 MG/1
4 TABLET, ORALLY DISINTEGRATING ORAL EVERY 30 MIN PRN
Status: CANCELLED | OUTPATIENT
Start: 2024-05-10

## 2024-05-10 RX ORDER — OXYCODONE HYDROCHLORIDE 5 MG/1
10 TABLET ORAL
Status: CANCELLED | OUTPATIENT
Start: 2024-05-10

## 2024-05-10 RX ORDER — HYDROMORPHONE HYDROCHLORIDE 1 MG/ML
0.2 INJECTION, SOLUTION INTRAMUSCULAR; INTRAVENOUS; SUBCUTANEOUS EVERY 5 MIN PRN
Status: CANCELLED | OUTPATIENT
Start: 2024-05-10

## 2024-05-10 RX ORDER — ONDANSETRON 2 MG/ML
4 INJECTION INTRAMUSCULAR; INTRAVENOUS EVERY 30 MIN PRN
Status: CANCELLED | OUTPATIENT
Start: 2024-05-10

## 2024-05-10 RX ORDER — SODIUM CHLORIDE, SODIUM LACTATE, POTASSIUM CHLORIDE, CALCIUM CHLORIDE 600; 310; 30; 20 MG/100ML; MG/100ML; MG/100ML; MG/100ML
INJECTION, SOLUTION INTRAVENOUS CONTINUOUS
Status: CANCELLED | OUTPATIENT
Start: 2024-05-10

## 2024-05-10 RX ORDER — DEXAMETHASONE SODIUM PHOSPHATE 10 MG/ML
4 INJECTION, SOLUTION INTRAMUSCULAR; INTRAVENOUS
Status: CANCELLED | OUTPATIENT
Start: 2024-05-10

## 2024-05-10 RX ORDER — FENTANYL CITRATE 50 UG/ML
25 INJECTION, SOLUTION INTRAMUSCULAR; INTRAVENOUS EVERY 5 MIN PRN
Status: CANCELLED | OUTPATIENT
Start: 2024-05-10

## 2024-05-10 RX ORDER — ACETAMINOPHEN 325 MG/1
975 TABLET ORAL ONCE
Status: CANCELLED | OUTPATIENT
Start: 2024-05-10 | End: 2024-05-10

## 2024-05-10 RX ORDER — OXYCODONE HYDROCHLORIDE 5 MG/1
5 TABLET ORAL
Status: CANCELLED | OUTPATIENT
Start: 2024-05-10

## 2024-05-10 RX ORDER — FENTANYL CITRATE 50 UG/ML
25 INJECTION, SOLUTION INTRAMUSCULAR; INTRAVENOUS
Status: CANCELLED | OUTPATIENT
Start: 2024-05-10

## 2024-05-10 RX ORDER — HYDROMORPHONE HYDROCHLORIDE 1 MG/ML
0.4 INJECTION, SOLUTION INTRAMUSCULAR; INTRAVENOUS; SUBCUTANEOUS EVERY 5 MIN PRN
Status: CANCELLED | OUTPATIENT
Start: 2024-05-10

## 2024-05-20 ENCOUNTER — TELEPHONE (OUTPATIENT)
Dept: FAMILY MEDICINE | Facility: OTHER | Age: 49
End: 2024-05-20

## 2024-05-20 NOTE — TELEPHONE ENCOUNTER
12:14 PM    Reason for Call: OVERBOOK    Patient is having the following symptoms: Patient needs to be seen for preop/unsure of facility/06/10/24/bladder surgery/ unsure of drs name days.    The patient is requesting an appointment for Overbook with Doreen Galo.    Was an appointment offered for this call? No  If yes : Appointment type              Date    Preferred method for responding to this message: Telephone Call  What is your phone number ?   [phone]      If we cannot reach you directly, may we leave a detailed response at the number you provided? Yes    Can this message wait until your PCP/provider returns, if unavailable today? Provider is in today    Tracy Greene

## 2024-05-22 ENCOUNTER — TELEPHONE (OUTPATIENT)
Dept: OBGYN | Facility: CLINIC | Age: 49
End: 2024-05-22
Payer: COMMERCIAL

## 2024-06-06 ENCOUNTER — TELEPHONE (OUTPATIENT)
Dept: OBGYN | Facility: CLINIC | Age: 49
End: 2024-06-06
Payer: COMMERCIAL

## 2024-06-06 NOTE — TELEPHONE ENCOUNTER
LVM on Patients spouses phone to let them know patients surgery will be cancelled with Dr. Lea since there is no pre-op scheduled. Patient will have to schedule pre op prior to getting rescheduled in the future. This will be the 2nd time patient cancelled due to not being compliant with pre surgery instructions. Writer left phone number in case they had any questions or concerns.     Mary SHERIDAN

## 2024-06-17 NOTE — PROGRESS NOTES
Assessment & Plan     (R22.31) Mass of right hand  (primary encounter diagnosis  Plan: XR Hand Right G/E 3 Views (Clinic Performed)        Patient has small bony prominence at the base of her right ring finger. Will order XR. Will notify patient of the results when available and intervene accordingly.     (B00.2) Recurrent oral herpes simplex  Comment: rarely occur, needing refill  Plan: acyclovir (ZOVIRAX) 400 MG tablet        Medication refilled.     (M79.10) Myalgia  Comment: all lab work as been normal in the past, most likely r/t a fibromyalgia, muscle aches worsen with stress  Plan: tiZANidine (ZANAFLEX) 2 MG tablet        A low sugar diet and daily low impact exercise was encouraged. Will also try low dose muscle relaxant and reassess in 4 weeks. Can also consider a gabapentin or Lyrica in the future.     (F33.1) Major depressive disorder, recurrent episode, moderate (H)  (F41.1) AZ (generalized anxiety disorder)  Comment: anxiety and depression have worsened with her social situation, but she does not feel suicidal   Plan: escitalopram (LEXAPRO) 5 MG tablet        Will increase her Lexapro to 10 mg from 5 mg and reassess in 4 weeks. Sooner with new or worsening symptoms.     Encouraged discussion with trusted relative or friend to monitor for negative mood changes and change in behaviour during medication initiation and titration. Recommended immediate help if increased thoughts of suicide and call if significant side effects occur. Encouraged patient that this type of medication is not effective immediately, and to be consistant with taking the medication.    The longitudinal plan of care for the diagnosis(es)/condition(s) as documented were addressed during this visit. Due to the added complexity in care, I will continue to support Lenora in the subsequent management and with ongoing continuity of care.    BMI  Estimated body mass index is 27.34 kg/m  as calculated from the following:    Height as of  "3/15/24: 1.575 m (5' 2\").    Weight as of this encounter: 67.8 kg (149 lb 8 oz).         Return in about 4 weeks (around 7/16/2024).    Tonya Cooley is a 49 year old, presenting for the following health issues:  Mass (Right palm side )    HPI     Concern - Mass on right hand - palm area   Onset: Chronic (3-4 months)  Description: Patient has a mass on her right hand - epperson aspect/base of ring finger.   Intensity: moderate  Progression of Symptoms:  mass is growing   Accompanying Signs & Symptoms: Some tenderness, no erythema  Previous history of similar problem: No   Precipitating factors:        Worsened by: No   Alleviating factors:        Improved by: No   Therapies tried and outcome: Heat, ice, tylenol and ibuprofen- no relief     She is also requesting a refill of her Acyclovir.  Has intermittent cold sores.     Patient has diffuse muscle and joint aches. No erythema or swelling. ESR and CRP were normal in the past. Rheumatoid arthritis testing was negative. SANDEE was negative. Tick testing was negative. Muscle aches worsen when she has stress. Drinks a lot of sugar-often drinks 8-9 Mountain Dew daily.     Very stressed. Daughter in-law will not let her see her grand-kids.         Review of Systems  Constitutional, HEENT, cardiovascular, pulmonary, gi and gu systems are negative, except as otherwise noted.      Objective    BP 99/67 (BP Location: Right arm, Patient Position: Sitting, Cuff Size: Adult Regular)   Pulse 104   Temp 98.1  F (36.7  C) (Tympanic)   Resp 16   Wt 67.8 kg (149 lb 8 oz)   SpO2 95%   BMI 27.34 kg/m    Body mass index is 27.34 kg/m .  Physical Exam   GENERAL: alert and oriented, very tearful when talking about her social situation  MS: no gross musculoskeletal defects noted, no edema; mass bony prominence felt at base of ring finger-palmar aspect-of right hand; no joint swelling or erythema  NEURO: Normal strength and tone, mentation intact and speech normal  PSYCH: mentation " appears normal and tearful    XR in process        Signed Electronically by: Doreen Galo NP    Answers submitted by the patient for this visit:  Patient Health Questionnaire (Submitted on 6/18/2024)  PHQ9 TOTAL SCORE: 9  AZ-7 (Submitted on 6/18/2024)  AZ 7 TOTAL SCORE: 8  General Questionnaire (Submitted on 6/18/2024)  Chief Complaint: Chronic problems general questions HPI Form  How many servings of fruits and vegetables do you eat daily?: 2-3  On average, how many sweetened beverages do you drink each day (Examples: soda, juice, sweet tea, etc.  Do NOT count diet or artificially sweetened beverages)?: 3  How many minutes a day do you exercise enough to make your heart beat faster?: 9 or less  How many days a week do you exercise enough to make your heart beat faster?: 3 or less  How many days per week do you miss taking your medication?: 0  General Concern (Submitted on 6/18/2024)  Chief Complaint: Chronic problems general questions HPI Form  What is the reason for your visit today?: lump in my hand bones or joints or muscles pain  When did your symptoms begin?: More than a month  How would you describe these symptoms?: Moderate  Are your symptoms:: Worsening  Have you had these symptoms before?: Yes  Have you tried or received treatment for these symptoms before?: No

## 2024-06-18 ENCOUNTER — OFFICE VISIT (OUTPATIENT)
Dept: FAMILY MEDICINE | Facility: OTHER | Age: 49
End: 2024-06-18
Attending: NURSE PRACTITIONER
Payer: COMMERCIAL

## 2024-06-18 ENCOUNTER — ANCILLARY PROCEDURE (OUTPATIENT)
Dept: GENERAL RADIOLOGY | Facility: OTHER | Age: 49
End: 2024-06-18
Attending: NURSE PRACTITIONER
Payer: COMMERCIAL

## 2024-06-18 VITALS
DIASTOLIC BLOOD PRESSURE: 67 MMHG | OXYGEN SATURATION: 95 % | HEART RATE: 104 BPM | SYSTOLIC BLOOD PRESSURE: 99 MMHG | TEMPERATURE: 98.1 F | BODY MASS INDEX: 27.34 KG/M2 | RESPIRATION RATE: 16 BRPM | WEIGHT: 149.5 LBS

## 2024-06-18 DIAGNOSIS — R22.31 MASS OF RIGHT HAND: ICD-10-CM

## 2024-06-18 DIAGNOSIS — M79.10 MYALGIA: ICD-10-CM

## 2024-06-18 DIAGNOSIS — R22.31 MASS OF RIGHT HAND: Primary | ICD-10-CM

## 2024-06-18 DIAGNOSIS — B00.2 RECURRENT ORAL HERPES SIMPLEX: ICD-10-CM

## 2024-06-18 DIAGNOSIS — F41.1 GAD (GENERALIZED ANXIETY DISORDER): ICD-10-CM

## 2024-06-18 DIAGNOSIS — F33.1 MAJOR DEPRESSIVE DISORDER, RECURRENT EPISODE, MODERATE (H): ICD-10-CM

## 2024-06-18 PROCEDURE — 73130 X-RAY EXAM OF HAND: CPT | Mod: TC | Performed by: RADIOLOGY

## 2024-06-18 PROCEDURE — 99214 OFFICE O/P EST MOD 30 MIN: CPT | Performed by: NURSE PRACTITIONER

## 2024-06-18 PROCEDURE — G2211 COMPLEX E/M VISIT ADD ON: HCPCS | Performed by: NURSE PRACTITIONER

## 2024-06-18 RX ORDER — ACYCLOVIR 400 MG/1
400 TABLET ORAL EVERY 8 HOURS
Qty: 15 TABLET | Refills: 0 | Status: SHIPPED | OUTPATIENT
Start: 2024-06-18 | End: 2024-07-29

## 2024-06-18 RX ORDER — TIZANIDINE 2 MG/1
2 TABLET ORAL 3 TIMES DAILY PRN
Qty: 30 TABLET | Refills: 0 | Status: SHIPPED | OUTPATIENT
Start: 2024-06-18 | End: 2024-09-30

## 2024-06-18 RX ORDER — ESCITALOPRAM OXALATE 5 MG/1
10 TABLET ORAL
Qty: 60 TABLET | Refills: 0 | Status: SHIPPED | OUTPATIENT
Start: 2024-06-18 | End: 2024-07-29

## 2024-06-18 ASSESSMENT — PAIN SCALES - GENERAL: PAINLEVEL: MILD PAIN (2)

## 2024-06-18 ASSESSMENT — ANXIETY QUESTIONNAIRES
2. NOT BEING ABLE TO STOP OR CONTROL WORRYING: MORE THAN HALF THE DAYS
6. BECOMING EASILY ANNOYED OR IRRITABLE: SEVERAL DAYS
5. BEING SO RESTLESS THAT IT IS HARD TO SIT STILL: SEVERAL DAYS
1. FEELING NERVOUS, ANXIOUS, OR ON EDGE: SEVERAL DAYS
4. TROUBLE RELAXING: SEVERAL DAYS
IF YOU CHECKED OFF ANY PROBLEMS ON THIS QUESTIONNAIRE, HOW DIFFICULT HAVE THESE PROBLEMS MADE IT FOR YOU TO DO YOUR WORK, TAKE CARE OF THINGS AT HOME, OR GET ALONG WITH OTHER PEOPLE: SOMEWHAT DIFFICULT
GAD7 TOTAL SCORE: 8
7. FEELING AFRAID AS IF SOMETHING AWFUL MIGHT HAPPEN: SEVERAL DAYS
7. FEELING AFRAID AS IF SOMETHING AWFUL MIGHT HAPPEN: SEVERAL DAYS
8. IF YOU CHECKED OFF ANY PROBLEMS, HOW DIFFICULT HAVE THESE MADE IT FOR YOU TO DO YOUR WORK, TAKE CARE OF THINGS AT HOME, OR GET ALONG WITH OTHER PEOPLE?: SOMEWHAT DIFFICULT
GAD7 TOTAL SCORE: 8
3. WORRYING TOO MUCH ABOUT DIFFERENT THINGS: SEVERAL DAYS
GAD7 TOTAL SCORE: 8

## 2024-06-18 ASSESSMENT — PATIENT HEALTH QUESTIONNAIRE - PHQ9
SUM OF ALL RESPONSES TO PHQ QUESTIONS 1-9: 9
SUM OF ALL RESPONSES TO PHQ QUESTIONS 1-9: 9

## 2024-07-26 NOTE — PROGRESS NOTES
Assessment & Plan     (M79.10) Myalgia  (primary encounter diagnosis)  Comment: worsened by stress, most likely fibromyalgia   Plan: Will continue the Lexapro. A healthy diet and low impact exercise was encouraged. Will return if symptoms worsen.     (F41.1) AZ (generalized anxiety disorder)  (F33.1) Major depressive disorder, recurrent episode, moderate (H)  Comment: improved with Lexapro 10 mg  Plan: escitalopram (LEXAPRO) 5 MG tablet        Will continue current dose and reassess in 3 months. Sooner with new or worsening symptoms.     (M54.50) Acute left-sided low back pain without sciatica  Comment: no red flags  Plan: Chiropractic Referral        Rest, ice, and NSAIDs recommended. Will also refer to the chiropractor. If this does not help, will consider imaging.     (K12.1) Ulcer of mouth  (B00.2) Recurrent oral herpes simplex  Plan: Sore on roof of mouth present times 3 days. Tender. Appears like a herpes ulcer. She has had recurrent oral herpes in the past. Acyclovir ordered. If the ulcer does not go away, will send to ENT.     (Z12.31) Encounter for screening mammogram for breast cancer  Plan: MA Screen Bilateral w/Oskar        Will notify patient of the results when available and intervene accordingly.       Return in about 3 months (around 10/29/2024).        Tonya Cooley is a 49 year old, presenting for the following health issues:  Depression, Fibromyalgia, and lump roof of mouth     History of Present Illness       Reason for visit:  Follow up    She eats 2-3 servings of fruits and vegetables daily.She consumes 4 sweetened beverage(s) daily.She exercises with enough effort to increase her heart rate 9 or less minutes per day.  She exercises with enough effort to increase her heart rate 3 or less days per week.   She is taking medications regularly.     Pain History:  When did you first notice your pain? 4 weeks ago   Have you seen anyone else for your pain? No  How has your pain affected your  ability to work? Not currently working - unrelated to pain  Where in your body do you have pain? Left low back pain, left hip pain  Onset/Duration: 4 weeks ago  Description  Location: patient has pain throughout body; worst in her left hip; has fibromyalgia   Joint Swelling: No  Redness: No  Pain: YES  Warmth: No  Intensity:  depends on activity/10  Progression of Symptoms:  same and constant  Accompanying signs and symptoms:   Fevers: No  Numbness/tingling/weakness: No  History  Trauma to the area: No  Recent illness:  No  Previous similar problem: YES  Previous evaluation:  YES  Precipitating or alleviating factors:  Aggravating factors include: stress   Therapies tried and outcome: nothing        Lump on roof of mouth    Duration: 3 days ago  Description (location/character/radiation): sore on roof of mouth - tender  Intensity: moderate   Accompanying signs and symptoms: no fevers, no weight loss  History (similar episodes/previous evaluation): none  Precipitating or alleviating factors: None  Therapies tried and outcome: antibiotic rinse-did not help  Follows closely with the dentist        Depression and Anxiety     Last seen on 6/18/24. Anxiety and depression had worsened. Lexapro was increased to 10 mg from 5 mg.     How are you doing with your depression since your last visit? Improved   How are you doing with your anxiety since your last visit?  No change  Are you having other symptoms that might be associated with depression or anxiety? No  Have you had a significant life event? Relationship Concerns-son not letting her see her grand-kids.    Do you have any concerns with your use of alcohol or other drugs? No  No thoughts of self harm.   She also has ADHD; taking Adderall 20 mg TID.       Myalgias; patient complains of muscle aches throughout body. All lab work has been normal in the past. Pain worsens with stress. See above, Lexapro was recently increased. A low sugar diet and daily low impact exercise  was also encouraged. Today she notes that her pain is about the same. Slightly worse in left hip. No fevers. No numbness or tingling. No bowel or bladder incontinence.     Social History     Tobacco Use    Smoking status: Never     Passive exposure: Never    Smokeless tobacco: Never   Vaping Use    Vaping status: Never Used   Substance Use Topics    Alcohol use: Yes     Alcohol/week: 0.0 standard drinks of alcohol     Comment: RARELY    Drug use: No         1/4/2023     3:00 PM 9/25/2023    12:59 PM 6/18/2024     2:35 PM   PHQ   PHQ-9 Total Score 15 7 9   Q9: Thoughts of better off dead/self-harm past 2 weeks Several days Not at all Not at all         1/4/2023     3:00 PM 9/25/2023     1:00 PM 6/18/2024     2:36 PM   AZ-7 SCORE   Total Score  7 (mild anxiety) 8 (mild anxiety)   Total Score 18 7 8         6/18/2024     2:35 PM   Last PHQ-9   1.  Little interest or pleasure in doing things 1   2.  Feeling down, depressed, or hopeless 1   3.  Trouble falling or staying asleep, or sleeping too much 2   4.  Feeling tired or having little energy 1   5.  Poor appetite or overeating 1   6.  Feeling bad about yourself 1   7.  Trouble concentrating 1   8.  Moving slowly or restless 1   Q9: Thoughts of better off dead/self-harm past 2 weeks 0   PHQ-9 Total Score 9         6/18/2024     2:36 PM   AZ-7    1. Feeling nervous, anxious, or on edge 1   2. Not being able to stop or control worrying 2   3. Worrying too much about different things 1   4. Trouble relaxing 1   5. Being so restless that it is hard to sit still 1   6. Becoming easily annoyed or irritable 1   7. Feeling afraid, as if something awful might happen 1   AZ-7 Total Score 8   If you checked any problems, how difficult have they made it for you to do your work, take care of things at home, or get along with other people? Somewhat difficult           Review of Systems  Constitutional, HEENT, cardiovascular, pulmonary, gi and gu systems are negative, except as  "otherwise noted.      Objective    BP 98/76 (BP Location: Right arm, Patient Position: Sitting, Cuff Size: Adult Regular)   Pulse 106   Temp 97.6  F (36.4  C) (Tympanic)   Ht 1.575 m (5' 2\")   Wt 67.3 kg (148 lb 6.4 oz)   SpO2 99%   BMI 27.14 kg/m    Body mass index is 27.14 kg/m .  Physical Exam   GENERAL: alert and no distress  EYES: Eyes grossly normal to inspection, PERRL and conjunctivae and sclerae normal  HENT: ear canals and TM's normal, nose without ulcers or lesions; she does have small tender ulcer on the roof of her mouth  NECK: no adenopathy, no asymmetry, masses, or scars  RESP: lungs clear to auscultation - no rales, rhonchi or wheezes  CV: regular rate and rhythm, normal S1 S2, no S3 or S4, no murmur, click or rub, no peripheral edema  ABDOMEN: soft, nontender, no hepatosplenomegaly, no masses and bowel sounds normal  MS: no gross musculoskeletal defects noted, no edema  NEURO: Normal strength and tone, mentation intact and speech normal  PSYCH: mentation appears normal, affect normal/bright  Musculoskeletal: Lumbar spine without gross deformity, rash, erythema, or ecchymosis. Some point tenderness over left SI joint. Some Paraspinous muscle tenderness on the left. No pain over left trochanteric bursa. No pain with extension. No pain with flexion. No pain with lateral flexion. No palpable spasm. Negative straight leg raises bilaterally. Patellar reflexes 2+, LE strength 5/5 bilaterally. Sensation intact to light touch. Posterior tib pulses intact.             Signed Electronically by: Doreen Galo NP    "

## 2024-07-29 ENCOUNTER — OFFICE VISIT (OUTPATIENT)
Dept: FAMILY MEDICINE | Facility: OTHER | Age: 49
End: 2024-07-29
Attending: NURSE PRACTITIONER
Payer: COMMERCIAL

## 2024-07-29 ENCOUNTER — TELEPHONE (OUTPATIENT)
Dept: FAMILY MEDICINE | Facility: OTHER | Age: 49
End: 2024-07-29

## 2024-07-29 VITALS
HEART RATE: 106 BPM | BODY MASS INDEX: 27.31 KG/M2 | SYSTOLIC BLOOD PRESSURE: 98 MMHG | HEIGHT: 62 IN | DIASTOLIC BLOOD PRESSURE: 76 MMHG | OXYGEN SATURATION: 99 % | WEIGHT: 148.4 LBS | TEMPERATURE: 97.6 F

## 2024-07-29 DIAGNOSIS — K12.1 ULCER OF MOUTH: ICD-10-CM

## 2024-07-29 DIAGNOSIS — M54.50 ACUTE LEFT-SIDED LOW BACK PAIN WITHOUT SCIATICA: ICD-10-CM

## 2024-07-29 DIAGNOSIS — B00.2 RECURRENT ORAL HERPES SIMPLEX: ICD-10-CM

## 2024-07-29 DIAGNOSIS — F41.1 GAD (GENERALIZED ANXIETY DISORDER): ICD-10-CM

## 2024-07-29 DIAGNOSIS — Z12.31 ENCOUNTER FOR SCREENING MAMMOGRAM FOR BREAST CANCER: ICD-10-CM

## 2024-07-29 DIAGNOSIS — F33.1 MAJOR DEPRESSIVE DISORDER, RECURRENT EPISODE, MODERATE (H): ICD-10-CM

## 2024-07-29 DIAGNOSIS — M79.10 MYALGIA: Primary | ICD-10-CM

## 2024-07-29 PROCEDURE — G2211 COMPLEX E/M VISIT ADD ON: HCPCS | Performed by: NURSE PRACTITIONER

## 2024-07-29 PROCEDURE — 99214 OFFICE O/P EST MOD 30 MIN: CPT | Performed by: NURSE PRACTITIONER

## 2024-07-29 RX ORDER — ACYCLOVIR 400 MG/1
400 TABLET ORAL EVERY 8 HOURS
Qty: 15 TABLET | Refills: 0 | Status: SHIPPED | OUTPATIENT
Start: 2024-07-29

## 2024-07-29 RX ORDER — ESCITALOPRAM OXALATE 5 MG/1
10 TABLET ORAL
Qty: 180 TABLET | Refills: 0 | Status: SHIPPED | OUTPATIENT
Start: 2024-07-29 | End: 2024-09-30

## 2024-07-29 NOTE — TELEPHONE ENCOUNTER
2:39 PM    Reason for Call: Phone Call    Description: Pt called clinic stating PCP attempted to call them. PT returning call. States it may have to do with visit today. Please reach out to pt to discuss.     Was an appointment offered for this call? No  If yes : Appointment type              Date    Preferred method for responding to this message: Telephone Call  What is your phone number ?[phone]     If we cannot reach you directly, may we leave a detailed response at the number you provided? Yes    Can this message wait until your PCP/provider returns, if available today? Not applicable     Funmilayo Lora

## 2024-09-16 ENCOUNTER — MYC MEDICAL ADVICE (OUTPATIENT)
Dept: UROLOGY | Facility: CLINIC | Age: 49
End: 2024-09-16
Payer: COMMERCIAL

## 2024-09-16 NOTE — TELEPHONE ENCOUNTER
Left Voicemail (1st Attempt) and Sent Mychart (1st Attempt) for the patient to call back and schedule the following:    Appointment type: Return   Provider: Dr. Lea   Return date: March 2025   Specialty phone number: [phone]  Additonal Notes: Per check out -  Return in 1 year (on 3/15/2025)

## 2024-09-25 DIAGNOSIS — R10.13 DYSPEPSIA: ICD-10-CM

## 2024-09-30 ENCOUNTER — OFFICE VISIT (OUTPATIENT)
Dept: FAMILY MEDICINE | Facility: OTHER | Age: 49
End: 2024-09-30
Attending: NURSE PRACTITIONER
Payer: COMMERCIAL

## 2024-09-30 VITALS
BODY MASS INDEX: 27.79 KG/M2 | OXYGEN SATURATION: 98 % | HEIGHT: 62 IN | TEMPERATURE: 98.8 F | WEIGHT: 151 LBS | DIASTOLIC BLOOD PRESSURE: 70 MMHG | SYSTOLIC BLOOD PRESSURE: 100 MMHG | HEART RATE: 102 BPM

## 2024-09-30 DIAGNOSIS — Z13.220 LIPID SCREENING: ICD-10-CM

## 2024-09-30 DIAGNOSIS — Z13.1 SCREENING FOR DIABETES MELLITUS: ICD-10-CM

## 2024-09-30 DIAGNOSIS — F33.1 MAJOR DEPRESSIVE DISORDER, RECURRENT EPISODE, MODERATE (H): ICD-10-CM

## 2024-09-30 DIAGNOSIS — Z00.00 HEALTH MAINTENANCE EXAMINATION: Primary | ICD-10-CM

## 2024-09-30 DIAGNOSIS — F41.1 GAD (GENERALIZED ANXIETY DISORDER): ICD-10-CM

## 2024-09-30 DIAGNOSIS — Z12.31 ENCOUNTER FOR SCREENING MAMMOGRAM FOR BREAST CANCER: ICD-10-CM

## 2024-09-30 DIAGNOSIS — Z13.0 SCREENING, ANEMIA, DEFICIENCY, IRON: ICD-10-CM

## 2024-09-30 LAB
ALBUMIN SERPL BCG-MCNC: 4.5 G/DL (ref 3.5–5.2)
ALP SERPL-CCNC: 106 U/L (ref 40–150)
ALT SERPL W P-5'-P-CCNC: 32 U/L (ref 0–50)
ANION GAP SERPL CALCULATED.3IONS-SCNC: 11 MMOL/L (ref 7–15)
AST SERPL W P-5'-P-CCNC: 30 U/L (ref 0–45)
BASOPHILS # BLD AUTO: 0 10E3/UL (ref 0–0.2)
BASOPHILS NFR BLD AUTO: 0 %
BILIRUB SERPL-MCNC: 0.6 MG/DL
BUN SERPL-MCNC: 8.9 MG/DL (ref 6–20)
CALCIUM SERPL-MCNC: 9.3 MG/DL (ref 8.8–10.4)
CHLORIDE SERPL-SCNC: 103 MMOL/L (ref 98–107)
CHOLEST SERPL-MCNC: 178 MG/DL
CREAT SERPL-MCNC: 0.9 MG/DL (ref 0.51–0.95)
EGFRCR SERPLBLD CKD-EPI 2021: 78 ML/MIN/1.73M2
EOSINOPHIL # BLD AUTO: 0.1 10E3/UL (ref 0–0.7)
EOSINOPHIL NFR BLD AUTO: 1 %
ERYTHROCYTE [DISTWIDTH] IN BLOOD BY AUTOMATED COUNT: 11.9 % (ref 10–15)
FASTING STATUS PATIENT QL REPORTED: NO
FASTING STATUS PATIENT QL REPORTED: NO
GLUCOSE SERPL-MCNC: 124 MG/DL (ref 70–99)
HCO3 SERPL-SCNC: 26 MMOL/L (ref 22–29)
HCT VFR BLD AUTO: 41.3 % (ref 35–47)
HDLC SERPL-MCNC: 59 MG/DL
HGB BLD-MCNC: 14.4 G/DL (ref 11.7–15.7)
IMM GRANULOCYTES # BLD: 0 10E3/UL
IMM GRANULOCYTES NFR BLD: 0 %
LDLC SERPL CALC-MCNC: 102 MG/DL
LYMPHOCYTES # BLD AUTO: 1.3 10E3/UL (ref 0.8–5.3)
LYMPHOCYTES NFR BLD AUTO: 24 %
MCH RBC QN AUTO: 29.4 PG (ref 26.5–33)
MCHC RBC AUTO-ENTMCNC: 34.9 G/DL (ref 31.5–36.5)
MCV RBC AUTO: 85 FL (ref 78–100)
MONOCYTES # BLD AUTO: 0.4 10E3/UL (ref 0–1.3)
MONOCYTES NFR BLD AUTO: 7 %
NEUTROPHILS # BLD AUTO: 3.6 10E3/UL (ref 1.6–8.3)
NEUTROPHILS NFR BLD AUTO: 68 %
NONHDLC SERPL-MCNC: 119 MG/DL
NRBC # BLD AUTO: 0 10E3/UL
NRBC BLD AUTO-RTO: 0 /100
PLATELET # BLD AUTO: 222 10E3/UL (ref 150–450)
POTASSIUM SERPL-SCNC: 3.8 MMOL/L (ref 3.4–5.3)
PROT SERPL-MCNC: 7.1 G/DL (ref 6.4–8.3)
RBC # BLD AUTO: 4.89 10E6/UL (ref 3.8–5.2)
SODIUM SERPL-SCNC: 140 MMOL/L (ref 135–145)
TRIGL SERPL-MCNC: 85 MG/DL
WBC # BLD AUTO: 5.3 10E3/UL (ref 4–11)

## 2024-09-30 PROCEDURE — 80061 LIPID PANEL: CPT | Performed by: NURSE PRACTITIONER

## 2024-09-30 PROCEDURE — 99396 PREV VISIT EST AGE 40-64: CPT | Performed by: NURSE PRACTITIONER

## 2024-09-30 PROCEDURE — 36415 COLL VENOUS BLD VENIPUNCTURE: CPT | Performed by: NURSE PRACTITIONER

## 2024-09-30 PROCEDURE — 80053 COMPREHEN METABOLIC PANEL: CPT | Performed by: NURSE PRACTITIONER

## 2024-09-30 PROCEDURE — 85025 COMPLETE CBC W/AUTO DIFF WBC: CPT | Performed by: NURSE PRACTITIONER

## 2024-09-30 PROCEDURE — 99214 OFFICE O/P EST MOD 30 MIN: CPT | Mod: 25 | Performed by: NURSE PRACTITIONER

## 2024-09-30 RX ORDER — PREDNISOLONE ACETATE 10 MG/ML
SUSPENSION/ DROPS OPHTHALMIC
COMMUNITY
Start: 2024-08-08

## 2024-09-30 RX ORDER — CYCLOPENTOLATE HYDROCHLORIDE 10 MG/ML
SOLUTION/ DROPS OPHTHALMIC
Status: CANCELLED | OUTPATIENT
Start: 2024-09-30

## 2024-09-30 RX ORDER — ESCITALOPRAM OXALATE 10 MG/1
10 TABLET ORAL DAILY
Qty: 90 TABLET | Refills: 0 | Status: SHIPPED | OUTPATIENT
Start: 2024-09-30

## 2024-09-30 RX ORDER — PREDNISOLONE ACETATE 10 MG/ML
SUSPENSION/ DROPS OPHTHALMIC
Status: CANCELLED | OUTPATIENT
Start: 2024-09-30

## 2024-09-30 RX ORDER — CYCLOPENTOLATE HYDROCHLORIDE 10 MG/ML
SOLUTION/ DROPS OPHTHALMIC
COMMUNITY
Start: 2024-08-08

## 2024-09-30 SDOH — HEALTH STABILITY: PHYSICAL HEALTH: ON AVERAGE, HOW MANY MINUTES DO YOU ENGAGE IN EXERCISE AT THIS LEVEL?: 20 MIN

## 2024-09-30 SDOH — HEALTH STABILITY: PHYSICAL HEALTH: ON AVERAGE, HOW MANY DAYS PER WEEK DO YOU ENGAGE IN MODERATE TO STRENUOUS EXERCISE (LIKE A BRISK WALK)?: 3 DAYS

## 2024-09-30 ASSESSMENT — ANXIETY QUESTIONNAIRES
7. FEELING AFRAID AS IF SOMETHING AWFUL MIGHT HAPPEN: MORE THAN HALF THE DAYS
2. NOT BEING ABLE TO STOP OR CONTROL WORRYING: NEARLY EVERY DAY
8. IF YOU CHECKED OFF ANY PROBLEMS, HOW DIFFICULT HAVE THESE MADE IT FOR YOU TO DO YOUR WORK, TAKE CARE OF THINGS AT HOME, OR GET ALONG WITH OTHER PEOPLE?: SOMEWHAT DIFFICULT
GAD7 TOTAL SCORE: 16
GAD7 TOTAL SCORE: 16
7. FEELING AFRAID AS IF SOMETHING AWFUL MIGHT HAPPEN: MORE THAN HALF THE DAYS
5. BEING SO RESTLESS THAT IT IS HARD TO SIT STILL: MORE THAN HALF THE DAYS
3. WORRYING TOO MUCH ABOUT DIFFERENT THINGS: NEARLY EVERY DAY
1. FEELING NERVOUS, ANXIOUS, OR ON EDGE: MORE THAN HALF THE DAYS
IF YOU CHECKED OFF ANY PROBLEMS ON THIS QUESTIONNAIRE, HOW DIFFICULT HAVE THESE PROBLEMS MADE IT FOR YOU TO DO YOUR WORK, TAKE CARE OF THINGS AT HOME, OR GET ALONG WITH OTHER PEOPLE: SOMEWHAT DIFFICULT
4. TROUBLE RELAXING: MORE THAN HALF THE DAYS
6. BECOMING EASILY ANNOYED OR IRRITABLE: MORE THAN HALF THE DAYS
GAD7 TOTAL SCORE: 16

## 2024-09-30 ASSESSMENT — SOCIAL DETERMINANTS OF HEALTH (SDOH): HOW OFTEN DO YOU GET TOGETHER WITH FRIENDS OR RELATIVES?: ONCE A WEEK

## 2024-09-30 ASSESSMENT — PATIENT HEALTH QUESTIONNAIRE - PHQ9
10. IF YOU CHECKED OFF ANY PROBLEMS, HOW DIFFICULT HAVE THESE PROBLEMS MADE IT FOR YOU TO DO YOUR WORK, TAKE CARE OF THINGS AT HOME, OR GET ALONG WITH OTHER PEOPLE: SOMEWHAT DIFFICULT
SUM OF ALL RESPONSES TO PHQ QUESTIONS 1-9: 10
SUM OF ALL RESPONSES TO PHQ QUESTIONS 1-9: 10

## 2024-09-30 ASSESSMENT — PAIN SCALES - GENERAL: PAINLEVEL: NO PAIN (0)

## 2024-09-30 NOTE — PROGRESS NOTES
Preventive Care Visit  [facility]  Doreen Galo NP, Family Medicine  Sep 30, 2024      Assessment & Plan     (Z00.00) Health maintenance examination  (primary encounter diagnosis)  Plan: Declines vaccines. Mammogram ordered. Colonoscopy UTD. No longer needs pap. Will update labs today.     (F33.1) Major depressive disorder, recurrent episode, moderate (H)  (F41.1) AZ (generalized anxiety disorder)  Plan: escitalopram (LEXAPRO) 10 MG tablet        Patient with worsening anxiety and depression. Will increase her Lexapro to 10 mg from 5 mg and reassess 4 weeks. Sooner with new or worsening symptoms.     Encouraged discussion with trusted relative or friend to monitor for negative mood changes and change in behaviour during medication initiation and titration. Recommended immediate help if increased thoughts of suicide and call if significant side effects occur. Encouraged patient that this type of medication is not effective immediately, and to be consistant with taking the medication.    (Z13.220) Lipid screening  Plan: Lipid Profile (Chol, Trig, HDL, LDL calc)        Will notify patient of the results when available and intervene accordingly.     (Z13.0) Screening, anemia, deficiency, iron  Plan: CBC with platelets and differential        Will notify patient of the results when available and intervene accordingly.     (Z13.1) Screening for diabetes mellitus  Plan: Comprehensive metabolic panel (BMP + Alb, Alk         Phos, ALT, AST, Total. Bili, TP)        Will notify patient of the results when available and intervene accordingly.     (Z12.31) Encounter for screening mammogram for breast cancer  Plan: MA Screen Bilateral w/Oskar        Will notify patient of the results when available and intervene accordingly.     Patient has been advised of split billing requirements and indicates understanding: Yes        Counseling  Appropriate preventive services were addressed with this patient via screening,  questionnaire, or discussion as appropriate for fall prevention, nutrition, physical activity, Tobacco-use cessation, social engagement, weight loss and cognition.  Checklist reviewing preventive services available has been given to the patient.  Reviewed patient's diet, addressing concerns and/or questions.   She is at risk for lack of exercise and has been provided with information to increase physical activity for the benefit of her well-being.   The patient's PHQ-9 score is consistent with moderate depression. She was provided with information regarding depression.         Tonya Cooley is a 49 year old, presenting for the following:  Physical        9/30/2024     9:42 AM   Additional Questions   Roomed by lisbeth almonte   Accompanied by self         9/30/2024     9:42 AM   Patient Reported Additional Medications   Patient reports taking the following new medications none        Health Care Directive  Patient does not have a Health Care Directive or Living Will: Discussed advance care planning with patient; information given to patient to review.    HPI    Depression and Anxiety   How are you doing with your depression since your last visit? No change  How are you doing with your anxiety since your last visit?  No change  Are you having other symptoms that might be associated with depression or anxiety? No  Have you had a significant life event? No   Do you have any concerns with your use of alcohol or other drugs? No  No thoughts of self harm.   Taking Lexapro 5 mg. Unsure if it is working. Continues to feel depressed and anxious. Has a lot of stress with her children.     Social History     Tobacco Use    Smoking status: Never     Passive exposure: Never    Smokeless tobacco: Never   Vaping Use    Vaping status: Never Used   Substance Use Topics    Alcohol use: Yes     Alcohol/week: 0.0 standard drinks of alcohol     Comment: RARELY    Drug use: No         9/25/2023    12:59 PM 6/18/2024     2:35 PM 9/30/2024      9:29 AM   PHQ   PHQ-9 Total Score 7 9 10   Q9: Thoughts of better off dead/self-harm past 2 weeks Not at all Not at all Not at all         9/25/2023     1:00 PM 6/18/2024     2:36 PM 9/30/2024     9:29 AM   AZ-7 SCORE   Total Score 7 (mild anxiety) 8 (mild anxiety) 16 (severe anxiety)   Total Score 7 8 16         9/30/2024     9:29 AM   Last PHQ-9   1.  Little interest or pleasure in doing things 1   2.  Feeling down, depressed, or hopeless 1   3.  Trouble falling or staying asleep, or sleeping too much 2   4.  Feeling tired or having little energy 2   5.  Poor appetite or overeating 2   6.  Feeling bad about yourself 1   7.  Trouble concentrating 1   8.  Moving slowly or restless 0   Q9: Thoughts of better off dead/self-harm past 2 weeks 0   PHQ-9 Total Score 10         9/30/2024     9:29 AM   AZ-7    1. Feeling nervous, anxious, or on edge 2   2. Not being able to stop or control worrying 3   3. Worrying too much about different things 3   4. Trouble relaxing 2   5. Being so restless that it is hard to sit still 2   6. Becoming easily annoyed or irritable 2   7. Feeling afraid, as if something awful might happen 2   AZ-7 Total Score 16   If you checked any problems, how difficult have they made it for you to do your work, take care of things at home, or get along with other people? Somewhat difficult           9/30/2024   General Health   How would you rate your overall physical health? (!) FAIR   Feel stress (tense, anxious, or unable to sleep) Very much      (!) STRESS CONCERN-see above        9/30/2024   Nutrition   Three or more servings of calcium each day? (!) NO   Diet: Regular (no restrictions)   How many servings of fruit and vegetables per day? (!) 0-1   How many sweetened beverages each day? (!) 4+     Plans to start a multivitamin.          9/30/2024   Exercise   Days per week of moderate/strenous exercise 3 days   Average minutes spent exercising at this level 20 min            9/30/2024    Social Factors   Frequency of gathering with friends or relatives Once a week   Worry food won't last until get money to buy more No   Food not last or not have enough money for food? No   Do you have housing? (Housing is defined as stable permanent housing and does not include staying ouside in a car, in a tent, in an abandoned building, in an overnight shelter, or couch-surfing.) Yes   Are you worried about losing your housing? No   Lack of transportation? No   Unable to get utilities (heat,electricity)? No            9/30/2024   Fall Risk   Fallen 2 or more times in the past year? No   Trouble with walking or balance? No             9/30/2024   Dental   Dentist two times every year? Yes            9/30/2024   TB Screening   Were you born outside of the [place]? No          Today's PHQ-9 Score:       9/30/2024     9:29 AM   PHQ-9 SCORE   PHQ-9 Total Score MyChart 10 (Moderate depression)   PHQ-9 Total Score 10         9/30/2024   Substance Use   Alcohol more than 3/day or more than 7/wk No   Do you use any other substances recreationally? No        Social History     Tobacco Use    Smoking status: Never     Passive exposure: Never    Smokeless tobacco: Never   Vaping Use    Vaping status: Never Used   Substance Use Topics    Alcohol use: Yes     Alcohol/week: 0.0 standard drinks of alcohol     Comment: RARELY    Drug use: No           9/25/2023   LAST FHS-7 RESULTS   1st degree relative breast or ovarian cancer No   Any relative bilateral breast cancer No   Any male have breast cancer No   Any ONE woman have BOTH breast AND ovarian cancer No   Any woman with breast cancer before 50yrs No   2 or more relatives with breast AND/OR ovarian cancer No   2 or more relatives with breast AND/OR bowel cancer No        Mammogram Screening - Mammogram every 1-2 years updated in Health Maintenance based on mutual decision making        9/30/2024   STI Screening   New sexual partner(s) since last STI/HIV test? No        History of  abnormal Pap smear: Status post hysterectomy with removal of cervix and no history of CIN2 or greater or cervical cancer. Health Maintenance and Surgical History updated.        Latest Ref Rng & Units 2019    10:12 AM   PAP / HPV   PAP (Historical)  NIL    HPV 16 DNA NEG^Negative Negative    HPV 18 DNA NEG^Negative Negative    Other HR HPV NEG^Negative Negative      ASCVD Risk   The 10-year ASCVD risk score (Kenney STOVER, et al., 2019) is: 0.7%    Values used to calculate the score:      Age: 49 years      Sex: Female      Is Non- : No      Diabetic: No      Tobacco smoker: No      Systolic Blood Pressure: 100 mmHg      Is BP treated: No      HDL Cholesterol: 54 mg/dL      Total Cholesterol: 182 mg/dL    Fracture Risk Assessment Tool  Link to Frax Calculator  Use the information below to complete the Frax calculator  : 1975  Sex: female  Weight (kg): 68.5 kg (actual weight)  Height (cm): 157.5 cm  Previous Fragility Fracture:  No  History of parent with fractured hip:  No  Current Smoking:  No  Patient has been on glucocorticoids for more than 3 months (5mg/day or more): No  Rheumatoid Arthritis on Problem List:  No  Secondary Osteoporosis on Problem List:  No  Consumes 3 or more units of alcohol per day: No  Femoral Neck BMD (g/cm2)    Reviewed and updated as needed this visit by Provider                    BP Readings from Last 3 Encounters:   24 100/70   24 98/76   24 99/67    Wt Readings from Last 3 Encounters:   24 68.5 kg (151 lb)   24 67.3 kg (148 lb 6.4 oz)   24 67.8 kg (149 lb 8 oz)                  Patient Active Problem List   Diagnosis    OCD (obsessive compulsive disorder)    Stress due to illness of family member    Midline low back pain without sciatica    Gastroesophageal reflux disease without esophagitis    ACP (advance care planning)    Prolonged grief reaction    Major depressive disorder, recurrent episode, moderate (H)     AZ (generalized anxiety disorder)    Acute intractable tension-type headache    Myalgia    Rheumatoid factor positive    Cystocele, midline     Past Surgical History:   Procedure Laterality Date    bladder reconstruction and mesh removal  2012    BLADDER REPAIR  2010    COLONOSCOPY - HIM SCAN N/A 06/21/2007    Colonoscopy to the cecum with biopsy terminal ileum, cecum and descending colon ([facility]) normal    colposcopy with biopsy-mutliple  10/2014    HYSTERECTOMY TOTAL ABDOMINAL, BILATERAL SALPINGO-OOPHORECTOMY, COMBINED Left     Right ovary remains.     IR CONSULTATION FOR IR EXAM  11/3/2022    LAPAROSCOPIC APPENDECTOMY N/A 09/03/2020    Procedure: APPENDECTOMY, LAPAROSCOPIC;  Surgeon: Jonathan Martinez MD;  Location: [facility]    TUBAL LIGATION         Social History     Tobacco Use    Smoking status: Never     Passive exposure: Never    Smokeless tobacco: Never   Substance Use Topics    Alcohol use: Yes     Alcohol/week: 0.0 standard drinks of alcohol     Comment: RARELY     Family History   Problem Relation Age of Onset    Other - See Comments Paternal Grandmother         Antitrysin Deficiency    Cancer Mother         Cervical    Cancer - colorectal Maternal Grandfather          Current Outpatient Medications   Medication Sig Dispense Refill    amphetamine-dextroamphetamine (ADDERALL) 20 MG tablet Take 20 mg by mouth 3 times daily      escitalopram (LEXAPRO) 10 MG tablet Take 1 tablet (10 mg) by mouth daily. 90 tablet 0    omeprazole (PRILOSEC) 20 MG DR capsule TAKE 1 CAPSULE BY MOUTH EVERY DAY 90 capsule 2    acyclovir (ZOVIRAX) 400 MG tablet Take 1 tablet (400 mg) by mouth every 8 hours (Patient not taking: Reported on 9/30/2024) 15 tablet 0    cyclopentolate (CYCLOGYL) 1 % ophthalmic solution  (Patient not taking: Reported on 9/30/2024)      prednisoLONE acetate (PRED FORTE) 1 % ophthalmic suspension  (Patient not taking: Reported on 9/30/2024)           Review of  "Systems  CONSTITUTIONAL: NEGATIVE for fever, chills, change in weight  INTEGUMENTARY/SKIN: NEGATIVE for worrisome rashes, moles or lesions  EYES: NEGATIVE for vision changes or irritation  ENT/MOUTH: NEGATIVE for ear, mouth and throat problems  RESP: NEGATIVE for significant cough or SOB  BREAST: NEGATIVE for masses, tenderness or discharge  CV: NEGATIVE for chest pain, palpitations or peripheral edema  GI: NEGATIVE for nausea, abdominal pain, heartburn, or change in bowel habits  : NEGATIVE for frequency, dysuria, or hematuria  MUSCULOSKELETAL: NEGATIVE for significant arthralgias or myalgia  NEURO: NEGATIVE for weakness, dizziness or paresthesias  ENDOCRINE: NEGATIVE for temperature intolerance, skin/hair changes  HEME: NEGATIVE for bleeding problems  PSYCHIATRIC: NEGATIVE for changes in mood or affect     Objective    Exam  /70 (BP Location: Right arm, Patient Position: Sitting, Cuff Size: Adult Regular)   Pulse 102   Temp 98.8  F (37.1  C) (Tympanic)   Ht 1.575 m (5' 2\")   Wt 68.5 kg (151 lb)   SpO2 98%   BMI 27.62 kg/m     Estimated body mass index is 27.62 kg/m  as calculated from the following:    Height as of this encounter: 1.575 m (5' 2\").    Weight as of this encounter: 68.5 kg (151 lb).    Physical Exam  GENERAL: alert and no distress  EYES: Eyes grossly normal to inspection, PERRL and conjunctivae and sclerae normal  HENT: ear canals and TM's normal, nose and mouth without ulcers or lesions  NECK: no adenopathy, no asymmetry, masses, or scars  RESP: lungs clear to auscultation - no rales, rhonchi or wheezes  BREAST: declines exam, plans to schedule mammogram  CV: regular rate and rhythm, normal S1 S2, no S3 or S4, no murmur, click or rub, no peripheral edema  ABDOMEN: soft, nontender, no hepatosplenomegaly, no masses and bowel sounds normal   (female): deferred  MS: no gross musculoskeletal defects noted, no edema  SKIN: no suspicious lesions or rashes  NEURO: Normal strength and tone, " mentation intact and speech normal  PSYCH: mentation appears normal, affect normal/bright        Signed Electronically by: Doreen Galo NP  itted by the patient for this visit:  Adult Preventative Visit on 9/30/2024  9:30 AM with Doreen Galo  Patient Health Questionnaire (Submitted on 9/30/2024)  If you checked off any problems, how difficult have these problems made it for you to do your work, take care of things at home, or get along with other people?: Somewhat difficult  PHQ9 TOTAL SCORE: 10  Patient Health Questionnaire (G7) (Submitted on 9/30/2024)  AZ 7 TOTAL SCORE: 16

## 2024-10-08 ENCOUNTER — MYC REFILL (OUTPATIENT)
Dept: FAMILY MEDICINE | Facility: OTHER | Age: 49
End: 2024-10-08

## 2024-10-08 DIAGNOSIS — R10.13 DYSPEPSIA: ICD-10-CM

## 2024-11-17 ENCOUNTER — APPOINTMENT (OUTPATIENT)
Dept: CT IMAGING | Facility: HOSPITAL | Age: 49
End: 2024-11-17
Attending: STUDENT IN AN ORGANIZED HEALTH CARE EDUCATION/TRAINING PROGRAM
Payer: COMMERCIAL

## 2024-11-17 ENCOUNTER — APPOINTMENT (OUTPATIENT)
Dept: GENERAL RADIOLOGY | Facility: HOSPITAL | Age: 49
End: 2024-11-17
Attending: STUDENT IN AN ORGANIZED HEALTH CARE EDUCATION/TRAINING PROGRAM
Payer: COMMERCIAL

## 2024-11-17 ENCOUNTER — HOSPITAL ENCOUNTER (INPATIENT)
Facility: HOSPITAL | Age: 49
LOS: 3 days | Discharge: HOME | End: 2024-11-20
Attending: STUDENT IN AN ORGANIZED HEALTH CARE EDUCATION/TRAINING PROGRAM | Admitting: HOSPITALIST
Payer: COMMERCIAL

## 2024-11-17 DIAGNOSIS — K57.92 ACUTE DIVERTICULITIS: ICD-10-CM

## 2024-11-17 LAB
ALBUMIN SERPL BCG-MCNC: 4.7 G/DL (ref 3.5–5.2)
ALBUMIN UR-MCNC: NEGATIVE MG/DL
ALP SERPL-CCNC: 121 U/L (ref 40–150)
ALT SERPL W P-5'-P-CCNC: 54 U/L (ref 0–50)
ANION GAP SERPL CALCULATED.3IONS-SCNC: 12 MMOL/L (ref 7–15)
APPEARANCE UR: CLEAR
AST SERPL W P-5'-P-CCNC: 41 U/L (ref 0–45)
BACTERIA #/AREA URNS HPF: ABNORMAL /HPF
BASOPHILS # BLD AUTO: 0 10E3/UL (ref 0–0.2)
BASOPHILS NFR BLD AUTO: 0 %
BILIRUB SERPL-MCNC: 0.9 MG/DL
BILIRUB UR QL STRIP: NEGATIVE
BUN SERPL-MCNC: 11.1 MG/DL (ref 6–20)
CALCIUM SERPL-MCNC: 9.2 MG/DL (ref 8.8–10.4)
CHLORIDE SERPL-SCNC: 100 MMOL/L (ref 98–107)
COLOR UR AUTO: ABNORMAL
CREAT SERPL-MCNC: 0.79 MG/DL (ref 0.51–0.95)
EGFRCR SERPLBLD CKD-EPI 2021: >90 ML/MIN/1.73M2
EOSINOPHIL # BLD AUTO: 0 10E3/UL (ref 0–0.7)
EOSINOPHIL NFR BLD AUTO: 0 %
ERYTHROCYTE [DISTWIDTH] IN BLOOD BY AUTOMATED COUNT: 11.9 % (ref 10–15)
GLUCOSE SERPL-MCNC: 88 MG/DL (ref 70–99)
GLUCOSE UR STRIP-MCNC: NEGATIVE MG/DL
HCO3 SERPL-SCNC: 25 MMOL/L (ref 22–29)
HCT VFR BLD AUTO: 39.9 % (ref 35–47)
HGB BLD-MCNC: 13.8 G/DL (ref 11.7–15.7)
HGB UR QL STRIP: ABNORMAL
HOLD SPECIMEN: NORMAL
IMM GRANULOCYTES # BLD: 0 10E3/UL
IMM GRANULOCYTES NFR BLD: 0 %
KETONES UR STRIP-MCNC: NEGATIVE MG/DL
LEUKOCYTE ESTERASE UR QL STRIP: NEGATIVE
LIPASE SERPL-CCNC: 28 U/L (ref 13–60)
LYMPHOCYTES # BLD AUTO: 1 10E3/UL (ref 0.8–5.3)
LYMPHOCYTES NFR BLD AUTO: 11 %
MCH RBC QN AUTO: 29.6 PG (ref 26.5–33)
MCHC RBC AUTO-ENTMCNC: 34.6 G/DL (ref 31.5–36.5)
MCV RBC AUTO: 86 FL (ref 78–100)
MONOCYTES # BLD AUTO: 0.5 10E3/UL (ref 0–1.3)
MONOCYTES NFR BLD AUTO: 6 %
MUCOUS THREADS #/AREA URNS LPF: PRESENT /LPF
NEUTROPHILS # BLD AUTO: 7.7 10E3/UL (ref 1.6–8.3)
NEUTROPHILS NFR BLD AUTO: 82 %
NITRATE UR QL: NEGATIVE
NRBC # BLD AUTO: 0 10E3/UL
NRBC BLD AUTO-RTO: 0 /100
PH UR STRIP: 6 [PH] (ref 4.7–8)
PLATELET # BLD AUTO: 195 10E3/UL (ref 150–450)
POTASSIUM SERPL-SCNC: 3.7 MMOL/L (ref 3.4–5.3)
PROT SERPL-MCNC: 6.9 G/DL (ref 6.4–8.3)
RBC # BLD AUTO: 4.66 10E6/UL (ref 3.8–5.2)
RBC URINE: 1 /HPF
SODIUM SERPL-SCNC: 137 MMOL/L (ref 135–145)
SP GR UR STRIP: 1.01 (ref 1–1.03)
SQUAMOUS EPITHELIAL: 2 /HPF
TROPONIN T SERPL HS-MCNC: <6 NG/L
UROBILINOGEN UR STRIP-MCNC: NORMAL MG/DL
WBC # BLD AUTO: 9.3 10E3/UL (ref 4–11)
WBC URINE: 2 /HPF

## 2024-11-17 PROCEDURE — 36415 COLL VENOUS BLD VENIPUNCTURE: CPT | Performed by: STUDENT IN AN ORGANIZED HEALTH CARE EDUCATION/TRAINING PROGRAM

## 2024-11-17 PROCEDURE — 250N000011 HC RX IP 250 OP 636: Performed by: STUDENT IN AN ORGANIZED HEALTH CARE EDUCATION/TRAINING PROGRAM

## 2024-11-17 PROCEDURE — 80053 COMPREHEN METABOLIC PANEL: CPT | Performed by: STUDENT IN AN ORGANIZED HEALTH CARE EDUCATION/TRAINING PROGRAM

## 2024-11-17 PROCEDURE — 96361 HYDRATE IV INFUSION ADD-ON: CPT

## 2024-11-17 PROCEDURE — 99283 EMERGENCY DEPT VISIT LOW MDM: CPT | Performed by: SURGERY

## 2024-11-17 PROCEDURE — 71046 X-RAY EXAM CHEST 2 VIEWS: CPT

## 2024-11-17 PROCEDURE — 96368 THER/DIAG CONCURRENT INF: CPT

## 2024-11-17 PROCEDURE — 81001 URINALYSIS AUTO W/SCOPE: CPT | Performed by: STUDENT IN AN ORGANIZED HEALTH CARE EDUCATION/TRAINING PROGRAM

## 2024-11-17 PROCEDURE — 96365 THER/PROPH/DIAG IV INF INIT: CPT

## 2024-11-17 PROCEDURE — 99222 1ST HOSP IP/OBS MODERATE 55: CPT | Mod: AI | Performed by: HOSPITALIST

## 2024-11-17 PROCEDURE — 99284 EMERGENCY DEPT VISIT MOD MDM: CPT | Performed by: STUDENT IN AN ORGANIZED HEALTH CARE EDUCATION/TRAINING PROGRAM

## 2024-11-17 PROCEDURE — 93010 ELECTROCARDIOGRAM REPORT: CPT | Performed by: INTERNAL MEDICINE

## 2024-11-17 PROCEDURE — 250N000011 HC RX IP 250 OP 636: Performed by: EMERGENCY MEDICINE

## 2024-11-17 PROCEDURE — 258N000003 HC RX IP 258 OP 636: Performed by: STUDENT IN AN ORGANIZED HEALTH CARE EDUCATION/TRAINING PROGRAM

## 2024-11-17 PROCEDURE — 84484 ASSAY OF TROPONIN QUANT: CPT | Performed by: STUDENT IN AN ORGANIZED HEALTH CARE EDUCATION/TRAINING PROGRAM

## 2024-11-17 PROCEDURE — 96375 TX/PRO/DX INJ NEW DRUG ADDON: CPT

## 2024-11-17 PROCEDURE — 250N000013 HC RX MED GY IP 250 OP 250 PS 637: Performed by: STUDENT IN AN ORGANIZED HEALTH CARE EDUCATION/TRAINING PROGRAM

## 2024-11-17 PROCEDURE — 120N000001 HC R&B MED SURG/OB

## 2024-11-17 PROCEDURE — 96376 TX/PRO/DX INJ SAME DRUG ADON: CPT

## 2024-11-17 PROCEDURE — 93005 ELECTROCARDIOGRAM TRACING: CPT

## 2024-11-17 PROCEDURE — 74177 CT ABD & PELVIS W/CONTRAST: CPT

## 2024-11-17 PROCEDURE — 99285 EMERGENCY DEPT VISIT HI MDM: CPT | Mod: 25

## 2024-11-17 PROCEDURE — 83690 ASSAY OF LIPASE: CPT | Performed by: STUDENT IN AN ORGANIZED HEALTH CARE EDUCATION/TRAINING PROGRAM

## 2024-11-17 PROCEDURE — 85004 AUTOMATED DIFF WBC COUNT: CPT | Performed by: STUDENT IN AN ORGANIZED HEALTH CARE EDUCATION/TRAINING PROGRAM

## 2024-11-17 PROCEDURE — 85041 AUTOMATED RBC COUNT: CPT | Performed by: STUDENT IN AN ORGANIZED HEALTH CARE EDUCATION/TRAINING PROGRAM

## 2024-11-17 RX ORDER — MORPHINE SULFATE 2 MG/ML
1 INJECTION, SOLUTION INTRAMUSCULAR; INTRAVENOUS
Status: DISCONTINUED | OUTPATIENT
Start: 2024-11-17 | End: 2024-11-18

## 2024-11-17 RX ORDER — ONDANSETRON 2 MG/ML
4 INJECTION INTRAMUSCULAR; INTRAVENOUS ONCE
Status: COMPLETED | OUTPATIENT
Start: 2024-11-17 | End: 2024-11-17

## 2024-11-17 RX ORDER — ACETAMINOPHEN 650 MG/1
650 SUPPOSITORY RECTAL EVERY 4 HOURS PRN
Status: DISCONTINUED | OUTPATIENT
Start: 2024-11-17 | End: 2024-11-18

## 2024-11-17 RX ORDER — ONDANSETRON 4 MG/1
4 TABLET, ORALLY DISINTEGRATING ORAL EVERY 6 HOURS PRN
Status: DISCONTINUED | OUTPATIENT
Start: 2024-11-17 | End: 2024-11-20 | Stop reason: HOSPADM

## 2024-11-17 RX ORDER — ACETAMINOPHEN 325 MG/1
650 TABLET ORAL EVERY 4 HOURS PRN
Status: DISCONTINUED | OUTPATIENT
Start: 2024-11-17 | End: 2024-11-18

## 2024-11-17 RX ORDER — CIPROFLOXACIN 2 MG/ML
400 INJECTION, SOLUTION INTRAVENOUS ONCE
Status: COMPLETED | OUTPATIENT
Start: 2024-11-17 | End: 2024-11-17

## 2024-11-17 RX ORDER — IOPAMIDOL 755 MG/ML
74 INJECTION, SOLUTION INTRAVASCULAR ONCE
Status: COMPLETED | OUTPATIENT
Start: 2024-11-17 | End: 2024-11-17

## 2024-11-17 RX ORDER — ONDANSETRON 2 MG/ML
4 INJECTION INTRAMUSCULAR; INTRAVENOUS EVERY 30 MIN PRN
Status: DISCONTINUED | OUTPATIENT
Start: 2024-11-17 | End: 2024-11-18

## 2024-11-17 RX ORDER — IBUPROFEN 200 MG
600 TABLET ORAL EVERY 6 HOURS PRN
Status: DISCONTINUED | OUTPATIENT
Start: 2024-11-17 | End: 2024-11-18

## 2024-11-17 RX ORDER — METRONIDAZOLE 500 MG/100ML
500 INJECTION, SOLUTION INTRAVENOUS EVERY 8 HOURS
Status: DISCONTINUED | OUTPATIENT
Start: 2024-11-18 | End: 2024-11-18

## 2024-11-17 RX ORDER — METRONIDAZOLE 500 MG/100ML
500 INJECTION, SOLUTION INTRAVENOUS ONCE
Status: COMPLETED | OUTPATIENT
Start: 2024-11-17 | End: 2024-11-17

## 2024-11-17 RX ORDER — MORPHINE SULFATE 2 MG/ML
2 INJECTION, SOLUTION INTRAMUSCULAR; INTRAVENOUS
Status: DISCONTINUED | OUTPATIENT
Start: 2024-11-17 | End: 2024-11-18

## 2024-11-17 RX ORDER — ESCITALOPRAM OXALATE 10 MG/1
10 TABLET ORAL DAILY
Status: DISCONTINUED | OUTPATIENT
Start: 2024-11-18 | End: 2024-11-20 | Stop reason: HOSPADM

## 2024-11-17 RX ORDER — AMOXICILLIN 250 MG
1 CAPSULE ORAL 2 TIMES DAILY PRN
Status: DISCONTINUED | OUTPATIENT
Start: 2024-11-17 | End: 2024-11-20 | Stop reason: HOSPADM

## 2024-11-17 RX ORDER — ENOXAPARIN SODIUM 100 MG/ML
40 INJECTION SUBCUTANEOUS EVERY 24 HOURS
Status: DISCONTINUED | OUTPATIENT
Start: 2024-11-18 | End: 2024-11-20

## 2024-11-17 RX ORDER — MAGNESIUM HYDROXIDE/ALUMINUM HYDROXICE/SIMETHICONE 120; 1200; 1200 MG/30ML; MG/30ML; MG/30ML
15 SUSPENSION ORAL ONCE
Status: COMPLETED | OUTPATIENT
Start: 2024-11-17 | End: 2024-11-17

## 2024-11-17 RX ORDER — HYDROMORPHONE HYDROCHLORIDE 1 MG/ML
0.5 INJECTION, SOLUTION INTRAMUSCULAR; INTRAVENOUS; SUBCUTANEOUS ONCE
Status: COMPLETED | OUTPATIENT
Start: 2024-11-17 | End: 2024-11-17

## 2024-11-17 RX ORDER — CIPROFLOXACIN 2 MG/ML
400 INJECTION, SOLUTION INTRAVENOUS EVERY 12 HOURS
Status: DISCONTINUED | OUTPATIENT
Start: 2024-11-18 | End: 2024-11-20

## 2024-11-17 RX ORDER — POLYETHYLENE GLYCOL 3350 17 G/17G
17 POWDER, FOR SOLUTION ORAL DAILY
Status: DISCONTINUED | OUTPATIENT
Start: 2024-11-18 | End: 2024-11-20 | Stop reason: HOSPADM

## 2024-11-17 RX ORDER — AMOXICILLIN 250 MG
2 CAPSULE ORAL 2 TIMES DAILY PRN
Status: DISCONTINUED | OUTPATIENT
Start: 2024-11-17 | End: 2024-11-20 | Stop reason: HOSPADM

## 2024-11-17 RX ORDER — ONDANSETRON 2 MG/ML
4 INJECTION INTRAMUSCULAR; INTRAVENOUS EVERY 6 HOURS PRN
Status: DISCONTINUED | OUTPATIENT
Start: 2024-11-17 | End: 2024-11-20 | Stop reason: HOSPADM

## 2024-11-17 RX ORDER — SODIUM CHLORIDE 9 MG/ML
INJECTION, SOLUTION INTRAVENOUS CONTINUOUS
Status: DISCONTINUED | OUTPATIENT
Start: 2024-11-18 | End: 2024-11-20

## 2024-11-17 RX ORDER — CALCIUM CARBONATE 500 MG/1
1000 TABLET, CHEWABLE ORAL 4 TIMES DAILY PRN
Status: DISCONTINUED | OUTPATIENT
Start: 2024-11-17 | End: 2024-11-20 | Stop reason: HOSPADM

## 2024-11-17 RX ORDER — HYDROMORPHONE HYDROCHLORIDE 1 MG/ML
0.5 INJECTION, SOLUTION INTRAMUSCULAR; INTRAVENOUS; SUBCUTANEOUS EVERY 30 MIN PRN
Status: COMPLETED | OUTPATIENT
Start: 2024-11-17 | End: 2024-11-17

## 2024-11-17 RX ORDER — KETOROLAC TROMETHAMINE 15 MG/ML
15 INJECTION, SOLUTION INTRAMUSCULAR; INTRAVENOUS ONCE
Status: COMPLETED | OUTPATIENT
Start: 2024-11-17 | End: 2024-11-17

## 2024-11-17 RX ORDER — OXYCODONE HYDROCHLORIDE 5 MG/1
5 TABLET ORAL EVERY 4 HOURS PRN
Status: DISCONTINUED | OUTPATIENT
Start: 2024-11-17 | End: 2024-11-18

## 2024-11-17 RX ORDER — LIDOCAINE 40 MG/G
CREAM TOPICAL
Status: DISCONTINUED | OUTPATIENT
Start: 2024-11-17 | End: 2024-11-20 | Stop reason: HOSPADM

## 2024-11-17 RX ADMIN — KETOROLAC TROMETHAMINE 15 MG: 15 INJECTION, SOLUTION INTRAMUSCULAR; INTRAVENOUS at 17:21

## 2024-11-17 RX ADMIN — SODIUM CHLORIDE 1000 ML: 9 INJECTION, SOLUTION INTRAVENOUS at 17:21

## 2024-11-17 RX ADMIN — METRONIDAZOLE 500 MG: 500 INJECTION, SOLUTION INTRAVENOUS at 18:58

## 2024-11-17 RX ADMIN — ALUMINUM HYDROXIDE, MAGNESIUM HYDROXIDE, AND SIMETHICONE 15 ML: 200; 200; 20 SUSPENSION ORAL at 17:21

## 2024-11-17 RX ADMIN — HYDROMORPHONE HYDROCHLORIDE 0.5 MG: 1 INJECTION, SOLUTION INTRAMUSCULAR; INTRAVENOUS; SUBCUTANEOUS at 17:22

## 2024-11-17 RX ADMIN — CIPROFLOXACIN 400 MG: 400 INJECTION, SOLUTION INTRAVENOUS at 19:05

## 2024-11-17 RX ADMIN — ONDANSETRON 4 MG: 2 INJECTION INTRAMUSCULAR; INTRAVENOUS at 17:22

## 2024-11-17 RX ADMIN — HYDROMORPHONE HYDROCHLORIDE 0.5 MG: 1 INJECTION, SOLUTION INTRAMUSCULAR; INTRAVENOUS; SUBCUTANEOUS at 20:04

## 2024-11-17 RX ADMIN — ONDANSETRON 4 MG: 2 INJECTION INTRAMUSCULAR; INTRAVENOUS at 20:01

## 2024-11-17 RX ADMIN — IOPAMIDOL 74 ML: 755 INJECTION, SOLUTION INTRAVENOUS at 18:17

## 2024-11-17 RX ADMIN — HYDROMORPHONE HYDROCHLORIDE 0.5 MG: 1 INJECTION, SOLUTION INTRAMUSCULAR; INTRAVENOUS; SUBCUTANEOUS at 18:32

## 2024-11-17 RX ADMIN — ONDANSETRON 4 MG: 2 INJECTION INTRAMUSCULAR; INTRAVENOUS at 23:19

## 2024-11-17 RX ADMIN — HYDROMORPHONE HYDROCHLORIDE 0.5 MG: 1 INJECTION, SOLUTION INTRAMUSCULAR; INTRAVENOUS; SUBCUTANEOUS at 22:33

## 2024-11-17 ASSESSMENT — COLUMBIA-SUICIDE SEVERITY RATING SCALE - C-SSRS
1. IN THE PAST MONTH, HAVE YOU WISHED YOU WERE DEAD OR WISHED YOU COULD GO TO SLEEP AND NOT WAKE UP?: NO
6. HAVE YOU EVER DONE ANYTHING, STARTED TO DO ANYTHING, OR PREPARED TO DO ANYTHING TO END YOUR LIFE?: NO
2. HAVE YOU ACTUALLY HAD ANY THOUGHTS OF KILLING YOURSELF IN THE PAST MONTH?: NO

## 2024-11-17 ASSESSMENT — ACTIVITIES OF DAILY LIVING (ADL)
ADLS_ACUITY_SCORE: 0

## 2024-11-17 NOTE — LETTER
[facility]  [address]  [place] [address]  Phone: [phone]  Fax: [phone]    November 20, 2024        Lenora Gage  [address]  [place] [address]          To whom it may concern:    RE: Lenora Gage    Patient was admitted to hospital on 11/17/2024, she was under my care until 11/20/24. Please excuse her for missing work for above dates and she should be able to return to work on 11/26/24.      Sincerely,      Osman Damian [facility]

## 2024-11-17 NOTE — ED PROVIDER NOTES
"[facility]  ED Provider Note    Chief Complaint   Patient presents with    Abdominal Pain    Chest Pain     History:  Lenora Gage is a 49 year old female with no relevant past medical history presents to the emergency department today with intermittent left flank pain for the last 2 weeks that is now radiating into the left groin.  She has not had urinary symptoms or bloody urine.  There is nausea no vomiting no bloody stools no blood in vomit.  She does not report associated trauma.  Sometimes when the pain gets really bad it seems to even radiate up into her chest.  It hurts to breathe but otherwise there is no shortness of breath.    Review of Systems   Performed; see HPI for pertinent positives and negatives.     Medical history, surgical history, and social history was reviewed.  Nursing documentation, triage note, and vitals were reviewed.    Vitals:  BP: 127/95  Pulse: 103  Temp: 98.4  F (36.9  C)  Resp: 16  Height: 160 cm (5' 3\")  Weight: 68.7 kg (151 lb 6.4 oz)  SpO2: 97 %    Physical Exam:  Constitutional: Alert and conversant. NAD   HENT: NCAT   Eyes: Normal pupils   Neck: supple   CV: No pallor  Pulmonary/Chest: Non-labored respirations  Abdominal: non-distended localized peritonitis in the left lower quadrant, very exquisitely tender to palpation there, mild tenderness to palpation in the right lower quadrant in the left upper quadrant.  Very positive left CVA tenderness  MSK: PRATT.   Neuro: Alert and appropriate   Skin: Warm and dry. No diaphoresis. No rashes on exposed skin    Psych: Appropriate mood and affect       MDM:      ED Course as of 11/19/24 0116   Sun Nov 17, 2024   1713 Differential includes but is not limited to nephrolithiasis/ureterolithiasis, pyelonephritis, rib fracture/muscle strain, pulmonary contusion, PNA, pleurisy, cholecystitis/choledocholithiasis/ascending cholangitis, diverticulitis, aortic dissection  Also considering coronary artery disease given her " chest pain that this seems less likely especially as is not the main presenting symptom.  She has been taking ibuprofen's to help with her pain she may have some amount of gastritis associated with that.  Will proceed with GI cocktail, Toradol for immediate pain relief though I recognize the contradiction, Zofran and fluids and Dilaudid as needed for ongoing pain after the Zofran.  Laboratory and imaging underway   1845 CT Abdomen Pelvis w Contrast  On my independent read of the CT I see uncomplicated diverticulitis awaiting confirmation from radiology.   1852 Signout given with plan for a follow-up of CT results and antibiotics with discharge after assuming the CT does not show something that requires admission       Procedures:  Procedures      Reassessment by Dr. Colvin  CT scan showed microperforation, case was discussed with surgery who agreed with admission.  Went to the floor without incident.    Impression:  Final diagnoses:   Acute diverticulitis            Star Conway MD  11/17/24 [id number]       Nacho Colvin MD  11/19/24 0116

## 2024-11-17 NOTE — ED NOTES
Reports a few weeks of left flank pain. Today woke up and pain had moved to the LLQ, radiates to the flank and the chest. Nausea, without vomiting. Reports also frequent headaches over the last few weeks. No fever/ chills. No constipation. Had a few days of diarrhea that improved. Ibuprofen today for pain without relief.

## 2024-11-18 ENCOUNTER — APPOINTMENT (OUTPATIENT)
Dept: CT IMAGING | Facility: HOSPITAL | Age: 49
End: 2024-11-18
Attending: HOSPITALIST
Payer: COMMERCIAL

## 2024-11-18 PROBLEM — A41.9 SEPSIS (H): Status: ACTIVE | Noted: 2024-11-18

## 2024-11-18 LAB
ALBUMIN SERPL BCG-MCNC: 3.5 G/DL (ref 3.5–5.2)
ALBUMIN UR-MCNC: NEGATIVE MG/DL
ALP SERPL-CCNC: 86 U/L (ref 40–150)
ALT SERPL W P-5'-P-CCNC: 33 U/L (ref 0–50)
ANION GAP SERPL CALCULATED.3IONS-SCNC: 8 MMOL/L (ref 7–15)
APPEARANCE UR: CLEAR
AST SERPL W P-5'-P-CCNC: 20 U/L (ref 0–45)
BACTERIA #/AREA URNS HPF: ABNORMAL /HPF
BILIRUB DIRECT SERPL-MCNC: <0.2 MG/DL (ref 0–0.3)
BILIRUB SERPL-MCNC: 0.8 MG/DL
BILIRUB UR QL STRIP: NEGATIVE
BUN SERPL-MCNC: 8 MG/DL (ref 6–20)
CALCIUM SERPL-MCNC: 8.2 MG/DL (ref 8.8–10.4)
CHLORIDE SERPL-SCNC: 108 MMOL/L (ref 98–107)
CK SERPL-CCNC: 75 U/L (ref 26–192)
COLOR UR AUTO: YELLOW
CREAT SERPL-MCNC: 0.74 MG/DL (ref 0.51–0.95)
CRP SERPL-MCNC: 20.74 MG/L
EGFRCR SERPLBLD CKD-EPI 2021: >90 ML/MIN/1.73M2
ERYTHROCYTE [DISTWIDTH] IN BLOOD BY AUTOMATED COUNT: 11.9 % (ref 10–15)
GLUCOSE BLDC GLUCOMTR-MCNC: 89 MG/DL (ref 70–99)
GLUCOSE SERPL-MCNC: 103 MG/DL (ref 70–99)
GLUCOSE UR STRIP-MCNC: NEGATIVE MG/DL
HCO3 SERPL-SCNC: 25 MMOL/L (ref 22–29)
HCT VFR BLD AUTO: 36.7 % (ref 35–47)
HGB BLD-MCNC: 12.1 G/DL (ref 11.7–15.7)
HGB UR QL STRIP: NEGATIVE
HOLD SPECIMEN: NORMAL
INR PPP: 1.13 (ref 0.85–1.15)
KETONES UR STRIP-MCNC: 15 MG/DL
LACTATE SERPL-SCNC: 0.4 MMOL/L (ref 0.7–2)
LEUKOCYTE ESTERASE UR QL STRIP: NEGATIVE
MCH RBC QN AUTO: 29.1 PG (ref 26.5–33)
MCHC RBC AUTO-ENTMCNC: 33 G/DL (ref 31.5–36.5)
MCV RBC AUTO: 88 FL (ref 78–100)
MUCOUS THREADS #/AREA URNS LPF: PRESENT /LPF
NITRATE UR QL: NEGATIVE
PH UR STRIP: 6 [PH] (ref 5–7)
PLATELET # BLD AUTO: 157 10E3/UL (ref 150–450)
POTASSIUM SERPL-SCNC: 4.1 MMOL/L (ref 3.4–5.3)
PROCALCITONIN SERPL IA-MCNC: 0.04 NG/ML
PROT SERPL-MCNC: 5.2 G/DL (ref 6.4–8.3)
RBC # BLD AUTO: 4.16 10E6/UL (ref 3.8–5.2)
RBC URINE: 0 /HPF
SODIUM SERPL-SCNC: 141 MMOL/L (ref 135–145)
SP GR UR STRIP: 1.01 (ref 1–1.03)
SQUAMOUS EPITHELIAL: 1 /HPF
UROBILINOGEN UR STRIP-MCNC: NORMAL MG/DL
WBC # BLD AUTO: 4.9 10E3/UL (ref 4–11)
WBC URINE: 1 /HPF

## 2024-11-18 PROCEDURE — 250N000013 HC RX MED GY IP 250 OP 250 PS 637: Performed by: HOSPITALIST

## 2024-11-18 PROCEDURE — 258N000003 HC RX IP 258 OP 636: Performed by: HOSPITALIST

## 2024-11-18 PROCEDURE — 85014 HEMATOCRIT: CPT | Performed by: HOSPITALIST

## 2024-11-18 PROCEDURE — 200N000001 HC R&B ICU

## 2024-11-18 PROCEDURE — 81001 URINALYSIS AUTO W/SCOPE: CPT | Performed by: HOSPITALIST

## 2024-11-18 PROCEDURE — 36415 COLL VENOUS BLD VENIPUNCTURE: CPT | Performed by: HOSPITALIST

## 2024-11-18 PROCEDURE — 87040 BLOOD CULTURE FOR BACTERIA: CPT | Performed by: HOSPITALIST

## 2024-11-18 PROCEDURE — 82550 ASSAY OF CK (CPK): CPT | Performed by: HOSPITALIST

## 2024-11-18 PROCEDURE — 250N000009 HC RX 250: Performed by: HOSPITALIST

## 2024-11-18 PROCEDURE — 74177 CT ABD & PELVIS W/CONTRAST: CPT

## 2024-11-18 PROCEDURE — 81003 URINALYSIS AUTO W/O SCOPE: CPT | Performed by: HOSPITALIST

## 2024-11-18 PROCEDURE — 85610 PROTHROMBIN TIME: CPT | Performed by: HOSPITALIST

## 2024-11-18 PROCEDURE — 250N000011 HC RX IP 250 OP 636: Mod: JZ | Performed by: HOSPITALIST

## 2024-11-18 PROCEDURE — 99233 SBSQ HOSP IP/OBS HIGH 50: CPT | Performed by: HOSPITALIST

## 2024-11-18 PROCEDURE — 84155 ASSAY OF PROTEIN SERUM: CPT | Performed by: HOSPITALIST

## 2024-11-18 PROCEDURE — 86140 C-REACTIVE PROTEIN: CPT | Performed by: HOSPITALIST

## 2024-11-18 PROCEDURE — 250N000011 HC RX IP 250 OP 636: Performed by: HOSPITALIST

## 2024-11-18 PROCEDURE — 99222 1ST HOSP IP/OBS MODERATE 55: CPT | Performed by: SURGERY

## 2024-11-18 PROCEDURE — 250N000011 HC RX IP 250 OP 636: Performed by: RADIOLOGY

## 2024-11-18 PROCEDURE — 84145 PROCALCITONIN (PCT): CPT | Performed by: HOSPITALIST

## 2024-11-18 PROCEDURE — 80048 BASIC METABOLIC PNL TOTAL CA: CPT | Performed by: HOSPITALIST

## 2024-11-18 PROCEDURE — 83605 ASSAY OF LACTIC ACID: CPT | Performed by: HOSPITALIST

## 2024-11-18 RX ORDER — HYDROMORPHONE HYDROCHLORIDE 1 MG/ML
0.5 INJECTION, SOLUTION INTRAMUSCULAR; INTRAVENOUS; SUBCUTANEOUS
Status: DISCONTINUED | OUTPATIENT
Start: 2024-11-18 | End: 2024-11-18

## 2024-11-18 RX ORDER — KETOROLAC TROMETHAMINE 30 MG/ML
30 INJECTION, SOLUTION INTRAMUSCULAR; INTRAVENOUS EVERY 6 HOURS PRN
Status: DISCONTINUED | OUTPATIENT
Start: 2024-11-18 | End: 2024-11-20 | Stop reason: HOSPADM

## 2024-11-18 RX ORDER — ROPIVACAINE IN 0.9% SOD CHL/PF 0.1 %
.01-.125 PLASTIC BAG, INJECTION (ML) EPIDURAL CONTINUOUS
Status: DISCONTINUED | OUTPATIENT
Start: 2024-11-18 | End: 2024-11-19

## 2024-11-18 RX ORDER — NALOXONE HYDROCHLORIDE 0.4 MG/ML
0.4 INJECTION, SOLUTION INTRAMUSCULAR; INTRAVENOUS; SUBCUTANEOUS
Status: DISCONTINUED | OUTPATIENT
Start: 2024-11-18 | End: 2024-11-20 | Stop reason: HOSPADM

## 2024-11-18 RX ORDER — ACYCLOVIR 400 MG/1
400 TABLET ORAL EVERY 8 HOURS PRN
COMMUNITY

## 2024-11-18 RX ORDER — ACETAMINOPHEN 10 MG/ML
1000 INJECTION, SOLUTION INTRAVENOUS EVERY 6 HOURS
Status: DISCONTINUED | OUTPATIENT
Start: 2024-11-18 | End: 2024-11-19

## 2024-11-18 RX ORDER — MORPHINE SULFATE 2 MG/ML
1 INJECTION, SOLUTION INTRAMUSCULAR; INTRAVENOUS
Status: DISCONTINUED | OUTPATIENT
Start: 2024-11-18 | End: 2024-11-18

## 2024-11-18 RX ORDER — OXYCODONE HYDROCHLORIDE 5 MG/1
5 TABLET ORAL EVERY 4 HOURS PRN
Status: DISCONTINUED | OUTPATIENT
Start: 2024-11-18 | End: 2024-11-18

## 2024-11-18 RX ORDER — NALOXONE HYDROCHLORIDE 0.4 MG/ML
0.2 INJECTION, SOLUTION INTRAMUSCULAR; INTRAVENOUS; SUBCUTANEOUS
Status: DISCONTINUED | OUTPATIENT
Start: 2024-11-18 | End: 2024-11-20 | Stop reason: HOSPADM

## 2024-11-18 RX ORDER — IBUPROFEN 200 MG
400 TABLET ORAL 3 TIMES DAILY PRN
COMMUNITY

## 2024-11-18 RX ORDER — METRONIDAZOLE 500 MG/100ML
500 INJECTION, SOLUTION INTRAVENOUS EVERY 6 HOURS
Status: DISCONTINUED | OUTPATIENT
Start: 2024-11-18 | End: 2024-11-20

## 2024-11-18 RX ORDER — OXYCODONE HYDROCHLORIDE 5 MG/1
5-10 TABLET ORAL EVERY 4 HOURS PRN
Status: DISCONTINUED | OUTPATIENT
Start: 2024-11-18 | End: 2024-11-20 | Stop reason: HOSPADM

## 2024-11-18 RX ORDER — IOPAMIDOL 755 MG/ML
72 INJECTION, SOLUTION INTRAVASCULAR ONCE
Status: COMPLETED | OUTPATIENT
Start: 2024-11-18 | End: 2024-11-18

## 2024-11-18 RX ORDER — MORPHINE SULFATE 2 MG/ML
2 INJECTION, SOLUTION INTRAMUSCULAR; INTRAVENOUS
Status: DISCONTINUED | OUTPATIENT
Start: 2024-11-18 | End: 2024-11-18

## 2024-11-18 RX ADMIN — ONDANSETRON 4 MG: 2 INJECTION INTRAMUSCULAR; INTRAVENOUS at 09:45

## 2024-11-18 RX ADMIN — MORPHINE SULFATE 2 MG: 2 INJECTION, SOLUTION INTRAMUSCULAR; INTRAVENOUS at 06:39

## 2024-11-18 RX ADMIN — CIPROFLOXACIN 400 MG: 2 INJECTION, SOLUTION INTRAVENOUS at 08:35

## 2024-11-18 RX ADMIN — KETOROLAC TROMETHAMINE 30 MG: 30 INJECTION, SOLUTION INTRAMUSCULAR at 14:35

## 2024-11-18 RX ADMIN — ACETAMINOPHEN 1000 MG: 10 INJECTION INTRAVENOUS at 20:22

## 2024-11-18 RX ADMIN — IOPAMIDOL 72 ML: 755 INJECTION, SOLUTION INTRAVENOUS at 10:00

## 2024-11-18 RX ADMIN — METRONIDAZOLE 500 MG: 5 INJECTION, SOLUTION INTRAVENOUS at 22:50

## 2024-11-18 RX ADMIN — OXYCODONE HYDROCHLORIDE 2.5 MG: 5 TABLET ORAL at 03:27

## 2024-11-18 RX ADMIN — MORPHINE SULFATE 2 MG: 2 INJECTION, SOLUTION INTRAMUSCULAR; INTRAVENOUS at 03:27

## 2024-11-18 RX ADMIN — ONDANSETRON 4 MG: 2 INJECTION INTRAMUSCULAR; INTRAVENOUS at 21:42

## 2024-11-18 RX ADMIN — HYDROMORPHONE HYDROCHLORIDE 0.5 MG: 1 INJECTION, SOLUTION INTRAMUSCULAR; INTRAVENOUS; SUBCUTANEOUS at 08:35

## 2024-11-18 RX ADMIN — OXYCODONE HYDROCHLORIDE 5 MG: 5 TABLET ORAL at 20:53

## 2024-11-18 RX ADMIN — ACETAMINOPHEN 650 MG: 325 TABLET, FILM COATED ORAL at 00:26

## 2024-11-18 RX ADMIN — HYDROMORPHONE HYDROCHLORIDE 0.5 MG: 1 INJECTION, SOLUTION INTRAMUSCULAR; INTRAVENOUS; SUBCUTANEOUS at 13:36

## 2024-11-18 RX ADMIN — METRONIDAZOLE 500 MG: 5 INJECTION, SOLUTION INTRAVENOUS at 16:33

## 2024-11-18 RX ADMIN — METRONIDAZOLE 500 MG: 500 INJECTION, SOLUTION INTRAVENOUS at 02:51

## 2024-11-18 RX ADMIN — NOREPINEPHRINE BITARTRATE 0.03 MCG/KG/MIN: 0.02 INJECTION, SOLUTION INTRAVENOUS at 11:23

## 2024-11-18 RX ADMIN — ACETAMINOPHEN 1000 MG: 10 INJECTION INTRAVENOUS at 08:34

## 2024-11-18 RX ADMIN — OXYCODONE HYDROCHLORIDE 5 MG: 5 TABLET ORAL at 16:50

## 2024-11-18 RX ADMIN — ACETAMINOPHEN 1000 MG: 10 INJECTION INTRAVENOUS at 14:35

## 2024-11-18 RX ADMIN — SODIUM CHLORIDE, PRESERVATIVE FREE: 5 INJECTION INTRAVENOUS at 00:15

## 2024-11-18 RX ADMIN — KETOROLAC TROMETHAMINE 30 MG: 30 INJECTION, SOLUTION INTRAMUSCULAR at 08:35

## 2024-11-18 RX ADMIN — METRONIDAZOLE 500 MG: 5 INJECTION, SOLUTION INTRAVENOUS at 10:43

## 2024-11-18 RX ADMIN — KETOROLAC TROMETHAMINE 30 MG: 30 INJECTION, SOLUTION INTRAMUSCULAR at 20:21

## 2024-11-18 RX ADMIN — ONDANSETRON 4 MG: 4 TABLET, ORALLY DISINTEGRATING ORAL at 03:35

## 2024-11-18 RX ADMIN — SODIUM CHLORIDE, PRESERVATIVE FREE: 5 INJECTION INTRAVENOUS at 16:56

## 2024-11-18 RX ADMIN — SODIUM CHLORIDE 1000 ML: 9 INJECTION, SOLUTION INTRAVENOUS at 07:33

## 2024-11-18 RX ADMIN — PANTOPRAZOLE SODIUM 40 MG: 40 INJECTION, POWDER, FOR SOLUTION INTRAVENOUS at 08:35

## 2024-11-18 RX ADMIN — SODIUM CHLORIDE, POTASSIUM CHLORIDE, SODIUM LACTATE AND CALCIUM CHLORIDE 500 ML: 600; 310; 30; 20 INJECTION, SOLUTION INTRAVENOUS at 04:19

## 2024-11-18 RX ADMIN — CIPROFLOXACIN 400 MG: 2 INJECTION, SOLUTION INTRAVENOUS at 20:18

## 2024-11-18 NOTE — PHARMACY
Pharmacy Antimicrobial Stewardship Assessment     Current Antimicrobial Therapy:  Anti-infectives (From now, onward)      Start     Dose/Rate Route Frequency Ordered Stop    24 0900  metroNIDAZOLE (FLAGYL) infusion 500 mg        Note to Pharmacy: Metronidazole IV may be dosed more frequently than Q12h for bacteremia, CNS infection and Clostridium difficile.    500 mg  over 60 Minutes Intravenous EVERY 6 HOURS 24 0722 24 0859    24 0800  ciprofloxacin (CIPRO) infusion 400 mg         400 mg  over 1 Hours Intravenous EVERY 12 HOURS 24 2359 24 0759          Indication: diverticulitis    Days of Therapy: 2     Pertinent Labs:    Recent Labs   Lab Test 24  0616 24  1702 24  1033 04/15/23  0919 10/05/22  1444 05/10/21  1239 20  1505   WBC 4.9 9.3 5.3 6.0 5.8 5.7 14.8*       Recent Labs   Lab Test 24  0802 24  0616 20  1505   LACT 0.4*  --  1.5   CRPI  --  20.74*  --    PCAL  --  0.04  --         Temperature:  Temp (24hrs), Av.5  F (36.4  C), Min:97  F (36.1  C), Max:98.4  F (36.9  C)      Culture Results:   30-Day Micro Results       Collected Updated Procedure Result Status      2024 1039 2024 1042 Blood Culture Arm, Left [[id number]]   Blood from Arm, Left    In process Component Value   No component results                      Recommendations/Interventions:  1. Current regimen is appropriate for diverticulitis. I do not recommend increasing the ciprofloxacin dose. If patient doesn't get better on this regimen, consider consulting ID for further recommendations due to patient's penicillin allergy.    Heather Carreno, [facility]  2024

## 2024-11-18 NOTE — PROVIDER NOTIFICATION
Detail Level: Detailed Dr. CAMACHO updated about blood pressures 80/50s, MAPs 60-62. Has just returned from CT.

## 2024-11-18 NOTE — ED NOTES
Face to face report given with opportunity to observe patient.    Report given to SHARMAINE Swartz RN   11/17/2024  9:32 PM

## 2024-11-18 NOTE — PROGRESS NOTES
General Surgery    S:  Continues to have LLQ abdominal pain.  She tells me she has had abdominal pain for 2 weeks now.  Has never had a colonoscopy before she tells me but had one in 2007 according to our EMR.  This is her first bout of diverticulitis.  Surgical history includes total abdominal hysterectomy w/ left salpingoophorectomy.    O:  Afebrile.  P 60-80.  SBP 90s with MAP 60-70.  Breathing comfortably on room air.    Patient is now on levophed for hypotension  Appears to be in no distress.  Moderate tenderness to LLQ with palpation.    Labs reviewed and overall unremarkable.  WBC 4k, Lactic acid 0.4    Repeat CT scan this morning demonstrates no major changes.  She has sigmoid diverticulitis with a small foci of inflammation that may form an abscess    A&P:  49 year old female presenting with first episode of diverticulitis.    Patient is presenting a mixed picture.  I do not think patient is in septic shock from her diverticulitis, but rather, narcotics and hypovolemia may be contributing to her hypotension.  Her labs are generally normal. and she is not tachycardic.  Both she and her  tell me that she always has low blood pressure.    Discussed case with hospitalist.  Will get patient over to oral oxycodone from dilaudid.  Patient is already on tylenol and toradol.  She has had multiple fluid boluses today and is on normal saline at 150 ml per hour.  Continue NPO.  Patient is on Cipro/flagyl.  If patient does not improve by tomorrow and is still requiring norepinephrine for blood pressure support, then will discuss surgery.  I have already introduced the topic of surgery.  This would most likely include sigmoid colectomy with primary anastomosis and diverting loop ileostomy.

## 2024-11-18 NOTE — MEDICATION SCRIBE - ADMISSION MEDICATION HISTORY
Medication Scribe Admission Medication History    Admission medication history is complete. The information provided in this note is only as accurate as the sources available at the time of the update.    Information Source(s): Patient and CareEverywhere/SureScripts via in-person    Pertinent Information:   Patient reports she manages her own medications.    Changes made to PTA medication list:  Added: IBU  Deleted: None  Changed: Updated  Zovirax: from scheduled to prn (cold sores)    Allergies reviewed with patient and updates made in EHR: yes    Medication History Completed By: Jocelynn Cohen 11/18/2024 10:51 AM    PTA Med List   Medication Sig Last Dose/Taking    acyclovir (ZOVIRAX) 400 MG tablet Take 400 mg by mouth every 8 hours as needed (cold sores). Past Week    amphetamine-dextroamphetamine (ADDERALL) 20 MG tablet Take 20 mg by mouth 3 times daily 11/17/2024 at  8:00 AM    escitalopram (LEXAPRO) 10 MG tablet Take 1 tablet (10 mg) by mouth daily. 11/17/2024 at  8:00 AM    ibuprofen (ADVIL/MOTRIN) 200 MG tablet Take 400 mg by mouth 3 times daily as needed for pain. 11/16/2024    omeprazole (PRILOSEC) 20 MG DR capsule Take 1 capsule (20 mg) by mouth daily. 11/17/2024 at  8:00 AM

## 2024-11-18 NOTE — PROGRESS NOTES
[facility]    Medicine Progress Note - Hospitalist Service    Date of Admission:  11/17/2024    Assessment & Plan   Hospital Course:  Patient is admitted with acute diverticulitis with microperforation, she has been having abdominal pain that is worsened over the last 2 weeks.  She overnight did have hypotension required fluid bolus.  This morning she had blood pressures in the high 70 systolic's maps were in the mid 50s I did order another liter bolus of normal saline I did see the patient she was alert and oriented but did complain of left abdominal pain, she was discussed with general surgeon and his recommendation was to repeat CT imaging to make sure there is no worsening perforation.  Patient also was moved to the ICU for further care and monitoring may need pressors to keep her blood pressure is up.  I did order labs on her and I did increase her Flagyl and discussed with pharmacy regarding increasing the ciprofloxacin.  She does have allergies to penicillin so we will try to avoid Zosyn at this time.      DIVERTICULITIS WITH MICROPERFORATION  Sepsis with hypotension    -Continue Flagyl every 6 hours  -Continue Cipro IV  -N.p.o. except meds and ice   -1 L NS bolus given on 11/18   -Transferred to ICU for levophed   -Increased normal saline to 150 cc an hour  -Change morphine and  Dilaudid to oxycodone as needed for severe pain  -Scheduled IV Tylenol every 6 hours that she is n.p.o.  -As needed Toradol  -Hold home selective serotonin reuptake inhibitor  -Hold Lovenox as possible surgery  -General Surgery  seeing patient   -May need to use Levophed and asked to keep MAP above 65  -Repeat CT scan abdomen pelvis with contrast stat done no worsening perforation or abscess formation     IBS  Noted      OCD  Holding  home selective serotonin reuptake inhibitor           Diet: NPO for Medical/Clinical Reasons Except for: Ice Chips, Meds    DVT Prophylaxis: Pneumatic Compression Devices  Cordova Catheter:  "Not present  Lines: None     Cardiac Monitoring: None  Code Status: Full Code      Clinically Significant Risk Factors Present on Admission          # Hyperchloremia: Highest Cl = 108 mmol/L in last 2 days, will monitor as appropriate      # Hypocalcemia: Lowest Ca = 8.2 mg/dL in last 2 days, will monitor and replace as appropriate                   # Overweight: Estimated body mass index is 26.36 kg/m  as calculated from the following:    Height as of this encounter: 1.6 m (5' 3\").    Weight as of this encounter: 67.5 kg (148 lb 13 oz).              Social Drivers of Health    Physical Activity: Insufficiently Active (9/30/2024)    Exercise Vital Sign     Days of Exercise per Week: 3 days     Minutes of Exercise per Session: 20 min   Stress: Stress Concern Present (9/30/2024)    [employer] - Occupational Stress Questionnaire     Feeling of Stress : Very much   Social Connections: Unknown (9/30/2024)    Social Connection and Isolation Panel [NHANES]     Frequency of Social Gatherings with Friends and Family: Once a week          Disposition Plan     Medically Ready for Discharge: Anticipated in 2-4 Days           Osman Damian DO  Hospitalist Service  [facility]  Securely message with [employer] (more info)  Text page via [facility] Paging/Directory   ______________________________________________________________________    Interval History   Patient was seen this morning for medical rounds.  Patient denies chest pain or shortness of breath.  She has been having left flank pain.  Patient also was discussed with general surgery. We moved her to  the ICU for pressors as was hypotensive.   Patient also underwent another ct scan of abdomen to rule out an abscess or worsening perforation.     Physical Exam   Vital Signs: Temp: 97  F (36.1  C) Temp src: Tympanic BP: 91/63 Pulse: 62   Resp: 13 SpO2: 97 % O2 Device: None (Room air)    Weight: 148 lbs 12.97 oz     Physical " Exam  Constitutional:       Appearance: She is ill-appearing.   HENT:      Right Ear: External ear normal.      Left Ear: External ear normal.      Nose: Nose normal.      Mouth/Throat:      Pharynx: Oropharynx is clear.   Eyes:      Conjunctiva/sclera: Conjunctivae normal.   Cardiovascular:      Rate and Rhythm: Normal rate.   Pulmonary:      Effort: Pulmonary effort is normal.   Abdominal:      General: Abdomen is flat.      Tenderness: There is abdominal tenderness.      Comments: Left lower quadrant tenderness    Musculoskeletal:         General: No swelling.   Skin:     Coloration: Skin is not jaundiced.   Neurological:      Mental Status: She is alert. Mental status is at baseline.           Medical Decision Making       65 MINUTES SPENT BY ME on the date of service doing chart review, history, exam, documentation & further activities per the note.      Data     I have personally reviewed the following data over the past 24 hrs:    4.9  \   12.1   / 157     141 108 (H) 8.0 /  103 (H)   4.1 25 0.74 \     ALT: 33 AST: 20 AP: 86 TBILI: 0.8   ALB: 3.5 TOT PROTEIN: 5.2 (L) LIPASE: 28     Trop: <6 BNP: N/A     Procal: 0.04 CRP: 20.74 (H) Lactic Acid: 0.4 (L)       INR:  1.13 PTT:  N/A   D-dimer:  N/A Fibrinogen:  N/A       Imaging results reviewed over the past 24 hrs:   Recent Results (from the past 24 hours)   CT Abdomen Pelvis w Contrast    Narrative    EXAM: CT ABDOMEN PELVIS W CONTRAST 11/17/2024 6:24 PM    HISTORY: left flank and LLQ pain, stone vs diverticulitis    COMPARISON: CT abdomen and pelvis 4/15/2023    TECHNIQUE:   Imaging protocol: Computed tomography of the abdomen and pelvis with  imaging acquired after administration of intravenous contrast.  Intravenous contrast: Isovue 370 74mL.  Acquisition: This CT exam was performed using one or more the  following dose reduction techniques: automated exposure control,  adjustment of the mA and/or kV according to patient size, and/or  iterative  reconstruction technique.    FINDINGS:    LOWER CHEST:  Bibasilar atelectasis. No pulmonary mass or focal consolidation.    ABDOMEN/PELVIS:  LIVER: Normal contour. No suspicious liver lesions.  GALLBLADDER: No radio-opaque stones. No gallbladder wall thickening.  BILE DUCTS: No biliary tract dilatation.  PANCREAS: Within normal limits.  SPLEEN: Spleen is enlarged, measures up to 13.3 cm.  ADRENALS: Within normal limits.    KIDNEYS/URETERS: No soft tissue mass. Mild bilateral symmetric  hydroureteronephrosis. No nephrolithiasis.  URINARY BLADDER: Within normal limits.  REPRODUCTIVE ORGANS: No pelvic masses.    BOWEL: No bowel dilatation. Diverticulosis with marked thickening of  the sigmoid colon with surrounding inflammatory changes, consistent  with acute diverticulitis. Appendix is surgically absent.  PERITONEUM/RETROPERITONEUM: No free air. Marked stranding and edema  surrounding the inflamed segment of sigmoid colon. Possible punctate  focus of air adjacent to the inflamed diverticulum (series 3 image  164). Surgical clip in the left mesentery.  VESSELS: Within normal limits.  LYMPH NODES: There are no pathologically enlarged lymph nodes.    BONES AND SOFT TISSUES:  No suspicious osseous lesions. Degenerative periarticular changes and  spinal spondylosis.      Impression    IMPRESSION:  Acute sigmoid diverticulitis with small punctate hypodensity near the  inflamed diverticulum that could represent focus of free air and  microperforation, although more likely to represent a focus of fat  necrosis.    EVELYN MURPHY MD         SYSTEM ID:  [id number]   XR Chest 2 Views    Narrative    PROCEDURE:  XR CHEST 2 VIEWS    HISTORY: chest pain    COMPARISON: No relevant priors available for comparison    FINDINGS: PA and lateral chest radiographs    Cardiomediastinal silhouette is within normal limits.  No focal consolidation, effusion or pneumothorax.    No suspicious osseous lesion or subdiaphragmatic free air.       Impression    IMPRESSION:    No acute findings.    EVELYN MURPHY MD         SYSTEM ID:  [id number]   CT Abdomen Pelvis w Contrast    Narrative    CT ABDOMEN PELVIS W CONTRAST    CLINICAL HISTORY: Female, age 49 years,  sepsis with hypotension ,  repeat ct as concern for worsening perforation;    Comparison:  CT scan abdomen and pelvis 11/17/2024    TECHNIQUE:  CT scanwas performed of the abdomen and pelvis with IV  contrast. Axial, sagittal and coronal images were reviewed.  This facility minimizes radiation dose by adjusting the mA and/or kV  according to each patient size.  This CT scan was performed using one or more the following dose  reduction techniques:  -Automated exposure control,  -Adjustment of the mA and/or kV according to patient's size, and/or,  -Use of iterative reconstruction technique.      FINDINGS:  The lung bases and visualized portions of the heart demonstrate no  acute abnormality. Nonacute findings within the lung bases are similar  appearance.    Stomach and duodenum: Small hiatal hernia is unchanged without acute  abnormality.    Liver: Unremarkable.    Gallbladder: Unremarkable    Spleen: Unchanged splenomegaly.    Pancreas: Unremarkable    Adrenal glands: Unremarkable.    Kidneys: Unremarkable    Ureters: Unremarkable.    Urinary bladder: Unremarkable    Large and small bowel: Diverticulitis of the distal descending/sigmoid  colon is again seen and does not appear to be significantly different.  Fat stranding about the colon in this region is similar appearance.  Small volume of gas within the pericolonic fat in previously is no  longer identified. There is no evidence of worsening perforation and  no apparent abscess. Small bowel loops are unremarkable.    The abdominal aorta and inferior vena cava taper normally. Numerous  normal-sized lymph nodes are again seen throughout the retroperitoneum  and are unchanged.    Bony structures: No acute abnormality.        Impression    IMPRESSION:    Diverticulitis of the distal colon is not significantly different.  Small gas bubble in the pericolonic fat on the prior study is no  longer appreciated. There is no apparent abscess.    Unchanged splenomegaly and retroperitoneal lymphadenopathy.    ANGEL NORTON MD         SYSTEM ID:  [id number]

## 2024-11-18 NOTE — PLAN OF CARE
Pt transferred to ICU. Fluid bolus started per orders. Face to face report given with opportunity to observe patient.    Report given to SHARMAINE Lassiter RN   11/18/2024  7:43 AM

## 2024-11-18 NOTE — PROGRESS NOTES
Quick note  Patient is admitted with acute diverticulitis with microperforation, she has been having abdominal pain that is worsened over the last 2 weeks.  She overnight did have hypotension required fluid bolus.  This morning she had blood pressures in the high 70 systolic's maps were in the mid 50s I did order another liter bolus of normal saline I did see the patient she was alert and oriented but did complain of left abdominal pain, she was discussed with general surgeon and his recommendation was to repeat CT imaging to make sure there is no worsening perforation.  Patient also was moved to the ICU for further care and monitoring may need pressors to keep her blood pressure is up.  I did order labs on her and I did increase her Flagyl and discussed with pharmacy regarding increasing the ciprofloxacin.  She does have allergies to penicillin so we will try to avoid Zosyn at this time.  -Continue Flagyl every 6 hours  -Continue Cipro IV  -N.p.o.  -1 L NS ordered  -Transferred to ICU  -Increase normal saline to 125 cc an hour  -Change morphine to Dilaudid as needed for severe pain  -Scheduled IV Tylenol every 6 hours that she is n.p.o.  -As needed Toradol  -Hold home selective serotonin reuptake inhibitor  -Hold Lovenox as possible surgery  -General Surgery consulted  -May need to use Levophed and asked to keep MAP above 65  -Repeat CT scan abdomen pelvis with contrast stat ordered

## 2024-11-18 NOTE — H&P
"[facility]    History and Physical - Hospitalist Service       Date of Admission:  11/17/2024    Assessment & Plan      Lenora Gage is a 49 year old female admitted on 11/17/2024. She comes in with flank pain now localized to LLQ.    DIVERTICULITIS WITH MICROPERFORATION  Seen by surgery who will follow along.  NPO for now and resume diet in 12-24 hours if stable.  IV cipro and flagyl and switch to oral when improved.  Activity as tolerated    IBS  Hx of this.    OCD  Lexapro        Diet: NPO for Medical/Clinical Reasons Except for: Ice Chips, Meds  DVT Prophylaxis: Enoxaparin (Lovenox) SQ  Cordova Catheter: Not present  Lines: None     Cardiac Monitoring: None  Code Status: Full Code and  is POA    Clinically Significant Risk Factors Present on Admission                             # Overweight: Estimated body mass index is 26.36 kg/m  as calculated from the following:    Height as of this encounter: 1.6 m (5' 3\").    Weight as of this encounter: 67.5 kg (148 lb 13 oz).              Disposition Plan     Medically Ready for Discharge:            Carolynn Galarza MD  Hospitalist Service  [facility]  Securely message with [employer] (more info)  Text page via [employer] Paging/Directory     ______________________________________________________________________    Chief Complaint   Abd pain     History is obtained from the patient    History of Present Illness   Lenora Gage is a 49 year old female who Lenora Gage is a 49 year old female with no relevant past medical history presents to the emergency department today with intermittent left flank pain for the last 2 weeks that is now radiating into the left groin.  She has not had urinary symptoms or bloody urine.  There is nausea no vomiting no bloody stools no blood in vomit.  She does not report associated trauma.  Sometimes when the pain gets really bad it seems to even radiate up into her chest.  It hurts to breathe but otherwise there " is no shortness of breath.  No fever/chills.  Some diarrhea.  No falls or bruising of skin.  N but no weight loss.  Some chest pain      Past Medical History    Past Medical History:   Diagnosis Date    Adjustment disorder with anxiety     Constipation 07/17/2012    Dysmenorrhea 06/20/2002    Dyspareunia 06/13/2007    Dysplasia of cervix (uteri)  07/25/2000    Endometriosis of uterus 12/29/2003    Esophageal reflux 02/07/2000    Helicobacter positive gastritis     IBS (Irritable colon syndrome) 07/06/2011    OCD (obsessive compulsive disorder)        Past Surgical History   Past Surgical History:   Procedure Laterality Date    bladder reconstruction and mesh removal  2012    BLADDER REPAIR  2010    COLONOSCOPY - HIM SCAN N/A 06/21/2007    Colonoscopy to the cecum with biopsy terminal ileum, cecum and descending colon ([facility]) normal    colposcopy with biopsy-mutliple  10/2014    HYSTERECTOMY TOTAL ABDOMINAL, BILATERAL SALPINGO-OOPHORECTOMY, COMBINED Left     Right ovary remains.     IR CONSULTATION FOR IR EXAM  11/3/2022    LAPAROSCOPIC APPENDECTOMY N/A 09/03/2020    Procedure: APPENDECTOMY, LAPAROSCOPIC;  Surgeon: Jonathan Martinez MD;  Location: [facility] OR    TUBAL LIGATION         Prior to Admission Medications   Prior to Admission Medications   Prescriptions Last Dose Informant Patient Reported? Taking?   acyclovir (ZOVIRAX) 400 MG tablet   No No   Sig: Take 1 tablet (400 mg) by mouth every 8 hours   Patient not taking: Reported on 9/30/2024   amphetamine-dextroamphetamine (ADDERALL) 20 MG tablet   Yes No   Sig: Take 20 mg by mouth 3 times daily   escitalopram (LEXAPRO) 10 MG tablet   No No   Sig: Take 1 tablet (10 mg) by mouth daily.   omeprazole (PRILOSEC) 20 MG DR capsule   No No   Sig: Take 1 capsule (20 mg) by mouth daily.      Facility-Administered Medications: None        Review of Systems    The 10 point Review of Systems is negative other than noted in the HPI or here.      Social History   I have reviewed this patient's social history and updated it with pertinent information if needed.  Social History     Tobacco Use    Smoking status: Never     Passive exposure: Never    Smokeless tobacco: Never   Vaping Use    Vaping status: Never Used   Substance Use Topics    Alcohol use: Yes     Alcohol/week: 0.0 standard drinks of alcohol     Comment: RARELY    Drug use: No         Family History   I have reviewed this patient's family history and updated it with pertinent information if needed.  Family History   Problem Relation Age of Onset    Other - See Comments Paternal Grandmother         Antitrysin Deficiency    Cancer Mother         Cervical    Cancer - colorectal Maternal Grandfather          Allergies   Allergies   Allergen Reactions    Blood Transfusion Related (Informational Only) Other (See Comments)     Patient has a history of a clinically significant antibody against RBC antigens.  A delay in compatible RBCs may occur.    Penicillins Anaphylaxis and Hives        Physical Exam   Vital Signs: Temp: 97.4  F (36.3  C) Temp src: Oral BP: 108/65 Pulse: 84   Resp: 17 SpO2: 94 % O2 Device: None (Room air)    Weight: 148 lbs 12.97 oz    General Appearance: Wdwn woman in nad  Respiratory: lcta, no dyspnea  Cardiovascular: rrr, good perfusion  GI: no distension and has pain in llq  Skin: no rashes/lesions on exposed skin     Medical Decision Making             Data   ------------------------- PAST 24 HR DATA REVIEWED -----------------------------------------------    I have personally reviewed the following data over the past 24 hrs:    9.3  \   13.8   / 195     137 100 11.1 /  88   3.7 25 0.79 \     ALT: 54 (H) AST: 41 AP: 121 TBILI: 0.9   ALB: 4.7 TOT PROTEIN: 6.9 LIPASE: 28     Trop: <6 BNP: N/A       Imaging results reviewed over the past 24 hrs:   Recent Results (from the past 24 hours)   CT Abdomen Pelvis w Contrast    Narrative    EXAM: CT ABDOMEN PELVIS W CONTRAST 11/17/2024  6:24 PM    HISTORY: left flank and LLQ pain, stone vs diverticulitis    COMPARISON: CT abdomen and pelvis 4/15/2023    TECHNIQUE:   Imaging protocol: Computed tomography of the abdomen and pelvis with  imaging acquired after administration of intravenous contrast.  Intravenous contrast: Isovue 370 74mL.  Acquisition: This CT exam was performed using one or more the  following dose reduction techniques: automated exposure control,  adjustment of the mA and/or kV according to patient size, and/or  iterative reconstruction technique.    FINDINGS:    LOWER CHEST:  Bibasilar atelectasis. No pulmonary mass or focal consolidation.    ABDOMEN/PELVIS:  LIVER: Normal contour. No suspicious liver lesions.  GALLBLADDER: No radio-opaque stones. No gallbladder wall thickening.  BILE DUCTS: No biliary tract dilatation.  PANCREAS: Within normal limits.  SPLEEN: Spleen is enlarged, measures up to 13.3 cm.  ADRENALS: Within normal limits.    KIDNEYS/URETERS: No soft tissue mass. Mild bilateral symmetric  hydroureteronephrosis. No nephrolithiasis.  URINARY BLADDER: Within normal limits.  REPRODUCTIVE ORGANS: No pelvic masses.    BOWEL: No bowel dilatation. Diverticulosis with marked thickening of  the sigmoid colon with surrounding inflammatory changes, consistent  with acute diverticulitis. Appendix is surgically absent.  PERITONEUM/RETROPERITONEUM: No free air. Marked stranding and edema  surrounding the inflamed segment of sigmoid colon. Possible punctate  focus of air adjacent to the inflamed diverticulum (series 3 image  164). Surgical clip in the left mesentery.  VESSELS: Within normal limits.  LYMPH NODES: There are no pathologically enlarged lymph nodes.    BONES AND SOFT TISSUES:  No suspicious osseous lesions. Degenerative periarticular changes and  spinal spondylosis.      Impression    IMPRESSION:  Acute sigmoid diverticulitis with small punctate hypodensity near the  inflamed diverticulum that could represent focus of  free air and  microperforation, although more likely to represent a focus of fat  necrosis.    EVELYN MURPHY MD         SYSTEM ID:  [id number]   XR Chest 2 Views    Narrative    PROCEDURE:  XR CHEST 2 VIEWS    HISTORY: chest pain    COMPARISON: No relevant priors available for comparison    FINDINGS: PA and lateral chest radiographs    Cardiomediastinal silhouette is within normal limits.  No focal consolidation, effusion or pneumothorax.    No suspicious osseous lesion or subdiaphragmatic free air.      Impression    IMPRESSION:    No acute findings.    EVELYN MURPHY MD         SYSTEM ID:  [id number]   As the provider for the telehealth service, I attest that I introduced myself to the patient and provided my credentials. Based on review of the patient s chart and/or a discussion with members of the patient s treatment team, I determined that telemedicine via a real-time, two-way, interactive audio and video platform is an appropriate means of providing this service. The patient and I mutually agreed that this visit is appropriate for telemedicine as well.  The RN, or tech on duty in the ER, assisted me with the patient.  The patient was located at [facility] and I am located in [place], [place].  The video portion of the visit was approximately 20 mins.

## 2024-11-18 NOTE — CONSULTS
CLINIC NOTE - CONSULT  11/17/2024    Patient:Lenora Gage  Referring Physician:  ER    Reason for Referral: diverticulitis    This is a 49 year old female with h/o GERD, anxiety, bladder prolapse s/p sling, admitted with acute on chronic abdominal pain. Patient reports a 2-3 week h/o LLQ and left back achy pain that became suddenly worse this morning. +non-bloody diarrhea and nausea. No emesis. No urinary symptoms. No fevers. First episode. No personal or Fhx of IBD. Has Fhx of colon cancer. Colonoscopy 2021 wnl.     Past Medical History:  Past Medical History:   Diagnosis Date    Adjustment disorder with anxiety     Constipation 07/17/2012    Dysmenorrhea 06/20/2002    Dyspareunia 06/13/2007    Dysplasia of cervix (uteri)  07/25/2000    Endometriosis of uterus 12/29/2003    Esophageal reflux 02/07/2000    Helicobacter positive gastritis     IBS (Irritable colon syndrome) 07/06/2011    OCD (obsessive compulsive disorder)        Past Surgical History:  Past Surgical History:   Procedure Laterality Date    bladder reconstruction and mesh removal  2012    BLADDER REPAIR  2010    COLONOSCOPY - HIM SCAN N/A 06/21/2007    Colonoscopy to the cecum with biopsy terminal ileum, cecum and descending colon ([facility]) normal    colposcopy with biopsy-mutliple  10/2014    HYSTERECTOMY TOTAL ABDOMINAL, BILATERAL SALPINGO-OOPHORECTOMY, COMBINED Left     Right ovary remains.     IR CONSULTATION FOR IR EXAM  11/3/2022    LAPAROSCOPIC APPENDECTOMY N/A 09/03/2020    Procedure: APPENDECTOMY, LAPAROSCOPIC;  Surgeon: Jonathan Martinez MD;  Location: [facility]    TUBAL LIGATION         Family History History:  Family History   Problem Relation Age of Onset    Other - See Comments Paternal Grandmother         Antitrysin Deficiency    Cancer Mother         Cervical    Cancer - colorectal Maternal Grandfather        History of Tobacco Use:  History   Smoking Status    Never   Smokeless Tobacco    Never        Current Medications:  Current Outpatient Medications   Medication Sig Dispense Refill    acyclovir (ZOVIRAX) 400 MG tablet Take 1 tablet (400 mg) by mouth every 8 hours (Patient not taking: Reported on 9/30/2024) 15 tablet 0    amphetamine-dextroamphetamine (ADDERALL) 20 MG tablet Take 20 mg by mouth 3 times daily      escitalopram (LEXAPRO) 10 MG tablet Take 1 tablet (10 mg) by mouth daily. 90 tablet 0    omeprazole (PRILOSEC) 20 MG DR capsule Take 1 capsule (20 mg) by mouth daily. 90 capsule 2       Allergies:  Allergies   Allergen Reactions    Blood Transfusion Related (Informational Only) Other (See Comments)     Patient has a history of a clinically significant antibody against RBC antigens.  A delay in compatible RBCs may occur.    Penicillins Anaphylaxis and Hives       ROS:  Pertinent items are noted in HPI.    PHYSICAL EXAM:     Vital signs: /79   Pulse 87   Temp 98.4  F (36.9  C) (Oral)   Resp 14   Wt 68.7 kg (151 lb 6.4 oz)   SpO2 96%   BMI 27.69 kg/m     BMI: Body mass index is 27.69 kg/m .   General: Normal, cooperative, in no acute distress   Skin: no rashes, warm, perfused   HEENT: EOMI, Sclera clear, anicteric, and Trachea midline   Neck: supple, full range of motion   Lungs: clear to auscultation, without wheezes, unlabored   CV: Regular rate and rhythm without murmer   Abdominal: soft, mild tenderness in the LLQ area, no rebound/guarding, no peritonitis   Extremities: No cyanosis, clubbing or edema noted bilaterally in Upper and Lower Extremities   Neurological: without deficit    No leuk. CT notable for sigmoid diverticulitis, read as possible microperforation    ASSESSMENT: 49F with prior abdominal surgery, p/w first episode of ?complicated diverticulitis. Focally tender without peritonitis    PLAN:   - No current indication for acute surgical intervention  - would likely benefit from IV abx  - symptom control  - IVF, keep NPO for now; if stable/improved in 12-24 hours, can  resume diet  - serial abd exams  - OOB/A  - following    S Suresh Pimentel MD

## 2024-11-18 NOTE — PLAN OF CARE
"Plan of Care Reviewed With:     Overall Patient Progress:    BP (!) 79/46   Pulse 64   Temp 97  F (36.1  C) (Tympanic)   Resp 18   Ht 1.6 m (5' 3\")   Wt 67.5 kg (148 lb 13 oz)   SpO2 97%   BMI 26.36 kg/m      Patient is A&Ox4 and makes needs known appropriately. She's been walking into the bathroom and is a stand by, due to complaining of dizziness and lightheaded. Patients Blood Pressures have been low and night and day provides have been kept up to date.  Patient has been rating pain at 7-8 out of 10.    Face to face report given with opportunity to observe patient.    Report given to SHARMAINE Mccarthy RN   11/18/2024  7:32 AM      "

## 2024-11-18 NOTE — PLAN OF CARE
[facility] Inpatient Admission Note:    Patient admitted to 3228/3228-1 at approximately 2303 via bed accompanied by other:Staff from emergency room . Report received from Sheridan in SBAR format at 2241 via telephone. Patient ambulated to bed via self.. Patient is alert and oriented X 3, reports pain; rates at 6 on 0-10 scale.  Patient oriented to room, unit, hourly rounding, and plan of care. Explained admission packet and patient handbook with patient bill of rights brochure. Will continue to monitor and document as needed.     Inpatient Nursing criteria listed below was met:    Health care directives status obtained and documented: Yes    Patient identifies a surrogate decision maker: Yes If yes, who:  Contact Information: [phone]     If initial lactic acid greater than 2.0, repeat lactic acid drawn within one hour of arrival to unit: Yes.     Clergy visit ordered if patient requests: No    Skin issues/needs documented: No    Isolation Patient: yes Education given, correct sign in place and documentation row added to PCS:  Yes    Fall Prevention Yes: Care plan updated, education given and documented, sticker and magnet in place: Yes    Care Plan initiated: Yes    Education Documented (including assessment): Yes    Patient has discharge needs : No     Gabbi Rivas RN   11/18/2024  10:53 PM

## 2024-11-19 LAB
ANION GAP SERPL CALCULATED.3IONS-SCNC: 9 MMOL/L (ref 7–15)
ATRIAL RATE - MUSE: 96 BPM
BASOPHILS # BLD AUTO: 0 10E3/UL (ref 0–0.2)
BASOPHILS NFR BLD AUTO: 0 %
BUN SERPL-MCNC: 5.4 MG/DL (ref 6–20)
CALCIUM SERPL-MCNC: 7.9 MG/DL (ref 8.8–10.4)
CHLORIDE SERPL-SCNC: 111 MMOL/L (ref 98–107)
CREAT SERPL-MCNC: 0.81 MG/DL (ref 0.51–0.95)
CRP SERPL-MCNC: 15.81 MG/L
DIASTOLIC BLOOD PRESSURE - MUSE: NORMAL MMHG
EGFRCR SERPLBLD CKD-EPI 2021: 88 ML/MIN/1.73M2
EOSINOPHIL # BLD AUTO: 0.1 10E3/UL (ref 0–0.7)
EOSINOPHIL NFR BLD AUTO: 2 %
ERYTHROCYTE [DISTWIDTH] IN BLOOD BY AUTOMATED COUNT: 11.9 % (ref 10–15)
GLUCOSE SERPL-MCNC: 80 MG/DL (ref 70–99)
HCO3 SERPL-SCNC: 21 MMOL/L (ref 22–29)
HCT VFR BLD AUTO: 34.3 % (ref 35–47)
HGB BLD-MCNC: 11.5 G/DL (ref 11.7–15.7)
IMM GRANULOCYTES # BLD: 0 10E3/UL
IMM GRANULOCYTES NFR BLD: 0 %
INTERPRETATION ECG - MUSE: NORMAL
LYMPHOCYTES # BLD AUTO: 0.7 10E3/UL (ref 0.8–5.3)
LYMPHOCYTES NFR BLD AUTO: 16 %
MAGNESIUM SERPL-MCNC: 1.7 MG/DL (ref 1.7–2.3)
MCH RBC QN AUTO: 29.5 PG (ref 26.5–33)
MCHC RBC AUTO-ENTMCNC: 33.5 G/DL (ref 31.5–36.5)
MCV RBC AUTO: 88 FL (ref 78–100)
MONOCYTES # BLD AUTO: 0.3 10E3/UL (ref 0–1.3)
MONOCYTES NFR BLD AUTO: 7 %
NEUTROPHILS # BLD AUTO: 3.3 10E3/UL (ref 1.6–8.3)
NEUTROPHILS NFR BLD AUTO: 74 %
NRBC # BLD AUTO: 0 10E3/UL
NRBC BLD AUTO-RTO: 0 /100
P AXIS - MUSE: 57 DEGREES
PLATELET # BLD AUTO: 158 10E3/UL (ref 150–450)
POTASSIUM SERPL-SCNC: 4.2 MMOL/L (ref 3.4–5.3)
PR INTERVAL - MUSE: 130 MS
PROCALCITONIN SERPL IA-MCNC: 0.04 NG/ML
QRS DURATION - MUSE: 72 MS
QT - MUSE: 368 MS
QTC - MUSE: 464 MS
R AXIS - MUSE: 57 DEGREES
RBC # BLD AUTO: 3.9 10E6/UL (ref 3.8–5.2)
SODIUM SERPL-SCNC: 141 MMOL/L (ref 135–145)
SYSTOLIC BLOOD PRESSURE - MUSE: NORMAL MMHG
T AXIS - MUSE: 50 DEGREES
VENTRICULAR RATE- MUSE: 96 BPM
WBC # BLD AUTO: 4.5 10E3/UL (ref 4–11)

## 2024-11-19 PROCEDURE — 85025 COMPLETE CBC W/AUTO DIFF WBC: CPT | Performed by: HOSPITALIST

## 2024-11-19 PROCEDURE — 250N000009 HC RX 250: Performed by: HOSPITALIST

## 2024-11-19 PROCEDURE — 258N000003 HC RX IP 258 OP 636: Performed by: HOSPITALIST

## 2024-11-19 PROCEDURE — 250N000013 HC RX MED GY IP 250 OP 250 PS 637: Performed by: HOSPITALIST

## 2024-11-19 PROCEDURE — 83735 ASSAY OF MAGNESIUM: CPT | Performed by: HOSPITALIST

## 2024-11-19 PROCEDURE — 99231 SBSQ HOSP IP/OBS SF/LOW 25: CPT | Performed by: SURGERY

## 2024-11-19 PROCEDURE — 99232 SBSQ HOSP IP/OBS MODERATE 35: CPT | Performed by: HOSPITALIST

## 2024-11-19 PROCEDURE — 36415 COLL VENOUS BLD VENIPUNCTURE: CPT | Performed by: HOSPITALIST

## 2024-11-19 PROCEDURE — 86140 C-REACTIVE PROTEIN: CPT | Performed by: HOSPITALIST

## 2024-11-19 PROCEDURE — 80048 BASIC METABOLIC PNL TOTAL CA: CPT | Performed by: HOSPITALIST

## 2024-11-19 PROCEDURE — 120N000001 HC R&B MED SURG/OB

## 2024-11-19 PROCEDURE — 250N000011 HC RX IP 250 OP 636: Performed by: HOSPITALIST

## 2024-11-19 PROCEDURE — 84145 PROCALCITONIN (PCT): CPT | Performed by: HOSPITALIST

## 2024-11-19 RX ORDER — PROCHLORPERAZINE 25 MG
25 SUPPOSITORY, RECTAL RECTAL EVERY 12 HOURS PRN
Status: DISCONTINUED | OUTPATIENT
Start: 2024-11-19 | End: 2024-11-20 | Stop reason: HOSPADM

## 2024-11-19 RX ORDER — PROCHLORPERAZINE MALEATE 5 MG/1
5 TABLET ORAL EVERY 6 HOURS PRN
Status: DISCONTINUED | OUTPATIENT
Start: 2024-11-19 | End: 2024-11-20 | Stop reason: HOSPADM

## 2024-11-19 RX ORDER — ACETAMINOPHEN 325 MG/1
975 TABLET ORAL 3 TIMES DAILY
Status: DISCONTINUED | OUTPATIENT
Start: 2024-11-19 | End: 2024-11-20 | Stop reason: HOSPADM

## 2024-11-19 RX ORDER — PANTOPRAZOLE SODIUM 40 MG/1
40 TABLET, DELAYED RELEASE ORAL
Status: DISCONTINUED | OUTPATIENT
Start: 2024-11-20 | End: 2024-11-20 | Stop reason: HOSPADM

## 2024-11-19 RX ADMIN — METRONIDAZOLE 500 MG: 5 INJECTION, SOLUTION INTRAVENOUS at 10:49

## 2024-11-19 RX ADMIN — KETOROLAC TROMETHAMINE 30 MG: 30 INJECTION, SOLUTION INTRAMUSCULAR at 05:00

## 2024-11-19 RX ADMIN — ONDANSETRON 4 MG: 2 INJECTION INTRAMUSCULAR; INTRAVENOUS at 09:50

## 2024-11-19 RX ADMIN — POLYETHYLENE GLYCOL 3350 17 G: 17 POWDER, FOR SOLUTION ORAL at 09:36

## 2024-11-19 RX ADMIN — SODIUM CHLORIDE, PRESERVATIVE FREE: 5 INJECTION INTRAVENOUS at 05:01

## 2024-11-19 RX ADMIN — ACETAMINOPHEN 1000 MG: 10 INJECTION INTRAVENOUS at 02:03

## 2024-11-19 RX ADMIN — ACETAMINOPHEN 975 MG: 325 TABLET, FILM COATED ORAL at 15:50

## 2024-11-19 RX ADMIN — ONDANSETRON 4 MG: 2 INJECTION INTRAMUSCULAR; INTRAVENOUS at 21:03

## 2024-11-19 RX ADMIN — METRONIDAZOLE 500 MG: 5 INJECTION, SOLUTION INTRAVENOUS at 04:27

## 2024-11-19 RX ADMIN — METRONIDAZOLE 500 MG: 5 INJECTION, SOLUTION INTRAVENOUS at 16:47

## 2024-11-19 RX ADMIN — CIPROFLOXACIN 400 MG: 2 INJECTION, SOLUTION INTRAVENOUS at 19:55

## 2024-11-19 RX ADMIN — PANTOPRAZOLE SODIUM 40 MG: 40 INJECTION, POWDER, FOR SOLUTION INTRAVENOUS at 09:36

## 2024-11-19 RX ADMIN — METRONIDAZOLE 500 MG: 5 INJECTION, SOLUTION INTRAVENOUS at 21:36

## 2024-11-19 RX ADMIN — KETOROLAC TROMETHAMINE 30 MG: 30 INJECTION, SOLUTION INTRAMUSCULAR at 13:10

## 2024-11-19 RX ADMIN — CIPROFLOXACIN 400 MG: 2 INJECTION, SOLUTION INTRAVENOUS at 09:36

## 2024-11-19 NOTE — PROGRESS NOTES
[facility]    Hospitalist Progress Note      Assessment & Plan   Lenora Gage is a 49 year old female who was admitted on 11/17/2024.     Hospital Course:  Patient is admitted with acute diverticulitis with microperforation, she has been having abdominal pain that is worsened over the last 2 weeks.  She overnight did have hypotension required fluid bolus.  This morning she had blood pressures in the high 70 systolic's maps were in the mid 50s I did order another liter bolus of normal saline I did see the patient she was alert and oriented but did complain of left abdominal pain, she was discussed with general surgeon and his recommendation was to repeat CT imaging to make sure there is no worsening perforation.  Patient also was moved to the ICU for further care and monitoring may need pressors to keep her blood pressure is up.  I did order labs on her and I did increase her Flagyl and discussed with pharmacy regarding increasing the ciprofloxacin.  She does have allergies to penicillin so we will try to avoid Zosyn at this time.  - 11/19 WBCs normal, CRP decreasing, Procalcitonin normal, minimal tenderness with abdominal exam. IV NE was discontinued. Gen surgery - okay for regular diet and oral abx today     DIVERTICULITIS WITH MICROPERFORATION  Sepsis with hypotension    - Continue Flagyl IV every 6 hours  - Continue Cipro IV  - Regular diet  - 1 L NS bolus given on 11/18   -Transferred to ICU for levophed - No use since yesterday (11/18)  - NS decreased to 50ml (11/19)  - Change morphine and Dilaudid to oxycodone as needed for severe pain  - Scheduled IV Tylenol every 6 hours that she is n.p.o.  - As needed Toradol  - Hold home SSRI  - Hold Lovenox as possible surgery  - General Surgery seeing patient   - May need to use Levophed and asked to keep MAP above 65  - Repeat CT scan abdomen pelvis with contrast stat done no worsening perforation or abscess formation      IBS  Noted      OCD  Major  depressive disorder, recurrent episode, moderate (H)  Holding  home selective serotonin reuptake inhibitor     GERD: PPI    DVT Prophylaxis: Enoxaparin (Lovenox) subcutaneous - HOLD as above  Code Status: Full Code  { TIP  Expected discharge date has passed or is blank! Click here to update expected discharge date and refresh note (tipsheet)     :[id number]} ***    Jere Cornejo MS3 assisted with history and eval under supervision of Dr Damian    Interval History   Overall minimal pain but good control. No particular questions or concerns. Had emesis after oxycodone, Zofran helped.    -Data reviewed today: I reviewed all new labs and imaging results over the last 24 hours. I personally reviewed no images or EKG's today.    Physical Exam   Temp: 98  F (36.7  C) Temp src: Tympanic BP: 94/63 Pulse: 78   Resp: 13 SpO2: 94 % O2 Device: None (Room air) Oxygen Delivery: 2 LPM  Vitals:    11/17/24 1651 11/17/24 2303   Weight: 68.7 kg (151 lb 6.4 oz) 67.5 kg (148 lb 13 oz)     Vital Signs with Ranges  Temp:  [96.8  F (36  C)-98  F (36.7  C)] 98  F (36.7  C)  Pulse:  [54-86] 78  Resp:  [7-23] 13  BP: ()/(47-81) 94/63  SpO2:  [88 %-97 %] 94 %  I/O last 3 completed shifts:  In: 3163.2 [I.V.:3163.2]  Out: 1650 [Urine:1650]    Physical Exam  Constitutional:       General: She is not in acute distress.  Cardiovascular:      Rate and Rhythm: Normal rate and regular rhythm.      Heart sounds: Normal heart sounds. No murmur heard.  Pulmonary:      Effort: Pulmonary effort is normal. No respiratory distress.      Breath sounds: Normal breath sounds.   Abdominal:      General: Abdomen is flat.      Palpations: Abdomen is soft.      Tenderness: There is abdominal tenderness.      Comments: Mild LLQ tenderness.   Skin:     Findings: No erythema, lesion or rash.   Neurological:      General: No focal deficit present.      Mental Status: She is alert. Mental status is at baseline.   Psychiatric:         Mood and Affect: Mood normal.          Behavior: Behavior normal.       10 point ROS of systems including Constitutional, Eyes, Respiratory, Cardiovascular, Gastroenterology, Genitourinary, Integumentary, Muscularskeletal, Psychiatric were all negative except for pertinent positives noted in my HPI.    Medications   Current Facility-Administered Medications   Medication Dose Route Frequency Provider Last Rate Last Admin    sodium chloride 0.9 % infusion   Intravenous Continuous Osman Damian DO 50 mL/hr at 11/19/24 0950 Rate Change at 11/19/24 0950     Current Facility-Administered Medications   Medication Dose Route Frequency Provider Last Rate Last Admin    acetaminophen (TYLENOL) tablet 975 mg  975 mg Oral TID Osman Damian DO        ciprofloxacin (CIPRO) infusion 400 mg  400 mg Intravenous Q12H Carolynn Galarza MD 0 mL/hr at 11/18/24 0948 400 mg at 11/19/24 0936    [Held by provider] enoxaparin ANTICOAGULANT (LOVENOX) injection 40 mg  40 mg Subcutaneous Q24H Carolynn Galarza MD        [Held by provider] escitalopram (LEXAPRO) tablet 10 mg  10 mg Oral Daily Osman Damian DO        metroNIDAZOLE (FLAGYL) infusion 500 mg  500 mg Intravenous Q6H Osman Damian DO   500 mg at 11/19/24 1049    [START ON 11/20/2024] pantoprazole (PROTONIX) EC tablet 40 mg  40 mg Oral QAM AC Osman Damian DO        polyethylene glycol (MIRALAX) Packet 17 g  17 g Oral Daily KADI'Carolynn Cardoso MD   17 g at 11/19/24 0936    sodium chloride (PF) 0.9% PF flush 3 mL  3 mL Intracatheter Q8H KADI'Carolynn Cardoso MD   3 mL at 11/19/24 0936       Data   Recent Labs   Lab 11/19/24  0453 11/18/24  1652 11/18/24  0802 11/18/24  0616 11/17/24  1702   WBC 4.5  --   --  4.9 9.3   HGB 11.5*  --   --  12.1 13.8   MCV 88  --   --  88 86     --   --  157 195   INR  --   --  1.13  --   --      --   --  141 137   POTASSIUM 4.2  --   --  4.1 3.7   CHLORIDE 111*  --   --  108* 100   CO2 21*  --    --  25 25   BUN 5.4*  --   --  8.0 11.1   CR 0.81  --   --  0.74 0.79   ANIONGAP 9  --   --  8 12   LUIS 7.9*  --   --  8.2* 9.2   GLC 80 89  --  103* 88   ALBUMIN  --   --  3.5  --  4.7   PROTTOTAL  --   --  5.2*  --  6.9   BILITOTAL  --   --  0.8  --  0.9   ALKPHOS  --   --  86  --  121   ALT  --   --  33  --  54*   AST  --   --  20  --  41   LIPASE  --   --   --   --  28       No results found for this or any previous visit (from the past 24 hours).

## 2024-11-19 NOTE — PLAN OF CARE
"BP 90/66 (BP Location: Left arm)   Pulse 76   Temp 98  F (36.7  C) (Tympanic)   Resp 13   Ht 1.6 m (5' 3\")   Wt 67.5 kg (148 lb 13 oz)   SpO2 94%   BMI 26.36 kg/m      Pt alert, oriented. Denies pain. Zofran 1x fore mild nausea, pt states helped. BS active, sot tender. No stools this sift. Poor appetite, able to eat crackers thus far but planning on ordering light foods. SR 60s. Lungs clear, Sating 97% RA. Up to chair at bedside, tolerated well. Denies symptoms with position changes. BPs back at baseline, pt chronically runs soft.     Face to face report given with opportunity to observe patient.    Report given to Tiffany SCHMID and Johana Sol RN   11/19/2024  12:02 PM    "

## 2024-11-19 NOTE — PHARMACY-MEDICATION REGIMEN REVIEW
Pharmacy Antimicrobial Stewardship Assessment     Current Antimicrobial Therapy:  Anti-infectives (From now, onward)      Start     Dose/Rate Route Frequency Ordered Stop    24 0900  metroNIDAZOLE (FLAGYL) infusion 500 mg        Note to Pharmacy: Metronidazole IV may be dosed more frequently than Q12h for bacteremia, CNS infection and Clostridium difficile.    500 mg  over 60 Minutes Intravenous EVERY 6 HOURS 24 0722 24 1029    24 0800  ciprofloxacin (CIPRO) infusion 400 mg         400 mg  over 1 Hours Intravenous EVERY 12 HOURS 24 2359 24 0759            Indication: Intra-abdominal infection; diverticulitis    Days of Therapy: Day 3 for metronidazole and ciprofloxacin     Pertinent Labs:    Recent Labs   Lab Test 24  0453 24  0616 24  1702 24  1033 04/15/23  0919 10/05/22  1444 05/10/21  1239   WBC 4.5 4.9 9.3 5.3 6.0 5.8 5.7       Recent Labs   Lab Test 24  0802 24  0616 20  1505   LACT 0.4*  --  1.5   CRPI  --  20.74*  --    PCAL  --  0.04  --         Temperature:  Temp (24hrs), Av.2  F (36.2  C), Min:96.8  F (36  C), Max:97.5  F (36.4  C)      Culture Results:   30-Day Micro Results       Collected Updated Procedure Result Status      2024 1039 2024 1042 Blood Culture Arm, Left [[id number]]   Blood from Arm, Left    In process Component Value   No component results                        Recent Antibiotics:  None    Recommendations/Interventions:  1. Intra-abdominal infection/diverticulitis: Continue metronidazole 500 mg every 6 hours PLUS ciprofloxacin 400 mg every 12 hours. Per UpToDate:    2. Will monitor culture/susceptibility results and tailor antibiotics accordingly.   3. Patient does have penicillin allergy (anaphylaxis/hives) that class of antibiotics (per [employer]):       Laura Galo  2024

## 2024-11-19 NOTE — PLAN OF CARE
Goal Outcome Evaluation:      Plan of Care Reviewed With: patient    Overall Patient Progress: no changeOverall Patient Progress: no change    Patient is alert and oriented, makes needs known. Uses bedpan with assist of one. Pain controlled with PRNs per MAR. BP labile this shift, MAPs borderline. Patient had emesis x1 about an hour after oxycodone dose, PRN Zofran given with relief in nausea. Patient very concerned about headache, pain is relieved with PRN Toradol.  Assessments as charted.     Face to face report given with opportunity to observe patient.    Report given to SHARMAINE Toscano RN   11/19/2024  7:26 AM

## 2024-11-19 NOTE — PROGRESS NOTES
[facility]    Medicine Progress Note - Hospitalist Service    Date of Admission:  11/17/2024    Assessment & Plan   Hospital Course:  Patient is admitted with acute diverticulitis with microperforation, she has been having abdominal pain that is worsened over the last 2 weeks.  She overnight did have hypotension required fluid bolus.  This morning she had blood pressures in the high 70 systolic's maps were in the mid 50s I did order another liter bolus of normal saline I did see the patient she was alert and oriented but did complain of left abdominal pain, she was discussed with general surgeon and his recommendation was to repeat CT imaging to make sure there is no worsening perforation.  Patient also was moved to the ICU for further care and monitoring may need pressors to keep her blood pressure is up.  I did order labs on her and I did increase her Flagyl and discussed with pharmacy regarding increasing the ciprofloxacin.  She does have allergies to penicillin so we will try to avoid Zosyn at this time.  On11/19 off levophed. Pain improved. No fever. Normal wbc, normal procal, improving crp. Will move out of icu. Wean off iv fluids. Diet advanced per surgery.      DIVERTICULITIS WITH MICROPERFORATION  Sepsis with hypotension  suspected required levophed briefly ( could be2nd from narcotic  medication effect)   -Continue Flagyl every 6 hours  -Continue Cipro   -1 L NS bolus given on 11/18   -Transferred to ICU for levophed   -ON iV fluids     -Change morphine and  Dilaudid to oxycodone as needed for severe pain  -Scheduled IV Tylenol every 6 hours that she is n.p.o.  -As needed Toradol  -Hold home selective serotonin reuptake inhibitor  -Hold Lovenox   -General Surgery  seeing patient   - keep MAP above 65  -Repeat CT scan abdomen pelvis with contrast stat done no worsening perforation or abscess formation     On 11/19 improving  Ok to move out of icu as off levophed since yesterday  Diet advanced  "per surgery    IBS  Noted      OCD  Holding  home selective serotonin reuptake inhibitor           Diet: Regular Diet Adult    DVT Prophylaxis: Pneumatic Compression Devices  [place] Catheter: Not present  Lines: None     Cardiac Monitoring: ACTIVE order. Indication: Tachyarrhythmias, acute (48 hours)  Code Status: Full Code      Clinically Significant Risk Factors          # Hyperchloremia: Highest Cl = 111 mmol/L in last 2 days, will monitor as appropriate      # Hypocalcemia: Lowest Ca = 7.9 mg/dL in last 2 days, will monitor and replace as appropriate             # Acute Hypoxic Respiratory Failure: Documented O2 saturation < 90%. Continue supplemental oxygen as needed         # Overweight: Estimated body mass index is 26.36 kg/m  as calculated from the following:    Height as of this encounter: 1.6 m (5' 3\").    Weight as of this encounter: 67.5 kg (148 lb 13 oz)., PRESENT ON ADMISSION            Social Drivers of Health    Physical Activity: Insufficiently Active (9/30/2024)    Exercise Vital Sign     Days of Exercise per Week: 3 days     Minutes of Exercise per Session: 20 min   Stress: Stress Concern Present (9/30/2024)    [employer] - Occupational Stress Questionnaire     Feeling of Stress : Very much   Social Connections: Unknown (9/30/2024)    Social Connection and Isolation Panel [NHANES]     Frequency of Social Gatherings with Friends and Family: Once a week          Disposition Plan     Medically Ready for Discharge: Anticipated in 2-4 Days             Osman Damian DO  Hospitalist Service  [facility]  Securely message with [employer] (more info)  Text page via [employer] Paging/Directory   ______________________________________________________________________    Interval History   Patient was seen this morning for medical rounds. No chest pain or shortness of b reaht.   Abd pain improved  No fevers      Physical Exam   Vital Signs: Temp: 98  F (36.7  C) Temp src: " Tympanic BP: 99/59 Pulse: 78   Resp: 13 SpO2: 94 % O2 Device: None (Room air) Oxygen Delivery: 2 LPM  Weight: 148 lbs 12.97 oz    Physical Exam  HENT:      Right Ear: External ear normal.      Left Ear: External ear normal.      Nose: Nose normal.      Mouth/Throat:      Pharynx: Oropharynx is clear.   Eyes:      Conjunctiva/sclera: Conjunctivae normal.   Cardiovascular:      Rate and Rhythm: Normal rate.   Pulmonary:      Effort: Pulmonary effort is normal.   Abdominal:      General: Abdomen is flat.      Tenderness: There is abdominal tenderness.   Musculoskeletal:         General: No swelling.   Skin:     Coloration: Skin is not jaundiced.   Neurological:      Mental Status: She is alert. Mental status is at baseline.         Medical Decision Making       62 MINUTES SPENT BY ME on the date of service doing chart review, history, exam, documentation & further activities per the note.      Data     I have personally reviewed the following data over the past 24 hrs:    4.5  \   11.5 (L)   / 158     141 111 (H) 5.4 (L) /  80   4.2 21 (L) 0.81 \     Procal: 0.04 CRP: 15.81 (H) Lactic Acid: N/A         Imaging results reviewed over the past 24 hrs:   No results found for this or any previous visit (from the past 24 hours).

## 2024-11-19 NOTE — PROGRESS NOTES
General Surgery    S:  Pain improved.  Episode of emesis overnight.  Has yet to get out of bed.  Voiding.    O:  Off of levophed since yesterday evening.    Abdominal exam is much improved, minimally tender to palpation in LLQ    Labs reviewed, CRP decreasing.  WBC remains wnl    A&P:  49 year old female admitted with diverticulitis.    Doing better with resuscitation and switching to oral narcotics.  Okay for regular diet and oral antibiotics today.  Okay for discharge later this afternoon if tolerating diet and feeling well.  Will see patient in follow up and get her scheduled for a colonoscopy in 4 weeks after this episode.

## 2024-11-19 NOTE — PROGRESS NOTES
Medical record and Florentino Score reviewed. Participated in interdisciplinary rounds.  No apparent needs noted at this time. Care Transitions will remain available if needs arise.    RAMSES Leon @[phone] November 19, 2024

## 2024-11-19 NOTE — PLAN OF CARE
Goal Outcome Evaluation:       Pt transferred to ICU this morning due to hypotension. Pt drowsy, oriented x3. Levophed infusing until 1831 today to maintain MAPs >65. Afebrile. PRN dilaudid, oxycodone, toradol given for abd, back, and headache pain. On RA, does desat to 88-89% while asleep at times. LS clear. HRR. NSR 70-80s. BS hyperactive, abd soft, tender to LLQ. Zofran given x1 for nausea. Adequate output of alfredo colored urine. UA collected this shift. Moving self independently in bed, remains bedrest.  mL/hr. IV Cipro & Flaygl continues. Call light within reach.    Face to face report given with opportunity to observe patient.    Report given to SHARMAINE Ashraf RN   11/18/2024  7:09 PM

## 2024-11-19 NOTE — PLAN OF CARE
Goal Outcome Evaluation:      Plan of Care Reviewed With: patient    Overall Patient Progress: improving    Pt AOX4. Makes needs known. Report 5/10 headache pain, PRN Toradol given with good relief.  0.9 NS running @ 25 mL/hr per orders. IV Flagyl infused per orders. Up with SBA to bathroom to void. Fair appetite, ordered dinner. Denies any nausea during rest of shift. BP at baseline for shift. Call light in reach.    Face to face report given with opportunity to observe patient.    Report given to Daja. SHARMAINE De La Garza RN

## 2024-11-20 ENCOUNTER — TELEPHONE (OUTPATIENT)
Dept: FAMILY MEDICINE | Facility: OTHER | Age: 49
End: 2024-11-20

## 2024-11-20 VITALS
RESPIRATION RATE: 14 BRPM | OXYGEN SATURATION: 98 % | BODY MASS INDEX: 26.37 KG/M2 | HEIGHT: 63 IN | DIASTOLIC BLOOD PRESSURE: 60 MMHG | WEIGHT: 148.81 LBS | SYSTOLIC BLOOD PRESSURE: 92 MMHG | HEART RATE: 72 BPM | TEMPERATURE: 98.5 F

## 2024-11-20 LAB
ANION GAP SERPL CALCULATED.3IONS-SCNC: 8 MMOL/L (ref 7–15)
BASOPHILS # BLD AUTO: 0 10E3/UL (ref 0–0.2)
BASOPHILS NFR BLD AUTO: 1 %
BUN SERPL-MCNC: 6.8 MG/DL (ref 6–20)
CALCIUM SERPL-MCNC: 8.1 MG/DL (ref 8.8–10.4)
CHLORIDE SERPL-SCNC: 108 MMOL/L (ref 98–107)
CREAT SERPL-MCNC: 0.8 MG/DL (ref 0.51–0.95)
EGFRCR SERPLBLD CKD-EPI 2021: 89 ML/MIN/1.73M2
EOSINOPHIL # BLD AUTO: 0.1 10E3/UL (ref 0–0.7)
EOSINOPHIL NFR BLD AUTO: 3 %
ERYTHROCYTE [DISTWIDTH] IN BLOOD BY AUTOMATED COUNT: 11.8 % (ref 10–15)
GLUCOSE SERPL-MCNC: 101 MG/DL (ref 70–99)
HCO3 SERPL-SCNC: 24 MMOL/L (ref 22–29)
HCT VFR BLD AUTO: 33.4 % (ref 35–47)
HGB BLD-MCNC: 11.6 G/DL (ref 11.7–15.7)
IMM GRANULOCYTES # BLD: 0 10E3/UL
IMM GRANULOCYTES NFR BLD: 0 %
LYMPHOCYTES # BLD AUTO: 0.8 10E3/UL (ref 0.8–5.3)
LYMPHOCYTES NFR BLD AUTO: 20 %
MCH RBC QN AUTO: 29.6 PG (ref 26.5–33)
MCHC RBC AUTO-ENTMCNC: 34.7 G/DL (ref 31.5–36.5)
MCV RBC AUTO: 85 FL (ref 78–100)
MONOCYTES # BLD AUTO: 0.4 10E3/UL (ref 0–1.3)
MONOCYTES NFR BLD AUTO: 10 %
NEUTROPHILS # BLD AUTO: 2.6 10E3/UL (ref 1.6–8.3)
NEUTROPHILS NFR BLD AUTO: 67 %
NRBC # BLD AUTO: 0 10E3/UL
NRBC BLD AUTO-RTO: 0 /100
PLATELET # BLD AUTO: 176 10E3/UL (ref 150–450)
POTASSIUM SERPL-SCNC: 3.8 MMOL/L (ref 3.4–5.3)
RBC # BLD AUTO: 3.92 10E6/UL (ref 3.8–5.2)
SODIUM SERPL-SCNC: 140 MMOL/L (ref 135–145)
WBC # BLD AUTO: 3.9 10E3/UL (ref 4–11)

## 2024-11-20 PROCEDURE — 82565 ASSAY OF CREATININE: CPT | Performed by: HOSPITALIST

## 2024-11-20 PROCEDURE — 250N000013 HC RX MED GY IP 250 OP 250 PS 637: Performed by: HOSPITALIST

## 2024-11-20 PROCEDURE — 36415 COLL VENOUS BLD VENIPUNCTURE: CPT | Performed by: HOSPITALIST

## 2024-11-20 PROCEDURE — 250N000013 HC RX MED GY IP 250 OP 250 PS 637: Performed by: INTERNAL MEDICINE

## 2024-11-20 PROCEDURE — 250N000011 HC RX IP 250 OP 636: Mod: JZ | Performed by: HOSPITALIST

## 2024-11-20 PROCEDURE — 85025 COMPLETE CBC W/AUTO DIFF WBC: CPT | Performed by: HOSPITALIST

## 2024-11-20 PROCEDURE — 80048 BASIC METABOLIC PNL TOTAL CA: CPT | Performed by: HOSPITALIST

## 2024-11-20 PROCEDURE — 250N000011 HC RX IP 250 OP 636: Performed by: HOSPITALIST

## 2024-11-20 PROCEDURE — 99239 HOSP IP/OBS DSCHRG MGMT >30: CPT | Performed by: HOSPITALIST

## 2024-11-20 PROCEDURE — 82374 ASSAY BLOOD CARBON DIOXIDE: CPT | Performed by: HOSPITALIST

## 2024-11-20 RX ORDER — ONDANSETRON 4 MG/1
4 TABLET, ORALLY DISINTEGRATING ORAL EVERY 12 HOURS PRN
Qty: 20 TABLET | Refills: 0 | Status: SHIPPED | OUTPATIENT
Start: 2024-11-20 | End: 2024-11-30

## 2024-11-20 RX ORDER — ACETAMINOPHEN 325 MG/1
975 TABLET ORAL 3 TIMES DAILY
Qty: 90 TABLET | Refills: 0 | Status: SHIPPED | OUTPATIENT
Start: 2024-11-20 | End: 2024-11-30

## 2024-11-20 RX ORDER — LIDOCAINE 4 G/G
3 PATCH TOPICAL
Status: DISCONTINUED | OUTPATIENT
Start: 2024-11-20 | End: 2024-11-20 | Stop reason: HOSPADM

## 2024-11-20 RX ORDER — METRONIDAZOLE 500 MG/1
500 TABLET ORAL 3 TIMES DAILY
Qty: 21 TABLET | Refills: 0 | Status: SHIPPED | OUTPATIENT
Start: 2024-11-20 | End: 2024-11-27

## 2024-11-20 RX ORDER — METRONIDAZOLE 500 MG/1
500 TABLET ORAL 3 TIMES DAILY
Status: DISCONTINUED | OUTPATIENT
Start: 2024-11-20 | End: 2024-11-20 | Stop reason: HOSPADM

## 2024-11-20 RX ORDER — CIPROFLOXACIN 500 MG/1
500 TABLET, FILM COATED ORAL EVERY 12 HOURS SCHEDULED
Status: DISCONTINUED | OUTPATIENT
Start: 2024-11-20 | End: 2024-11-20 | Stop reason: HOSPADM

## 2024-11-20 RX ORDER — CIPROFLOXACIN 500 MG/1
500 TABLET, FILM COATED ORAL EVERY 12 HOURS
Qty: 14 TABLET | Refills: 0 | Status: SHIPPED | OUTPATIENT
Start: 2024-11-20 | End: 2024-11-27

## 2024-11-20 RX ADMIN — PANTOPRAZOLE SODIUM 40 MG: 40 TABLET, DELAYED RELEASE ORAL at 08:48

## 2024-11-20 RX ADMIN — METRONIDAZOLE 500 MG: 500 TABLET ORAL at 13:20

## 2024-11-20 RX ADMIN — METRONIDAZOLE 500 MG: 500 TABLET ORAL at 10:05

## 2024-11-20 RX ADMIN — PROCHLORPERAZINE MALEATE 5 MG: 5 TABLET ORAL at 08:47

## 2024-11-20 RX ADMIN — SALINE NASAL SPRAY 2 SPRAY: 1.5 SOLUTION NASAL at 10:11

## 2024-11-20 RX ADMIN — METRONIDAZOLE 500 MG: 5 INJECTION, SOLUTION INTRAVENOUS at 04:37

## 2024-11-20 RX ADMIN — ACETAMINOPHEN 975 MG: 325 TABLET, FILM COATED ORAL at 08:47

## 2024-11-20 RX ADMIN — ONDANSETRON 4 MG: 4 TABLET, ORALLY DISINTEGRATING ORAL at 03:56

## 2024-11-20 RX ADMIN — LIDOCAINE 1 PATCH: 4 PATCH TOPICAL at 10:06

## 2024-11-20 RX ADMIN — ACETAMINOPHEN 975 MG: 325 TABLET, FILM COATED ORAL at 13:20

## 2024-11-20 RX ADMIN — ONDANSETRON 4 MG: 2 INJECTION INTRAMUSCULAR; INTRAVENOUS at 11:36

## 2024-11-20 RX ADMIN — ESCITALOPRAM OXALATE 10 MG: 10 TABLET ORAL at 08:47

## 2024-11-20 RX ADMIN — SALINE NASAL SPRAY 2 SPRAY: 1.5 SOLUTION NASAL at 13:20

## 2024-11-20 RX ADMIN — POLYETHYLENE GLYCOL 3350 17 G: 17 POWDER, FOR SOLUTION ORAL at 08:46

## 2024-11-20 RX ADMIN — CIPROFLOXACIN HYDROCHLORIDE 500 MG: 500 TABLET, FILM COATED ORAL at 10:05

## 2024-11-20 ASSESSMENT — ACTIVITIES OF DAILY LIVING (ADL)
ADLS_ACUITY_SCORE: 0

## 2024-11-20 NOTE — PLAN OF CARE
Patient alert and oriented. BP soft but baseline. Afebrile. Ambulates to restroom independently. No stools. Void x2. Stopped IV fluids @0857 per MD order. Saline locked. Was switched to PO Cipro and Flagyl. Was given PRN Compazine for nausea prior to antibiotics due to nausea @0847. C/O headache that radiates to L side of neck. Was given acetaminophen per MAR. 1 Lidocaine patch placed on left posterior side of neck. Refused breakfast and lunch. Patient is lying in bed resting with call light in reach.        Face to face report given with opportunity to observe patient.    Report given to SHARMAINE Vaughn   11/20/2024  1:00 PM

## 2024-11-20 NOTE — PLAN OF CARE
Patient discharged at 2:31 PM via wheel chair accompanied by family and staff. Prescriptions sent to patients preferred pharmacy. All belongings sent with patient. Pt denied nausea or pain at discharge.    Discharge instructions reviewed with pt. Upcoming appointments and surgical follow ups for diverticulitis follow up and colonoscopy reviewed with pt. Diverticulitis overview reviewed with pt. Antibiotics reviewed with pt. Pt verbalized understanding. Listed belongings gathered and returned to patient.     Patient discharged to Home.       [facility]  Follow up appointment made:  Yes  Home medications returned to patient: N/A  Patient reports pain was well managed at discharge: Yes

## 2024-11-20 NOTE — TELEPHONE ENCOUNTER
1:50 PM    Reason for Call: OVERBOOK/HOSPITAL FOLLOW UP     Chikis calling from [facility] [place] to have hospital follow up in 7 days of discharge Lenora is getting discharged this afternoon.    The patient is requesting an appointment for hospital follow up with Doreen Galo.    Was an appointment offered for this call? No  If yes : Appointment type              Date    Preferred method for responding to this message: Telephone Call  What is your phone number ? Call patient at [phone]    If we cannot reach you directly, may we leave a detailed response at the number you provided? Yes    Can this message wait until your PCP/provider returns, if unavailable today? No

## 2024-11-20 NOTE — PROGRESS NOTES
General Surgery    Patient tolerating diet and passing gas  Stable vital signs  Abdomen soft and nontender  Labs wnl  49 year old female w/ diverticulitis treated nonoperatively.  Will see in my clinic in 1-2 weeks to discuss general management more in depth and get her set up for colonoscopy.

## 2024-11-20 NOTE — PLAN OF CARE
Left message with pt that phone  was left in room. Pt to come get them at desk or call us back to let us know what to do with it.

## 2024-11-20 NOTE — PLAN OF CARE
Face to face report given with opportunity to observe patient.    Report given to SHARMAINE Ochoa RN   11/20/2024  7:25 AM

## 2024-11-20 NOTE — DISCHARGE SUMMARY
"[facility]  Hospitalist Discharge Summary      Date of Admission:  11/17/2024  Date of Discharge:  11/20/2024  Discharging Provider: Osman Damian DO  Discharge Service: Hospitalist Service    Discharge Diagnoses   DIVERTICULITIS WITH MICROPERFORATION  Sepsis with hypotension  suspected required levophed briefly ( could be2nd from narcotic  medication effect)   IBS  OCD      Clinically Significant Risk Factors     # Overweight: Estimated body mass index is 26.37 kg/m  as calculated from the following:    Height as of this encounter: 1.6 m (5' 2.99\").    Weight as of this encounter: 67.5 kg (148 lb 13 oz).       Follow-ups Needed After Discharge   Follow-up Appointments       Follow-up and recommended labs and tests       Follow up with primary care provider, Doreen Galo, within 7 days for hospital follow- up.  The following labs/tests are recommended: Will need follow up with general surgery in 4 weeks for colonoscopy. .                 Unresulted Labs Ordered in the Past 30 Days of this Admission       Date and Time Order Name Status Description    11/18/2024 10:17 AM Blood Culture Arm, Left Preliminary         These results will be followed up by Doreen Galo, NP      Discharge Disposition   Discharged to home  Condition at discharge: Stable    Hospital Course   Patient was  admitted with acute diverticulitis with microperforation, she has been having abdominal pain that is worsened over the last 2 weeks.  She   did have hypotension required fluid boluses.  Also her  blood pressures were low in   the high 70 systolic's maps were in the mid 50s, she was moved to ICU and required for short period pressors.  She was discussed with general surgeon and his recommendation was to repeat CT imaging to make sure there is no worsening perforation. We did repeat ct of abd pelvis and no worsening finds and actually the prior small gas bubble seen was gone.   She was on cipro and flagyl due to " allergies to penicillin and concern for cross reactivity.   On 11/19 she was off levophed. Pain improved. No fever. Normal wbc, normal procal, improving crp. She was moved out of icu. Weaned off iv fluids. Diet advanced per surgery. Blood pressure remained in normal range. She did have headache that was attributed to tension headache. She was able to tolerate regular diet. Her antibiotics were switched to oral form and she was able to keep them down but did need Zofran to premedicate. Patient did have normal QTC. ON 11/20 discussed with general surgery and per their recs she can be discharged home with follow up in 4 weeks for colonoscopy. Patients hypotension may have been from sepsis but also dilaudid or morphine could have played a role.    Consultations This Hospital Stay   SURGERY GENERAL IP CONSULT    Code Status   Full Code    Time Spent on this Encounter   IOsman DO, personally saw the patient today and spent greater than 30 minutes discharging this patient.       Osman Damian DO  [facility]  [facility] [address] [place] [id number]  Phone: [phone]  Fax: [phone]  ______________________________________________________________________    Physical Exam   Vital Signs: Temp: 98.5  F (36.9  C) Temp src: Temporal BP: 92/60 Pulse: 72   Resp: 14 SpO2: 98 % O2 Device: None (Room air)    Weight: 148 lbs 12.97 oz  HENT:      Right Ear: External ear normal.      Left Ear: External ear normal.      Nose: Nose normal.      Mouth/Throat:      Pharynx: Oropharynx is clear.   Eyes:      Conjunctiva/sclera: Conjunctivae normal.   Cardiovascular:      Rate and Rhythm: Normal rate.   Pulmonary:      Effort: Pulmonary effort is normal.   Abdominal:      General: Abdomen is flat.      Tenderness: There is no abdominal tenderness.   Musculoskeletal:         General: No swelling.   Skin:     Coloration: Skin is not jaundiced.   Neurological:      Mental Status: She is alert. Mental  status is at baseline.      Primary Care Physician   Doreen Galo    Discharge Orders      Adult General Surgery  Referral      Adult Gen Surg  Referral      Reason for your hospital stay    Patient was  admitted with acute diverticulitis with microperforation, she has been having abdominal pain that is worsened over the last 2 weeks.  She   did have hypotension required fluid boluses.  Also her  blood pressures were low in   the high 70 systolic's maps were in the mid 50s, she was moved to ICU and required for short period pressors.  She was discussed with general surgeon and his recommendation was to repeat CT imaging to make sure there is no worsening perforation. We did repeat ct of abd pelvis and no worsening finds and actually the prior small gas bubble seen was gone.   She was on cipro and flagyl due to allergies to penicillin and concern for cross reactivity.   On 11/19 she was off levophed. Pain improved. No fever. Normal wbc, normal procal, improving crp. She was moved out of icu. Weaned off iv fluids. Diet advanced per surgery. Blood pressure remained in normal range. She did have headache that was attributed to tension headache. She was able to tolerate regular diet. Her antibiotics were switched to oral form and she was able to keep them down but did need Zofran to premedicate. Patient did have normal QTC. ON 11/20 discussed with general surgery and per their recs she can be discharged home with follow up in 4 weeks for colonoscopy. Patients hypotension may have been from sepsis but also dilaudid or morphine could have played a role.     Follow-up and recommended labs and tests     Follow up with primary care provider, Doreen Galo, within 7 days for hospital follow- up.  The following labs/tests are recommended: Will need follow up with general surgery in 4 weeks for colonoscopy. .     Activity    Your activity upon discharge: activity as tolerated     Diet    Follow this diet  upon discharge: Current Diet:Orders Placed This Encounter      Regular Diet Adult       Significant Results and Procedures   Most Recent 3 CBC's:  Recent Labs   Lab Test 11/20/24  0512 11/19/24  0453 11/18/24  0616   WBC 3.9* 4.5 4.9   HGB 11.6* 11.5* 12.1   MCV 85 88 88    158 157     Most Recent 3 BMP's:  Recent Labs   Lab Test 11/20/24  0512 11/19/24  0453 11/18/24  1652 11/18/24  0616    141  --  141   POTASSIUM 3.8 4.2  --  4.1   CHLORIDE 108* 111*  --  108*   CO2 24 21*  --  25   BUN 6.8 5.4*  --  8.0   CR 0.80 0.81  --  0.74   ANIONGAP 8 9  --  8   LUIS 8.1* 7.9*  --  8.2*   * 80 89 103*   ,   Results for orders placed or performed during the hospital encounter of 11/17/24   CT Abdomen Pelvis w Contrast    Narrative    EXAM: CT ABDOMEN PELVIS W CONTRAST 11/17/2024 6:24 PM    HISTORY: left flank and LLQ pain, stone vs diverticulitis    COMPARISON: CT abdomen and pelvis 4/15/2023    TECHNIQUE:   Imaging protocol: Computed tomography of the abdomen and pelvis with  imaging acquired after administration of intravenous contrast.  Intravenous contrast: Isovue 370 74mL.  Acquisition: This CT exam was performed using one or more the  following dose reduction techniques: automated exposure control,  adjustment of the mA and/or kV according to patient size, and/or  iterative reconstruction technique.    FINDINGS:    LOWER CHEST:  Bibasilar atelectasis. No pulmonary mass or focal consolidation.    ABDOMEN/PELVIS:  LIVER: Normal contour. No suspicious liver lesions.  GALLBLADDER: No radio-opaque stones. No gallbladder wall thickening.  BILE DUCTS: No biliary tract dilatation.  PANCREAS: Within normal limits.  SPLEEN: Spleen is enlarged, measures up to 13.3 cm.  ADRENALS: Within normal limits.    KIDNEYS/URETERS: No soft tissue mass. Mild bilateral symmetric  hydroureteronephrosis. No nephrolithiasis.  URINARY BLADDER: Within normal limits.  REPRODUCTIVE ORGANS: No pelvic masses.    BOWEL: No bowel  dilatation. Diverticulosis with marked thickening of  the sigmoid colon with surrounding inflammatory changes, consistent  with acute diverticulitis. Appendix is surgically absent.  PERITONEUM/RETROPERITONEUM: No free air. Marked stranding and edema  surrounding the inflamed segment of sigmoid colon. Possible punctate  focus of air adjacent to the inflamed diverticulum (series 3 image  164). Surgical clip in the left mesentery.  VESSELS: Within normal limits.  LYMPH NODES: There are no pathologically enlarged lymph nodes.    BONES AND SOFT TISSUES:  No suspicious osseous lesions. Degenerative periarticular changes and  spinal spondylosis.      Impression    IMPRESSION:  Acute sigmoid diverticulitis with small punctate hypodensity near the  inflamed diverticulum that could represent focus of free air and  microperforation, although more likely to represent a focus of fat  necrosis.    EVELYN MURPHY MD         SYSTEM ID:  [id number]   XR Chest 2 Views    Narrative    PROCEDURE:  XR CHEST 2 VIEWS    HISTORY: chest pain    COMPARISON: No relevant priors available for comparison    FINDINGS: PA and lateral chest radiographs    Cardiomediastinal silhouette is within normal limits.  No focal consolidation, effusion or pneumothorax.    No suspicious osseous lesion or subdiaphragmatic free air.      Impression    IMPRESSION:    No acute findings.    EVELYN MURPHY MD         SYSTEM ID:  [id number]   CT Abdomen Pelvis w Contrast    Narrative    CT ABDOMEN PELVIS W CONTRAST    CLINICAL HISTORY: Female, age 49 years,  sepsis with hypotension ,  repeat ct as concern for worsening perforation;    Comparison:  CT scan abdomen and pelvis 11/17/2024    TECHNIQUE:  CT scanwas performed of the abdomen and pelvis with IV  contrast. Axial, sagittal and coronal images were reviewed.  This facility minimizes radiation dose by adjusting the mA and/or kV  according to each patient size.  This CT scan was performed using one or more the  following dose  reduction techniques:  -Automated exposure control,  -Adjustment of the mA and/or kV according to patient's size, and/or,  -Use of iterative reconstruction technique.      FINDINGS:  The lung bases and visualized portions of the heart demonstrate no  acute abnormality. Nonacute findings within the lung bases are similar  appearance.    Stomach and duodenum: Small hiatal hernia is unchanged without acute  abnormality.    Liver: Unremarkable.    Gallbladder: Unremarkable    Spleen: Unchanged splenomegaly.    Pancreas: Unremarkable    Adrenal glands: Unremarkable.    Kidneys: Unremarkable    Ureters: Unremarkable.    Urinary bladder: Unremarkable    Large and small bowel: Diverticulitis of the distal descending/sigmoid  colon is again seen and does not appear to be significantly different.  Fat stranding about the colon in this region is similar appearance.  Small volume of gas within the pericolonic fat in previously is no  longer identified. There is no evidence of worsening perforation and  no apparent abscess. Small bowel loops are unremarkable.    The abdominal aorta and inferior vena cava taper normally. Numerous  normal-sized lymph nodes are again seen throughout the retroperitoneum  and are unchanged.    Bony structures: No acute abnormality.        Impression    IMPRESSION:   Diverticulitis of the distal colon is not significantly different.  Small gas bubble in the pericolonic fat on the prior study is no  longer appreciated. There is no apparent abscess.    Unchanged splenomegaly and retroperitoneal lymphadenopathy.    ANGEL NORTON MD         SYSTEM ID:  [id number]       Discharge Medications   Current Discharge Medication List        START taking these medications    Details   acetaminophen (TYLENOL) 325 MG tablet Take 3 tablets (975 mg) by mouth 3 times daily for 10 days.  Qty: 90 tablet, Refills: 0    Associated Diagnoses: Acute diverticulitis      ciprofloxacin (CIPRO) 500 MG tablet  Take 1 tablet (500 mg) by mouth every 12 hours for 7 days.  Qty: 14 tablet, Refills: 0    Associated Diagnoses: Acute diverticulitis      metroNIDAZOLE (FLAGYL) 500 MG tablet Take 1 tablet (500 mg) by mouth 3 times daily for 7 days.  Qty: 21 tablet, Refills: 0    Associated Diagnoses: Acute diverticulitis      ondansetron (ZOFRAN ODT) 4 MG ODT tab Take 1 tablet (4 mg) by mouth every 12 hours as needed.  Qty: 20 tablet, Refills: 0    Associated Diagnoses: Acute diverticulitis           CONTINUE these medications which have NOT CHANGED    Details   acyclovir (ZOVIRAX) 400 MG tablet Take 400 mg by mouth every 8 hours as needed (cold sores).      amphetamine-dextroamphetamine (ADDERALL) 20 MG tablet Take 20 mg by mouth 3 times daily      escitalopram (LEXAPRO) 10 MG tablet Take 1 tablet (10 mg) by mouth daily.  Qty: 90 tablet, Refills: 0    Associated Diagnoses: AZ (generalized anxiety disorder)      ibuprofen (ADVIL/MOTRIN) 200 MG tablet Take 400 mg by mouth 3 times daily as needed for pain.      omeprazole (PRILOSEC) 20 MG DR capsule Take 1 capsule (20 mg) by mouth daily.  Qty: 90 capsule, Refills: 2    Associated Diagnoses: Dyspepsia           Allergies   Allergies   Allergen Reactions    Blood Transfusion Related (Informational Only) Other (See Comments)     Patient has a history of a clinically significant antibody against RBC antigens.  A delay in compatible RBCs may occur.    Penicillins Anaphylaxis and Hives

## 2024-11-20 NOTE — PLAN OF CARE
Goal Outcome Evaluation:      Plan of Care Reviewed With: patient    Overall Patient Progress: improvingOverall Patient Progress: improving    Pt is alert and oriented. Reports mild to moderate pain to ab. Denied any medications. Scheduled Tylenol held due to bout of nausea. Pt feels her nausea is triggered when IV abx are given. PRN zofran given half hour before am Flagyl and was effective. Pt did vomit x1 , zofran was given and did help slightly but not all the way. Received order for compazine PRN. BP soft but at baseline. VS and assessment as charted. Call light in reach, uses appropriately.

## 2024-11-20 NOTE — DISCHARGE INSTRUCTIONS
Appointments: The [facility] will be calling you at home to schedule a Hospital Follow-up Appointment with your Primary Care Provider. If they do not contact you by tomorrow afternoon please call [phone] to schedule an appointment.

## 2024-11-21 ENCOUNTER — PATIENT OUTREACH (OUTPATIENT)
Dept: CARE COORDINATION | Facility: OTHER | Age: 49
End: 2024-11-21

## 2024-11-21 LAB — BACTERIA BLD CULT: NORMAL

## 2024-11-21 NOTE — PROGRESS NOTES
Clinic Care Coordination Contact  Care Team Conversations    RN CC attempted to call patient to complete TCM. No answer. Will attempt again on a later date.

## 2024-11-22 NOTE — PROGRESS NOTES
Assessment/plan:  (Z09) Hospital discharge follow-up  (primary encounter diagnosis)  (K57.92) Acute diverticulitis  Plan: Patient improving since discharge. Vitals stable. Tolerating antibiotics, some side effects including nausea, but tolerable with zofran. Continue medication for remaining 2 days. If symptoms worsen, go to ER. Will recheck labs today and will update patient and intervene accordingly. See surgery as scheduled.     - CBC with platelets and differential  - CRP inflammation  - Basic metabolic panel       (G44.201) Acute intractable tension-type headache  Plan: Recommend rest and massage. Will re-evaluate in 6 weeks.     (F33.1) Major depressive disorder, recurrent episode, moderate (H)  (F41.1) AZ (generalized anxiety disorder)  Plan: Stable, experiencing significant family stress. Will reevaluate in 6 weeks and revisit decision to restart counseling. No thoughts of self harm or suicide.    The longitudinal plan of care for the diagnosis(es)/condition(s) as documented were addressed during this visit. Due to the added complexity in care, I will continue to support Lenora in the subsequent management and with ongoing continuity of care.    Tonya Cooley is a 49 year old, presenting for the following health issues:  Hospital F/U (Acute diverticulitis)    HPI       Hospital Follow-up Visit:    Hospital/Nursing Home/IP Rehab Facility: [facility]  Date of Admission: 11/17/2024  Date of Discharge: 11/20/2024  Reason(s) for Admission: Acute Diverticulitis   Was the patient in the ICU or did the patient experience delirium during hospitalization?  No, Yes confusion, low B/P  Do you have any other stressors you would like to discuss with your provider? No    Problems taking medications regularly:  None  Medication changes since discharge: Ciproflaxacin, Flagyl, Ondansetron  Problems adhering to non-medication therapy:  None    Summary of hospitalization:  [facility]  "discharge summary reviewed, see below for summary:    \"Hospital Course  Patient was  admitted with acute diverticulitis with microperforation, she has been having abdominal pain that is worsened over the last 2 weeks.  She   did have hypotension required fluid boluses.  Also her  blood pressures were low in   the high 70 systolic's maps were in the mid 50s, she was moved to ICU and required for short period pressors.  She was discussed with general surgeon and his recommendation was to repeat CT imaging to make sure there is no worsening perforation. We did repeat ct of abd pelvis and no worsening finds and actually the prior small gas bubble seen was gone.   She was on cipro and flagyl due to allergies to penicillin and concern for cross reactivity.   On 11/19 she was off levophed. Pain improved. No fever. Normal wbc, normal procal, improving crp. She was moved out of icu. Weaned off iv fluids. Diet advanced per surgery. Blood pressure remained in normal range. She did have headache that was attributed to tension headache. She was able to tolerate regular diet. Her antibiotics were switched to oral form and she was able to keep them down but did need Zofran to premedicate. Patient did have normal QTC. ON 11/20 discussed with general surgery and per their recs she can be discharged home with follow up in 4 weeks for colonoscopy. Patients hypotension may have been from sepsis but also dilaudid or morphine could have played a role. Osman aDmian, DO\"    Diagnostic Tests/Treatments reviewed.  Follow up needed: none  Other Healthcare Providers Involved in Patient s Care: Dr. Alex Shrestha  Update since discharge: improved.     Taking Cipro and Flagyl as prescribed, has two days remaining in course. Takes Zofran about 30 min to 1 hour before.  Is tolerating medications, but with side effects. Experiencing constant nausea, has vomiting once since leaving the hospital, and taste changes. Both affect " her appetite. Eating one meal a day of toast, potatoes or cereal. Trying to push fluids. Having 2 stools/day. Diarrhea or soft stool. No blood in stool, no painful stools. Taking benefiber once daily. Continues to have abd pain along left side of abdomen. Abd pain is sometimes accompanied with pain that radiates to left chest and lightheadedness. EKG performed on 11/17 is normal. Denies palpitations, fevers, SOB, cough, wheezing, urinary changes. Has felt cold/clammy sweats a few times a day everyday since leaving the hospital. Has not taken her temperature at home. Will follow up with surgery  on 12/3/24, and will have colonoscopy in 4 weeks.     Experiencing chronic mild headaches that started mid-October. No hx of migraines. Everyday. No sensitivity to light, no sensitivity to sound. Constant pain across forehead that sometimes includes pain in neck/shoulder. Rarely headaches before October. No vision changes, no hearing changes. Suspected tension headache.     Lenora has also been experiencing significant family stress. She is interested in restarting counseling, but will wait until feeling better after this acute diverticulitis episode.    Plan of care communicated with patient.      Review of Systems  CONSTITUTIONAL: NEGATIVE for fever, change in weight, POSITIVE for chills  INTEGUMENTARY/SKIN: NEGATIVE for worrisome rashes, moles or lesions  EYES: NEGATIVE for vision changes or irritation  ENT/MOUTH: NEGATIVE for ear, mouth and throat problems  RESP: NEGATIVE for significant cough or SOB  CV: NEGATIVE for chest pain, palpitations or peripheral edema, POSITIVE for chest pain associated with abdominal pain  GI: NEGATIVE for heartburn, or change in bowel habits besides described above, POSITIVE for nausea and abdominal pain described above  : NEGATIVE for frequency, dysuria, or hematuria  MUSCULOSKELETAL: NEGATIVE for significant arthralgias or myalgia  NEURO: NEGATIVE for weakness, dizziness or  "paresthesias  ENDOCRINE: NEGATIVE for temperature intolerance, skin/hair changes  PSYCHIATRIC: NEGATIVE for changes in mood or affect      Objective    BP 98/68   Pulse 82   Temp 99  F (37.2  C) (Tympanic)   Resp 16   Ht 1.6 m (5' 3\")   Wt 71.5 kg (157 lb 11.2 oz)   SpO2 96%   BMI 27.94 kg/m    Body mass index is 27.94 kg/m .  Physical Exam   GENERAL: alert and no distress  NECK: no adenopathy, no asymmetry, masses, or scars  RESP: lungs clear to auscultation - no rales, rhonchi or wheezes  CV: regular rate and rhythm, no murmur, click or rub, no peripheral edema  ABDOMEN: soft, slight tenderness with palpation LLQ, no masses and bowel sounds normal  MS: no gross musculoskeletal defects noted, no edema  SKIN: no suspicious lesions or rashes  NEURO: Normal strength and tone, mentation intact and speech normal  PSYCH: mentation appears normal, affect normal/bright    Labs pending    YANIRA Payne  [facility]       I was present with the student who participated in the service and in the documentation of the note. I have verified the history and personally performed the physical exam and medical decision making. I agree with the assessment and plan of care as documented in the note.       Signed Electronically by: Doreen Galo NP    "

## 2024-11-23 LAB — BACTERIA BLD CULT: NO GROWTH

## 2024-11-25 ENCOUNTER — OFFICE VISIT (OUTPATIENT)
Dept: FAMILY MEDICINE | Facility: OTHER | Age: 49
End: 2024-11-25
Attending: NURSE PRACTITIONER
Payer: COMMERCIAL

## 2024-11-25 VITALS
RESPIRATION RATE: 16 BRPM | WEIGHT: 157.7 LBS | HEART RATE: 82 BPM | BODY MASS INDEX: 27.94 KG/M2 | TEMPERATURE: 99 F | HEIGHT: 63 IN | SYSTOLIC BLOOD PRESSURE: 98 MMHG | DIASTOLIC BLOOD PRESSURE: 68 MMHG | OXYGEN SATURATION: 96 %

## 2024-11-25 DIAGNOSIS — G44.201 ACUTE INTRACTABLE TENSION-TYPE HEADACHE: ICD-10-CM

## 2024-11-25 DIAGNOSIS — K57.92 ACUTE DIVERTICULITIS: ICD-10-CM

## 2024-11-25 DIAGNOSIS — Z09 HOSPITAL DISCHARGE FOLLOW-UP: Primary | ICD-10-CM

## 2024-11-25 DIAGNOSIS — F41.1 GAD (GENERALIZED ANXIETY DISORDER): ICD-10-CM

## 2024-11-25 DIAGNOSIS — F33.1 MAJOR DEPRESSIVE DISORDER, RECURRENT EPISODE, MODERATE (H): ICD-10-CM

## 2024-11-25 LAB
ANION GAP SERPL CALCULATED.3IONS-SCNC: 11 MMOL/L (ref 7–15)
BASOPHILS # BLD AUTO: 0 10E3/UL (ref 0–0.2)
BASOPHILS NFR BLD AUTO: 0 %
BUN SERPL-MCNC: 15.6 MG/DL (ref 6–20)
CALCIUM SERPL-MCNC: 9.4 MG/DL (ref 8.8–10.4)
CHLORIDE SERPL-SCNC: 100 MMOL/L (ref 98–107)
CREAT SERPL-MCNC: 0.95 MG/DL (ref 0.51–0.95)
CRP SERPL-MCNC: <3 MG/L
EGFRCR SERPLBLD CKD-EPI 2021: 73 ML/MIN/1.73M2
EOSINOPHIL # BLD AUTO: 0.1 10E3/UL (ref 0–0.7)
EOSINOPHIL NFR BLD AUTO: 2 %
ERYTHROCYTE [DISTWIDTH] IN BLOOD BY AUTOMATED COUNT: 12.4 % (ref 10–15)
GLUCOSE SERPL-MCNC: 100 MG/DL (ref 70–99)
HCO3 SERPL-SCNC: 27 MMOL/L (ref 22–29)
HCT VFR BLD AUTO: 43.4 % (ref 35–47)
HGB BLD-MCNC: 14.5 G/DL (ref 11.7–15.7)
IMM GRANULOCYTES # BLD: 0 10E3/UL
IMM GRANULOCYTES NFR BLD: 0 %
LYMPHOCYTES # BLD AUTO: 1.5 10E3/UL (ref 0.8–5.3)
LYMPHOCYTES NFR BLD AUTO: 26 %
MCH RBC QN AUTO: 28.9 PG (ref 26.5–33)
MCHC RBC AUTO-ENTMCNC: 33.4 G/DL (ref 31.5–36.5)
MCV RBC AUTO: 87 FL (ref 78–100)
MONOCYTES # BLD AUTO: 0.5 10E3/UL (ref 0–1.3)
MONOCYTES NFR BLD AUTO: 8 %
NEUTROPHILS # BLD AUTO: 3.6 10E3/UL (ref 1.6–8.3)
NEUTROPHILS NFR BLD AUTO: 63 %
NRBC # BLD AUTO: 0 10E3/UL
NRBC BLD AUTO-RTO: 0 /100
PLATELET # BLD AUTO: 284 10E3/UL (ref 150–450)
POTASSIUM SERPL-SCNC: 4.4 MMOL/L (ref 3.4–5.3)
RBC # BLD AUTO: 5.01 10E6/UL (ref 3.8–5.2)
SODIUM SERPL-SCNC: 138 MMOL/L (ref 135–145)
WBC # BLD AUTO: 5.8 10E3/UL (ref 4–11)

## 2024-11-25 ASSESSMENT — PATIENT HEALTH QUESTIONNAIRE - PHQ9
SUM OF ALL RESPONSES TO PHQ QUESTIONS 1-9: 9
10. IF YOU CHECKED OFF ANY PROBLEMS, HOW DIFFICULT HAVE THESE PROBLEMS MADE IT FOR YOU TO DO YOUR WORK, TAKE CARE OF THINGS AT HOME, OR GET ALONG WITH OTHER PEOPLE: SOMEWHAT DIFFICULT
SUM OF ALL RESPONSES TO PHQ QUESTIONS 1-9: 9

## 2024-11-25 ASSESSMENT — PAIN SCALES - GENERAL: PAINLEVEL_OUTOF10: MILD PAIN (3)

## 2024-11-25 NOTE — LETTER
November 25, 2024      Lenora Gage  [address] [place] [address]        To Whom It May Concern:    Lenora Gage  was seen on 11/25/2024.  Please excuse her from work until she sees surgery on 12/3/24.         Sincerely,        Doreen Galo, NP

## 2024-12-03 ENCOUNTER — OFFICE VISIT (OUTPATIENT)
Dept: SURGERY | Facility: OTHER | Age: 49
End: 2024-12-03
Attending: SURGERY
Payer: COMMERCIAL

## 2024-12-03 VITALS
BODY MASS INDEX: 27.82 KG/M2 | WEIGHT: 157 LBS | DIASTOLIC BLOOD PRESSURE: 70 MMHG | SYSTOLIC BLOOD PRESSURE: 102 MMHG | OXYGEN SATURATION: 99 % | TEMPERATURE: 97.6 F | HEART RATE: 118 BPM | HEIGHT: 63 IN

## 2024-12-03 DIAGNOSIS — K57.92 ACUTE DIVERTICULITIS: Primary | ICD-10-CM

## 2024-12-03 ASSESSMENT — PAIN SCALES - GENERAL: PAINLEVEL_OUTOF10: NO PAIN (0)

## 2024-12-03 NOTE — PATIENT INSTRUCTIONS
Thank you for allowing Dr. Shrestha and our surgical team to participate in your care. Please call our health unit coordinator at [phone] with scheduling questions or the nurse at [phone] with any other questions or concerns.      You have been scheduled for: Colonoscopy with  on 1/13/25.   You will use miralax bowel prep.  Please see handout for additional instruction.  You will not need a pre-operative appointment with your primary care provider.  You may call [phone] or [phone] with any questions.

## 2024-12-03 NOTE — PROGRESS NOTES
A&P:  49 year old female seen for diverticulitis hospitalization follow up.  Doing well.  Okay to return to normal activity.  Discussed general management of diverticulitis (fiber supplementation, hydration, exercise) and general principles of elective sigmoidectomy for diverticulitis.  Will get patient scheduled for a colonoscopy in 4-6 weeks.    Discussed with patient risks and benefits of colonoscopy including bleeding, missed lesion, and perforation.  Patient demonstrated understanding and wishes to proceed with colonoscopy.    S:  Feels well.  Tolerating diet and having normal bowel function.  Has some intermittent lower abdominal pain here and there but nothing like her previous attack.  Patient tells me her last colonoscopy was about 3 years ago, but from records available to me, it was in 2014.    O:  No apparent distress  Abdomen is soft, nontender, nondistended.

## 2024-12-03 NOTE — LETTER
December 3, 2024      Lenora Gage  [address] [place] [address]        To Whom It May Concern:    Lenora Gage was seen in our clinic. She may return to work without restrictions on December 9, 2024.      Sincerely,        Yao Shrestha MD

## 2024-12-06 ENCOUNTER — HOSPITAL ENCOUNTER (OUTPATIENT)
Facility: HOSPITAL | Age: 49
End: 2024-12-06
Attending: SURGERY | Admitting: SURGERY
Payer: COMMERCIAL

## 2025-01-06 ENCOUNTER — ANESTHESIA EVENT (OUTPATIENT)
Dept: SURGERY | Facility: HOSPITAL | Age: 50
End: 2025-01-06

## 2025-01-06 RX ORDER — NALOXONE HYDROCHLORIDE 0.4 MG/ML
0.1 INJECTION, SOLUTION INTRAMUSCULAR; INTRAVENOUS; SUBCUTANEOUS
Status: CANCELLED | OUTPATIENT
Start: 2025-01-06

## 2025-01-06 RX ORDER — DEXAMETHASONE SODIUM PHOSPHATE 10 MG/ML
4 INJECTION, SOLUTION INTRAMUSCULAR; INTRAVENOUS
Status: CANCELLED | OUTPATIENT
Start: 2025-01-06

## 2025-01-06 RX ORDER — ONDANSETRON 2 MG/ML
4 INJECTION INTRAMUSCULAR; INTRAVENOUS EVERY 30 MIN PRN
Status: CANCELLED | OUTPATIENT
Start: 2025-01-06

## 2025-01-06 RX ORDER — LIDOCAINE 40 MG/G
CREAM TOPICAL
Status: CANCELLED | OUTPATIENT
Start: 2025-01-06

## 2025-01-06 RX ORDER — ONDANSETRON 4 MG/1
4 TABLET, ORALLY DISINTEGRATING ORAL EVERY 30 MIN PRN
Status: CANCELLED | OUTPATIENT
Start: 2025-01-06

## 2025-01-06 RX ORDER — SODIUM CHLORIDE, SODIUM LACTATE, POTASSIUM CHLORIDE, CALCIUM CHLORIDE 600; 310; 30; 20 MG/100ML; MG/100ML; MG/100ML; MG/100ML
INJECTION, SOLUTION INTRAVENOUS CONTINUOUS
Status: CANCELLED | OUTPATIENT
Start: 2025-01-06

## 2025-01-06 NOTE — ANESTHESIA PREPROCEDURE EVALUATION
Anesthesia Pre-Procedure Evaluation    Patient: Lenora Gage   MRN: [id number] : 1975        Procedure : Procedure(s):  COLONOSCOPY          Past Medical History:   Diagnosis Date     Adjustment disorder with anxiety      Constipation 2012     Dysmenorrhea 2002     Dyspareunia 2007     Dysplasia of cervix (uteri)  2000     Endometriosis of uterus 2003     Esophageal reflux 2000     Helicobacter positive gastritis      IBS (Irritable colon syndrome) 2011     OCD (obsessive compulsive disorder)       Past Surgical History:   Procedure Laterality Date     bladder reconstruction and mesh removal       BLADDER REPAIR       COLONOSCOPY - HIM SCAN N/A 2007    Colonoscopy to the cecum with biopsy terminal ileum, cecum and descending colon ([facility]) normal     colposcopy with biopsy-mutliple  10/2014     HYSTERECTOMY TOTAL ABDOMINAL, BILATERAL SALPINGO-OOPHORECTOMY, COMBINED Left     Right ovary remains.      IR CONSULTATION FOR IR EXAM  11/3/2022     LAPAROSCOPIC APPENDECTOMY N/A 2020    Procedure: APPENDECTOMY, LAPAROSCOPIC;  Surgeon: Jonathan Martinez MD;  Location: [facility] OR     TUBAL LIGATION        Allergies   Allergen Reactions     Blood Transfusion Related (Informational Only) Other (See Comments)     Patient has a history of a clinically significant antibody against RBC antigens.  A delay in compatible RBCs may occur.     Penicillins Anaphylaxis and Hives      Social History     Tobacco Use     Smoking status: Never     Passive exposure: Never     Smokeless tobacco: Never   Substance Use Topics     Alcohol use: Yes     Alcohol/week: 0.0 standard drinks of alcohol     Comment: RARELY      Wt Readings from Last 1 Encounters:   24 71.2 kg (157 lb)        Anesthesia Evaluation   Pt has had prior anesthetic. Type: MAC and General.    No history of anesthetic complications       ROS/MED HX  ENT/Pulmonary:        Neurologic: Comment: Hx of Acute intractable tension-type headache      Cardiovascular: Comment: Required vasopressors recent hospitalization 11/2024    (+)  - -   -  - -                                 Previous cardiac testing   Echo: Date: Results:    Stress Test:  Date: Results:    ECG Reviewed:  Date: 11/17/24 Results:  HR 96, sinus  Cath:  Date: Results:      METS/Exercise Tolerance:     Hematologic:       Musculoskeletal:       GI/Hepatic: Comment:   11/17/24-11/20/24 hospitalized for:  DIVERTICULITIS WITH MICROPERFORATION  Sepsis with hypotension  suspected required levophed briefly ( could be2nd from narcotic  medication effect)       11/18/24 imaging: Spleen: Unchanged splenomegaly.    (+) GERD (on PPI),     hiatal hernia, Inflammatory bowel disease, bowel prep,            Renal/Genitourinary:       Endo:       Psychiatric/Substance Use:     (+) psychiatric history anxiety, other (comment) and depression (OCD)       Infectious Disease: Comment: ESBL added to chart 11/2024 (activated 2014?) - UA without infection 11/17/24      Malignancy:       Other:               OUTSIDE LABS:  CBC:   Lab Results   Component Value Date    WBC 5.8 11/25/2024    WBC 3.9 (L) 11/20/2024    HGB 14.5 11/25/2024    HGB 11.6 (L) 11/20/2024    HCT 43.4 11/25/2024    HCT 33.4 (L) 11/20/2024     11/25/2024     11/20/2024     BMP:   Lab Results   Component Value Date     11/25/2024     11/20/2024    POTASSIUM 4.4 11/25/2024    POTASSIUM 3.8 11/20/2024    CHLORIDE 100 11/25/2024    CHLORIDE 108 (H) 11/20/2024    CO2 27 11/25/2024    CO2 24 11/20/2024    BUN 15.6 11/25/2024    BUN 6.8 11/20/2024    CR 0.95 11/25/2024    CR 0.80 11/20/2024     (H) 11/25/2024     (H) 11/20/2024     COAGS:   Lab Results   Component Value Date    INR 1.13 11/18/2024     POC:   Lab Results   Component Value Date     (H) 09/04/2020    HCG Negative 04/15/2023     HEPATIC:   Lab Results   Component Value Date     ALBUMIN 3.5 11/18/2024    PROTTOTAL 5.2 (L) 11/18/2024    ALT 33 11/18/2024    AST 20 11/18/2024     (H) 11/18/2019    ALKPHOS 86 11/18/2024    BILITOTAL 0.8 11/18/2024     OTHER:   Lab Results   Component Value Date    LACT 0.4 (L) 11/18/2024    A1C 5.2 11/18/2020    LUIS 9.4 11/25/2024    PHOS 3.7 10/05/2022    MAG 1.7 11/19/2024    LIPASE 28 11/17/2024    AMYLASE 61 09/03/2014    TSH 2.03 10/05/2022    CRP <2.9 10/11/2022    SED 7 10/11/2022       Anesthesia Plan                        Consents            Postoperative Care            Comments:    Other Comments: Chart reviewed [facility] - same day surg [facility]           Maxine Viera, APRN CNP    I have reviewed the pertinent notes and labs in the chart from the past 30 days. Any updates or changes from those notes are reflected in this note.

## 2025-01-13 ENCOUNTER — ANESTHESIA (OUTPATIENT)
Dept: SURGERY | Facility: HOSPITAL | Age: 50
End: 2025-01-13

## 2025-01-20 ENCOUNTER — MYC REFILL (OUTPATIENT)
Dept: FAMILY MEDICINE | Facility: OTHER | Age: 50
End: 2025-01-20

## 2025-01-20 ENCOUNTER — MYC MEDICAL ADVICE (OUTPATIENT)
Dept: FAMILY MEDICINE | Facility: OTHER | Age: 50
End: 2025-01-20

## 2025-01-20 DIAGNOSIS — R10.13 DYSPEPSIA: ICD-10-CM

## 2025-09-01 ENCOUNTER — PATIENT OUTREACH (OUTPATIENT)
Dept: CARE COORDINATION | Facility: CLINIC | Age: 50
End: 2025-09-01
Payer: COMMERCIAL

## (undated) DEVICE — SUCTION-IRRIGATION STRYKEFLOW II (STRYKER)

## (undated) DEVICE — STERI-STRIP-1/2" X 4"

## (undated) DEVICE — STAPLER-35MM VASCULAR ECHELON FLEX

## (undated) DEVICE — APPLICATOR-CHLORAPREP 26ML TINTED CHG 2%+ 70% IPA-SURGICAL

## (undated) DEVICE — BLANKET-BAIR UPPER BODY

## (undated) DEVICE — IRRIGATION-NACL 1000ML

## (undated) DEVICE — LINEAR CUTTER-45MM ECHELON FLEX ARTICULATING

## (undated) DEVICE — RELOAD-WHITE 35MM VASCULAR ECHELON

## (undated) DEVICE — TROCAR-5X100MM FIOS BLADELESS

## (undated) DEVICE — SUTURE-MONOCRYL 4-0 PS-2 Y496G

## (undated) DEVICE — PACK-LAPAROSCOPY-CUSTOM

## (undated) DEVICE — MARKER-SKIN REG

## (undated) DEVICE — IRRIGATION-H2O 1000ML

## (undated) DEVICE — TROCAR-12X100MM KII FIOS

## (undated) DEVICE — TUBE-SALEM SUMP 18FR STOMACH SUCTION

## (undated) DEVICE — RELOAD-WHITE 45MM ECHELON ENDOPATH

## (undated) DEVICE — LIGHT HANDLE COVER

## (undated) DEVICE — PUNCTURE CLOSURE DEVICE

## (undated) DEVICE — TROCAR SLEEVE-KII 5X100MM

## (undated) DEVICE — SUTURE-VICRYL 0 UR-6 J603H

## (undated) DEVICE — CANISTER-SUCTION 2000CC

## (undated) DEVICE — RELOAD-BLUE 45MM ECHELON ENDOPATH

## (undated) DEVICE — SOL-NACL 0.9% 1000ML

## (undated) DEVICE — GLV-7.5 BIOGEL LATEX

## (undated) DEVICE — TUBING-INSUFFLATION/LAPAROFLATOR W/FILTER

## (undated) DEVICE — CLEARIFY VISUALIZATION SYSTEM (SCOPE WARMER)

## (undated) DEVICE — CAUTERY PAD-POLYHESIVE II ADULT

## (undated) DEVICE — WANDS-INSULSCAN

## (undated) DEVICE — BDG-STAT STRIP

## (undated) DEVICE — CAUTERY-LAP L-HOOK

## (undated) DEVICE — INZII RETRIEVAL SYSTEM-10MM

## (undated) DEVICE — SCD SLEEVE-KNEE REG.

## (undated) DEVICE — CORD-LAPAROSCOPIC MONOPOLAR-DISPOSABLE

## (undated) DEVICE — LABEL-STERILE PREPRINTED FOR OR

## (undated) DEVICE — PACK-BASIN SET-UP

## (undated) RX ORDER — FENTANYL CITRATE 50 UG/ML
INJECTION, SOLUTION INTRAMUSCULAR; INTRAVENOUS
Status: DISPENSED
Start: 2020-09-03

## (undated) RX ORDER — LIDOCAINE HYDROCHLORIDE 20 MG/ML
INJECTION, SOLUTION EPIDURAL; INFILTRATION; INTRACAUDAL; PERINEURAL
Status: DISPENSED
Start: 2020-09-03

## (undated) RX ORDER — LIDOCAINE HYDROCHLORIDE 20 MG/ML
JELLY TOPICAL
Status: DISPENSED
Start: 2024-03-15

## (undated) RX ORDER — DEXAMETHASONE SODIUM PHOSPHATE 10 MG/ML
INJECTION, SOLUTION INTRAMUSCULAR; INTRAVENOUS
Status: DISPENSED
Start: 2020-09-03

## (undated) RX ORDER — PROPOFOL 10 MG/ML
INJECTION, EMULSION INTRAVENOUS
Status: DISPENSED
Start: 2020-09-03

## (undated) RX ORDER — ONDANSETRON 2 MG/ML
INJECTION INTRAMUSCULAR; INTRAVENOUS
Status: DISPENSED
Start: 2020-09-03

## (undated) RX ORDER — SULFAMETHOXAZOLE/TRIMETHOPRIM 800-160 MG
TABLET ORAL
Status: DISPENSED
Start: 2024-02-07